# Patient Record
Sex: FEMALE | Race: WHITE
[De-identification: names, ages, dates, MRNs, and addresses within clinical notes are randomized per-mention and may not be internally consistent; named-entity substitution may affect disease eponyms.]

---

## 2020-12-24 ENCOUNTER — HOSPITAL ENCOUNTER (INPATIENT)
Dept: HOSPITAL 79 - ER1 | Age: 55
LOS: 7 days | Discharge: HOME | DRG: 270 | End: 2020-12-31
Attending: HOSPITALIST | Admitting: INTERNAL MEDICINE
Payer: OTHER GOVERNMENT

## 2020-12-24 VITALS — HEIGHT: 67 IN | WEIGHT: 165 LBS | BODY MASS INDEX: 25.9 KG/M2

## 2020-12-24 DIAGNOSIS — A48.0: ICD-10-CM

## 2020-12-24 DIAGNOSIS — Z83.3: ICD-10-CM

## 2020-12-24 DIAGNOSIS — Z90.49: ICD-10-CM

## 2020-12-24 DIAGNOSIS — I16.0: ICD-10-CM

## 2020-12-24 DIAGNOSIS — E11.52: Primary | ICD-10-CM

## 2020-12-24 DIAGNOSIS — L03.031: ICD-10-CM

## 2020-12-24 DIAGNOSIS — Z82.49: ICD-10-CM

## 2020-12-24 DIAGNOSIS — M86.8X7: ICD-10-CM

## 2020-12-24 DIAGNOSIS — F17.210: ICD-10-CM

## 2020-12-24 DIAGNOSIS — E11.69: ICD-10-CM

## 2020-12-24 DIAGNOSIS — Z20.828: ICD-10-CM

## 2020-12-24 DIAGNOSIS — Z91.19: ICD-10-CM

## 2020-12-24 DIAGNOSIS — E11.65: ICD-10-CM

## 2020-12-24 LAB
BUN/CREATININE RATIO: 10 (ref 0–10)
HGB BLD-MCNC: 14.4 GM/DL (ref 12.3–15.3)
RED BLOOD COUNT: 4.5 M/UL (ref 4–5.1)
WHITE BLOOD COUNT: 10 K/UL (ref 4.5–11)

## 2020-12-24 PROCEDURE — C1769 GUIDE WIRE: HCPCS

## 2020-12-24 PROCEDURE — C1887 CATHETER, GUIDING: HCPCS

## 2020-12-24 PROCEDURE — C1714 CATH, TRANS ATHERECTOMY, DIR: HCPCS

## 2020-12-24 PROCEDURE — C2623 CATH, TRANSLUMIN, DRUG-COAT: HCPCS

## 2020-12-24 PROCEDURE — C1725 CATH, TRANSLUMIN NON-LASER: HCPCS

## 2020-12-24 PROCEDURE — U0002 COVID-19 LAB TEST NON-CDC: HCPCS

## 2020-12-25 LAB
BUN/CREATININE RATIO: 11 (ref 0–10)
HGB BLD-MCNC: 12.1 GM/DL (ref 12.3–15.3)
RED BLOOD COUNT: 3.85 M/UL (ref 4–5.1)
WHITE BLOOD COUNT: 9 K/UL (ref 4.5–11)

## 2020-12-26 LAB
BUN/CREATININE RATIO: 10 (ref 0–10)
HGB BLD-MCNC: 12.7 GM/DL (ref 12.3–15.3)
RED BLOOD COUNT: 4.14 M/UL (ref 4–5.1)
WHITE BLOOD COUNT: 10.7 K/UL (ref 4.5–11)

## 2020-12-27 LAB — BUN/CREATININE RATIO: 10 (ref 0–10)

## 2020-12-28 LAB — BUN/CREATININE RATIO: 9 (ref 0–10)

## 2020-12-29 LAB
BUN/CREATININE RATIO: 7 (ref 0–10)
HGB BLD-MCNC: 12.1 GM/DL (ref 12.3–15.3)
RED BLOOD COUNT: 3.87 M/UL (ref 4–5.1)
WHITE BLOOD COUNT: 9.5 K/UL (ref 4.5–11)

## 2020-12-29 PROCEDURE — 04CK3ZZ EXTIRPATION OF MATTER FROM RIGHT FEMORAL ARTERY, PERCUTANEOUS APPROACH: ICD-10-PCS | Performed by: SURGERY

## 2020-12-29 PROCEDURE — 0Y6T0Z1 DETACHMENT AT RIGHT 3RD TOE, HIGH, OPEN APPROACH: ICD-10-PCS | Performed by: SURGERY

## 2020-12-29 PROCEDURE — 047M3Z1 DILATION OF RIGHT POPLITEAL ARTERY USING DRUG-COATED BALLOON, PERCUTANEOUS APPROACH: ICD-10-PCS | Performed by: SURGERY

## 2020-12-29 PROCEDURE — B41GYZZ FLUOROSCOPY OF LEFT LOWER EXTREMITY ARTERIES USING OTHER CONTRAST: ICD-10-PCS | Performed by: SURGERY

## 2020-12-29 PROCEDURE — 047K3Z1 DILATION OF RIGHT FEMORAL ARTERY USING DRUG-COATED BALLOON, PERCUTANEOUS APPROACH: ICD-10-PCS | Performed by: SURGERY

## 2020-12-29 PROCEDURE — 04CM3ZZ EXTIRPATION OF MATTER FROM RIGHT POPLITEAL ARTERY, PERCUTANEOUS APPROACH: ICD-10-PCS | Performed by: SURGERY

## 2020-12-29 PROCEDURE — B41FYZZ FLUOROSCOPY OF RIGHT LOWER EXTREMITY ARTERIES USING OTHER CONTRAST: ICD-10-PCS | Performed by: SURGERY

## 2020-12-29 PROCEDURE — 047P3ZZ DILATION OF RIGHT ANTERIOR TIBIAL ARTERY, PERCUTANEOUS APPROACH: ICD-10-PCS | Performed by: SURGERY

## 2020-12-29 NOTE — NUR
2100- ASKED PATIENT IF SHE WANTED ME TO GO AHEAD AND UNWRAP AND APPLY MORE
BETADINE TO HER FOOT AND REWRAP IT, SHE STATES SHE WOULD RATHER WAIT UNTIL IN
THE MORNING.

## 2020-12-30 LAB
BUN/CREATININE RATIO: 8 (ref 0–10)
HGB BLD-MCNC: 10.9 GM/DL (ref 12.3–15.3)
RED BLOOD COUNT: 3.53 M/UL (ref 4–5.1)
WHITE BLOOD COUNT: 12 K/UL (ref 4.5–11)

## 2020-12-31 LAB
BUN/CREATININE RATIO: 15 (ref 0–10)
HGB BLD-MCNC: 11.6 GM/DL (ref 12.3–15.3)
RED BLOOD COUNT: 3.7 M/UL (ref 4–5.1)
WHITE BLOOD COUNT: 11 K/UL (ref 4.5–11)

## 2020-12-31 PROCEDURE — 02HV33Z INSERTION OF INFUSION DEVICE INTO SUPERIOR VENA CAVA, PERCUTANEOUS APPROACH: ICD-10-PCS | Performed by: HOSPITALIST

## 2020-12-31 PROCEDURE — B548ZZA ULTRASONOGRAPHY OF SUPERIOR VENA CAVA, GUIDANCE: ICD-10-PCS | Performed by: HOSPITALIST

## 2021-01-01 ENCOUNTER — HOSPITAL ENCOUNTER (OUTPATIENT)
Dept: HOSPITAL 79 - OPSV | Age: 56
End: 2021-01-01
Payer: OTHER GOVERNMENT

## 2021-01-01 VITALS — WEIGHT: 165 LBS | BODY MASS INDEX: 25.9 KG/M2 | HEIGHT: 67 IN

## 2021-01-01 DIAGNOSIS — M86.8X7: Primary | ICD-10-CM

## 2021-01-01 DIAGNOSIS — I96: ICD-10-CM

## 2021-01-02 ENCOUNTER — HOSPITAL ENCOUNTER (OUTPATIENT)
Dept: HOSPITAL 79 - OPSV | Age: 56
End: 2021-01-02
Attending: HOSPITALIST
Payer: OTHER GOVERNMENT

## 2021-01-02 VITALS — BODY MASS INDEX: 25.9 KG/M2 | WEIGHT: 165 LBS | HEIGHT: 67 IN

## 2021-01-02 DIAGNOSIS — I96: ICD-10-CM

## 2021-01-02 DIAGNOSIS — M86.8X7: Primary | ICD-10-CM

## 2021-01-03 ENCOUNTER — HOSPITAL ENCOUNTER (OUTPATIENT)
Dept: HOSPITAL 79 - OPSV | Age: 56
End: 2021-01-03
Attending: HOSPITALIST
Payer: OTHER GOVERNMENT

## 2021-01-03 VITALS — BODY MASS INDEX: 25.9 KG/M2 | WEIGHT: 165 LBS | HEIGHT: 67 IN

## 2021-01-03 DIAGNOSIS — M86.8X7: Primary | ICD-10-CM

## 2021-01-03 DIAGNOSIS — I96: ICD-10-CM

## 2021-01-04 ENCOUNTER — HOSPITAL ENCOUNTER (OUTPATIENT)
Dept: HOSPITAL 79 - OPSV | Age: 56
End: 2021-01-04
Attending: HOSPITALIST
Payer: OTHER GOVERNMENT

## 2021-01-04 ENCOUNTER — HOSPITAL ENCOUNTER (OUTPATIENT)
Dept: HOSPITAL 79 - WCC | Age: 56
End: 2021-01-04
Attending: SURGERY
Payer: OTHER GOVERNMENT

## 2021-01-04 VITALS — BODY MASS INDEX: 25.9 KG/M2 | WEIGHT: 165 LBS | HEIGHT: 67 IN

## 2021-01-04 DIAGNOSIS — E08.21: ICD-10-CM

## 2021-01-04 DIAGNOSIS — L97.516: ICD-10-CM

## 2021-01-04 DIAGNOSIS — I73.9: Primary | ICD-10-CM

## 2021-01-04 DIAGNOSIS — M86.8X7: Primary | ICD-10-CM

## 2021-01-04 DIAGNOSIS — I96: ICD-10-CM

## 2021-01-04 DIAGNOSIS — F17.291: ICD-10-CM

## 2021-01-04 PROCEDURE — G0463 HOSPITAL OUTPT CLINIC VISIT: HCPCS

## 2021-01-05 ENCOUNTER — HOSPITAL ENCOUNTER (OUTPATIENT)
Dept: HOSPITAL 79 - OPSV | Age: 56
End: 2021-01-05
Attending: HOSPITALIST
Payer: OTHER GOVERNMENT

## 2021-01-05 DIAGNOSIS — M86.8X7: Primary | ICD-10-CM

## 2021-01-05 DIAGNOSIS — I96: ICD-10-CM

## 2021-01-06 ENCOUNTER — HOSPITAL ENCOUNTER (OUTPATIENT)
Dept: HOSPITAL 79 - OPSV | Age: 56
End: 2021-01-06
Attending: HOSPITALIST
Payer: OTHER GOVERNMENT

## 2021-01-06 ENCOUNTER — HOSPITAL ENCOUNTER (OUTPATIENT)
Dept: HOSPITAL 79 - WCC | Age: 56
End: 2021-01-06
Attending: NURSE PRACTITIONER
Payer: OTHER GOVERNMENT

## 2021-01-06 VITALS — BODY MASS INDEX: 25.9 KG/M2 | WEIGHT: 165 LBS | HEIGHT: 67 IN

## 2021-01-06 DIAGNOSIS — M86.8X7: Primary | ICD-10-CM
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
## 2021-01-06 DIAGNOSIS — I96: ICD-10-CM

## 2021-01-06 DIAGNOSIS — E11.40: ICD-10-CM

## 2021-01-06 DIAGNOSIS — T81.30XA: Primary | ICD-10-CM

## 2021-01-06 PROCEDURE — G0463 HOSPITAL OUTPT CLINIC VISIT: HCPCS

## 2021-01-07 ENCOUNTER — HOSPITAL ENCOUNTER (OUTPATIENT)
Dept: HOSPITAL 79 - OPSV | Age: 56
End: 2021-01-07
Attending: HOSPITALIST
Payer: OTHER GOVERNMENT

## 2021-01-07 DIAGNOSIS — M86.8X7: Primary | ICD-10-CM

## 2021-01-07 DIAGNOSIS — I96: ICD-10-CM

## 2021-01-08 ENCOUNTER — HOSPITAL ENCOUNTER (OUTPATIENT)
Dept: HOSPITAL 79 - OPSV | Age: 56
End: 2021-01-08
Attending: HOSPITALIST
Payer: OTHER GOVERNMENT

## 2021-01-08 ENCOUNTER — HOSPITAL ENCOUNTER (OUTPATIENT)
Dept: HOSPITAL 79 - WCC | Age: 56
End: 2021-01-08
Attending: NURSE PRACTITIONER
Payer: OTHER GOVERNMENT

## 2021-01-08 VITALS — BODY MASS INDEX: 25.9 KG/M2 | HEIGHT: 67 IN | WEIGHT: 165 LBS

## 2021-01-08 DIAGNOSIS — I73.9: ICD-10-CM

## 2021-01-08 DIAGNOSIS — F17.291: ICD-10-CM

## 2021-01-08 DIAGNOSIS — I96: ICD-10-CM

## 2021-01-08 DIAGNOSIS — L97.516: ICD-10-CM

## 2021-01-08 DIAGNOSIS — E08.21: Primary | ICD-10-CM

## 2021-01-08 DIAGNOSIS — M86.8X7: Primary | ICD-10-CM

## 2021-01-08 DIAGNOSIS — E08.621: ICD-10-CM

## 2021-01-08 PROCEDURE — G0463 HOSPITAL OUTPT CLINIC VISIT: HCPCS

## 2021-01-09 ENCOUNTER — HOSPITAL ENCOUNTER (OUTPATIENT)
Dept: HOSPITAL 79 - OPSV | Age: 56
End: 2021-01-09
Attending: HOSPITALIST
Payer: OTHER GOVERNMENT

## 2021-01-09 VITALS — BODY MASS INDEX: 25.9 KG/M2 | WEIGHT: 165 LBS | HEIGHT: 67 IN

## 2021-01-09 DIAGNOSIS — I96: ICD-10-CM

## 2021-01-09 DIAGNOSIS — M86.8X7: Primary | ICD-10-CM

## 2021-01-10 ENCOUNTER — HOSPITAL ENCOUNTER (OUTPATIENT)
Dept: HOSPITAL 79 - OPSV | Age: 56
End: 2021-01-10
Attending: HOSPITALIST
Payer: OTHER GOVERNMENT

## 2021-01-10 VITALS — BODY MASS INDEX: 25.9 KG/M2 | WEIGHT: 165 LBS | HEIGHT: 67 IN

## 2021-01-10 DIAGNOSIS — M86.8X7: Primary | ICD-10-CM

## 2021-01-10 DIAGNOSIS — I96: ICD-10-CM

## 2021-01-11 ENCOUNTER — HOSPITAL ENCOUNTER (OUTPATIENT)
Dept: HOSPITAL 79 - OPSV | Age: 56
End: 2021-01-11
Attending: HOSPITALIST
Payer: OTHER GOVERNMENT

## 2021-01-11 ENCOUNTER — HOSPITAL ENCOUNTER (OUTPATIENT)
Dept: HOSPITAL 79 - WCC | Age: 56
End: 2021-01-11
Attending: SURGERY
Payer: OTHER GOVERNMENT

## 2021-01-11 VITALS — BODY MASS INDEX: 25.9 KG/M2 | WEIGHT: 165 LBS | HEIGHT: 67 IN

## 2021-01-11 DIAGNOSIS — T87.53: ICD-10-CM

## 2021-01-11 DIAGNOSIS — M86.8X7: Primary | ICD-10-CM

## 2021-01-11 DIAGNOSIS — E11.21: ICD-10-CM

## 2021-01-11 DIAGNOSIS — I96: ICD-10-CM

## 2021-01-11 DIAGNOSIS — I10: ICD-10-CM

## 2021-01-11 DIAGNOSIS — Z89.421: ICD-10-CM

## 2021-01-11 DIAGNOSIS — T87.81: Primary | ICD-10-CM

## 2021-01-11 DIAGNOSIS — L97.514: ICD-10-CM
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
## 2021-01-11 DIAGNOSIS — E11.52: ICD-10-CM

## 2021-01-11 DIAGNOSIS — E11.40: ICD-10-CM

## 2021-01-11 DIAGNOSIS — Z79.51: ICD-10-CM

## 2021-01-11 DIAGNOSIS — E11.621: ICD-10-CM

## 2021-01-11 DIAGNOSIS — F17.291: ICD-10-CM

## 2021-01-11 PROCEDURE — 0KBV0ZZ EXCISION OF RIGHT FOOT MUSCLE, OPEN APPROACH: ICD-10-PCS | Performed by: SURGERY

## 2021-01-12 ENCOUNTER — HOSPITAL ENCOUNTER (OUTPATIENT)
Dept: HOSPITAL 79 - OPSV | Age: 56
End: 2021-01-12
Attending: HOSPITALIST
Payer: OTHER GOVERNMENT

## 2021-01-12 VITALS — HEIGHT: 67 IN | WEIGHT: 165 LBS | BODY MASS INDEX: 25.9 KG/M2

## 2021-01-12 DIAGNOSIS — I96: ICD-10-CM

## 2021-01-12 DIAGNOSIS — M86.8X7: Primary | ICD-10-CM

## 2021-01-13 ENCOUNTER — HOSPITAL ENCOUNTER (OUTPATIENT)
Dept: HOSPITAL 79 - OPSV | Age: 56
End: 2021-01-13
Attending: HOSPITALIST
Payer: OTHER GOVERNMENT

## 2021-01-13 VITALS — WEIGHT: 165 LBS | BODY MASS INDEX: 25.9 KG/M2 | HEIGHT: 67 IN

## 2021-01-13 DIAGNOSIS — I96: ICD-10-CM

## 2021-01-13 DIAGNOSIS — M86.8X7: Primary | ICD-10-CM

## 2021-01-14 ENCOUNTER — HOSPITAL ENCOUNTER (OUTPATIENT)
Dept: HOSPITAL 79 - OPSV | Age: 56
End: 2021-01-14
Attending: HOSPITALIST
Payer: OTHER GOVERNMENT

## 2021-01-14 VITALS — HEIGHT: 67 IN | WEIGHT: 165 LBS | BODY MASS INDEX: 25.9 KG/M2

## 2021-01-14 DIAGNOSIS — M86.8X7: Primary | ICD-10-CM

## 2021-01-14 DIAGNOSIS — I96: ICD-10-CM

## 2021-01-15 ENCOUNTER — HOSPITAL ENCOUNTER (OUTPATIENT)
Dept: HOSPITAL 79 - OPSV | Age: 56
End: 2021-01-15
Attending: HOSPITALIST
Payer: OTHER GOVERNMENT

## 2021-01-15 VITALS — HEIGHT: 67 IN | WEIGHT: 165 LBS | BODY MASS INDEX: 25.9 KG/M2

## 2021-01-15 DIAGNOSIS — M86.8X7: Primary | ICD-10-CM

## 2021-01-15 DIAGNOSIS — I96: ICD-10-CM

## 2021-01-16 ENCOUNTER — HOSPITAL ENCOUNTER (OUTPATIENT)
Dept: HOSPITAL 79 - OPSV | Age: 56
End: 2021-01-16
Attending: HOSPITALIST
Payer: OTHER GOVERNMENT

## 2021-01-16 DIAGNOSIS — M86.8X7: Primary | ICD-10-CM

## 2021-01-16 DIAGNOSIS — I96: ICD-10-CM

## 2021-01-17 ENCOUNTER — HOSPITAL ENCOUNTER (OUTPATIENT)
Dept: HOSPITAL 79 - OPSV | Age: 56
End: 2021-01-17
Attending: HOSPITALIST
Payer: OTHER GOVERNMENT

## 2021-01-17 VITALS — WEIGHT: 165 LBS | BODY MASS INDEX: 25.9 KG/M2 | HEIGHT: 67 IN

## 2021-01-17 DIAGNOSIS — I96: ICD-10-CM

## 2021-01-17 DIAGNOSIS — M86.8X7: Primary | ICD-10-CM

## 2021-01-18 ENCOUNTER — HOSPITAL ENCOUNTER (OUTPATIENT)
Dept: HOSPITAL 79 - WCC | Age: 56
End: 2021-01-18
Attending: SURGERY
Payer: OTHER GOVERNMENT

## 2021-01-18 ENCOUNTER — HOSPITAL ENCOUNTER (OUTPATIENT)
Dept: HOSPITAL 79 - OPSV | Age: 56
End: 2021-01-18
Attending: HOSPITALIST
Payer: OTHER GOVERNMENT

## 2021-01-18 DIAGNOSIS — I96: ICD-10-CM

## 2021-01-18 DIAGNOSIS — E11.621: ICD-10-CM

## 2021-01-18 DIAGNOSIS — Z79.899: ICD-10-CM

## 2021-01-18 DIAGNOSIS — M86.8X7: Primary | ICD-10-CM
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
## 2021-01-18 DIAGNOSIS — F17.291: ICD-10-CM

## 2021-01-18 DIAGNOSIS — T81.31XA: Primary | ICD-10-CM

## 2021-01-18 DIAGNOSIS — E11.52: ICD-10-CM

## 2021-01-18 DIAGNOSIS — I10: ICD-10-CM

## 2021-01-18 DIAGNOSIS — L97.514: ICD-10-CM

## 2021-01-18 DIAGNOSIS — E11.40: ICD-10-CM

## 2021-01-18 PROCEDURE — 0KBV0ZZ EXCISION OF RIGHT FOOT MUSCLE, OPEN APPROACH: ICD-10-PCS | Performed by: SURGERY

## 2021-01-19 ENCOUNTER — HOSPITAL ENCOUNTER (OUTPATIENT)
Dept: HOSPITAL 79 - OPSV | Age: 56
End: 2021-01-19
Attending: HOSPITALIST
Payer: OTHER GOVERNMENT

## 2021-01-19 VITALS — WEIGHT: 165 LBS | HEIGHT: 67 IN | BODY MASS INDEX: 25.9 KG/M2

## 2021-01-19 DIAGNOSIS — I96: Primary | ICD-10-CM

## 2021-01-19 DIAGNOSIS — M86.8X7: ICD-10-CM

## 2021-01-20 ENCOUNTER — HOSPITAL ENCOUNTER (OUTPATIENT)
Dept: HOSPITAL 79 - OPSV | Age: 56
End: 2021-01-20
Attending: HOSPITALIST
Payer: OTHER GOVERNMENT

## 2021-01-20 VITALS — WEIGHT: 165 LBS | BODY MASS INDEX: 25.9 KG/M2 | HEIGHT: 67 IN

## 2021-01-20 DIAGNOSIS — I96: Primary | ICD-10-CM

## 2021-01-20 DIAGNOSIS — M86.8X7: ICD-10-CM

## 2021-01-21 ENCOUNTER — HOSPITAL ENCOUNTER (OUTPATIENT)
Dept: HOSPITAL 79 - OPSV | Age: 56
End: 2021-01-21
Attending: HOSPITALIST
Payer: OTHER GOVERNMENT

## 2021-01-21 DIAGNOSIS — I96: ICD-10-CM

## 2021-01-21 DIAGNOSIS — M86.8X7: Primary | ICD-10-CM

## 2021-01-22 ENCOUNTER — HOSPITAL ENCOUNTER (OUTPATIENT)
Dept: HOSPITAL 79 - OPSV | Age: 56
End: 2021-01-22
Attending: HOSPITALIST
Payer: OTHER GOVERNMENT

## 2021-01-22 VITALS — WEIGHT: 165 LBS | HEIGHT: 67 IN | BODY MASS INDEX: 25.9 KG/M2

## 2021-01-22 DIAGNOSIS — I96: ICD-10-CM

## 2021-01-22 DIAGNOSIS — M86.8X7: Primary | ICD-10-CM

## 2021-01-23 ENCOUNTER — HOSPITAL ENCOUNTER (OUTPATIENT)
Dept: HOSPITAL 79 - OPSV | Age: 56
End: 2021-01-23
Attending: HOSPITALIST
Payer: OTHER GOVERNMENT

## 2021-01-23 DIAGNOSIS — M86.8X7: Primary | ICD-10-CM

## 2021-01-23 DIAGNOSIS — I96: ICD-10-CM

## 2021-01-24 ENCOUNTER — HOSPITAL ENCOUNTER (OUTPATIENT)
Dept: HOSPITAL 79 - OPSV | Age: 56
End: 2021-01-24
Attending: HOSPITALIST
Payer: OTHER GOVERNMENT

## 2021-01-24 DIAGNOSIS — M86.8X7: ICD-10-CM

## 2021-01-24 DIAGNOSIS — I96: Primary | ICD-10-CM

## 2021-01-25 ENCOUNTER — HOSPITAL ENCOUNTER (OUTPATIENT)
Dept: HOSPITAL 79 - WCC | Age: 56
End: 2021-01-25
Attending: SURGERY
Payer: OTHER GOVERNMENT

## 2021-01-25 ENCOUNTER — HOSPITAL ENCOUNTER (OUTPATIENT)
Dept: HOSPITAL 79 - OPSV | Age: 56
End: 2021-01-25
Attending: HOSPITALIST
Payer: OTHER GOVERNMENT

## 2021-01-25 DIAGNOSIS — E11.621: ICD-10-CM

## 2021-01-25 DIAGNOSIS — E11.51: ICD-10-CM

## 2021-01-25 DIAGNOSIS — L97.516: ICD-10-CM

## 2021-01-25 DIAGNOSIS — I96: ICD-10-CM

## 2021-01-25 DIAGNOSIS — F17.291: ICD-10-CM

## 2021-01-25 DIAGNOSIS — T81.31XA: Primary | ICD-10-CM

## 2021-01-25 DIAGNOSIS — M86.8X7: Primary | ICD-10-CM

## 2021-01-26 ENCOUNTER — HOSPITAL ENCOUNTER (OUTPATIENT)
Dept: HOSPITAL 79 - OPSV | Age: 56
End: 2021-01-26
Attending: HOSPITALIST
Payer: OTHER GOVERNMENT

## 2021-01-26 VITALS — WEIGHT: 165 LBS | HEIGHT: 67 IN | BODY MASS INDEX: 25.9 KG/M2

## 2021-01-26 DIAGNOSIS — M86.8X7: Primary | ICD-10-CM

## 2021-01-26 DIAGNOSIS — I96: ICD-10-CM

## 2021-01-27 ENCOUNTER — HOSPITAL ENCOUNTER (OUTPATIENT)
Dept: HOSPITAL 79 - OPSV | Age: 56
End: 2021-01-27
Attending: HOSPITALIST
Payer: OTHER GOVERNMENT

## 2021-01-27 VITALS — BODY MASS INDEX: 25.9 KG/M2 | HEIGHT: 67 IN | WEIGHT: 165 LBS

## 2021-01-27 DIAGNOSIS — I96: ICD-10-CM

## 2021-01-27 DIAGNOSIS — M86.8X7: Primary | ICD-10-CM

## 2021-01-28 ENCOUNTER — HOSPITAL ENCOUNTER (OUTPATIENT)
Dept: HOSPITAL 79 - OPSV | Age: 56
End: 2021-01-28
Attending: HOSPITALIST
Payer: OTHER GOVERNMENT

## 2021-01-28 DIAGNOSIS — M86.8X7: Primary | ICD-10-CM

## 2021-01-28 DIAGNOSIS — I96: ICD-10-CM

## 2021-01-29 ENCOUNTER — HOSPITAL ENCOUNTER (OUTPATIENT)
Dept: HOSPITAL 79 - OPSV | Age: 56
End: 2021-01-29
Attending: HOSPITALIST
Payer: OTHER GOVERNMENT

## 2021-01-29 VITALS — BODY MASS INDEX: 25.9 KG/M2 | WEIGHT: 165 LBS | HEIGHT: 67 IN

## 2021-01-29 DIAGNOSIS — I96: ICD-10-CM

## 2021-01-29 DIAGNOSIS — M86.8X7: Primary | ICD-10-CM

## 2021-01-30 ENCOUNTER — HOSPITAL ENCOUNTER (OUTPATIENT)
Dept: HOSPITAL 79 - OPSV | Age: 56
End: 2021-01-30
Attending: HOSPITALIST
Payer: OTHER GOVERNMENT

## 2021-01-30 VITALS — WEIGHT: 165 LBS | BODY MASS INDEX: 25.9 KG/M2 | HEIGHT: 67 IN

## 2021-01-30 DIAGNOSIS — I96: ICD-10-CM

## 2021-01-30 DIAGNOSIS — M86.8X7: Primary | ICD-10-CM

## 2021-02-01 ENCOUNTER — HOSPITAL ENCOUNTER (OUTPATIENT)
Dept: HOSPITAL 79 - WCC | Age: 56
End: 2021-02-01
Attending: SURGERY
Payer: OTHER GOVERNMENT

## 2021-02-01 DIAGNOSIS — F17.291: ICD-10-CM

## 2021-02-01 DIAGNOSIS — E11.52: ICD-10-CM

## 2021-02-01 DIAGNOSIS — I10: ICD-10-CM

## 2021-02-01 DIAGNOSIS — Z89.421: ICD-10-CM

## 2021-02-01 DIAGNOSIS — Z79.1: ICD-10-CM

## 2021-02-01 DIAGNOSIS — L97.514: ICD-10-CM

## 2021-02-01 DIAGNOSIS — E11.621: ICD-10-CM

## 2021-02-01 DIAGNOSIS — T87.53: ICD-10-CM

## 2021-02-01 DIAGNOSIS — T87.81: Primary | ICD-10-CM
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
## 2021-02-01 DIAGNOSIS — E11.21: ICD-10-CM

## 2021-02-01 DIAGNOSIS — I96: ICD-10-CM

## 2021-02-01 DIAGNOSIS — E11.40: ICD-10-CM

## 2021-02-01 PROCEDURE — 0KBV0ZZ EXCISION OF RIGHT FOOT MUSCLE, OPEN APPROACH: ICD-10-PCS | Performed by: SURGERY

## 2021-02-08 ENCOUNTER — HOSPITAL ENCOUNTER (OUTPATIENT)
Dept: HOSPITAL 79 - WCC | Age: 56
End: 2021-02-08
Attending: SURGERY
Payer: OTHER GOVERNMENT
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
## 2021-02-08 DIAGNOSIS — E11.52: ICD-10-CM

## 2021-02-08 DIAGNOSIS — I96: ICD-10-CM

## 2021-02-08 DIAGNOSIS — T87.81: Primary | ICD-10-CM

## 2021-02-08 DIAGNOSIS — I10: ICD-10-CM

## 2021-02-08 DIAGNOSIS — T87.53: ICD-10-CM

## 2021-02-08 DIAGNOSIS — E11.40: ICD-10-CM

## 2021-02-08 DIAGNOSIS — Z79.52: ICD-10-CM

## 2021-02-08 PROCEDURE — 0KBV0ZZ EXCISION OF RIGHT FOOT MUSCLE, OPEN APPROACH: ICD-10-PCS | Performed by: SURGERY

## 2021-02-09 ENCOUNTER — HOSPITAL ENCOUNTER (EMERGENCY)
Dept: HOSPITAL 79 - ER1 | Age: 56
Discharge: HOME | End: 2021-02-09
Payer: OTHER GOVERNMENT

## 2021-02-09 DIAGNOSIS — I95.9: Primary | ICD-10-CM

## 2021-02-09 DIAGNOSIS — E11.9: ICD-10-CM

## 2021-02-09 DIAGNOSIS — Z79.82: ICD-10-CM

## 2021-02-09 DIAGNOSIS — Z79.899: ICD-10-CM

## 2021-02-09 DIAGNOSIS — I10: ICD-10-CM

## 2021-02-09 DIAGNOSIS — E78.5: ICD-10-CM

## 2021-02-09 LAB
BUN/CREATININE RATIO: 19 (ref 0–10)
HGB BLD-MCNC: 10.9 GM/DL (ref 12.3–15.3)
RED BLOOD COUNT: 3.62 M/UL (ref 4–5.1)
WHITE BLOOD COUNT: 6.7 K/UL (ref 4.5–11)

## 2021-02-22 ENCOUNTER — HOSPITAL ENCOUNTER (OUTPATIENT)
Dept: HOSPITAL 79 - WCC | Age: 56
End: 2021-02-22
Attending: SURGERY
Payer: OTHER GOVERNMENT

## 2021-02-22 DIAGNOSIS — I96: ICD-10-CM

## 2021-02-22 DIAGNOSIS — E11.40: ICD-10-CM

## 2021-02-22 DIAGNOSIS — E11.21: ICD-10-CM

## 2021-02-22 DIAGNOSIS — T87.81: Primary | ICD-10-CM

## 2021-02-22 DIAGNOSIS — I10: ICD-10-CM

## 2021-02-22 DIAGNOSIS — Z79.52: ICD-10-CM

## 2021-02-22 DIAGNOSIS — T87.53: ICD-10-CM

## 2021-02-22 DIAGNOSIS — L97.514: ICD-10-CM

## 2021-02-22 DIAGNOSIS — E11.621: ICD-10-CM

## 2021-02-22 DIAGNOSIS — Z89.421: ICD-10-CM
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
## 2021-02-22 DIAGNOSIS — F17.291: ICD-10-CM

## 2021-02-22 DIAGNOSIS — E11.52: ICD-10-CM

## 2021-02-22 PROCEDURE — 0KBV0ZZ EXCISION OF RIGHT FOOT MUSCLE, OPEN APPROACH: ICD-10-PCS | Performed by: SURGERY

## 2021-03-01 ENCOUNTER — HOSPITAL ENCOUNTER (OUTPATIENT)
Dept: HOSPITAL 79 - WCC | Age: 56
End: 2021-03-01
Attending: SURGERY
Payer: OTHER GOVERNMENT

## 2021-03-01 ENCOUNTER — HOSPITAL ENCOUNTER (OUTPATIENT)
Dept: HOSPITAL 79 - DTC | Age: 56
End: 2021-03-01
Attending: NURSE PRACTITIONER
Payer: OTHER GOVERNMENT

## 2021-03-01 DIAGNOSIS — G47.30: ICD-10-CM

## 2021-03-01 DIAGNOSIS — L97.522: ICD-10-CM

## 2021-03-01 DIAGNOSIS — E11.9: Primary | ICD-10-CM

## 2021-03-01 DIAGNOSIS — I10: ICD-10-CM

## 2021-03-01 DIAGNOSIS — L97.829: ICD-10-CM

## 2021-03-01 DIAGNOSIS — L97.819: ICD-10-CM

## 2021-03-01 DIAGNOSIS — E11.621: Primary | ICD-10-CM

## 2021-03-01 PROCEDURE — G0108 DIAB MANAGE TRN  PER INDIV: HCPCS

## 2021-03-08 ENCOUNTER — HOSPITAL ENCOUNTER (OUTPATIENT)
Dept: HOSPITAL 79 - WCC | Age: 56
End: 2021-03-08
Attending: SURGERY
Payer: OTHER GOVERNMENT

## 2021-03-08 DIAGNOSIS — Z79.52: ICD-10-CM

## 2021-03-08 DIAGNOSIS — I10: ICD-10-CM

## 2021-03-08 DIAGNOSIS — E11.40: ICD-10-CM

## 2021-03-08 DIAGNOSIS — E11.52: ICD-10-CM

## 2021-03-08 DIAGNOSIS — L97.513: ICD-10-CM

## 2021-03-08 DIAGNOSIS — T87.81: Primary | ICD-10-CM

## 2021-03-08 DIAGNOSIS — E11.21: ICD-10-CM

## 2021-03-08 DIAGNOSIS — T87.53: ICD-10-CM

## 2021-03-08 DIAGNOSIS — F17.291: ICD-10-CM

## 2021-03-08 DIAGNOSIS — E11.621: ICD-10-CM

## 2021-03-08 DIAGNOSIS — I96: ICD-10-CM
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
## 2021-03-08 PROCEDURE — 0KBV0ZZ EXCISION OF RIGHT FOOT MUSCLE, OPEN APPROACH: ICD-10-PCS | Performed by: SURGERY

## 2021-03-15 ENCOUNTER — HOSPITAL ENCOUNTER (OUTPATIENT)
Dept: HOSPITAL 79 - WCC | Age: 56
End: 2021-03-15
Attending: NURSE PRACTITIONER
Payer: OTHER GOVERNMENT

## 2021-03-15 DIAGNOSIS — Z79.899: ICD-10-CM
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
## 2021-03-15 DIAGNOSIS — E11.52: ICD-10-CM

## 2021-03-15 DIAGNOSIS — F17.291: ICD-10-CM

## 2021-03-15 DIAGNOSIS — I96: ICD-10-CM

## 2021-03-15 DIAGNOSIS — I10: ICD-10-CM

## 2021-03-15 DIAGNOSIS — E11.21: ICD-10-CM

## 2021-03-15 DIAGNOSIS — L97.513: ICD-10-CM

## 2021-03-15 DIAGNOSIS — T87.81: Primary | ICD-10-CM

## 2021-03-15 DIAGNOSIS — T87.53: ICD-10-CM

## 2021-03-15 DIAGNOSIS — E11.621: ICD-10-CM

## 2021-03-15 PROCEDURE — 0KBV0ZZ EXCISION OF RIGHT FOOT MUSCLE, OPEN APPROACH: ICD-10-PCS | Performed by: NURSE PRACTITIONER

## 2021-03-22 ENCOUNTER — HOSPITAL ENCOUNTER (OUTPATIENT)
Dept: HOSPITAL 79 - WCC | Age: 56
End: 2021-03-22
Attending: NURSE PRACTITIONER
Payer: OTHER GOVERNMENT

## 2021-03-22 DIAGNOSIS — T87.53: ICD-10-CM

## 2021-03-22 DIAGNOSIS — Z79.52: ICD-10-CM

## 2021-03-22 DIAGNOSIS — I96: ICD-10-CM

## 2021-03-22 DIAGNOSIS — T87.81: Primary | ICD-10-CM

## 2021-03-22 DIAGNOSIS — E11.40: ICD-10-CM

## 2021-03-22 DIAGNOSIS — E11.21: ICD-10-CM

## 2021-03-22 DIAGNOSIS — L97.512: ICD-10-CM

## 2021-03-22 DIAGNOSIS — I10: ICD-10-CM

## 2021-03-22 DIAGNOSIS — E11.621: ICD-10-CM

## 2021-03-22 DIAGNOSIS — E11.52: ICD-10-CM
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
## 2021-03-22 DIAGNOSIS — F17.291: ICD-10-CM

## 2021-03-22 PROCEDURE — 0KBV0ZZ EXCISION OF RIGHT FOOT MUSCLE, OPEN APPROACH: ICD-10-PCS | Performed by: NURSE PRACTITIONER

## 2021-03-29 ENCOUNTER — HOSPITAL ENCOUNTER (OUTPATIENT)
Dept: HOSPITAL 79 - WCC | Age: 56
End: 2021-03-29
Attending: NURSE PRACTITIONER
Payer: OTHER GOVERNMENT

## 2021-03-29 DIAGNOSIS — L97.512: ICD-10-CM

## 2021-03-29 DIAGNOSIS — T81.31XA: Primary | ICD-10-CM

## 2021-03-29 DIAGNOSIS — E11.621: ICD-10-CM
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
## 2021-03-29 DIAGNOSIS — E11.52: ICD-10-CM

## 2021-03-29 DIAGNOSIS — I96: ICD-10-CM

## 2021-03-29 DIAGNOSIS — E11.40: ICD-10-CM

## 2021-03-29 DIAGNOSIS — E11.21: ICD-10-CM

## 2021-03-29 DIAGNOSIS — F17.291: ICD-10-CM

## 2021-03-29 DIAGNOSIS — Z79.52: ICD-10-CM

## 2021-03-29 PROCEDURE — 0KBV0ZZ EXCISION OF RIGHT FOOT MUSCLE, OPEN APPROACH: ICD-10-PCS | Performed by: NURSE PRACTITIONER

## 2021-04-05 ENCOUNTER — HOSPITAL ENCOUNTER (OUTPATIENT)
Dept: HOSPITAL 79 - WCC | Age: 56
End: 2021-04-05
Attending: SURGERY
Payer: OTHER GOVERNMENT

## 2021-04-05 DIAGNOSIS — E11.40: ICD-10-CM

## 2021-04-05 DIAGNOSIS — T87.53: ICD-10-CM
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
## 2021-04-05 DIAGNOSIS — E11.52: ICD-10-CM

## 2021-04-05 DIAGNOSIS — T87.81: Primary | ICD-10-CM

## 2021-04-05 DIAGNOSIS — I10: ICD-10-CM

## 2021-04-05 DIAGNOSIS — Z79.52: ICD-10-CM

## 2021-04-05 DIAGNOSIS — S81.802A: ICD-10-CM

## 2021-04-05 DIAGNOSIS — I96: ICD-10-CM
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
## 2021-04-05 PROCEDURE — 0JBQ0ZZ EXCISION OF RIGHT FOOT SUBCUTANEOUS TISSUE AND FASCIA, OPEN APPROACH: ICD-10-PCS | Performed by: SURGERY

## 2021-04-12 ENCOUNTER — HOSPITAL ENCOUNTER (OUTPATIENT)
Dept: HOSPITAL 79 - WCC | Age: 56
End: 2021-04-12
Attending: NURSE PRACTITIONER
Payer: OTHER GOVERNMENT

## 2021-04-12 DIAGNOSIS — Z89.421: ICD-10-CM

## 2021-04-12 DIAGNOSIS — E11.621: Primary | ICD-10-CM

## 2021-04-12 DIAGNOSIS — E11.51: ICD-10-CM

## 2021-04-12 DIAGNOSIS — T87.81: ICD-10-CM

## 2021-04-12 DIAGNOSIS — E11.21: ICD-10-CM

## 2021-04-12 DIAGNOSIS — S81.802A: ICD-10-CM

## 2021-04-20 ENCOUNTER — HOSPITAL ENCOUNTER (OUTPATIENT)
Dept: HOSPITAL 79 - WCC | Age: 56
End: 2021-04-20
Attending: NURSE PRACTITIONER
Payer: OTHER GOVERNMENT

## 2021-04-20 DIAGNOSIS — E11.51: Primary | ICD-10-CM

## 2021-04-20 DIAGNOSIS — I10: ICD-10-CM

## 2021-04-20 DIAGNOSIS — L97.515: ICD-10-CM

## 2021-04-20 DIAGNOSIS — F17.291: ICD-10-CM

## 2021-04-20 DIAGNOSIS — L97.516: ICD-10-CM

## 2021-04-26 ENCOUNTER — HOSPITAL ENCOUNTER (OUTPATIENT)
Dept: HOSPITAL 79 - KOH-I | Age: 56
End: 2021-04-26
Attending: NURSE PRACTITIONER
Payer: OTHER GOVERNMENT

## 2021-04-26 ENCOUNTER — HOSPITAL ENCOUNTER (OUTPATIENT)
Dept: HOSPITAL 79 - WCC | Age: 56
End: 2021-04-26
Attending: SURGERY
Payer: OTHER GOVERNMENT

## 2021-04-26 DIAGNOSIS — L97.516: ICD-10-CM

## 2021-04-26 DIAGNOSIS — L97.515: ICD-10-CM

## 2021-04-26 DIAGNOSIS — I73.9: Primary | ICD-10-CM

## 2021-04-26 DIAGNOSIS — F17.291: ICD-10-CM

## 2021-04-26 DIAGNOSIS — E11.621: Primary | ICD-10-CM

## 2021-04-26 DIAGNOSIS — E08.21: ICD-10-CM

## 2021-04-26 DIAGNOSIS — E11.51: ICD-10-CM

## 2021-05-10 ENCOUNTER — HOSPITAL ENCOUNTER (OUTPATIENT)
Dept: HOSPITAL 79 - WCC | Age: 56
End: 2021-05-10
Attending: NURSE PRACTITIONER
Payer: OTHER GOVERNMENT

## 2021-05-10 DIAGNOSIS — T87.81: ICD-10-CM

## 2021-05-10 DIAGNOSIS — F17.291: ICD-10-CM

## 2021-05-10 DIAGNOSIS — E11.51: ICD-10-CM

## 2021-05-10 DIAGNOSIS — E11.21: ICD-10-CM

## 2021-05-10 DIAGNOSIS — Z89.421: ICD-10-CM

## 2021-05-10 DIAGNOSIS — L97.515: ICD-10-CM

## 2021-05-10 DIAGNOSIS — E11.621: Primary | ICD-10-CM

## 2021-05-10 DIAGNOSIS — Z79.52: ICD-10-CM

## 2021-05-17 ENCOUNTER — HOSPITAL ENCOUNTER (OUTPATIENT)
Dept: HOSPITAL 79 - WCC | Age: 56
End: 2021-05-17
Attending: NURSE PRACTITIONER
Payer: OTHER GOVERNMENT

## 2021-05-17 DIAGNOSIS — E11.621: Primary | ICD-10-CM

## 2021-05-17 DIAGNOSIS — E11.51: ICD-10-CM

## 2021-05-17 DIAGNOSIS — Z89.421: ICD-10-CM

## 2021-05-17 DIAGNOSIS — F17.291: ICD-10-CM

## 2021-05-17 DIAGNOSIS — Z79.899: ICD-10-CM

## 2021-05-17 DIAGNOSIS — T87.81: ICD-10-CM

## 2021-05-17 DIAGNOSIS — L97.515: ICD-10-CM

## 2021-05-17 DIAGNOSIS — L97.516: ICD-10-CM

## 2021-05-24 ENCOUNTER — HOSPITAL ENCOUNTER (OUTPATIENT)
Dept: HOSPITAL 79 - WCC | Age: 56
End: 2021-05-24
Attending: NURSE PRACTITIONER
Payer: OTHER GOVERNMENT

## 2021-05-24 DIAGNOSIS — T87.81: Primary | ICD-10-CM

## 2021-05-24 DIAGNOSIS — E11.51: ICD-10-CM

## 2021-05-24 DIAGNOSIS — L97.515: ICD-10-CM

## 2021-05-24 DIAGNOSIS — E11.621: ICD-10-CM

## 2021-05-24 DIAGNOSIS — Z89.421: ICD-10-CM

## 2021-05-24 DIAGNOSIS — L97.516: ICD-10-CM

## 2021-05-24 DIAGNOSIS — F17.291: ICD-10-CM

## 2021-05-24 DIAGNOSIS — Z79.899: ICD-10-CM

## 2021-06-10 ENCOUNTER — HOSPITAL ENCOUNTER (OUTPATIENT)
Dept: HOSPITAL 79 - WCC | Age: 56
End: 2021-06-10
Attending: NURSE PRACTITIONER
Payer: OTHER GOVERNMENT

## 2021-06-10 DIAGNOSIS — T87.81: ICD-10-CM

## 2021-06-10 DIAGNOSIS — Z79.82: ICD-10-CM

## 2021-06-10 DIAGNOSIS — E11.21: ICD-10-CM

## 2021-06-10 DIAGNOSIS — L97.512: ICD-10-CM

## 2021-06-10 DIAGNOSIS — Z89.421: ICD-10-CM

## 2021-06-10 DIAGNOSIS — Z79.02: ICD-10-CM

## 2021-06-10 DIAGNOSIS — E11.621: Primary | ICD-10-CM

## 2021-06-10 DIAGNOSIS — Z79.899: ICD-10-CM

## 2021-06-10 DIAGNOSIS — E11.51: ICD-10-CM

## 2021-06-10 DIAGNOSIS — Z79.84: ICD-10-CM

## 2021-06-10 DIAGNOSIS — F17.291: ICD-10-CM

## 2021-06-24 ENCOUNTER — HOSPITAL ENCOUNTER (OUTPATIENT)
Dept: HOSPITAL 79 - RAD | Age: 56
End: 2021-06-24
Attending: NURSE PRACTITIONER
Payer: OTHER GOVERNMENT

## 2021-06-24 DIAGNOSIS — E11.21: ICD-10-CM

## 2021-06-24 DIAGNOSIS — E11.51: ICD-10-CM

## 2021-06-24 DIAGNOSIS — F17.291: ICD-10-CM

## 2021-06-24 DIAGNOSIS — E11.621: Primary | ICD-10-CM

## 2021-06-24 DIAGNOSIS — L97.516: ICD-10-CM

## 2021-06-24 DIAGNOSIS — L97.515: ICD-10-CM

## 2021-06-25 ENCOUNTER — HOSPITAL ENCOUNTER (OUTPATIENT)
Dept: HOSPITAL 79 - WCC | Age: 56
End: 2021-06-25
Attending: NURSE PRACTITIONER
Payer: OTHER GOVERNMENT

## 2021-06-25 DIAGNOSIS — Z79.82: ICD-10-CM

## 2021-06-25 DIAGNOSIS — E11.51: ICD-10-CM

## 2021-06-25 DIAGNOSIS — Z79.84: ICD-10-CM

## 2021-06-25 DIAGNOSIS — Z89.421: ICD-10-CM

## 2021-06-25 DIAGNOSIS — Z79.899: ICD-10-CM

## 2021-06-25 DIAGNOSIS — E11.21: ICD-10-CM

## 2021-06-25 DIAGNOSIS — Z79.02: ICD-10-CM

## 2021-06-25 DIAGNOSIS — T87.81: Primary | ICD-10-CM

## 2021-06-25 DIAGNOSIS — F17.291: ICD-10-CM

## 2021-06-25 PROCEDURE — G0463 HOSPITAL OUTPT CLINIC VISIT: HCPCS

## 2022-02-01 ENCOUNTER — PREP FOR SURGERY (OUTPATIENT)
Dept: OTHER | Facility: HOSPITAL | Age: 57
End: 2022-02-01

## 2022-02-01 ENCOUNTER — HOSPITAL ENCOUNTER (INPATIENT)
Facility: HOSPITAL | Age: 57
LOS: 22 days | Discharge: HOME | End: 2022-02-23
Attending: INTERNAL MEDICINE | Admitting: INTERNAL MEDICINE

## 2022-02-01 DIAGNOSIS — M79.2 NEUROPATHIC PAIN: Primary | ICD-10-CM

## 2022-02-01 DIAGNOSIS — Z87.81 S/P RIGHT HIP FRACTURE: Primary | ICD-10-CM

## 2022-02-01 DIAGNOSIS — Z87.81 S/P RIGHT HIP FRACTURE: ICD-10-CM

## 2022-02-01 LAB
GLUCOSE BLDC GLUCOMTR-MCNC: 113 MG/DL (ref 70–130)
GLUCOSE BLDC GLUCOMTR-MCNC: 160 MG/DL (ref 70–130)

## 2022-02-01 PROCEDURE — 25010000002 ENOXAPARIN PER 10 MG: Performed by: INTERNAL MEDICINE

## 2022-02-01 PROCEDURE — 82962 GLUCOSE BLOOD TEST: CPT

## 2022-02-01 PROCEDURE — 63710000001 INSULIN DETEMIR PER 5 UNITS: Performed by: INTERNAL MEDICINE

## 2022-02-01 RX ORDER — MAGNESIUM SULFATE HEPTAHYDRATE 40 MG/ML
2 INJECTION, SOLUTION INTRAVENOUS AS NEEDED
Status: DISCONTINUED | OUTPATIENT
Start: 2022-02-01 | End: 2022-02-23 | Stop reason: HOSPADM

## 2022-02-01 RX ORDER — ROSUVASTATIN CALCIUM 20 MG/1
40 TABLET, COATED ORAL NIGHTLY
Status: CANCELLED | OUTPATIENT
Start: 2022-02-01

## 2022-02-01 RX ORDER — MAGNESIUM SULFATE HEPTAHYDRATE 40 MG/ML
2 INJECTION, SOLUTION INTRAVENOUS AS NEEDED
Status: CANCELLED | OUTPATIENT
Start: 2022-02-01

## 2022-02-01 RX ORDER — CALCIUM CARBONATE 200(500)MG
1 TABLET,CHEWABLE ORAL 2 TIMES DAILY PRN
Status: CANCELLED | OUTPATIENT
Start: 2022-02-01

## 2022-02-01 RX ORDER — NYSTATIN 100000 [USP'U]/G
POWDER TOPICAL EVERY 12 HOURS SCHEDULED
Status: DISCONTINUED | OUTPATIENT
Start: 2022-02-01 | End: 2022-02-23 | Stop reason: HOSPADM

## 2022-02-01 RX ORDER — POLYETHYLENE GLYCOL 3350 17 G/17G
17 POWDER, FOR SOLUTION ORAL DAILY PRN
Status: DISCONTINUED | OUTPATIENT
Start: 2022-02-01 | End: 2022-02-23 | Stop reason: HOSPADM

## 2022-02-01 RX ORDER — ONDANSETRON 4 MG/1
4 TABLET, FILM COATED ORAL EVERY 6 HOURS PRN
Status: CANCELLED | OUTPATIENT
Start: 2022-02-01

## 2022-02-01 RX ORDER — DEXTROSE MONOHYDRATE 25 G/50ML
25 INJECTION, SOLUTION INTRAVENOUS
Status: CANCELLED | OUTPATIENT
Start: 2022-02-01

## 2022-02-01 RX ORDER — ROSUVASTATIN CALCIUM 40 MG/1
40 TABLET, COATED ORAL NIGHTLY
Status: ON HOLD | COMMUNITY
End: 2022-02-23 | Stop reason: SDUPTHER

## 2022-02-01 RX ORDER — MAGNESIUM SULFATE HEPTAHYDRATE 40 MG/ML
4 INJECTION, SOLUTION INTRAVENOUS AS NEEDED
Status: CANCELLED | OUTPATIENT
Start: 2022-02-01

## 2022-02-01 RX ORDER — ACETAMINOPHEN 325 MG/1
650 TABLET ORAL EVERY 4 HOURS PRN
Status: DISCONTINUED | OUTPATIENT
Start: 2022-02-01 | End: 2022-02-23 | Stop reason: HOSPADM

## 2022-02-01 RX ORDER — ROSUVASTATIN CALCIUM 20 MG/1
40 TABLET, COATED ORAL NIGHTLY
Status: DISCONTINUED | OUTPATIENT
Start: 2022-02-01 | End: 2022-02-23 | Stop reason: HOSPADM

## 2022-02-01 RX ORDER — POTASSIUM CHLORIDE 1.5 G/1.77G
40 POWDER, FOR SOLUTION ORAL AS NEEDED
Status: DISCONTINUED | OUTPATIENT
Start: 2022-02-01 | End: 2022-02-23 | Stop reason: HOSPADM

## 2022-02-01 RX ORDER — NICOTINE POLACRILEX 4 MG
15 LOZENGE BUCCAL
Status: CANCELLED | OUTPATIENT
Start: 2022-02-01

## 2022-02-01 RX ORDER — DEXTROSE MONOHYDRATE 25 G/50ML
25 INJECTION, SOLUTION INTRAVENOUS
Status: DISCONTINUED | OUTPATIENT
Start: 2022-02-01 | End: 2022-02-23 | Stop reason: HOSPADM

## 2022-02-01 RX ORDER — POTASSIUM CHLORIDE 20 MEQ/1
40 TABLET, EXTENDED RELEASE ORAL AS NEEDED
Status: DISCONTINUED | OUTPATIENT
Start: 2022-02-01 | End: 2022-02-23 | Stop reason: HOSPADM

## 2022-02-01 RX ORDER — MAGNESIUM SULFATE HEPTAHYDRATE 40 MG/ML
4 INJECTION, SOLUTION INTRAVENOUS AS NEEDED
Status: DISCONTINUED | OUTPATIENT
Start: 2022-02-01 | End: 2022-02-23 | Stop reason: HOSPADM

## 2022-02-01 RX ORDER — POTASSIUM CHLORIDE 1.5 G/1.77G
40 POWDER, FOR SOLUTION ORAL AS NEEDED
Status: CANCELLED | OUTPATIENT
Start: 2022-02-01

## 2022-02-01 RX ORDER — POTASSIUM CHLORIDE 7.45 MG/ML
10 INJECTION INTRAVENOUS
Status: DISCONTINUED | OUTPATIENT
Start: 2022-02-01 | End: 2022-02-23 | Stop reason: HOSPADM

## 2022-02-01 RX ORDER — ASPIRIN 81 MG/1
81 TABLET, CHEWABLE ORAL DAILY
Status: CANCELLED | OUTPATIENT
Start: 2022-02-01

## 2022-02-01 RX ORDER — OXYCODONE HYDROCHLORIDE 5 MG/1
5 TABLET ORAL EVERY 6 HOURS PRN
Status: CANCELLED | OUTPATIENT
Start: 2022-02-01 | End: 2022-02-08

## 2022-02-01 RX ORDER — ASPIRIN 81 MG/1
81 TABLET ORAL DAILY
Status: ON HOLD | COMMUNITY
End: 2022-02-23 | Stop reason: SDUPTHER

## 2022-02-01 RX ORDER — POTASSIUM CHLORIDE 750 MG/1
40 CAPSULE, EXTENDED RELEASE ORAL AS NEEDED
Status: CANCELLED | OUTPATIENT
Start: 2022-02-01

## 2022-02-01 RX ORDER — ACETAMINOPHEN 325 MG/1
650 TABLET ORAL EVERY 4 HOURS PRN
Status: CANCELLED | OUTPATIENT
Start: 2022-02-01

## 2022-02-01 RX ORDER — BISACODYL 10 MG
10 SUPPOSITORY, RECTAL RECTAL DAILY PRN
Status: DISCONTINUED | OUTPATIENT
Start: 2022-02-01 | End: 2022-02-23 | Stop reason: HOSPADM

## 2022-02-01 RX ORDER — ASPIRIN 81 MG/1
81 TABLET, CHEWABLE ORAL DAILY
Status: DISCONTINUED | OUTPATIENT
Start: 2022-02-01 | End: 2022-02-23 | Stop reason: HOSPADM

## 2022-02-01 RX ORDER — NICOTINE POLACRILEX 4 MG
15 LOZENGE BUCCAL
Status: DISCONTINUED | OUTPATIENT
Start: 2022-02-01 | End: 2022-02-23 | Stop reason: HOSPADM

## 2022-02-01 RX ORDER — POLYETHYLENE GLYCOL 3350 17 G/17G
17 POWDER, FOR SOLUTION ORAL DAILY PRN
Status: CANCELLED | OUTPATIENT
Start: 2022-02-01

## 2022-02-01 RX ORDER — MULTIPLE VITAMINS W/ MINERALS TAB 9MG-400MCG
1 TAB ORAL DAILY
Status: CANCELLED | OUTPATIENT
Start: 2022-02-01

## 2022-02-01 RX ORDER — ONDANSETRON 4 MG/1
4 TABLET, FILM COATED ORAL EVERY 6 HOURS PRN
Status: DISCONTINUED | OUTPATIENT
Start: 2022-02-01 | End: 2022-02-23 | Stop reason: HOSPADM

## 2022-02-01 RX ORDER — POTASSIUM CHLORIDE 7.45 MG/ML
10 INJECTION INTRAVENOUS
Status: CANCELLED | OUTPATIENT
Start: 2022-02-01

## 2022-02-01 RX ORDER — ISOSORBIDE MONONITRATE 30 MG/1
30 TABLET, EXTENDED RELEASE ORAL
Status: DISCONTINUED | OUTPATIENT
Start: 2022-02-01 | End: 2022-02-23 | Stop reason: HOSPADM

## 2022-02-01 RX ORDER — MULTIPLE VITAMINS W/ MINERALS TAB 9MG-400MCG
1 TAB ORAL DAILY
Status: DISCONTINUED | OUTPATIENT
Start: 2022-02-01 | End: 2022-02-23 | Stop reason: HOSPADM

## 2022-02-01 RX ORDER — ASCORBIC ACID 500 MG
500 TABLET ORAL DAILY
Status: CANCELLED | OUTPATIENT
Start: 2022-02-01

## 2022-02-01 RX ORDER — CLOPIDOGREL BISULFATE 75 MG/1
75 TABLET ORAL DAILY
Status: CANCELLED | OUTPATIENT
Start: 2022-02-01

## 2022-02-01 RX ORDER — ISOSORBIDE MONONITRATE 30 MG/1
30 TABLET, EXTENDED RELEASE ORAL
Status: CANCELLED | OUTPATIENT
Start: 2022-02-01

## 2022-02-01 RX ORDER — OXYCODONE HYDROCHLORIDE 5 MG/1
5 TABLET ORAL EVERY 6 HOURS PRN
Status: DISCONTINUED | OUTPATIENT
Start: 2022-02-01 | End: 2022-02-23 | Stop reason: HOSPADM

## 2022-02-01 RX ORDER — ERGOCALCIFEROL 1.25 MG/1
50000 CAPSULE ORAL WEEKLY
COMMUNITY

## 2022-02-01 RX ORDER — CLOPIDOGREL BISULFATE 75 MG/1
75 TABLET ORAL DAILY
Status: ON HOLD | COMMUNITY
End: 2022-02-23 | Stop reason: SDUPTHER

## 2022-02-01 RX ORDER — BISACODYL 10 MG
10 SUPPOSITORY, RECTAL RECTAL DAILY PRN
Status: CANCELLED | OUTPATIENT
Start: 2022-02-01

## 2022-02-01 RX ORDER — OMEPRAZOLE 20 MG/1
20 CAPSULE, DELAYED RELEASE ORAL 2 TIMES DAILY
COMMUNITY
End: 2022-02-23 | Stop reason: HOSPADM

## 2022-02-01 RX ORDER — LISINOPRIL 10 MG/1
10 TABLET ORAL DAILY
COMMUNITY
End: 2022-02-23 | Stop reason: HOSPADM

## 2022-02-01 RX ORDER — ALENDRONATE SODIUM 70 MG/1
70 TABLET ORAL
Status: ON HOLD | COMMUNITY
End: 2022-06-09

## 2022-02-01 RX ORDER — LISINOPRIL 2.5 MG/1
2.5 TABLET ORAL
Status: CANCELLED | OUTPATIENT
Start: 2022-02-01

## 2022-02-01 RX ORDER — LISINOPRIL 2.5 MG/1
2.5 TABLET ORAL
Status: DISCONTINUED | OUTPATIENT
Start: 2022-02-01 | End: 2022-02-23 | Stop reason: HOSPADM

## 2022-02-01 RX ORDER — INSULIN GLARGINE 100 [IU]/ML
15 INJECTION, SOLUTION SUBCUTANEOUS 2 TIMES DAILY
Status: ON HOLD | COMMUNITY
End: 2022-02-23 | Stop reason: SDUPTHER

## 2022-02-01 RX ORDER — CALCIUM CARBONATE 200(500)MG
1 TABLET,CHEWABLE ORAL 2 TIMES DAILY PRN
Status: DISCONTINUED | OUTPATIENT
Start: 2022-02-01 | End: 2022-02-23 | Stop reason: HOSPADM

## 2022-02-01 RX ORDER — CLOPIDOGREL BISULFATE 75 MG/1
75 TABLET ORAL DAILY
Status: DISCONTINUED | OUTPATIENT
Start: 2022-02-01 | End: 2022-02-23 | Stop reason: HOSPADM

## 2022-02-01 RX ORDER — ASCORBIC ACID 500 MG
500 TABLET ORAL DAILY
Status: DISCONTINUED | OUTPATIENT
Start: 2022-02-01 | End: 2022-02-23 | Stop reason: HOSPADM

## 2022-02-01 RX ADMIN — ROSUVASTATIN CALCIUM 40 MG: 20 TABLET, FILM COATED ORAL at 20:44

## 2022-02-01 RX ADMIN — ENOXAPARIN SODIUM 40 MG: 40 INJECTION SUBCUTANEOUS at 20:44

## 2022-02-01 RX ADMIN — OXYCODONE 5 MG: 5 TABLET ORAL at 20:44

## 2022-02-01 RX ADMIN — INSULIN DETEMIR 12 UNITS: 100 INJECTION, SOLUTION SUBCUTANEOUS at 20:45

## 2022-02-01 NOTE — PLAN OF CARE
Goal Outcome Evaluation:  Plan of Care Reviewed With: patient        Progress: no change  Outcome Summary: new admit to unit

## 2022-02-01 NOTE — PROGRESS NOTES
Rehabilitation Nursing  Inpatient Rehabilitation Plan of Care Note    Plan of Care  Copy from POCBody Function Structure    Skin Integrity (Active)  Current Status (2/3/2022 12:00:00 AM): free from skin breakdown this shift  Weekly Goal: free from skin breakdown this stay  Discharge Goal: home free of skin breakdown    Safety    Potential for Injury (Active)  Current Status (2/3/2022 12:00:00 AM): free from injury this shift  Weekly Goal: free from injury this stay  Discharge Goal: home free of injury    Signed by: Sandra Brewster, Nurse

## 2022-02-01 NOTE — PROGRESS NOTES
Patient Assessment Instrument  Quality Indicators - Admission    Section B. Hearing, Speech Vision  Expression of Ideas and Wants: Expresses complex messages without difficulty and  with speech that is clear and easy to understand.  Understanding Verbal and Non-Verbal Content: Understands: Clear comprehension  without cues or repetitions.    Section C. Cognitive Patterns      Section AA7159. Prior Functioning      Section CO6766. Prior Device Use      Section BZ4528. Self Care Performance      Section UA5959. Self Care Discharge Goals      Section OS5587. Mobility Performance      Section NY5316. Mobility Discharge Goals      Section H. Bladder and Bowel      Section I. Active Diagnosis      Section J. Health Conditions      Section K. Swallowing/Nutritional Status      Section M. Skin Conditions      Section N. Medication      Section O. Special Treatments, Procedures, and Programs      OPTIONAL BRANCH FOR TRACKING FALLS  Fall(s) During Shift:    Signed by: Nurse Yesenia

## 2022-02-02 LAB
ALBUMIN SERPL-MCNC: 3.39 G/DL (ref 3.5–5.2)
ALBUMIN/GLOB SERPL: 1.3 G/DL
ALP SERPL-CCNC: 120 U/L (ref 39–117)
ALT SERPL W P-5'-P-CCNC: 39 U/L (ref 1–33)
ANION GAP SERPL CALCULATED.3IONS-SCNC: 9.6 MMOL/L (ref 5–15)
AST SERPL-CCNC: 42 U/L (ref 1–32)
BASOPHILS # BLD AUTO: 0.02 10*3/MM3 (ref 0–0.2)
BASOPHILS NFR BLD AUTO: 0.4 % (ref 0–1.5)
BILIRUB SERPL-MCNC: 0.5 MG/DL (ref 0–1.2)
BUN SERPL-MCNC: 9 MG/DL (ref 6–20)
BUN/CREAT SERPL: 14.3 (ref 7–25)
CALCIUM SPEC-SCNC: 9.2 MG/DL (ref 8.6–10.5)
CHLORIDE SERPL-SCNC: 107 MMOL/L (ref 98–107)
CO2 SERPL-SCNC: 23.4 MMOL/L (ref 22–29)
CREAT SERPL-MCNC: 0.63 MG/DL (ref 0.57–1)
DEPRECATED RDW RBC AUTO: 49.3 FL (ref 37–54)
EOSINOPHIL # BLD AUTO: 0.15 10*3/MM3 (ref 0–0.4)
EOSINOPHIL NFR BLD AUTO: 2.7 % (ref 0.3–6.2)
ERYTHROCYTE [DISTWIDTH] IN BLOOD BY AUTOMATED COUNT: 14.8 % (ref 12.3–15.4)
GFR SERPL CREATININE-BSD FRML MDRD: 98 ML/MIN/1.73
GLOBULIN UR ELPH-MCNC: 2.7 GM/DL
GLUCOSE BLDC GLUCOMTR-MCNC: 101 MG/DL (ref 70–130)
GLUCOSE BLDC GLUCOMTR-MCNC: 120 MG/DL (ref 70–130)
GLUCOSE BLDC GLUCOMTR-MCNC: 180 MG/DL (ref 70–130)
GLUCOSE SERPL-MCNC: 108 MG/DL (ref 65–99)
HCT VFR BLD AUTO: 34.1 % (ref 34–46.6)
HGB BLD-MCNC: 10.7 G/DL (ref 12–15.9)
IMM GRANULOCYTES # BLD AUTO: 0.02 10*3/MM3 (ref 0–0.05)
IMM GRANULOCYTES NFR BLD AUTO: 0.4 % (ref 0–0.5)
LYMPHOCYTES # BLD AUTO: 2.47 10*3/MM3 (ref 0.7–3.1)
LYMPHOCYTES NFR BLD AUTO: 44.7 % (ref 19.6–45.3)
MAGNESIUM SERPL-MCNC: 2 MG/DL (ref 1.6–2.6)
MCH RBC QN AUTO: 28.7 PG (ref 26.6–33)
MCHC RBC AUTO-ENTMCNC: 31.4 G/DL (ref 31.5–35.7)
MCV RBC AUTO: 91.4 FL (ref 79–97)
MONOCYTES # BLD AUTO: 0.39 10*3/MM3 (ref 0.1–0.9)
MONOCYTES NFR BLD AUTO: 7.1 % (ref 5–12)
NEUTROPHILS NFR BLD AUTO: 2.47 10*3/MM3 (ref 1.7–7)
NEUTROPHILS NFR BLD AUTO: 44.7 % (ref 42.7–76)
NRBC BLD AUTO-RTO: 0 /100 WBC (ref 0–0.2)
PLATELET # BLD AUTO: 194 10*3/MM3 (ref 140–450)
PMV BLD AUTO: 9 FL (ref 6–12)
POTASSIUM SERPL-SCNC: 4 MMOL/L (ref 3.5–5.2)
PROT SERPL-MCNC: 6.1 G/DL (ref 6–8.5)
RBC # BLD AUTO: 3.73 10*6/MM3 (ref 3.77–5.28)
SODIUM SERPL-SCNC: 140 MMOL/L (ref 136–145)
WBC NRBC COR # BLD: 5.52 10*3/MM3 (ref 3.4–10.8)

## 2022-02-02 PROCEDURE — 97161 PT EVAL LOW COMPLEX 20 MIN: CPT

## 2022-02-02 PROCEDURE — 97530 THERAPEUTIC ACTIVITIES: CPT | Performed by: OCCUPATIONAL THERAPIST

## 2022-02-02 PROCEDURE — 97116 GAIT TRAINING THERAPY: CPT

## 2022-02-02 PROCEDURE — 25010000002 ENOXAPARIN PER 10 MG: Performed by: INTERNAL MEDICINE

## 2022-02-02 PROCEDURE — 97166 OT EVAL MOD COMPLEX 45 MIN: CPT | Performed by: OCCUPATIONAL THERAPIST

## 2022-02-02 PROCEDURE — 85025 COMPLETE CBC W/AUTO DIFF WBC: CPT | Performed by: INTERNAL MEDICINE

## 2022-02-02 PROCEDURE — 63710000001 INSULIN ASPART PER 5 UNITS: Performed by: INTERNAL MEDICINE

## 2022-02-02 PROCEDURE — 97110 THERAPEUTIC EXERCISES: CPT

## 2022-02-02 PROCEDURE — 83735 ASSAY OF MAGNESIUM: CPT | Performed by: INTERNAL MEDICINE

## 2022-02-02 PROCEDURE — 63710000001 INSULIN DETEMIR PER 5 UNITS: Performed by: INTERNAL MEDICINE

## 2022-02-02 PROCEDURE — 97535 SELF CARE MNGMENT TRAINING: CPT | Performed by: OCCUPATIONAL THERAPIST

## 2022-02-02 PROCEDURE — 82962 GLUCOSE BLOOD TEST: CPT

## 2022-02-02 PROCEDURE — 80053 COMPREHEN METABOLIC PANEL: CPT | Performed by: INTERNAL MEDICINE

## 2022-02-02 RX ADMIN — ASPIRIN 81 MG: 81 TABLET, CHEWABLE ORAL at 08:28

## 2022-02-02 RX ADMIN — ENOXAPARIN SODIUM 40 MG: 40 INJECTION SUBCUTANEOUS at 20:13

## 2022-02-02 RX ADMIN — LISINOPRIL 2.5 MG: 2.5 TABLET ORAL at 08:28

## 2022-02-02 RX ADMIN — Medication 5000 UNITS: at 08:27

## 2022-02-02 RX ADMIN — ISOSORBIDE MONONITRATE 30 MG: 30 TABLET, EXTENDED RELEASE ORAL at 08:28

## 2022-02-02 RX ADMIN — Medication 1 TABLET: at 08:27

## 2022-02-02 RX ADMIN — CLOPIDOGREL BISULFATE 75 MG: 75 TABLET, FILM COATED ORAL at 08:28

## 2022-02-02 RX ADMIN — INSULIN DETEMIR 12 UNITS: 100 INJECTION, SOLUTION SUBCUTANEOUS at 20:14

## 2022-02-02 RX ADMIN — ROSUVASTATIN CALCIUM 40 MG: 20 TABLET, FILM COATED ORAL at 20:13

## 2022-02-02 RX ADMIN — NYSTATIN: 100000 POWDER TOPICAL at 08:28

## 2022-02-02 RX ADMIN — GUAIFENESIN 200 MG: 200 SOLUTION ORAL at 19:19

## 2022-02-02 RX ADMIN — INSULIN ASPART 2 UNITS: 100 INJECTION, SOLUTION INTRAVENOUS; SUBCUTANEOUS at 11:57

## 2022-02-02 RX ADMIN — OXYCODONE HYDROCHLORIDE AND ACETAMINOPHEN 500 MG: 500 TABLET ORAL at 08:28

## 2022-02-02 RX ADMIN — OXYCODONE 5 MG: 5 TABLET ORAL at 20:13

## 2022-02-02 RX ADMIN — NYSTATIN 1 APPLICATION: 100000 POWDER TOPICAL at 20:13

## 2022-02-02 NOTE — THERAPY EVALUATION
Inpatient Rehabilitation - Physical Therapy Initial Evaluation        [facility]     Patient Name: Lisa Velazquez  : 1965  MRN: [id number]    Today's Date: 2022                    Admit Date: 2022      Visit Dx:     ICD-10-CM ICD-9-CM   1. S/P right hip fracture  Z87.81 V15.51       Patient Active Problem List   Diagnosis   • S/P right hip fracture       Past Medical History:   Diagnosis Date   • Coronary artery disease    • Diabetes mellitus (HCC)    • Elevated cholesterol    • GERD (gastroesophageal reflux disease)    • History of transfusion    • Hypertension        Past Surgical History:   Procedure Laterality Date   • ABDOMINAL SURGERY      gallbladder removal   • CARDIAC CATHETERIZATION     • CARDIAC SURGERY      stent   • COLONOSCOPY     • FRACTURE SURGERY     • TOE AMPUTATION Right     3rd toe right foot       PT ASSESSMENT (last 12 hours)     IRF PT Evaluation and Treatment     Row Name 22 1304          PT Time and Intention    Document Type initial evaluation; daily treatment  -LB     Mode of Treatment individual therapy; physical therapy  -LB     Row Name 22 1304          General Information    Patient Profile Reviewed yes  -LB     Existing Precautions/Restrictions fall  -LB     Row Name 22 1304          Cognition/Psychosocial    Affect/Mental Status (Cognitive) WFL  -LB     Follows Commands (Cognition) verbal cues/prompting required  -LB     Personal Safety Interventions gait belt; nonskid shoes/slippers when out of bed; supervised activity  -LB     Row Name 22 1304          Pain Scale: FACES Pre/Post-Treatment    Pain: FACES Scale, Pretreatment 6-->hurts even more  -LB     Posttreatment Pain Rating 6-->hurts even more  -LB     Pain Location --  R Hip  -LB     Pre/Posttreatment Pain Comment rest, repositioned  -LB     Row Name 22 1304          Range of Motion (ROM)    Range of Motion --  right knee/hip lack 25% due to pain  -LB     Row Name 22 1306           Bed Mobility    Sit-Supine [place] (Bed Mobility) minimum assist (75% patient effort); verbal cues; nonverbal cues (demo/gesture)  -LB     Row Name 02/02/22 1304          Transfers    Chair-Bed [place] (Transfers) contact guard; verbal cues; nonverbal cues (demo/gesture)  -LB     Sit-Stand [place] (Transfers) contact guard; verbal cues; nonverbal cues (demo/gesture)  -LB     Stand-Sit [place] (Transfers) contact guard; verbal cues; nonverbal cues (demo/gesture)  -LB     Row Name 02/02/22 1304          Chair-Bed Transfer    Assistive Device (Chair-Bed Transfers) wheelchair  -LB     Row Name 02/02/22 1304          Sit-Stand Transfer    Assistive Device (Sit-Stand Transfers) walker, front-wheeled  -LB     Row Name 02/02/22 1304          Stand-Sit Transfer    Assistive Device (Stand-Sit Transfers) walker, front-wheeled  -LB     Row Name 02/02/22 1304          Gait/Stairs (Locomotion)    [place] Level (Gait) contact guard; verbal cues; nonverbal cues (demo/gesture)  -LB     Assistive Device (Gait) walker, front-wheeled  -LB     Distance in Feet (Gait) am-40' x2, pm-80' 60'  -LB     Deviations/Abnormal Patterns (Gait) antalgic; bc decreased; gait speed decreased; stride length decreased  -LB     Bilateral Gait Deviations forward flexed posture  -LB     Row Name 02/02/22 1304          Safety Issues, Functional Mobility    Impairments Affecting Function (Mobility) balance; endurance/activity tolerance; pain; range of motion (ROM); strength  -LB     Row Name 02/02/22 1304          Balance    Comment, Balance sitting bean bag toss and  with reacher  -LB     Row Name 02/02/22 1304          Motor Skills    Therapeutic Exercise --  sitting ex bid  -LB     Row Name 02/02/22 1304          IRF PT Goals    Bed Mobility Goal Selection (PT-IRF) bed mobility, PT goal 1  -LB     Transfer Goal Selection (PT-IRF) transfers, PT goal 1  -LB     Gait (Walking Locomotion) Goal Selection (PT-IRF) gait,  PT goal 1  -LB     Row Name 02/02/22 1304          Bed Mobility Goal 1 (PT-IRF)    Activity/Assistive Device (Bed Mobility Goal 1, PT-IRF) sit to supine/supine to sit  -LB     [place] Level (Bed Mobility Goal 1, PT-IRF) independent  -LB     Time Frame (Bed Mobility Goal 1, PT-IRF) by discharge  -LB     Row Name 02/02/22 1304          Transfer Goal 1 (PT-IRF)    Activity/Assistive Device (Transfer Goal 1, PT-IRF) sit-to-stand/stand-to-sit; bed-to-chair/chair-to-bed  -LB     [place] Level (Transfer Goal 1, PT-IRF) modified independence  -LB     Time Frame (Transfer Goal 1, PT-IRF) by discharge  -LB     Row Name 02/02/22 1304          Gait/Walking Locomotion Goal 1 (PT-IRF)    Activity/Assistive Device (Gait/Walking Locomotion Goal 1, PT-IRF) walker, rolling  -LB     Gait/Walking Locomotion Distance Goal 1 (PT-IRF) 300'  -LB     [place] Level (Gait/Walking Locomotion Goal 1, PT-IRF) supervision required  -LB     Time Frame (Gait/Walking Locomotion Goal 1, PT-IRF) by discharge  -LB     Row Name 02/02/22 1304          Positioning and Restraints    Pre-Treatment Position sitting in chair/recliner  -LB     In Bed call light within reach; encouraged to call for assist  pm  -LB     In Wheelchair call light within reach; encouraged to call for assist  am  -LB     Row Name 02/02/22 1304          Therapy Assessment/Plan (PT)    Patient's Goals For Discharge return home; take care of myself at home  -LB     Row Name 02/02/22 1304          Therapy Assessment/Plan (PT)    PT Diagnosis (PT) s/p R hip nailing-WBAT, covid, stenting  -LB     Rehab Potential/Prognosis (PT) adequate, monitor progress closely  -LB     Frequency of Treatment (PT) 5 times per week  -LB     Estimated Duration of Therapy (PT) 2 weeks  -LB     Problem List (PT) balance; mobility; range of motion (ROM); strength; pain  -LB     Activity Limitations Related to Problem List (PT) unable to ambulate safely; unable to transfer safely  -LB     Row Name  02/02/22 1304          Therapy Plan Review/Discharge Plan (PT)    Therapy Plan Review (PT) evaluation/treatment results reviewed; care plan/treatment goals reviewed; risks/benefits reviewed; participants voiced agreement with care plan  -LB     Anticipated Equipment Needs at Discharge (PT Eval) --  tbd  -LB     Expected Discharge Disposition (PT Eval) home with home health care  -LB           User Key  (r) = Recorded By, (t) = Taken By, (c) = Cosigned By    Initials Name Provider Type    LB Wendi Brothers, PT Physical Therapist                 Physical Therapy Education                 Title: PT OT SLP Therapies (Done)     Topic: Physical Therapy (Done)     Point: Mobility training (Done)     Learning Progress Summary           Patient Acceptance, E, VU,NR by LB at 2/2/2022 1331                   Point: Home exercise program (Done)     Learning Progress Summary           Patient Acceptance, E, VU,NR by LB at 2/2/2022 1331                   Point: Body mechanics (Done)     Learning Progress Summary           Patient Acceptance, E, VU,NR by LB at 2/2/2022 1331                   Point: Precautions (Done)     Learning Progress Summary           Patient Acceptance, E, VU,NR by LB at 2/2/2022 1331                               User Key     Initials Effective Dates Name Provider Type Discipline     06/16/21 -  Wendi Brothers, PT Physical Therapist PT                PT Recommendation and Plan    Planned Therapy Interventions (PT): balance training, bed mobility training, gait training, patient/family education, ROM (range of motion), strengthening, transfer training  Frequency of Treatment (PT): 5 times per week  Anticipated Equipment Needs at Discharge (PT Eval):  (tbd)                  Time Calculation:      PT Charges     Row Name 02/02/22 1332 02/02/22 1331          Time Calculation    Start Time 1245  -LB 1045  -LB     Stop Time 1330  -LB 1130  -LB     Time Calculation (min) 45 min  -LB 45 min  -LB      PT Received On -- 02/02/22  -[facility]     PT Goal Re-Cert Due Date -- 02/09/22  -[facility]           User Key  (r) = Recorded By, (t) = Taken By, (c) = Cosigned By    Initials Name Provider Type    Wendi Henriquez, PT Physical Therapist                Therapy Charges for Today     Code Description Service Date Service Provider Modifiers Qty    51284722109 HC PT EVAL LOW COMPLEXITY 1 2/2/2022 Wendi Brothers, PT GP 1    94579564900 HC GAIT TRAINING EA 15 MIN 2/2/2022 Wendi Brothers, PT GP 2    54337111466 HC PT THER PROC EA 15 MIN 2/2/2022 Wendi Brothers, PT GP 3                   Wendi Brothers, PT  2/2/2022

## 2022-02-02 NOTE — NURSING NOTE
Attempted to call patient's son, Luis Fernando Farrar, to notify pt had moved rooms. No answer at this time.

## 2022-02-02 NOTE — PROGRESS NOTES
Case Management  Inpatient Rehabilitation Plan of Care and Discharge Plan Note    Rehabilitation Diagnosis:  right hip IM nailing  Date of Onset:  1-12-22    Medical Summary:  right hip IM nailing, right intertrochanteric hip fx    Plan of Care      Expected Intensity:  Average of 3 hours of therapy 5 days/week.  Interdisciplinary Team:  Interdisciplinary Team: Medical Supervision and 24 Hour Rehabilitation Nursing.,  Physical Therapy:, Occupational Therapy:, Social Work, Therapeutic Recreation.  Physical Therapy Intensity/Duration: PT 1.5 hours per day/5 days per week  Occupational Therapy Intensity/Duration: OT 1.5 hours per day/5 days per week  Estimated Length of Stay/Anticipated Discharge Date: 14 days  Anticipated Discharge Destination:  Anticipated discharge destination from inpatient rehabilitation is community  discharge with assistance. Pt to return to her home with son checking on her  during the day.  Son is self-employed and can stay with pt in the evening and  night if needed.      Based on the patient's medical and functional status, their prognosis and  expected level of functional improvement is:  good    Signed by: RASTA Landry

## 2022-02-02 NOTE — SIGNIFICANT NOTE
02/02/22 1755   Living Environment   Current Living Arrangements home/apartment/condo   Primary Care Provided by self   Provides Primary Care For no one   Family Caregiver if Needed child(andrew), adult   Family Caregiver Names Son Luis Fernando Farrar   Quality of Family Relationships involved; supportive   Able to Return to Prior Arrangements yes   Living Arrangement Comments SS attempted to contact pt via room phone without success.  Spoke to son Luis Fernando Farrar [phone].  Pt is a  and lives alone.  Son is pt's only child.  Pt's best friend and ex-sister-in-law Sharon Sutherland talks to pt daily.  Pt receives VA pension from spouse, VA insurance and [facility] Medicaid.  Son says pt applied for Social Security disability several months ago and case is pending.  PCP is RAVIN Gonzalez.  [facility]  Pt does not have an advance directive or POA.  Son says pt was independent with ADL's and mobility prior to hospital admission.  Pt was also driving.   Resource/Environmental Concerns   Resource/Environmental Concerns none   Transportation Concerns car, none   Transition Planning   Patient/Family Anticipates Transition to home with help/services   Patient/Family Anticipated Services at Transition home health care; durable medical equipment   Transportation Anticipated family or friend will provide   Discharge Needs Assessment (IRF)   Current Outpatient/Agency/Support Group   (Pt did not receive home health services prior to hospital admission but removed home health services in the past.)   Concerns to be Addressed adjustment to diagnosis/illness   Concerns Comments Pt lives alone   Equipment Currently Used at Home shower chair; glucometer; bp cuff; other (see comments)  (Pt has a knee scooter that she used after having a toe amputation in 2020.  Pt may have a walker too but son was not sure.)   Current Discharge Risk lives alone   Discharge Coordination/Progress Son says discharge plans are for pt to return home  and he can check on pt daily and stay with her in the evenings and night if needed.  Son is self-employed.  Pt's best friend Sharon is in the process of moving to [place], [place] and may not be able to provide much assistance.  Team conference will be held in am.  SS will follow and assist with discharge planning.

## 2022-02-02 NOTE — PROGRESS NOTES
Inpatient Rehabilitation Functional Measures Assessment and Plan of Care    Plan of Care      Functional Measures  KIRAN Eating:  KIRAN Grooming:  KIRAN Bathing:  KIRAN Upper Body Dressing:  KIRAN Lower Body Dressing:  KIRAN Toileting:    KIRAN Bladder Management  Level of Assistance:  Frequency/Number of Accidents this Shift:    KIRAN Bowel Management  Level of Assistance:  Frequency/Number of Accidents this Shift:    KIRAN Bed/Chair/Wheelchair Transfer:  Bed/chair/wheelchair Transfer Score = 4.  Patient performs 75% or more of effort and minimal assistance (little/incidental  help/lifting of one limb/steadying) for transferring to and from the  bed/chair/wheelchair, requiring: Patient requires the following assistive  device(s): Walker. Seating system of wheelchair.  KIRAN Toilet Transfer:  KIRAN Tub/Shower Transfer:    Previously Documented Mode of Locomotion at Discharge:  KIRAN Expected Mode of Locomotion at Discharge: The expected mode of most  frequently used locomotion, at discharge, is expected to be walking.  KIRAN Walk/Wheelchair:  WHEELCHAIR OBSERVATION   Wheelchair did not occur.    WALK OBSERVATION   Walk Distance Scale = 2.  Distance walked is 50 -149 feet. Walk Score = 2.  Patient performs 75% or more of effort and requires minimal assistance.  Incidental assistance, contact guard or steadying was provided. Patient walked a  distance of 80 feet. Patient requires the following assistive device(s): Rolling  walker.  KIRAN Stairs:  Stairs did not occur.    KIRAN Comprehension:  KIRAN Expression:  KIRAN Social Interaction:  KIRAN Problem Solving:  KIRAN Memory:    Therapy Mode Minutes  Occupational Therapy:  Physical Therapy: Individual: 90 minutes.  Speech Language Pathology:    Discharge Functional Goals:  Bed, Chair, Wheelchair Transfers: 6  Walk: 5    Signed by: Wendi Brothers, Physical Therapist

## 2022-02-02 NOTE — PROGRESS NOTES
Rehabilitation Nursing  Inpatient Rehabilitation Plan of Care Note    Plan of Care  Body Function Structure    Skin Integrity (Active)  Current Status (2/3/2022 12:00:00 AM): free from skin breakdown this shift  Weekly Goal: free from skin breakdown this stay  Discharge Goal: home free of skin breakdown    Safety    Potential for Injury (Active)  Current Status (2/3/2022 12:00:00 AM): free from injury this shift  Weekly Goal: free from injury this stay  Discharge Goal: home free of injury    Signed by: Kathie Rose RN

## 2022-02-02 NOTE — PLAN OF CARE
Goal Outcome Evaluation:  Plan of Care Reviewed With: patient        Progress: improving  Outcome Summary: Patient done scheduled AM therapies. No complaints voiced. Continue POC

## 2022-02-02 NOTE — H&P
HISTORY AND PHYSICAL        Patient Identification:  Name:  Lisa Velazquez  Age:  56 y.o.  Sex:  female  :  1965  MRN:  [id number]   Visit Number:  [id number]  Primary Care Physician:  Patricia Skelton APRN       Subjective     Subjective     Chief complaint:     Right hip fracture  Anemia      History of presenting illness:     This patient is seen today by me for post admission physician evaluation to the inpatient rehabilitation facility.    57 y/o female admitted initially for L arterial stenting, but after stent she fell and fractured her R hip due to orthostatic hypotension.  She was taken to OR for TFN hip nailing for fixation of basicervical intertrochanteric fracture.  She is WBAT on her RLE.  She did have acute blood loss anemia with HGB on 7.1 and was transfused with 1 unit of PRBC’s.  Notes show that patient needs CABG, but cardiology want her to heal from her hip fracture before proceeding with the surgery.  She had post surgery hemorrhagic anemia and was transfused with 1 unit of PRBC’s  She continued to improve medically and PT/OT evals ordered which recommended ongoing therapy intervention and education.  Inpatient rehab ordered for ongoing medical monitoring r/t her recent arterial stenting, incision monitoring and wound care of R hip, and monitoring of stent incision, pain management regulation, bowel and bladder management which documentation shows she has had complications with urinary incontinence, skin monitoring and breakdown prevention, DM monitoring and glucose regulation, continued monitoring for complications from anemia as she did require transfusion after surgery, along with management of ongoing comorbidities.     The preadmission mini-FIM score as assessed by the referring facility is as follows: Eating 5; grooming 5; bathing 2; dressing-upper body 4; dressing-lower body 1; toileting 3; transfers: Bed, chair, wheelchair 4; transfers: Toilet 4; locomotion: Walk,  wheelchair 4; bladder management for; bowel management 6; comprehension 7; expression 7; social interaction 7; problem solving 7; memory 7.    Estimated length of stay 14 days.    The patient previously lived at home with family and plans to return home with assistance at discharge    Patient is going to require intensive inpatient physical therapy for therapeutic exercises, range of motion, endurance, gait training, safety, stairs training, strengthening for at least 1 to 2 hours a day for 5 to 6 days a week as well as occupational therapy for dressing, ADLs, feeding, home skills, safety and toileting techniques for 1 to 2 hours a day for 5 to 6 days a week.    ---------------------------------------------------------------------------------------------------------------------     Past Medical History    Past Medical History:   Diagnosis Date   • Coronary artery disease    • Diabetes mellitus (HCC)    • Elevated cholesterol    • GERD (gastroesophageal reflux disease)    • History of transfusion    • Hypertension        Past Surgical History    Past Surgical History:   Procedure Laterality Date   • ABDOMINAL SURGERY      gallbladder removal   • CARDIAC CATHETERIZATION     • CARDIAC SURGERY      stent   • COLONOSCOPY     • FRACTURE SURGERY     • TOE AMPUTATION Right     3rd toe right foot       Family History    Family History   Problem Relation Age of Onset   • Cancer Mother    • Diabetes Mother    • Heart disease Mother    • Heart disease Father    • Diabetes Brother    • Heart disease Brother    • Heart disease Daughter        Social History    Social History     Tobacco Use   • Smoking status: Former Smoker     Packs/day: 2.00     Years: 15.00     Pack years: 30.00     Types: Cigarettes     Quit date: 2020     Years since quittin.0   • Smokeless tobacco: Never Used   Vaping Use   • Vaping Use: Never used   Substance Use Topics   • Alcohol use: Never   • Drug use: Never       Allergies    Patient has no  known allergies.  ---------------------------------------------------------------------------------------------------------------------     Home Medications:    Prior to Admission Medications     Prescriptions Last Dose Informant Patient Reported? Taking?    alendronate (FOSAMAX) 70 MG tablet Unknown Pharmacy Yes No    Take 70 mg by mouth Every 7 (Seven) Days.    aspirin 81 MG EC tablet Unknown Pharmacy Yes No    Take 81 mg by mouth Daily.    bismuth-metronidazole-tetracycline (PYLERA) 140-125-125 MG per capsule Unknown Pharmacy Yes No    Take 3 capsules by mouth 4 (Four) Times a Day Before Meals & at Bedtime.    clopidogrel (PLAVIX) 75 MG tablet Unknown Pharmacy Yes No    Take 75 mg by mouth Daily.    insulin glargine (LANTUS, SEMGLEE) 100 UNIT/ML injection Unknown Pharmacy Yes No    Inject 15 Units under the skin into the appropriate area as directed 2 (Two) Times a Day.    lisinopril (PRINIVIL,ZESTRIL) 10 MG tablet Unknown Pharmacy Yes No    Take 10 mg by mouth Daily.    omeprazole (priLOSEC) 20 MG capsule Unknown Pharmacy Yes No    Take 20 mg by mouth 2 (Two) Times a Day.    rosuvastatin (CRESTOR) 40 MG tablet Unknown Pharmacy Yes No    Take 40 mg by mouth Every Night.    vitamin D (ERGOCALCIFEROL) 1.25 MG (45110 UT) capsule capsule Unknown Pharmacy Yes No    Take 50,000 Units by mouth 1 (One) Time Per Week.        ---------------------------------------------------------------------------------------------------------------------    Objective     Objective     Hospital Scheduled Meds:  ascorbic acid, 500 mg, Oral, Daily  aspirin, 81 mg, Oral, Daily  clopidogrel, 75 mg, Oral, Daily  enoxaparin, 40 mg, Subcutaneous, Nightly  insulin aspart, 0-7 Units, Subcutaneous, TID AC  insulin detemir, 12 Units, Subcutaneous, Nightly  isosorbide mononitrate, 30 mg, Oral, Q24H  lisinopril, 2.5 mg, Oral, Q24H  multivitamin with minerals, 1 tablet, Oral, Daily  nystatin, , Topical, Q12H  rosuvastatin, 40 mg, Oral,  Nightly  vitamin D3, 5,000 Units, Oral, Daily         ---------------------------------------------------------------------------------------------------------------------   Vital Signs:  Temp:  [97.3 °F (36.3 °C)-97.8 °F (36.6 °C)] 97.3 °F (36.3 °C)  Heart Rate:  [78-81] 78  Resp:  [16-20] 16  BP: (128-144)/(84-85) 128/84  Mean Arterial Pressure (Non-Invasive) for the past 24 hrs (Last 3 readings):   Noninvasive MAP (mmHg)   02/01/22 1643 2     SpO2 Percentage    02/01/22 1643 02/01/22 1900 02/02/22 0826   SpO2: 100% 98% 96%     SpO2:  [96 %-100 %] 96 %  on   ;   Device (Oxygen Therapy): room air    Body mass index is 25.22 kg/m².  Wt Readings from Last 3 Encounters:   02/01/22 73 kg (161 lb)     ---------------------------------------------------------------------------------------------------------------------     Physical Exam:    General Appearance: alert, pleasant, interactive and cooperative.  Generalized weakness.    Head: normocephalic, without obvious abnormality and atraumatic    Lungs: clear to auscultation, respirations regular, respirations even and  respirations unlabored. No accessory muscle use.     Heart:: regular rhythm & normal rate, normal S1, S2, no murmur, no gallop, no  rub and no click.  Chest wall with no abnormalities observed. PMI nondisplaced    Abdomen: normal bowel sounds in all quadrants, soft non-tender, no guarding and no rebound tenderness    Extremities: no edema, no cyanosis, no redness, no tenderness, no clubbing    Musculoskeletal: joints with no effusion nor erythema nor warmth.  Pedal pulses palpable and equal bilaterally    Skin: Right hip surgical wound    Neurologic:    Mental Status: Patient is awake alert and oriented x3.  Appropriate.   Sensory: Sensory overall seems to be intact in BLE and BUE.   Motor strength: Generalized weakness      ---------------------------------------------------------------------------------------------------------------------              Results from last 7 days   Lab Units 02/02/22  0159   WBC 10*3/mm3 5.52   HEMOGLOBIN g/dL 10.7*   HEMATOCRIT % 34.1   MCV fL 91.4   MCHC g/dL 31.4*   PLATELETS 10*3/mm3 194     Results from last 7 days   Lab Units 02/02/22  0159   SODIUM mmol/L 140   POTASSIUM mmol/L 4.0   MAGNESIUM mg/dL 2.0   CHLORIDE mmol/L 107   CO2 mmol/L 23.4   BUN mg/dL 9   CREATININE mg/dL 0.63   EGFR IF NONAFRICN AM mL/min/1.73 98   CALCIUM mg/dL 9.2   GLUCOSE mg/dL 108*   ALBUMIN g/dL 3.39*   BILIRUBIN mg/dL 0.5   ALK PHOS U/L 120*   AST (SGOT) U/L 42*   ALT (SGPT) U/L 39*   Estimated Creatinine Clearance: 114.9 mL/min (by C-G formula based on SCr of 0.63 mg/dL).  No results found for: AMMONIA    Glucose   Date/Time Value Ref Range Status   02/02/2022 1110 180 (H) 70 - 130 mg/dL Final     Comment:     Meter: [id number] : [id number] Hanh John   02/02/2022 0631 101 70 - 130 mg/dL Final     Comment:     Meter: [id number] : [id number] Cecily Crystal   02/01/2022 2005 113 70 - 130 mg/dL Final     Comment:     Meter: [id number] : [id number] Sivakumar Martinez C   02/01/2022 1732 160 (H) 70 - 130 mg/dL Final     Comment:     Meter: [id number] : [id number] Hanh John     No results found for: HGBA1C  No results found for: TSH, FREET4    No results found for: BLOODCX  No results found for: URINECX  No results found for: WOUNDCX  No results found for: STOOLCX  No results found for: RESPCX  Pain Management Panel    There is no flowsheet data to display.       I have personally reviewed the above laboratory results.   ---------------------------------------------------------------------------------------------------------------------  Imaging Results (Last 7 Days)     ** No results found for the last 168 hours. **        I have personally reviewed the above radiology results.   ---------------------------------------------------------------------------------------------------------------------      Assessment & Plan         Assessment/Plan       ASSESSMENT:    Right interotrochanteric hip fracture  Postsurgical hemorrhagic anemia  Diabetes mellitus  PVD  Hypertension  Hypercholesterolemia    PLAN:    Right hip fracture-the patient will be evaluated today for participation with occupational and physical therapy    Vitals are stable we will continue to monitor hemoglobin as she had recent postoperative significant anemia.  Has occasional nosebleed due to dry air.    Continue with Levemir 12 units subcu nightly with sliding scale and adjust appropriately.    Pain control for now seems to be adequate.    Estimated length of stay 14 days.    The patient previously lived at home with family and plans to return home with assistance at discharge    Patient is going to require intensive inpatient physical therapy for therapeutic exercises, range of motion, endurance, gait training, safety, stairs training, strengthening for at least 1 to 2 hours a day for 5 to 6 days a week as well as occupational therapy for dressing, ADLs, feeding, home skills, safety and toileting techniques for 1 to 2 hours a day for 5 to 6 days a week.      Patient's findings and recommendations were discussed with patient himself, referring facility, intake nurse and nursing staff.      We decided that this patient was appropriate for inpatient rehabilitation based on the new waiver that insurances have allowed due to their medical necessity to help decrease the impact of a COVID surge in referring facilities.      Code Status:     Code Status and Medical Interventions:   Ordered at: 02/02/22 1159     Level Of Support Discussed With:    Patient     Code Status (Patient has no pulse and is not breathing):    CPR (Attempt to Resuscitate)     Medical Interventions (Patient has pulse or is breathing):    Full Support       Alejandrina Ann MD  02/02/22  12:00 EST

## 2022-02-02 NOTE — PROGRESS NOTES
Inpatient Rehabilitation Functional Measures Assessment and Plan of Care    Plan of Care  Self Care    [OT] Toileting(Active)  Current Status(02/02/2022): max  Weekly Goal(02/09/2022): mod  Discharge Goal: set up    Functional Measures  KIRAN Eating:  KIRAN Grooming:  KIRAN Bathing:  KIRAN Upper Body Dressing:  KIRAN Lower Body Dressing:  KIRAN Toileting:    KIRAN Bladder Management  Level of Assistance:  Frequency/Number of Accidents this Shift:    KIRAN Bowel Management  Level of Assistance:  Frequency/Number of Accidents this Shift:    KIRAN Bed/Chair/Wheelchair Transfer:  KIRAN Toilet Transfer:  KIRAN Tub/Shower Transfer:    Previously Documented Mode of Locomotion at Discharge:  [facility] Expected Mode of Locomotion at Discharge:  KIRAN Walk/Wheelchair:  KIRAN Stairs:    KIRAN Comprehension:  KIRAN Expression:  [facility] Social Interaction:  [facility] Problem Solving:  KIRAN Memory:    Therapy Mode Minutes  Occupational Therapy: Individual: 90 minutes.  Physical Therapy:  Speech Language Pathology:    Discharge Functional Goals:    Signed by: Candie Dixon, Occupational Therapist

## 2022-02-02 NOTE — PROGRESS NOTES
Patient Assessment Instrument  Quality Indicators - Admission    Section B. Hearing, Speech Vision      Section C. Cognitive Patterns      Section [id number]. Prior Functioning      Section LT2653. Prior Device Use  Patient does not use manual or motorized wheelchair or scooter, mechanical lift,  walker, or an orthotic/prosthesis.    Section [id number]. Self Care Performance      Section LC5814. Self Care Discharge Goals      Section SM4094. Mobility Performance      Section AQ4775. Mobility Discharge Goals      Section H. Bladder and Bowel      Section I. Active Diagnosis      Section J. Health Conditions      Section K. Swallowing/Nutritional Status      Section M. Skin Conditions      Section N. Medication      Section O. Special Treatments, Procedures, and Programs      OPTIONAL BRANCH FOR TRACKING FALLS  Fall(s) During Shift:    Signed by: RASTA Landry

## 2022-02-02 NOTE — PROGRESS NOTES
Patient Assessment Instrument  Quality Indicators - Admission    Section B. Hearing, Speech Vision      Section C. Cognitive Patterns      Section [id number]. Prior Functioning      Section NS8859. Prior Device Use      Section MP6148. Self Care Performance      Section PA0298. Self Care Discharge Goals      Section BT3412. Mobility Performance     Roll Left and Right: [place] does less than half the effort. [place] lifts, holds  or supports trunk or limbs but provides less than half the effort.   Sit to Lying: [place] does less than half the effort. [place] lifts, holds or  supports trunk or limbs but provides less than half the effort.   Lying to Sitting on Side of Bed: [place] does less than half the effort. [place]  lifts, holds or supports trunk or limbs but provides less than half the effort.   Sit to Stand: [place] provides verbal cues and/or touching/steadying and/or  contact guard assistance as patient completes activity. Assistance may be  provided throughout the activity or intermittently.   Chair/Bed to Chair Transfer: [place] provides verbal cues and/or  touching/steadying and/or contact guard assistance as patient completes  activity. Assistance may be provided throughout the activity or intermittently.   Toilet Transfer [place] provides verbal cues and/or touching/steadying and/or  contact guard assistance as patient completes activity. Assistance may be  provided throughout the activity or intermittently.   Car Transfer: [place] provides verbal cues and/or touching/steadying and/or  contact guard assistance as patient completes activity. Assistance may be  provided throughout the activity or intermittently.   Walk 10 Feet:   [place] provides verbal cues and/or touching/steadying and/or  contact guard assistance as patient completes activity. Assistance may be  provided throughout the activity or intermittently.  Walk 50 Feet with 2 Turns:   [place] provides verbal cues and/or  touching/steadying and/or contact guard  assistance as patient completes  activity. Assistance may be provided throughout the activity or intermittently.  Walk 150 Feet:   Not attempted due to medical or safety concerns.  Walking 10 Feet on Uneven Surfaces:   Not attempted due to medical or safety  concerns.  1 Step Over Curb or Up/Down Stair:   Not attempted due to medical or safety  concerns.  Picking up an Object:   Not attempted due to medical or safety concerns.  Uses Wheelchair/Scooter: No    Section FD1835. Mobility Discharge Goals     Sit to Stand: Patient completed the activities by him/herself with no  assistance from a helper.   Chair/Bed to Chair Transfer: Patient completed the activities by him/herself  with no assistance from a helper.   Walk Discharge Goals:   Walk 150 Feet: [place] provides verbal cues or touching/steadying assistance as  patient completes activity.    Section H. Bladder and Bowel      Section I. Active Diagnosis      Section J. Health Conditions      Section K. Swallowing/Nutritional Status      Section M. Skin Conditions      Section N. Medication      Section O. Special Treatments, Procedures, and Programs      OPTIONAL BRANCH FOR TRACKING FALLS  Fall(s) During Shift:    Signed by: Wendi Brothers, Physical Therapist

## 2022-02-02 NOTE — PROGRESS NOTES
Patient Assessment Instrument  Quality Indicators - Admission    Section B. Hearing, Speech Vision      Section C. Cognitive Patterns      Section [id number]. Prior Functioning      Section QD9549. Prior Device Use      Section GQ2910. Self Care Performance     Eating: [place] sets up or cleans up; patient completes activity. [place] assists  only prior to or following the activity.   Oral Hygiene: [place] sets up or cleans up; patient completes activity. [place]  assists only prior to or following the activity.   Toileting Hygiene: [place] does more than half the effort. [place] lifts or holds  trunk or limbs and provides more than half the effort.   Shower/Bathe Self: [place] does more than half the effort. [place] lifts or holds  trunk or limbs and provides more than half the effort.   Upper Body Dressing: [place] provides verbal cues and/or touching/steadying  and/or contact guard assistance as patient completes activity.   Lower Body Dressing: [place] does more than half the effort. [place] lifts or  holds trunk or limbs and provides more than half the effort.   Putting On/Taking Off Footwear: [place] does all of the effort. Patient does  none of the effort to complete the activity. Or, the assistance of 2 or more  helpers is required for the patient to complete the activity.    Section [id number]. Self Care Discharge Goals     Eating: Patient completed the activities by him/herself with no assistance from  a helper.   Oral Hygiene: Patient completed the activities by him/herself with no  assistance from a helper.   Toileting Hygiene: [place] sets up or cleans up; patient completes activity.  [place] assists only prior to or following the activity.   Shower/Bathe Self: [place] sets up or cleans up; patient completes activity.  [place] assists only prior to or following the activity.   Upper Body Dressing: [place] sets up or cleans up; patient completes activity.  [place] assists only prior to or following the activity.   Lower Body  Dressing: [place] sets up or cleans up; patient completes activity.  [place] assists only prior to or following the activity.   Putting On/Taking Off Footwear: [place] sets up or cleans up; patient completes  activity. [place] assists only prior to or following the activity.    Section ON9665. Mobility Performance      Section KI7316. Mobility Discharge Goals      Section H. Bladder and Bowel      Section I. Active Diagnosis      Section J. Health Conditions      Section K. Swallowing/Nutritional Status      Section M. Skin Conditions      Section N. Medication      Section O. Special Treatments, Procedures, and Programs      OPTIONAL BRANCH FOR TRACKING FALLS  Fall(s) During Shift:    Signed by: Candie Dixon, Occupational Therapist

## 2022-02-02 NOTE — THERAPY EVALUATION
Inpatient Rehabilitation - Occupational Therapy Initial Evaluation     [place]     Patient Name: Lisa Velazquez  : 1965  MRN: [id number]    Today's Date: 2022                 Admit Date: 2022         ICD-10-CM ICD-9-CM   1. S/P right hip fracture  Z87.81 V15.51       Patient Active Problem List   Diagnosis   • S/P right hip fracture       Past Medical History:   Diagnosis Date   • Coronary artery disease    • Diabetes mellitus (HCC)    • Elevated cholesterol    • GERD (gastroesophageal reflux disease)    • History of transfusion    • Hypertension        Past Surgical History:   Procedure Laterality Date   • ABDOMINAL SURGERY      gallbladder removal   • CARDIAC CATHETERIZATION     • CARDIAC SURGERY      stent   • COLONOSCOPY     • FRACTURE SURGERY     • TOE AMPUTATION Right     3rd toe right foot             IRF OT ASSESSMENT FLOWSHEET (last 12 hours)     IRF OT Evaluation and Treatment     Row Name 22 1351          OT Time and Intention    Document Type initial evaluation  -     Mode of Treatment individual therapy; occupational therapy  -     Patient Effort good  -     Row Name 22 1351          Relationship/Environment    Primary Source of Support/Comfort child(andrew)  -     Lives With alone  -     Row Name 22 1351          General Information    Patient/Family/Caregiver Comments/Observations patient agreeable to therapy this am. patient tolerated well with no complaints  -     Existing Precautions/Restrictions fall; hip; weight bearing  -     Row Name 22 1351          Previous Level of Function/Home Environm    BADLs, Premorbid Functional Level independent  -     Row Name 22 1351          Living Environment    Current Living Arrangements home/apartment/condo  -     Row Name 22 1351          Cognition/Psychosocial    Affect/Mental Status (Cognitive) WFL  -     Orientation Status (Cognition) oriented to; person; place; situation  -     Follows  Commands (Cognition) WFL  -AH     Row Name 02/02/22 1351          Range of Motion Comprehensive    General Range of Motion no range of motion deficits identified  -AH     Row Name 02/02/22 [facility]          Strength Comprehensive (MMT)    General Manual Muscle Testing (MMT) Assessment upper extremity strength deficits identified  4-/5  -AH     Row Name 02/02/22 [facility]          Transfers    Bed-Chair [place] (Transfers) minimum assist (75% patient effort)  -AH     Row Name 02/02/22 [facility]          Motor Skills    Motor Skills coordination; functional endurance; therapeutic exercise  -     Therapeutic Exercise --  BUE ROM, gmc,fmc,functional endurance  -AH     Row Name 02/02/22 [facility]          Bathing    [place] Level (Bathing) moderate assist (50% patient effort)  -AH     Row Name 02/02/22 [facility]          Upper Body Dressing    [place] Level (Upper Body Dressing) supervision  -AH     Row Name 02/02/22 [facility]          Lower Body Dressing    [place] Level (Lower Body Dressing) maximum assist (25% patient effort)  -AH     Row Name 02/02/22 [facility]          Grooming    [place] Level (Grooming) supervision  -AH     Row Name 02/02/22 [facility]          Toileting    [place] Level (Toileting) dependent (less than 25% patient effort)  -AH     Row Name 02/02/22 [facility]          Self-Feeding    [place] Level (Self-Feeding) supervision  -AH     Row Name 02/02/22 [facility]          Bathing Goal 1 (OT-IRF)    Activity/Device (Bathing Goal 1, OT-IRF) bathing skills, all  -     [place] Level (Bathing Goal 1, OT-IRF) minimum assist (75% or more patient effort)  -AH     Row Name 02/02/22 [facility]          Bathing Goal 2 (OT-IRF)    Activity/Device (Bathing Goal 2, OT-IRF) bathing skills, all  -     [place] Level (Bathing Goal 2, OT-IRF) set-up required  -AH     Row Name 02/02/22 1351          LB Dressing Goal 1 (OT-IRF)    Activity/Device (LB Dressing Goal 1, OT-IRF) lower body dressing  -     [place] (LB  Dressing Goal 1, OT-IRF) moderate assist (50-74% patient effort)  -[facility] Name 02/02/22 [facility]          LB Dressing Goal 2 (OT-IRF)    Activity/Device (LB Dressing Goal 2, OT-IRF) lower body dressing  -     [place] (LB Dressing Goal 2, OT-IRF) set-up required  -[facility] Name 02/02/22 [facility]          Toileting Goal 1 (OT-IRF)    Activity/Device (Toileting Goal 1, OT-IRF) toileting skills, all  -     [place] Level (Toileting Goal 1, OT-IRF) moderate assist (50-74% patient effort)  -[facility] Name 02/02/22 [facility]          Toileting Goal 2 (OT-IRF)    Activity/Device (Toileting Goal 2, OT-IRF) toileting skills, all  -     [place] Level (Toileting Goal 2, OT-IRF) set-up required  -[facility] Name 02/02/22 [facility]          Therapy Assessment/Plan (OT)    Patient's Goals For Discharge take care of myself at home; return home  -[facility] Name 02/02/22 [facility]          Therapy Assessment/Plan (OT)    Rehab Potential/Prognosis (OT) good, to achieve stated therapy goals  -     Frequency of Treatment (OT) 5 times per week  -     Estimated Duration of Therapy (OT) 2 weeks  -     Activity Limitations Related to Problem List (OT) BADLs not performed adequately or safely  -     Planned Therapy Interventions (OT) activity tolerance training; adaptive equipment training; BADL retraining; patient/caregiver education/training; ROM/therapeutic exercise; strengthening exercise; transfer/mobility retraining  -[facility] Name 02/02/22 [facility]          Therapy Plan Review/Discharge Plan (OT)    Therapy Plan Review (OT) patient  -     Expected Discharge Disposition (OT) home  -           User Key  (r) = Recorded By, (t) = Taken By, (c) = Cosigned By    Initials Name Effective Dates     Rukhsana Dixon, OT 06/16/21 -                          OT Recommendation and Plan    Planned Therapy Interventions (OT): activity tolerance training, adaptive equipment training, BADL retraining, patient/caregiver  education/training, ROM/therapeutic exercise, strengthening exercise, transfer/mobility retraining                    Time Calculation:      Time Calculation- OT     Row Name 02/02/22 1402             Time Calculation-     OT Start Time 0915  -      OT Stop Time 1045  -      OT Time Calculation (min) 90 min  -            User Key  (r) = Recorded By, (t) = Taken By, (c) = Cosigned By    Initials Name Provider Type     Rukhsana Dixon, OT Occupational Therapist              Therapy Charges for Today     Code Description Service Date Service Provider Modifiers Qty    86791733407  OT EVAL MOD COMPLEXITY 3 2/2/2022 Rukhsana Dixon, OT GO 1    [id number]  OT THERAPEUTIC ACT EA 15 MIN 2/2/2022 Rukhsana Dixon OT GO 2    [id number]  OT SELF CARE/MGMT/TRAIN EA 15 MIN 2/2/2022 Rukhsana Dixon, OT GO 1                   Rukhsana Dixon OT  2/2/2022

## 2022-02-03 LAB
GLUCOSE BLDC GLUCOMTR-MCNC: 124 MG/DL (ref 70–130)
GLUCOSE BLDC GLUCOMTR-MCNC: 155 MG/DL (ref 70–130)
GLUCOSE BLDC GLUCOMTR-MCNC: 219 MG/DL (ref 70–130)

## 2022-02-03 PROCEDURE — 97110 THERAPEUTIC EXERCISES: CPT

## 2022-02-03 PROCEDURE — 63710000001 INSULIN DETEMIR PER 5 UNITS: Performed by: INTERNAL MEDICINE

## 2022-02-03 PROCEDURE — 82962 GLUCOSE BLOOD TEST: CPT

## 2022-02-03 PROCEDURE — 25010000002 ENOXAPARIN PER 10 MG: Performed by: INTERNAL MEDICINE

## 2022-02-03 PROCEDURE — 97110 THERAPEUTIC EXERCISES: CPT | Performed by: OCCUPATIONAL THERAPIST

## 2022-02-03 PROCEDURE — 97535 SELF CARE MNGMENT TRAINING: CPT | Performed by: OCCUPATIONAL THERAPIST

## 2022-02-03 PROCEDURE — 97116 GAIT TRAINING THERAPY: CPT

## 2022-02-03 PROCEDURE — 63710000001 INSULIN ASPART PER 5 UNITS: Performed by: INTERNAL MEDICINE

## 2022-02-03 PROCEDURE — 97530 THERAPEUTIC ACTIVITIES: CPT | Performed by: OCCUPATIONAL THERAPIST

## 2022-02-03 PROCEDURE — 97530 THERAPEUTIC ACTIVITIES: CPT

## 2022-02-03 RX ADMIN — NYSTATIN 1 APPLICATION: 100000 POWDER TOPICAL at 20:25

## 2022-02-03 RX ADMIN — ENOXAPARIN SODIUM 40 MG: 40 INJECTION SUBCUTANEOUS at 20:23

## 2022-02-03 RX ADMIN — ASPIRIN 81 MG: 81 TABLET, CHEWABLE ORAL at 10:06

## 2022-02-03 RX ADMIN — CLOPIDOGREL BISULFATE 75 MG: 75 TABLET, FILM COATED ORAL at 10:06

## 2022-02-03 RX ADMIN — INSULIN ASPART 2 UNITS: 100 INJECTION, SOLUTION INTRAVENOUS; SUBCUTANEOUS at 17:34

## 2022-02-03 RX ADMIN — INSULIN DETEMIR 12 UNITS: 100 INJECTION, SOLUTION SUBCUTANEOUS at 20:25

## 2022-02-03 RX ADMIN — GUAIFENESIN 200 MG: 200 SOLUTION ORAL at 20:22

## 2022-02-03 RX ADMIN — Medication 5000 UNITS: at 10:07

## 2022-02-03 RX ADMIN — ISOSORBIDE MONONITRATE 30 MG: 30 TABLET, EXTENDED RELEASE ORAL at 10:08

## 2022-02-03 RX ADMIN — LISINOPRIL 2.5 MG: 2.5 TABLET ORAL at 10:07

## 2022-02-03 RX ADMIN — NYSTATIN: 100000 POWDER TOPICAL at 10:09

## 2022-02-03 RX ADMIN — ROSUVASTATIN CALCIUM 40 MG: 20 TABLET, FILM COATED ORAL at 20:23

## 2022-02-03 RX ADMIN — OXYCODONE HYDROCHLORIDE AND ACETAMINOPHEN 500 MG: 500 TABLET ORAL at 10:06

## 2022-02-03 RX ADMIN — OXYCODONE 5 MG: 5 TABLET ORAL at 20:21

## 2022-02-03 RX ADMIN — Medication 1 TABLET: at 10:07

## 2022-02-03 NOTE — THERAPY TREATMENT NOTE
Inpatient Rehabilitation - Physical Therapy Treatment Note        [place]     Patient Name: Lisa Velazquez  : 1965  MRN: [id number]    Today's Date: 2/3/2022                    Admit Date: 2022      Visit Dx:     ICD-10-CM ICD-9-CM   1. S/P right hip fracture  Z87.81 V15.51       Patient Active Problem List   Diagnosis   • S/P right hip fracture       Past Medical History:   Diagnosis Date   • Coronary artery disease    • Diabetes mellitus (HCC)    • Elevated cholesterol    • GERD (gastroesophageal reflux disease)    • History of transfusion    • Hypertension        Past Surgical History:   Procedure Laterality Date   • ABDOMINAL SURGERY      gallbladder removal   • CARDIAC CATHETERIZATION     • CARDIAC SURGERY      stent   • COLONOSCOPY     • FRACTURE SURGERY     • TOE AMPUTATION Right     3rd toe right foot       PT ASSESSMENT (last 12 hours)     IRF PT Evaluation and Treatment     Row Name 22 161          PT Time and Intention    Document Type daily treatment  -RF     Mode of Treatment individual therapy; physical therapy  -RF     Patient/Family/Caregiver Comments/Observations Pt c/o pain with increased activity.  -RF     Row Name 22 1616          General Information    Patient Profile Reviewed yes  -RF     Existing Precautions/Restrictions fall  -RF     Row Name 22 1616          Cognition/Psychosocial    Affect/Mental Status (Cognitive) WFL  -RF     Follows Commands (Cognition) verbal cues/prompting required  -RF     Row Name 22 1616          Pain Scale: FACES Pre/Post-Treatment    Pain: FACES Scale, Pretreatment 6-->hurts even more  -RF     Posttreatment Pain Rating 6-->hurts even more  -RF     Row Name 22 161          Bed Mobility    Supine-Sit-Supine [place] (Bed Mobility) contact guard; nonverbal cues (demo/gesture); verbal cues  -RF     Assistive Device (Bed Mobility) bed rails  -RF     Row Name 22 1616          Transfers    Chair-Bed [place]  (Transfers) contact guard; verbal cues; nonverbal cues (demo/gesture)  -RF     Sit-Stand [place] (Transfers) contact guard; verbal cues; nonverbal cues (demo/gesture)  -RF     Stand-Sit [place] (Transfers) contact guard; verbal cues; nonverbal cues (demo/gesture)  -RF     Row Name 02/03/22 1616          Chair-Bed Transfer    Assistive Device (Chair-Bed Transfers) wheelchair  -RF     Row Name 02/03/22 1616          Sit-Stand Transfer    Assistive Device (Sit-Stand Transfers) walker, front-wheeled  -RF     Row Name 02/03/22 1616          Stand-Sit Transfer    Assistive Device (Stand-Sit Transfers) walker, front-wheeled  -RF     Row Name 02/03/22 1616          Gait/Stairs (Locomotion)    [place] Level (Gait) contact guard; verbal cues; nonverbal cues (demo/gesture)  -RF     Assistive Device (Gait) walker, front-wheeled  -RF     Distance in Feet (Gait) 40X2 in AM, 60 in PM  -RF     Deviations/Abnormal Patterns (Gait) antalgic; bc decreased; gait speed decreased; stride length decreased  -RF     Bilateral Gait Deviations forward flexed posture  -RF     Comment (Gait/Stairs) Pt cued for increased HS and quad activation. PT demonstrates increased reliance on UEs at RW.  -     Row Name 02/03/22 1616          Safety Issues, Functional Mobility    Impairments Affecting Function (Mobility) balance; endurance/activity tolerance; pain; range of motion (ROM); strength  -RF     Row Name 02/03/22 1616          Motor Skills    Therapeutic Exercise hip; knee; ankle  -     Row Name 02/03/22 1616          Hip (Therapeutic Exercise)    Hip (Therapeutic Exercise) strengthening exercise  -RF     Hip Strengthening (Therapeutic Exercise) bilateral; flexion; extension; aBduction; aDduction; heel slides; marching while seated; marching while standing; sitting; standing; resistance band; green; 2 sets; 10 repetitions; other (see comments)  #2 on LLE, standing TE performed on RLE only  -     Row Name 02/03/22 1616           Knee (Therapeutic Exercise)    Knee (Therapeutic Exercise) strengthening exercise  -RF     Knee Strengthening (Therapeutic Exercise) bilateral; flexion; extension; heel slides; marching while seated; marching while standing; LAQ (long arc quad); hamstring curls; sitting; standing; 2 lb free weight; resistance band; 2 sets; 10 repetitions; other (see comments); green  #2 on LLE, standing TE performed on RLE only  -RF     Row Name 02/03/22 1616          Ankle (Therapeutic Exercise)    Ankle (Therapeutic Exercise) strengthening exercise  -RF     Ankle Strengthening (Therapeutic Exercise) bilateral; dorsiflexion; plantarflexion; sitting; standing; resistance band; green; 2 sets; 10 repetitions; other (see comments)  #2 on LLE, standing TE performed on RLE only  -RF     Row Name 02/03/22 1616          IRF PT Goals    Bed Mobility Goal Selection (PT-IRF) bed mobility, PT goal 1  -RF     Transfer Goal Selection (PT-IRF) transfers, PT goal 1  -RF     Gait (Walking Locomotion) Goal Selection (PT-IRF) gait, PT goal 1  -RF     Row Name 02/03/22 1616          Bed Mobility Goal 1 (PT-IRF)    Activity/Assistive Device (Bed Mobility Goal 1, PT-IRF) sit to supine/supine to sit  -RF     [place] Level (Bed Mobility Goal 1, PT-IRF) independent  -RF     Time Frame (Bed Mobility Goal 1, PT-IRF) by discharge  -RF     Row Name 02/03/22 1616          Transfer Goal 1 (PT-IRF)    Activity/Assistive Device (Transfer Goal 1, PT-IRF) sit-to-stand/stand-to-sit; bed-to-chair/chair-to-bed  -RF     [place] Level (Transfer Goal 1, PT-IRF) modified independence  -RF     Time Frame (Transfer Goal 1, PT-IRF) by discharge  -RF     Row Name 02/03/22 1616          Gait/Walking Locomotion Goal 1 (PT-IRF)    Activity/Assistive Device (Gait/Walking Locomotion Goal 1, PT-IRF) walker, rolling  -RF     Gait/Walking Locomotion Distance Goal 1 (PT-IRF) 300'  -RF     [place] Level (Gait/Walking Locomotion Goal 1, PT-IRF) supervision required   -RF     Time Frame (Gait/Walking Locomotion Goal 1, PT-IRF) by discharge  -RF     Row Name 02/03/22 1616          Positioning and Restraints    Pre-Treatment Position sitting in chair/recliner  BID  -RF     In Bed supine; call light within reach; encouraged to call for assist  PM  -RF     In Wheelchair sitting; call light within reach; encouraged to call for assist  AM  -RF     Row Name 02/03/22 1616          Therapy Assessment/Plan (PT)    Patient's Goals For Discharge return home; take care of myself at home  -RF     Row Name 02/03/22 1616          Therapy Assessment/Plan (PT)    Rehab Potential/Prognosis (PT) adequate, monitor progress closely  -RF     Frequency of Treatment (PT) 5 times per week  -RF     Estimated Duration of Therapy (PT) 2 weeks  -RF     Problem List (PT) balance; mobility; range of motion (ROM); strength; pain  -RF     Activity Limitations Related to Problem List (PT) unable to ambulate safely; unable to transfer safely  -RF     Row Name 02/03/22 1616          Daily Progress Summary (PT)    Functional Goal Overall Progress (PT) progressing toward functional goals as expected  -RF     Daily Progress Summary (PT) Fair functional mobility and ambulation quality noted this date. Pt hindered by LE weakness and pain at this time. Conitnued skilled care required to ensure maximum safe function upon D/C.  -RF     Impairments Still Limiting Function (PT) strength decreased; impaired functional activity tolerance; pain  -RF     Recommendations (PT) Continue per current POC  -RF     Row Name 02/03/22 1616          Therapy Plan Review/Discharge Plan (PT)    Anticipated Equipment Needs at Discharge (PT Eval) --  tbd  -RF     Expected Discharge Disposition (PT Eval) home with home health care  -RF           User Key  (r) = Recorded By, (t) = Taken By, (c) = Cosigned By    Initials Name Provider Type    RF Julianna Ann, PTA Physical Therapy Assistant                 Physical Therapy Education                  Title: PT OT SLP Therapies (Done)     Topic: Physical Therapy (Done)     Point: Mobility training (Done)     Learning Progress Summary           Patient Acceptance, E,TB, VU by  at 2/3/2022 1621    Eager, E,D, VU,NR by  at 2/2/2022 1402    Comment: safety with ADL    Acceptance, E, VU,NR by  at 2/2/2022 1331                   Point: Home exercise program (Done)     Learning Progress Summary           Patient Acceptance, E,TB, VU by  at 2/3/2022 1621    Eager, E,D, VU,NR by  at 2/2/2022 1402    Comment: safety with ADL    Acceptance, E, VU,NR by  at 2/2/2022 1331                   Point: Body mechanics (Done)     Learning Progress Summary           Patient Acceptance, E,TB, VU by  at 2/3/2022 1621    Eager, E,D, VU,NR by  at 2/2/2022 1402    Comment: safety with ADL    Acceptance, E, VU,NR by  at 2/2/2022 1331                   Point: Precautions (Done)     Learning Progress Summary           Patient Acceptance, E,TB, VU by  at 2/3/2022 1621    Eager, E,D, VU,NR by  at 2/2/2022 1402    Comment: safety with ADL    Acceptance, E, VU,NR by  at 2/2/2022 1331                               User Key     Initials Effective Dates Name Provider Type Discipline     06/16/21 -  Wendi Brothers, PT Physical Therapist PT     06/16/21 -  Rukhsana Dixon, OT Occupational Therapist OT     06/16/21 -  Julianna Ann PTA Physical Therapy Assistant PT                PT Recommendation and Plan    Frequency of Treatment (PT): 5 times per week  Anticipated Equipment Needs at Discharge (PT Eval):  (tbd)  Daily Progress Summary (PT)  Functional Goal Overall Progress (PT): progressing toward functional goals as expected  Daily Progress Summary (PT): Fair functional mobility and ambulation quality noted this date. Pt hindered by LE weakness and pain at this time. Conitnued skilled care required to ensure maximum safe function upon D/C.  Impairments Still Limiting Function (PT): strength  decreased, impaired functional activity tolerance, pain  Recommendations (PT): Continue per current POC               Time Calculation:      PT Charges     Row Name 02/03/22 1622 02/03/22 1621          Time Calculation    Start Time 1415  -RF 1045  -RF     Stop Time 1510  -RF 1130  -RF     Time Calculation (min) 55 min  -RF 45 min  -RF     PT Received On 02/03/22  -RF 02/03/22  -RF     PT - Next Appointment 02/04/22  -RF 02/03/22  -RF     PT Goal Re-Cert Due Date 02/09/22  -RF 02/09/22  -RF            Time Calculation- PT    Total Timed Code Minutes- PT 55 minute(s)  -RF 45 minute(s)  -RF           User Key  (r) = Recorded By, (t) = Taken By, (c) = Cosigned By    Initials Name Provider Type    RF Julianna Ann PTA Physical Therapy Assistant                Therapy Charges for Today     Code Description Service Date Service Provider Modifiers Qty    44968212883 HC GAIT TRAINING EA 15 MIN 2/3/2022 Julianna Ann, JESSICA GP 2    21925969468 HC PT THER PROC EA 15 MIN 2/3/2022 Julianna Ann, JESSICA GP 4    65713684291 HC PT THERAPEUTIC ACT EA 15 MIN 2/3/2022 Julianna Ann PTA GP 1                   Julianna Ann PTA  2/3/2022

## 2022-02-03 NOTE — PROGRESS NOTES
Occupational Therapy: Individual: 90 minutes.    Physical Therapy:    Speech Language Pathology:    Signed by: Candie Dixon, Occupational Therapist

## 2022-02-03 NOTE — PROGRESS NOTES
Rehabilitation Nursing  Inpatient Rehabilitation Plan of Care Note    Plan of Care  Copy from POCBody Function Structure    Skin Integrity (Active)  Current Status (2/3/2022 12:00:00 AM): free from skin breakdown this shift  Weekly Goal: free from skin breakdown this stay  Discharge Goal: home free of skin breakdown    Safety    Potential for Injury (Active)  Current Status (2/3/2022 12:00:00 AM): free from injury this shift  Weekly Goal: free from injury this stay  Discharge Goal: home free of injury    Signed by: Mesah Shea, Nurse

## 2022-02-03 NOTE — PROGRESS NOTES
PPS CMG Coordinator  Inpatient Rehabilitation Admission    Ethnic Group:  Marital Status:    IRF Admission Date:  02/01/2022  Admission Class:  Admit From:    Pre-Hospital Living:    Payment Sources:  Impairment Group:  Date of Onset of Impairment:    Etiologic Diagnosis Code(s):  Rank Code      Description  1    S72.141A  Displaced intertrochanteric fracture of right                 femur, initial encounter for closed fracture    Comorbidities:  Rank Code      Description    1    I25.10    Atherosclerotic heart disease of native                 coronary artery without angina pectoris  2    K21.9     Gastro-esophageal reflux disease without                 esophagitis  3    I10       Essential (primary) hypertension  4    E11.51    Type 2 diabetes mellitus with diabetic                 peripheral angiopathy without gangrene  5    E78.00    Pure hypercholesterolemia, unspecified  6    Z79.4     Long term (current) use of insulin  7    Z80.9     Family history of malignant neoplasm,                 unspecified  8    Z82.49    Family history of ischemic heart disease and                 other diseases of the circulatory system  9    Z83.3     Family history of diabetes mellitus  10   Z87.891   Personal history of nicotine dependence  11   W19.XXXA  Unspecified fall, initial encounter    Height on Admission:  Weight on Admission:    Are there any arthritis conditions recorded for Impairment Group, Etiologic  Diagnosis, or Comorbid Conditions that meet all of the regulatory requirements  for IRF classification (in 42 .29(b)(2)(x), (xi), and xii))?  No    KIRAN Bladder Accidents:  0 - Accidents.  Bladder Score = 7. Patient has not had an accident.  KIRAN Bowel Accident:  -Accidents.    Signed by: Nurse Giovanni

## 2022-02-03 NOTE — SIGNIFICANT NOTE
02/03/22 1522   Plan   Plan Team conference held today.  Spoke to pt via cell phone [phone] and son [phone].  Discussed how pt is doing in therapy and plans for discharge on 2-11-22.  Pt asked if she could stay longer if needed.  Explained this is possible based on how she progresses in therapy, medical need for continued inpatient care and insurance approval.  Pt wants to go home at discharge.  Son can check on her during the day and stay with her in the evenings/nights if needed.  Pt says she has six sisters who live in [facility] that can assist her too.  Will follow.   Patient/Family in Agreement with Plan yes

## 2022-02-03 NOTE — PROGRESS NOTES
Rehabilitation Nursing  Inpatient Rehabilitation Plan of Care Note    Plan of Care  Copy from POCBody Function Structure    Skin Integrity (Active)  Current Status (2/3/2022 12:00:00 AM): free from skin breakdown this shift  Weekly Goal: free from skin breakdown this stay  Discharge Goal: home free of skin breakdown    Safety    Potential for Injury (Active)  Current Status (2/3/2022 12:00:00 AM): free from injury this shift  Weekly Goal: free from injury this stay  Discharge Goal: home free of injury    Signed by: Mesha Shea, Nurse

## 2022-02-03 NOTE — PROGRESS NOTES
[facility] CMG Coordinator  Inpatient Rehabilitation Admission    Ethnic Group: White.  Marital Status:  Marital Status: .    IRF Admission Date:  02/01/2022  Admission Class: Initial Rehab.  Admit From:  [facility] [place]    Pre-Hospital Living: Home. Pre-Hospital Living  With: (1) Alone.    Payment Sources: Primary: Not Listed.  Secondary: Not Listed.  Impairment Group: 08.11 Status Post Unilateral Hip Fracture  Date of Onset of Impairment: 01/12/2022    Etiologic Diagnosis Code(s):  Rank Code      Description  1    S72.141A  Displaced intertrochanteric fracture of right                 femur, initial encounter for closed fracture    Comorbidities:      Height on Admission: 67 inches.  Weight on Admission: 161 pounds.    Are there any arthritis conditions recorded for Impairment Group, Etiologic  Diagnosis, or Comorbid Conditions that meet all of the regulatory requirements  for IRF classification (in 42 .29(b)(2)(x), (xi), and xii))?    KIRAN Bladder Accidents:  0 - Accidents.  Bladder Score = 7. Patient has not had an accident.  KIRAN Bowel Accident: 0 -Accidents.  Bowel Score = 7. Patient has no accidents.    Signed by: Felicia Lundberg, Supervisor

## 2022-02-03 NOTE — PROGRESS NOTES
Occupational Therapy:    Physical Therapy: Individual: 100 minutes.    Speech Language Pathology:    Signed by: Julianna Ann PTA

## 2022-02-03 NOTE — PROGRESS NOTES
PROGRESS NOTE         Patient Identification:  Name:  Lisa Velazquez  Age:  56 y.o.  Sex:  female  :  1965  MRN:  [id number]  Visit Number:  [id number]  Primary Care Provider:  Patricia Skelton APRN         LOS: 2 days       ----------------------------------------------------------------------------------------------------------------------  Subjective       Chief Complaints:    Right interotrochanteric hip fracture      Interval History:    55 y/o female admitted initially for L arterial stenting, but after stent she fell and fractured her R hip due to orthostatic hypotension.  She was taken to OR for TFN hip nailing for fixation of basicervical intertrochanteric fracture.    Participated with physical and Occupational Therapy evaluations on 2022: Participated with bed mobility with minimal assist with verbal/nonverbal cues; participated with transfer activities with contact-guard assist and verbal/nonverbal cues; utilized wheelchair/rolled walker for chair to bed, sit to stand/stand to sit transfer; ambulated 40 feet x 2, 80 feet, 60 feet with front wheeled walker contact-guard assist with verbal/nonverbal cues; required moderate assist with bathing activity; supervision for upper body dressing; max assist with lower body dressing; supervision for grooming; dependent with toileting; and supervision for self-feeding      Objective       Current Hospital Meds:  ascorbic acid, 500 mg, Oral, Daily  aspirin, 81 mg, Oral, Daily  clopidogrel, 75 mg, Oral, Daily  enoxaparin, 40 mg, Subcutaneous, Nightly  insulin aspart, 0-7 Units, Subcutaneous, TID AC  insulin detemir, 12 Units, Subcutaneous, Nightly  isosorbide mononitrate, 30 mg, Oral, Q24H  lisinopril, 2.5 mg, Oral, Q24H  multivitamin with minerals, 1 tablet, Oral, Daily  nystatin, , Topical, Q12H  rosuvastatin, 40 mg, Oral, Nightly  vitamin D3, 5,000 Units, Oral, Daily          ----------------------------------------------------------------------------------------------------------------------    Vital Signs:  Temp:  [98.1 °F (36.7 °C)-98.4 °F (36.9 °C)] 98.4 °F (36.9 °C)  Heart Rate:  [81-84] 84  Resp:  [18] 18  BP: (131-160)/(81-86) 131/81  No data found.  SpO2 Percentage    02/02/22 0826 02/02/22 1900 02/03/22 0700   SpO2: 96% 97% 96%     SpO2:  [96 %-97 %] 96 %  on   ;   Device (Oxygen Therapy): room air    Body mass index is 25.22 kg/m².  Wt Readings from Last 3 Encounters:   02/01/22 73 kg (161 lb)        Intake/Output Summary (Last 24 hours) at 2/3/2022 0915  Last data filed at 2/3/2022 0500  Gross per 24 hour   Intake 660 ml   Output --   Net 660 ml     Diet Regular; Consistent Carbohydrate  ----------------------------------------------------------------------------------------------------------------------      Physical Exam:    General Appearance: alert, pleasant, looks better this morning     Head: normocephalic, without obvious abnormality and atraumatic     Lungs: clear to auscultation, respirations regular, respirations even and  respirations unlabored. No accessory muscle use.      Heart:: regular rhythm & normal rate, normal S1, S2, no murmur, no gallop, no  rub and no click.  Chest wall with no abnormalities observed. PMI nondisplaced     Abdomen: normal bowel sounds in all quadrants, soft non-tender, no guarding and no rebound tenderness     Extremities: no edema, no cyanosis, no redness, no tenderness, no clubbing     Musculoskeletal: joints with no effusion nor erythema nor warmth.  Pedal pulses palpable and equal bilaterally     Skin: Right hip surgical wound     Neurologic:               Mental Status: Patient is awake alert and oriented x3.  Appropriate.              Sensory: Sensory overall seems to be intact in BLE and BUE.              Motor strength: Generalized  weakness        ----------------------------------------------------------------------------------------------------------------------            Results from last 7 days   Lab Units 02/02/22  0159   WBC 10*3/mm3 5.52   HEMOGLOBIN g/dL 10.7*   HEMATOCRIT % 34.1   MCV fL 91.4   MCHC g/dL 31.4*   PLATELETS 10*3/mm3 194     Results from last 7 days   Lab Units 02/02/22  0159   SODIUM mmol/L 140   POTASSIUM mmol/L 4.0   MAGNESIUM mg/dL 2.0   CHLORIDE mmol/L 107   CO2 mmol/L 23.4   BUN mg/dL 9   CREATININE mg/dL 0.63   EGFR IF NONAFRICN AM mL/min/1.73 98   CALCIUM mg/dL 9.2   GLUCOSE mg/dL 108*   ALBUMIN g/dL 3.39*   BILIRUBIN mg/dL 0.5   ALK PHOS U/L 120*   AST (SGOT) U/L 42*   ALT (SGPT) U/L 39*   Estimated Creatinine Clearance: 114.9 mL/min (by C-G formula based on SCr of 0.63 mg/dL).  No results found for: AMMONIA    Glucose   Date/Time Value Ref Range Status   02/03/2022 0624 124 70 - 130 mg/dL Final     Comment:     Meter: [id number] : [id number] Sivakumar BERGER   02/02/2022 1601 120 70 - 130 mg/dL Final     Comment:     Meter: [id number] : [id number] Hanh John   02/02/2022 1110 180 (H) 70 - 130 mg/dL Final     Comment:     Meter: [id number] : [id number] Schafer Dora   02/02/2022 0631 101 70 - 130 mg/dL Final     Comment:     Meter: [id number] : [id number] Cecily Allie   02/01/2022 2005 113 70 - 130 mg/dL Final     Comment:     Meter: [id number] : [id number] Sivakumar Martinez CELINE   02/01/2022 1732 160 (H) 70 - 130 mg/dL Final     Comment:     Meter: [id number] : [id number] Hanh John     No results found for: HGBA1C  No results found for: TSH, FREET4    No results found for: BLOODCX  No results found for: URINECX  No results found for: WOUNDCX  No results found for: STOOLCX  No results found for: RESPCX  Pain Management Panel    There is no flowsheet data to display.            ----------------------------------------------------------------------------------------------------------------------  Imaging Results (Last 24 Hours)     ** No results found for the last 24 hours. **          ----------------------------------------------------------------------------------------------------------------------    Assessment/Plan       Assessment/Plan     ASSESSMENT:    Right interotrochanteric hip fracture  Postsurgical hemorrhagic anemia  Diabetes mellitus  PVD  Hypertension  Hypercholesterolemia      PLAN:    Right hip fracture-Participated with physical and Occupational Therapy evaluations on 2/2/2022: Participated with bed mobility with minimal assist with verbal/nonverbal cues; participated with transfer activities with contact-guard assist and verbal/nonverbal cues; utilized wheelchair/rolled walker for chair to bed, sit to stand/stand to sit transfer; ambulated 40 feet x 2, 80 feet, 60 feet with front wheeled walker contact-guard assist with verbal/nonverbal cues; required moderate assist with bathing activity; supervision for upper body dressing; max assist with lower body dressing; supervision for grooming; dependent with toileting; and supervision for self-feeding     Vitals are stable we will continue to monitor hemoglobin as she had recent postoperative significant anemia.  Has occasional nosebleed due to dry air.     Continue with Levemir 12 units subcu nightly with sliding scale and adjust appropriately.     Pain control for now seems to be adequate.        Code Status:   Code Status and Medical Interventions:   Ordered at: 02/02/22 1159     Level Of Support Discussed With:    Patient     Code Status (Patient has no pulse and is not breathing):    CPR (Attempt to Resuscitate)     Medical Interventions (Patient has pulse or is breathing):    Full Support       Alejandrina Ann MD  02/03/22  11:57 EST

## 2022-02-03 NOTE — THERAPY TREATMENT NOTE
Inpatient Rehabilitation - Occupational Therapy Treatment Note     [facility]     Patient Name: Lisa Velazquez  : 1965  MRN: [id number]    Today's Date: 2/3/2022                 Admit Date: 2022         ICD-10-CM ICD-9-CM   1. S/P right hip fracture  Z87.81 V15.51       Patient Active Problem List   Diagnosis   • S/P right hip fracture       Past Medical History:   Diagnosis Date   • Coronary artery disease    • Diabetes mellitus (HCC)    • Elevated cholesterol    • GERD (gastroesophageal reflux disease)    • History of transfusion    • Hypertension        Past Surgical History:   Procedure Laterality Date   • ABDOMINAL SURGERY      gallbladder removal   • CARDIAC CATHETERIZATION     • CARDIAC SURGERY      stent   • COLONOSCOPY     • FRACTURE SURGERY     • TOE AMPUTATION Right     3rd toe right foot             IRF OT ASSESSMENT FLOWSHEET (last 12 hours)     IRF OT Evaluation and Treatment     Row Name 22 1300          OT Time and Intention    Document Type daily treatment  -     Mode of Treatment individual therapy; occupational therapy  -     Patient Effort good  -     Row Name 22 1300          General Information    Patient/Family/Caregiver Comments/Observations patient agreeable to therapy today. patient tolerated well with no complaints.  -     Existing Precautions/Restrictions fall  -[facility] Name 22 1300          Cognition/Psychosocial    Affect/Mental Status (Cognitive) WFL  -[facility] Name 22 1300          Transfers    Bed-Chair [place] (Transfers) minimum assist (75% patient effort)  -     Assistive Device (Bed-Chair Transfers) wheelchair  -[facility] Name 22 1300          Motor Skills    Motor Skills coordination; functional endurance; therapeutic exercise  -     Therapeutic Exercise --  UE bike, dowel ex, gmc,fmc,reaching  -[facility] Name 22 1300          Lower Body Dressing    [place] Level (Lower Body Dressing) moderate assist (50%  patient effort)  -     Row Name 02/03/22 1300          Grooming    [place] Level (Grooming) supervision  -     Row Name 02/03/22 1300          Self-Feeding    [place] Level (Self-Feeding) set up  -           User Key  (r) = Recorded By, (t) = Taken By, (c) = Cosigned By    Initials Name Effective Dates     Rukhsana Dixon, OT 06/16/21 -                          OT Recommendation and Plan    Planned Therapy Interventions (OT): activity tolerance training, adaptive equipment training, BADL retraining, patient/caregiver education/training, ROM/therapeutic exercise, strengthening exercise, transfer/mobility retraining                    Time Calculation:      Time Calculation-      Row Name 02/03/22 1357             Time Calculation-     OT Start Time 0745  -      OT Stop Time 0915  -      OT Time Calculation (min) 90 min  -            User Key  (r) = Recorded By, (t) = Taken By, (c) = Cosigned By    Initials Name Provider Type     Rukhsana Dixon, OT Occupational Therapist              Therapy Charges for Today     Code Description Service Date Service Provider Modifiers Qty    72687687387 HC OT EVAL MOD COMPLEXITY 3 2/2/2022 Rukhsana Dixon, OT GO 1    75023095229 HC OT THERAPEUTIC ACT EA 15 MIN 2/2/2022 Rukhsana Dixon, OT GO 2    24294018566 HC OT SELF CARE/MGMT/TRAIN EA 15 MIN 2/2/2022 Rukhsana Dixon, OT GO 1    55587149297 HC OT THERAPEUTIC ACT EA 15 MIN 2/3/2022 Rukhsana Dixon OT GO 2    32675452527 HC OT SELF CARE/MGMT/TRAIN EA 15 MIN 2/3/2022 Rukhsana Dixon, OT GO 2    82405631496 HC OT THER PROC EA 15 MIN 2/3/2022 Rukhsana Dixon, OT GO 2                   Rukhsana Dixon OT  2/3/2022

## 2022-02-03 NOTE — PROGRESS NOTES
Physical Medicine and Rehabilitation  Inpatient Rehabilitation Interdisciplinary Plan of Care    Demographics            Age: 56Y            Gender: Female    Admission Date: 2/1/2022 4:43:00 PM  Rehabilitation Diagnosis:  right hip IM nailing    Plan of Care  Anticipated Discharge Date/Estimated Length of Stay: 2-11-22  Anticipated Discharge Destination: Community discharge with assistance  Discharge Plan : Pt to return to her home with son checking on her during the  day.  Son is self-employed and can stay with pt in the evening and night if  needed.  Medical Necessity Expected Level Rationale: good  Intensity and Duration: an average of 3 hours/5 days per week  Medical Supervision and 24 Hour Rehab Nursing: x  Physical Therapy: x  PT Intensity/Duration: PT 1.5 hours per day/5 days per week  Occupational Therapy: x  OT Intensity/Duration: OT 1.5 hours per day/5 days per week  Social Work: x  Therapeutic Recreation: x  Updated (if changes indicated)  No changes to plan.    Based on the patient's medical and functional status, their prognosis and  expected level of functional improvement is: good    Interdisciplinary Problem/Goals/Status    Mobility    [PT] Bed/Chair/Wheelchair (Active)  Current Status (2/2/2022 12:00:00 AM): min A  Weekly Goal: MI  Discharge Goal: MI    [PT] Walk (Active)  Current Status (2/2/2022 12:00:00 AM): amb 80' RW CGA  Weekly Goal: amb 300' RW Sup  Discharge Goal: amb 300' RW Sup    Self Care    [OT] Toileting (Active)  Current Status (2/2/2022 12:00:00 AM): max  Weekly Goal: mod  Discharge Goal: set up    Body Function Structure    [RN] Skin Integrity (Active)  Current Status (2/3/2022 12:00:00 AM): free from skin breakdown this shift  Weekly Goal: free from skin breakdown this stay  Discharge Goal: home free of skin breakdown    Safety    [RN] Potential for Injury (Active)  Current Status (2/3/2022 12:00:00 AM): free from injury this shift  Weekly Goal: free from injury this  stay  Discharge Goal: home free of injury    Medical Problems      Right interotrochanteric hip fracture  Postsurgical hemorrhagic anemia  Diabetes mellitus  PVD  Hypertension  Hypercholesterolemia    Comments: Pt plans to return home at discharge with son checking on her and  staying with her in the evenings/nights if needed.    Signed by: Alejandrina Ann, Physician

## 2022-02-03 NOTE — PROGRESS NOTES
Case Management  Inpatient Rehabilitation Team Conference    Conference Date/Time: 2/3/2022 7:03:32 AM    Team Conference Attendees:  MD Carrie Almanza SW Jessica Bill, RN,   Wendi Brothers, PT  Candie Dixon, OT   Riley Logan RN    Demographics            Age: 56Y            Gender: Female    Admission Date: 2/1/2022 4:43:00 PM  Rehabilitation Diagnosis:  right hip IM nailing  Comorbidities:      Plan of Care  Anticipated Discharge Date/Estimated Length of Stay: 14 days  Anticipated Discharge Destination: Community discharge with assistance  Discharge Plan : Pt to return to her home with son checking on her during the  day.  Son is self-employed and can stay with pt in the evening and night if  needed.  Medical Necessity Expected Level Rationale: good  Intensity and Duration: an average of 3 hours/5 days per week  Medical Supervision and 24 Hour Rehab Nursing: x  Physical Therapy: x  PT Intensity/Duration: PT 1.5 hours per day/5 days per week  Occupational Therapy: x  OT Intensity/Duration: OT 1.5 hours per day/5 days per week  Social Work: x  Therapeutic Recreation: x  Updated (if changes indicated)    Anticipated Discharge Date/Estimated Length of Stay:   2-11-22      Discharge Plan of Care:    Based on the patient's medical and functional status, their prognosis and  expected level of functional improvement is: good      Interdisciplinary Problem/Goals/Status  Body Function Structure    [RN] Skin Integrity(Active)  Current Status(02/03/2022): free from skin breakdown this shift  Weekly Goal(02/17/2022): free from skin breakdown this stay  Discharge Goal: home free of skin breakdown        Mobility    [PT] Bed/Chair/Wheelchair(Active)  Current Status(02/02/2022): min A  Weekly Goal(02/09/2022): MI  Discharge Goal: MI    [PT] Walk(Active)  Current Status(02/02/2022): amb 80' RW CGA  Weekly Goal(02/09/2022): amb 300' RW Sup  Discharge Goal: amb 300' RW  Sup        Safety    [RN] Potential for Injury(Active)  Current Status(02/03/2022): free from injury this shift  Weekly Goal(02/17/2022): free from injury this stay  Discharge Goal: home free of injury        Self Care    [OT] Toileting(Active)  Current Status(02/02/2022): max  Weekly Goal(02/09/2022): mod  Discharge Goal: set up    Comments: Pt plans to return home at discharge with son checking on her and  staying with her in the evenings/nights if needed.    Signed by: RASTA Landry    Physician CoSigned By: Alejandrina Ann 02/03/2022 13:33:00

## 2022-02-03 NOTE — PROGRESS NOTES
"Patient Assessment Instrument  Quality Indicators - Admission    Section B. Hearing, Speech Vision      Section C. Cognitive Patterns  Brief Interview for Mental Status (BIMS) was conducted.  Repetition of Three Words: Three words  Able to report correct year: Correct  Able to report correct month: Accurate within 5 days  Able to report correct day of the week: Correct  Able to recall \"sock\": Yes, no cue required  Able to recall \"blue\": Yes, no cue required  Able to recall \"bed\": Yes, no cue required    BIMS SUMMARY SCORE: 15 Cognitively intact Patient was able to complete the Brief  Interview for Mental Status    Section [id number]. Prior Functioning    Self Care: Patient completed the activities by him/herself, with or without an  assistive device, with no assistance from a helper.  Indoor Mobility: Patient completed the activities by him/herself, with or  without an assistive device, with no assistance from a helper.  Stairs: Patient completed the activities by him/herself, with or without an  assistive device, with no assistance from a helper.  Functional Cognition: Patient completed the activities by him/herself, with or  without an assistive device, with no assistance from a helper.    Section [id number]. Prior Device Use      Section [id number]. Self Care Performance      Section [id number]. Self Care Discharge Goals      Section [id number]. Mobility Performance      Section GE5824. Mobility Discharge Goals      Section H. Bladder and Bowel  Bladder Continence: Always continent (no documented incontinence).  Bowel Continence: Not rated (patient had an ostomy or did not have a bowel  movement for the entire 3 days).    Section I. Active Diagnosis  Comorbidities and Co-existing Conditions:   Peripheral Vascular Disease (PVD) or  Peripheral Arterial Disease (PAD)., Diabetes Mellitus (DM) - e.g., diabetic  retinopathy, nephropathy, and neuropathy).    Section J. Health Conditions  Patient has had two or more falls, or a fall with " injury, in the past year.  Patient has had major surgery during the 100 days prior to admission.    Section K. Swallowing/Nutritional Status  Regular food (solids and liquids swallowed safely without supervision or  modified food or liquid consistency).    Section M. Skin Conditions  Unhealed Pressure Ulcer/Injuries at Stage 1 or Higher on Admission:  No.    Section N. Medication    Potential Clinically Significant Medication Issues: No issues found during  review    Section O. Special Treatments, Procedures, and Programs  Patient did not receive total parenteral nutrition treatment at the time of  admission.    OPTIONAL BRANCH FOR TRACKING FALLS  Fall(s) During Shift: No falls.    Signed by: Felicia Lundberg, Supervisor

## 2022-02-04 LAB
GLUCOSE BLDC GLUCOMTR-MCNC: 123 MG/DL (ref 70–130)
GLUCOSE BLDC GLUCOMTR-MCNC: 154 MG/DL (ref 70–130)
GLUCOSE BLDC GLUCOMTR-MCNC: 180 MG/DL (ref 70–130)

## 2022-02-04 PROCEDURE — 97535 SELF CARE MNGMENT TRAINING: CPT

## 2022-02-04 PROCEDURE — 63710000001 INSULIN ASPART PER 5 UNITS: Performed by: INTERNAL MEDICINE

## 2022-02-04 PROCEDURE — 63710000001 INSULIN DETEMIR PER 5 UNITS: Performed by: INTERNAL MEDICINE

## 2022-02-04 PROCEDURE — 82962 GLUCOSE BLOOD TEST: CPT

## 2022-02-04 PROCEDURE — 25010000002 ENOXAPARIN PER 10 MG: Performed by: INTERNAL MEDICINE

## 2022-02-04 PROCEDURE — 97116 GAIT TRAINING THERAPY: CPT

## 2022-02-04 PROCEDURE — 97110 THERAPEUTIC EXERCISES: CPT

## 2022-02-04 PROCEDURE — 97530 THERAPEUTIC ACTIVITIES: CPT

## 2022-02-04 PROCEDURE — 97112 NEUROMUSCULAR REEDUCATION: CPT

## 2022-02-04 RX ADMIN — GUAIFENESIN 200 MG: 200 SOLUTION ORAL at 20:07

## 2022-02-04 RX ADMIN — NYSTATIN: 100000 POWDER TOPICAL at 08:19

## 2022-02-04 RX ADMIN — ASPIRIN 81 MG: 81 TABLET, CHEWABLE ORAL at 08:19

## 2022-02-04 RX ADMIN — OXYCODONE 5 MG: 5 TABLET ORAL at 20:07

## 2022-02-04 RX ADMIN — OXYCODONE HYDROCHLORIDE AND ACETAMINOPHEN 500 MG: 500 TABLET ORAL at 08:19

## 2022-02-04 RX ADMIN — INSULIN ASPART 2 UNITS: 100 INJECTION, SOLUTION INTRAVENOUS; SUBCUTANEOUS at 11:41

## 2022-02-04 RX ADMIN — Medication 1 TABLET: at 08:19

## 2022-02-04 RX ADMIN — ISOSORBIDE MONONITRATE 30 MG: 30 TABLET, EXTENDED RELEASE ORAL at 08:19

## 2022-02-04 RX ADMIN — ROSUVASTATIN CALCIUM 40 MG: 20 TABLET, FILM COATED ORAL at 20:08

## 2022-02-04 RX ADMIN — LISINOPRIL 2.5 MG: 2.5 TABLET ORAL at 08:19

## 2022-02-04 RX ADMIN — ENOXAPARIN SODIUM 40 MG: 40 INJECTION SUBCUTANEOUS at 20:08

## 2022-02-04 RX ADMIN — INSULIN DETEMIR 12 UNITS: 100 INJECTION, SOLUTION SUBCUTANEOUS at 20:08

## 2022-02-04 RX ADMIN — CLOPIDOGREL BISULFATE 75 MG: 75 TABLET, FILM COATED ORAL at 08:19

## 2022-02-04 RX ADMIN — NYSTATIN 1 APPLICATION: 100000 POWDER TOPICAL at 20:08

## 2022-02-04 RX ADMIN — Medication 5000 UNITS: at 08:19

## 2022-02-04 RX ADMIN — INSULIN ASPART 2 UNITS: 100 INJECTION, SOLUTION INTRAVENOUS; SUBCUTANEOUS at 16:42

## 2022-02-04 NOTE — PROGRESS NOTES
Occupational Therapy: Individual: 90 minutes.    Physical Therapy:    Speech Language Pathology:    Signed by: Bharat Lake, Therapy Coordinator

## 2022-02-04 NOTE — PROGRESS NOTES
PROGRESS NOTE         Patient Identification:  Name:  Lisa Velazquez  Age:  56 y.o.  Sex:  female  :  1965  MRN:  [id number]  Visit Number:  [id number]  Primary Care Provider:  Patricia Skelton APRN         LOS: 3 days       ----------------------------------------------------------------------------------------------------------------------  Subjective       Chief Complaints:    Right interotrochanteric hip fracture      Interval History:    57 y/o female admitted initially for L arterial stenting, but after stent she fell and fractured her R hip due to orthostatic hypotension.  She was taken to OR for TFN hip nailing for fixation of basicervical intertrochanteric fracture.      Participated with physical and occupational therapy: Performs bed mobility with contact-guard assist and verbal/nonverbal cues; performs transfer activities with contact-guard assist and verbal/nonverbal cues; utilizes wheelchair/rolled walker for chair to bed, sit to stand/stand to sit transfer; ambulated 40 feet x 2 and 60 feet with front wheeled walker and contact-guard assist with verbal/nonverbal cues; required moderate assist for lower body dressing; supervision for grooming activity; set up assist for self-feeding      Objective       Current Hospital Meds:  ascorbic acid, 500 mg, Oral, Daily  aspirin, 81 mg, Oral, Daily  clopidogrel, 75 mg, Oral, Daily  enoxaparin, 40 mg, Subcutaneous, Nightly  insulin aspart, 0-7 Units, Subcutaneous, TID AC  insulin detemir, 12 Units, Subcutaneous, Nightly  isosorbide mononitrate, 30 mg, Oral, Q24H  lisinopril, 2.5 mg, Oral, Q24H  multivitamin with minerals, 1 tablet, Oral, Daily  nystatin, , Topical, Q12H  rosuvastatin, 40 mg, Oral, Nightly  vitamin D3, 5,000 Units, Oral, Daily         ----------------------------------------------------------------------------------------------------------------------    Vital Signs:  Temp:  [97.6 °F (36.4 °C)-98 °F (36.7 °C)] 97.6 °F (36.4  °C)  Heart Rate:  [85-94] 94  Resp:  [16-18] 18  BP: (131-137)/(74-84) 131/84  No data found.  SpO2 Percentage    02/03/22 0700 02/03/22 1900 02/04/22 0737   SpO2: 96% 95% 98%     SpO2:  [95 %-98 %] 98 %  on   ;   Device (Oxygen Therapy): room air    Body mass index is 25.22 kg/m².  Wt Readings from Last 3 Encounters:   02/01/22 73 kg (161 lb)        Intake/Output Summary (Last 24 hours) at 2/4/2022 0926  Last data filed at 2/4/2022 0800  Gross per 24 hour   Intake 600 ml   Output --   Net 600 ml     Diet Regular; Consistent Carbohydrate  ----------------------------------------------------------------------------------------------------------------------      Physical Exam:    General Appearance: alert, pleasant, looks better this morning, feels much better and would like to extend her stay beyond Friday     Head: normocephalic, without obvious abnormality and atraumatic     Lungs: clear to auscultation, respirations regular, respirations even and  respirations unlabored. No accessory muscle use.      Heart:: regular rhythm & normal rate, normal S1, S2, no murmur, no gallop, no  rub and no click.  Chest wall with no abnormalities observed. PMI nondisplaced     Abdomen: normal bowel sounds in all quadrants, soft non-tender, no guarding and no rebound tenderness     Extremities: no edema, no cyanosis, no redness, no tenderness, no clubbing     Musculoskeletal: joints with no effusion nor erythema nor warmth.  Pedal pulses palpable and equal bilaterally     Skin: Right hip surgical wound     Neurologic:               Mental Status: Patient is awake alert and oriented x3.  Appropriate.              Sensory: Sensory overall seems to be intact in BLE and BUE.              Motor strength: Generalized weakness        ----------------------------------------------------------------------------------------------------------------------            Results from last 7 days   Lab Units 02/02/22  0159   WBC 10*3/mm3 5.52    HEMOGLOBIN g/dL 10.7*   HEMATOCRIT % 34.1   MCV fL 91.4   MCHC g/dL 31.4*   PLATELETS 10*3/mm3 194     Results from last 7 days   Lab Units 02/02/22  0159   SODIUM mmol/L 140   POTASSIUM mmol/L 4.0   MAGNESIUM mg/dL 2.0   CHLORIDE mmol/L 107   CO2 mmol/L 23.4   BUN mg/dL 9   CREATININE mg/dL 0.63   EGFR IF NONAFRICN AM mL/min/1.73 98   CALCIUM mg/dL 9.2   GLUCOSE mg/dL 108*   ALBUMIN g/dL 3.39*   BILIRUBIN mg/dL 0.5   ALK PHOS U/L 120*   AST (SGOT) U/L 42*   ALT (SGPT) U/L 39*   Estimated Creatinine Clearance: 114.9 mL/min (by C-G formula based on SCr of 0.63 mg/dL).  No results found for: AMMONIA    Glucose   Date/Time Value Ref Range Status   02/04/2022 0642 123 70 - 130 mg/dL Final     Comment:     Meter: [id number] : [id number] Sivakumar BERGER   02/03/2022 1637 155 (H) 70 - 130 mg/dL Final     Comment:     Meter: [id number] : [id number] david mejias   02/03/2022 1056 219 (H) 70 - 130 mg/dL Final     Comment:     Meter: [id number] : [id number] liam pires   02/03/2022 0624 124 70 - 130 mg/dL Final     Comment:     Meter: [id number] : [id number] Sivakumar BERGER   02/02/2022 1601 120 70 - 130 mg/dL Final     Comment:     Meter: [id number] : [id number] Hanh John   02/02/2022 1110 180 (H) 70 - 130 mg/dL Final     Comment:     Meter: [id number] : [id number] Hanh John   02/02/2022 0631 101 70 - 130 mg/dL Final     Comment:     Meter: [id number] : [id number] Cecily Crystal   02/01/2022 2005 113 70 - 130 mg/dL Final     Comment:     Meter: [id number] : [id number] Sivakumar BERGER     No results found for: HGBA1C  No results found for: TSH, FREET4    No results found for: BLOODCX  No results found for: URINECX  No results found for: WOUNDCX  No results found for: STOOLCX  No results found for: RESPCX  Pain Management Panel    There is no flowsheet data to display.            ----------------------------------------------------------------------------------------------------------------------  Imaging Results (Last 24 Hours)     ** No results found for the last 24 hours. **          ----------------------------------------------------------------------------------------------------------------------    Assessment/Plan       Assessment/Plan     ASSESSMENT:    Right interotrochanteric hip fracture  Postsurgical hemorrhagic anemia  Diabetes mellitus  PVD  Hypertension  Hypercholesterolemia      PLAN:    Right hip fracture-Participated with physical and occupational therapy: Performs bed mobility with contact-guard assist and verbal/nonverbal cues; performs transfer activities with contact-guard assist and verbal/nonverbal cues; utilizes wheelchair/rolled walker for chair to bed, sit to stand/stand to sit transfer; ambulated 40 feet x 2 and 60 feet with front wheeled walker and contact-guard assist with verbal/nonverbal cues; required moderate assist for lower body dressing; supervision for grooming activity; set up assist for self-feeding     Vitals are stable we will continue to monitor hemoglobin as she had recent postoperative significant anemia.  Has occasional nosebleed due to dry air.     Continue with Levemir 12 units subcu nightly with sliding scale and adjust appropriately.     Pain control for now seems to be adequate.        Code Status:   Code Status and Medical Interventions:   Ordered at: 02/02/22 1325     Level Of Support Discussed With:    Patient     Code Status (Patient has no pulse and is not breathing):    CPR (Attempt to Resuscitate)     Medical Interventions (Patient has pulse or is breathing):    Full Support     Alejandrina Ann MD  02/05/22  11:13 EST

## 2022-02-04 NOTE — PROGRESS NOTES
Occupational Therapy:    Physical Therapy: Individual: 90 minutes.    Speech Language Pathology:    Signed by: Julianna Ann PTA

## 2022-02-04 NOTE — THERAPY TREATMENT NOTE
Inpatient Rehabilitation - Occupational Therapy Treatment Note     [place]     Patient Name: Lisa Velazquez  : 1965  MRN: [id number]    Today's Date: 2022                 Admit Date: 2022         ICD-10-CM ICD-9-CM   1. S/P right hip fracture  Z87.81 V15.51       Patient Active Problem List   Diagnosis   • S/P right hip fracture       Past Medical History:   Diagnosis Date   • Coronary artery disease    • Diabetes mellitus (HCC)    • Elevated cholesterol    • GERD (gastroesophageal reflux disease)    • History of transfusion    • Hypertension        Past Surgical History:   Procedure Laterality Date   • ABDOMINAL SURGERY      gallbladder removal   • CARDIAC CATHETERIZATION     • CARDIAC SURGERY      stent   • COLONOSCOPY     • FRACTURE SURGERY     • TOE AMPUTATION Right     3rd toe right foot             IRF OT ASSESSMENT FLOWSHEET (last 12 hours)     IRF OT Evaluation and Treatment     Row Name 22 0907          OT Time and Intention    Document Type daily treatment  -JW     Mode of Treatment individual therapy; occupational therapy  -JW     Patient Effort good  -JW     Row Name 22 0907          General Information    General Observations of Patient Pt seen for 90 min this am.  Tx included groom/hyg tr while in w/c, w//c mob tr, bed mob and transf tr with focus on safety, pt need assist with opening containers during self feeding d/t decrease fine motor skills.  SBA for UB dres and min mod A for LB dress and min for donning socks/shoes on R and set up for Left foot. She completed 3 sets of 4 min. UE PRE and multiple fine and gross motor coord. challenges.  -JW     Existing Precautions/Restrictions fall  -JW           User Key  (r) = Recorded By, (t) = Taken By, (c) = Cosigned By    Initials Name Effective Dates    Bharat Glynn, OTR 21 -                          OT Recommendation and Plan                         Time Calculation:      Time Calculation- OT     Row Name 22 0915              Time Calculation- OT    OT Start Time 0715  -      OT Stop Time 0845  -      OT Time Calculation (min) 90 min  -      Total Timed Code Minutes- OT 90 minute(s)  -            User Key  (r) = Recorded By, (t) = Taken By, (c) = Cosigned By    Initials Name Provider Type    Bharat Glynn OTR Occupational Therapist              Therapy Charges for Today     Code Description Service Date Service Provider Modifiers Qty    34933546119 HC OT SELF CARE/MGMT/TRAIN EA 15 MIN 2/4/2022 Bharat Lake OTR GO 3    49646821219 HC OT THERAPEUTIC ACT EA 15 MIN 2/4/2022 Bharat Lake OTR GO 1    73805033190 HC OT THER SUPP EA 15 MIN 2/4/2022 Bharat Lake OTR GO 1    96226030694 HC OT NEUROMUSC RE EDUCATION EA 15 MIN 2/4/2022 Bharat Lake OTR GO 2                   EDOUARD Love  2/4/2022

## 2022-02-04 NOTE — THERAPY TREATMENT NOTE
Inpatient Rehabilitation - Physical Therapy Treatment Note       [facility]     Patient Name: Lisa Velazquez  : 1965  MRN: [id number]    Today's Date: 2022                    Admit Date: 2022      Visit Dx:     ICD-10-CM ICD-9-CM   1. S/P right hip fracture  Z87.81 V15.51       Patient Active Problem List   Diagnosis   • S/P right hip fracture       Past Medical History:   Diagnosis Date   • Coronary artery disease    • Diabetes mellitus (HCC)    • Elevated cholesterol    • GERD (gastroesophageal reflux disease)    • History of transfusion    • Hypertension        Past Surgical History:   Procedure Laterality Date   • ABDOMINAL SURGERY      gallbladder removal   • CARDIAC CATHETERIZATION     • CARDIAC SURGERY      stent   • COLONOSCOPY     • FRACTURE SURGERY     • TOE AMPUTATION Right     3rd toe right foot       PT ASSESSMENT (last 12 hours)     IRF PT Evaluation and Treatment     Row Name 22 1531          PT Time and Intention    Document Type daily treatment  -RF     Mode of Treatment individual therapy; physical therapy  -RF     Patient/Family/Caregiver Comments/Observations Pt c/o R hip pain with increased activity.  -RF     Row Name 22 1531          General Information    Patient Profile Reviewed yes  -RF     Existing Precautions/Restrictions fall  -RF     Row Name 22 1531          Cognition/Psychosocial    Affect/Mental Status (Cognitive) WFL  -RF     Follows Commands (Cognition) verbal cues/prompting required  -RF     Row Name 22 1531          Pain Scale: FACES Pre/Post-Treatment    Pain: FACES Scale, Pretreatment 6-->hurts even more  -RF     Posttreatment Pain Rating 6-->hurts even more  -RF     Row Name 22 1531          Bed Mobility    Supine-Sit [place] (Bed Mobility) minimum assist (75% patient effort)  -RF     Sit-Supine [place] (Bed Mobility) minimum assist (75% patient effort)  -RF     Assistive Device (Bed Mobility) bed rails  -RF     Comment (Bed  Mobility) Requires assistance with RLE  -RF     Row Name 02/04/22 1531          Transfers    Bed-Chair [place] (Transfers) contact guard  -RF     Chair-Bed [place] (Transfers) contact guard; verbal cues; nonverbal cues (demo/gesture)  -RF     Assistive Device (Bed-Chair Transfers) wheelchair; walker, front-wheeled  -RF     Sit-Stand [place] (Transfers) contact guard; verbal cues; nonverbal cues (demo/gesture)  -RF     Stand-Sit [place] (Transfers) contact guard; verbal cues; nonverbal cues (demo/gesture)  -RF     Row Name 02/04/22 1531          Chair-Bed Transfer    Assistive Device (Chair-Bed Transfers) wheelchair; walker, front-wheeled  -RF     Row Name 02/04/22 1531          Sit-Stand Transfer    Assistive Device (Sit-Stand Transfers) walker, front-wheeled  -RF     Row Name 02/04/22 1531          Stand-Sit Transfer    Assistive Device (Stand-Sit Transfers) walker, front-wheeled  -RF     Row Name 02/04/22 1531          Gait/Stairs (Locomotion)    [place] Level (Gait) contact guard; verbal cues; nonverbal cues (demo/gesture)  -RF     Assistive Device (Gait) walker, front-wheeled  -RF     Distance in Feet (Gait) 60X2 in AM, 80  -RF     Deviations/Abnormal Patterns (Gait) antalgic; bc decreased; gait speed decreased; stride length decreased  -RF     Bilateral Gait Deviations forward flexed posture  -RF     Comment (Gait/Stairs) Decreased WS and stance time noted on RLE; pt cued for proper technique.  -RF     Row Name 02/04/22 1531          Safety Issues, Functional Mobility    Impairments Affecting Function (Mobility) balance; endurance/activity tolerance; pain; range of motion (ROM); strength  -RF     Row Name 02/04/22 1531          Hip (Therapeutic Exercise)    Hip Strengthening (Therapeutic Exercise) bilateral; flexion; extension; aBduction; aDduction; marching while seated; external rotation; heel slides; sitting; supine; 2 lb free weight; resistance band; red; 2 sets; 10 repetitions   -RF     Row Name 02/04/22 1531          Knee (Therapeutic Exercise)    Knee Strengthening (Therapeutic Exercise) bilateral; flexion; extension; heel slides; marching while seated; SLR (straight leg raise); SAQ (short arc quad); LAQ (long arc quad); hamstring curls; sitting; supine; 2 lb free weight; resistance band; red; 2 sets; 10 repetitions  -RF     Row Name 02/04/22 1531          Ankle (Therapeutic Exercise)    Ankle Strengthening (Therapeutic Exercise) bilateral; dorsiflexion; plantarflexion; sitting; supine; 2 lb free weight; 2 sets; 10 repetitions  -RF     Row Name 02/04/22 1531          IRF PT Goals    Bed Mobility Goal Selection (PT-IRF) bed mobility, PT goal 1  -RF     Transfer Goal Selection (PT-IRF) transfers, PT goal 1  -RF     Gait (Walking Locomotion) Goal Selection (PT-IRF) gait, PT goal 1  -RF     Row Name 02/04/22 1531          Bed Mobility Goal 1 (PT-IRF)    Activity/Assistive Device (Bed Mobility Goal 1, PT-IRF) sit to supine/supine to sit  -RF     [place] Level (Bed Mobility Goal 1, PT-IRF) independent  -RF     Time Frame (Bed Mobility Goal 1, PT-IRF) by discharge  -RF     Row Name 02/04/22 1531          Transfer Goal 1 (PT-IRF)    Activity/Assistive Device (Transfer Goal 1, PT-IRF) sit-to-stand/stand-to-sit; bed-to-chair/chair-to-bed  -RF     [place] Level (Transfer Goal 1, PT-IRF) modified independence  -RF     Time Frame (Transfer Goal 1, PT-IRF) by discharge  -RF     Row Name 02/04/22 1531          Gait/Walking Locomotion Goal 1 (PT-IRF)    Activity/Assistive Device (Gait/Walking Locomotion Goal 1, PT-IRF) walker, rolling  -RF     Gait/Walking Locomotion Distance Goal 1 (PT-IRF) 300'  -RF     [place] Level (Gait/Walking Locomotion Goal 1, PT-IRF) supervision required  -RF     Time Frame (Gait/Walking Locomotion Goal 1, PT-IRF) by discharge  -RF     Row Name 02/04/22 1531          Positioning and Restraints    Pre-Treatment Position sitting in chair/recliner  -RF     Post  Treatment Position wheelchair  -RF     In Bed supine; call light within reach; encouraged to call for assist  PM  -RF     In Wheelchair sitting; call light within reach; encouraged to call for assist  AM  -RF     Row Name 02/04/22 1531          Therapy Assessment/Plan (PT)    Patient's Goals For Discharge return home; take care of myself at home  -RF     Row Name 02/04/22 1531          Therapy Assessment/Plan (PT)    Rehab Potential/Prognosis (PT) adequate, monitor progress closely  -RF     Frequency of Treatment (PT) 5 times per week  -RF     Estimated Duration of Therapy (PT) 2 weeks  -RF     Problem List (PT) balance; mobility; range of motion (ROM); strength; pain  -RF     Activity Limitations Related to Problem List (PT) unable to ambulate safely; unable to transfer safely  -RF     Row Name 02/04/22 1531          Daily Progress Summary (PT)    Functional Goal Overall Progress (PT) progressing toward functional goals as expected  -RF     Daily Progress Summary (PT) LE weakness and pain continually noted this date. Continued skilled care required for further improvements to ensure maximum safe function upon D/C>  -RF     Impairments Still Limiting Function (PT) strength decreased; impaired functional activity tolerance; pain  -RF     Recommendations (PT) Continue per current POC  -RF     Row Name 02/04/22 1531          Therapy Plan Review/Discharge Plan (PT)    Anticipated Equipment Needs at Discharge (PT Eval) --  tbd  -RF     Expected Discharge Disposition (PT Eval) home with home health care  -RF           User Key  (r) = Recorded By, (t) = Taken By, (c) = Cosigned By    Initials Name Provider Type    RF Julianna Ann, PTA Physical Therapy Assistant                 Physical Therapy Education                 Title: PT OT SLP Therapies (Done)     Topic: Physical Therapy (Done)     Point: Mobility training (Done)     Learning Progress Summary           Patient Acceptance, E,TB, VU by RF at 2/4/2022 3301     Eager, E, VU,DU,NR by  at 2/4/2022 0915    Acceptance, E,TB, VU by  at 2/3/2022 1621    Eager, E,D, VU,NR by  at 2/2/2022 1402    Comment: safety with ADL    Acceptance, E, VU,NR by  at 2/2/2022 1331                   Point: Home exercise program (Done)     Learning Progress Summary           Patient Acceptance, E,TB, VU by  at 2/4/2022 1542    Eager, E, VU,DU,NR by  at 2/4/2022 0915    Acceptance, E,TB, VU by  at 2/3/2022 1621    Eager, E,D, VU,NR by  at 2/2/2022 1402    Comment: safety with ADL    Acceptance, E, VU,NR by  at 2/2/2022 1331                   Point: Body mechanics (Done)     Learning Progress Summary           Patient Acceptance, E,TB, VU by  at 2/4/2022 1542    Eager, E, VU,DU,NR by  at 2/4/2022 0915    Acceptance, E,TB, VU by  at 2/3/2022 1621    Eager, E,D, VU,NR by  at 2/2/2022 1402    Comment: safety with ADL    Acceptance, E, VU,NR by  at 2/2/2022 1331                   Point: Precautions (Done)     Learning Progress Summary           Patient Acceptance, E,TB, VU by  at 2/4/2022 1542    Eager, E, VU,DU,NR by  at 2/4/2022 0915    Acceptance, E,TB, VU by  at 2/3/2022 1621    Eager, E,D, VU,NR by  at 2/2/2022 1402    Comment: safety with ADL    Acceptance, E, VU,NR by  at 2/2/2022 1331                               User Key     Initials Effective Dates Name Provider Type Discipline     06/16/21 -  Wendi Brothers, PT Physical Therapist PT     06/16/21 -  Rukhsana Dixon, OT Occupational Therapist OT     06/16/21 -  Bharat Lake OTR Occupational Therapist OT     06/16/21 -  Julianna Ann PTA Physical Therapy Assistant PT                PT Recommendation and Plan    Frequency of Treatment (PT): 5 times per week  Anticipated Equipment Needs at Discharge (PT Eval):  (tbd)  Daily Progress Summary (PT)  Functional Goal Overall Progress (PT): progressing toward functional goals as expected  Daily Progress Summary (PT): LE weakness and pain  continually noted this date. Continued skilled care required for further improvements to ensure maximum safe function upon D/C>  Impairments Still Limiting Function (PT): strength decreased, impaired functional activity tolerance, pain  Recommendations (PT): Continue per current POC               Time Calculation:      PT Charges     Row Name 02/04/22 1545 02/04/22 1544          Time Calculation    Start Time 1245  -RF 1045  -RF     Stop Time 1330  -RF 1130  -RF     Time Calculation (min) 45 min  -RF 45 min  -RF     PT Received On 02/04/22  -RF 02/04/22  -RF     PT - Next Appointment 02/05/22  -RF 02/04/22  -RF     PT Goal Re-Cert Due Date 02/09/22  -RF 02/09/22  -RF            Time Calculation- PT    Total Timed Code Minutes- PT 45 minute(s)  -RF 45 minute(s)  -RF           User Key  (r) = Recorded By, (t) = Taken By, (c) = Cosigned By    Initials Name Provider Type    RF Julianna Ann, JESSICA Physical Therapy Assistant                Therapy Charges for Today     Code Description Service Date Service Provider Modifiers Qty    04085427242 HC GAIT TRAINING EA 15 MIN 2/3/2022 Julianna Ann, PTA GP 2    63018956781 HC PT THER PROC EA 15 MIN 2/3/2022 Julianna Ann, PTA GP 4    98792554358 HC PT THERAPEUTIC ACT EA 15 MIN 2/3/2022 Julianna Ann, PTA GP 1    15441795065 HC GAIT TRAINING EA 15 MIN 2/4/2022 Julianna Ann, PTA GP 2    06476335331 HC PT THER PROC EA 15 MIN 2/4/2022 Julianna Ann, PTA GP 4                   Julianna Ann, JESSICA  2/4/2022

## 2022-02-04 NOTE — PROGRESS NOTES
Rehabilitation Nursing  Inpatient Rehabilitation Plan of Care Note    Plan of Care  Body Function Structure    Skin Integrity (Active)  Current Status (2/3/2022 12:00:00 AM): free from skin breakdown this shift  Weekly Goal: free from skin breakdown this stay  Discharge Goal: home free of skin breakdown    Safety    Potential for Injury (Active)  Current Status (2/3/2022 12:00:00 AM): free from injury this shift  Weekly Goal: free from injury this stay  Discharge Goal: home free of injury    Signed by: Jeromy Slater RN

## 2022-02-05 LAB
GLUCOSE BLDC GLUCOMTR-MCNC: 136 MG/DL (ref 70–130)
GLUCOSE BLDC GLUCOMTR-MCNC: 159 MG/DL (ref 70–130)
GLUCOSE BLDC GLUCOMTR-MCNC: 186 MG/DL (ref 70–130)
GLUCOSE BLDC GLUCOMTR-MCNC: 206 MG/DL (ref 70–130)

## 2022-02-05 PROCEDURE — 97530 THERAPEUTIC ACTIVITIES: CPT

## 2022-02-05 PROCEDURE — 25010000002 ENOXAPARIN PER 10 MG: Performed by: INTERNAL MEDICINE

## 2022-02-05 PROCEDURE — 63710000001 INSULIN DETEMIR PER 5 UNITS: Performed by: INTERNAL MEDICINE

## 2022-02-05 PROCEDURE — 63710000001 INSULIN ASPART PER 5 UNITS: Performed by: INTERNAL MEDICINE

## 2022-02-05 PROCEDURE — 97116 GAIT TRAINING THERAPY: CPT

## 2022-02-05 PROCEDURE — 97110 THERAPEUTIC EXERCISES: CPT

## 2022-02-05 PROCEDURE — 82962 GLUCOSE BLOOD TEST: CPT

## 2022-02-05 PROCEDURE — 97535 SELF CARE MNGMENT TRAINING: CPT

## 2022-02-05 RX ADMIN — ASPIRIN 81 MG: 81 TABLET, CHEWABLE ORAL at 08:24

## 2022-02-05 RX ADMIN — ISOSORBIDE MONONITRATE 30 MG: 30 TABLET, EXTENDED RELEASE ORAL at 08:25

## 2022-02-05 RX ADMIN — OXYCODONE 5 MG: 5 TABLET ORAL at 21:24

## 2022-02-05 RX ADMIN — INSULIN ASPART 3 UNITS: 100 INJECTION, SOLUTION INTRAVENOUS; SUBCUTANEOUS at 11:31

## 2022-02-05 RX ADMIN — NYSTATIN: 100000 POWDER TOPICAL at 08:24

## 2022-02-05 RX ADMIN — NYSTATIN: 100000 POWDER TOPICAL at 21:24

## 2022-02-05 RX ADMIN — ROSUVASTATIN CALCIUM 40 MG: 20 TABLET, FILM COATED ORAL at 21:23

## 2022-02-05 RX ADMIN — Medication 5000 UNITS: at 08:25

## 2022-02-05 RX ADMIN — OXYCODONE HYDROCHLORIDE AND ACETAMINOPHEN 500 MG: 500 TABLET ORAL at 08:24

## 2022-02-05 RX ADMIN — INSULIN DETEMIR 12 UNITS: 100 INJECTION, SOLUTION SUBCUTANEOUS at 21:24

## 2022-02-05 RX ADMIN — Medication 1 TABLET: at 08:25

## 2022-02-05 RX ADMIN — GUAIFENESIN 200 MG: 200 SOLUTION ORAL at 21:23

## 2022-02-05 RX ADMIN — INSULIN ASPART 2 UNITS: 100 INJECTION, SOLUTION INTRAVENOUS; SUBCUTANEOUS at 17:09

## 2022-02-05 RX ADMIN — LISINOPRIL 2.5 MG: 2.5 TABLET ORAL at 08:25

## 2022-02-05 RX ADMIN — ENOXAPARIN SODIUM 40 MG: 40 INJECTION SUBCUTANEOUS at 21:23

## 2022-02-05 RX ADMIN — CLOPIDOGREL BISULFATE 75 MG: 75 TABLET, FILM COATED ORAL at 08:24

## 2022-02-05 NOTE — PROGRESS NOTES
Occupational Therapy: Individual: 90 minutes.    Physical Therapy:    Speech Language Pathology:    Signed by: Vandana Rader OT

## 2022-02-05 NOTE — PROGRESS NOTES
Occupational Therapy:    Physical Therapy: Individual: 90 minutes.    Speech Language Pathology:    Signed by: Griffin Baird PT

## 2022-02-05 NOTE — THERAPY TREATMENT NOTE
Inpatient Rehabilitation - Occupational Therapy Treatment Note     Junito     Patient Name: Lisa Velazquez  : 1965  MRN: [id number]    Today's Date: 2022                 Admit Date: 2022         ICD-10-CM ICD-9-CM   1. S/P right hip fracture  Z87.81 V15.51       Patient Active Problem List   Diagnosis   • S/P right hip fracture       Past Medical History:   Diagnosis Date   • Coronary artery disease    • Diabetes mellitus (HCC)    • Elevated cholesterol    • GERD (gastroesophageal reflux disease)    • History of transfusion    • Hypertension        Past Surgical History:   Procedure Laterality Date   • ABDOMINAL SURGERY      gallbladder removal   • CARDIAC CATHETERIZATION     • CARDIAC SURGERY      stent   • COLONOSCOPY     • FRACTURE SURGERY     • TOE AMPUTATION Right     3rd toe right foot             IRF OT ASSESSMENT FLOWSHEET (last 12 hours)     IRF OT Evaluation and Treatment     Row Name 22          OT Time and Intention    Document Type daily treatment  -HB     Mode of Treatment individual therapy; occupational therapy  -HB     Patient Effort good  -HB     Symptoms Noted During/After Treatment none  -HB     Row Name 22 110          General Information    Patient Profile Reviewed yes  -HB     Patient/Family/Caregiver Comments/Observations Patient and RN okd OT this date.  -HB     General Observations of Patient Patient tolerated OT well with no adverse reactions. Pt demo good tolerance.  -HB     Existing Precautions/Restrictions fall  -     Row Name 22 110          Cognition/Psychosocial    Affect/Mental Status (Cognitive) WFL  -     Orientation Status (Cognition) oriented to; person; place; situation  -     Row Name 22 110          Pain Assessment    Additional Documentation Pain Scale: Numbers Pre/Post-Treatment (Group)  -     Row Name 22 110          Pain Scale: Numbers Pre/Post-Treatment    Pretreatment Pain Rating 0/10 - no pain  -      Posttreatment Pain Rating 0/10 - no pain  -     Row Name 02/05/22 1102          Bed Mobility    Supine-Sit [place] (Bed Mobility) supervision; nonverbal cues (demo/gesture); verbal cues  -     Row Name 02/05/22 1102          Transfers    Bed-Chair [place] (Transfers) minimum assist (75% patient effort); nonverbal cues (demo/gesture); verbal cues  -HB     Assistive Device (Bed-Chair Transfers) wheelchair; walker, front-wheeled  -     Sit-Stand [place] (Transfers) contact guard; nonverbal cues (demo/gesture); verbal cues  -HB     Stand-Sit [place] (Transfers) minimum assist (75% patient effort); nonverbal cues (demo/gesture); verbal cues  -     Row Name 02/05/22 1102          Motor Skills    Motor Skills coordination; functional endurance; motor control/coordination interventions; therapeutic exercise  -     Functional Endurance PRE 2 min 3x, wrist rolls, BUE TA to increase ADL status and functional endurance.  -     Motor Control/Coordination Interventions fine motor manipulation/dexterity activities; graded motor imagery/mirror therapy; therapeutic exercise/ROM; gross motor coordination activities  -     Therapeutic Exercise shoulder; elbow/forearm; wrist; hand  -     Row Name 02/05/22 1102          Lower Body Dressing    [place] Level (Lower Body Dressing) doff; don; shoes/slippers; socks; maximum assist (25% patient effort); verbal cues; nonverbal cues (demo/gesture)  -     Position (Lower Body Dressing) supported sitting  -     Row Name 02/05/22 1102          Grooming    [place] Level (Grooming) supervision; grooming skills  -     Position (Grooming) supported sitting  -[facility] Name 02/05/22 1102          Positioning and Restraints    Pre-Treatment Position in bed  -     Post Treatment Position wheelchair  -HB     In Bed encouraged to call for assist  in PT gym  -           User Key  (r) = Recorded By, (t) = Taken By, (c) = Cosigned By    Initials Name  Effective Dates     Vandana Rader OT 05/25/21 -                  Occupational Therapy Education                 Title: PT OT SLP Therapies (Done)     Topic: Occupational Therapy (Done)     Point: ADL training (Done)     Description:   Instruct learner(s) on proper safety adaptation and remediation techniques during self care or transfers.   Instruct in proper use of assistive devices.              Learning Progress Summary           Patient Acceptance, E, VU,NR by  at 2/5/2022 1117                   Point: Home exercise program (Done)     Description:   Instruct learner(s) on appropriate technique for monitoring, assisting and/or progressing therapeutic exercises/activities.              Learning Progress Summary           Patient Acceptance, E, VU,NR by  at 2/5/2022 1117                   Point: Body mechanics (Done)     Description:   Instruct learner(s) on proper positioning and spine alignment during self-care, functional mobility activities and/or exercises.              Learning Progress Summary           Patient Acceptance, E, VU,NR by  at 2/5/2022 1117                               User Key     Initials Effective Dates Name Provider Type Discipline     05/25/21 -  Vandana Rader OT Occupational Therapist OT                    OT Recommendation and Plan                         Time Calculation:      Time Calculation- OT     Row Name 02/05/22 1118             Time Calculation- OT    OT Start Time 0700  -HB      OT Stop Time 0830  -HB      OT Time Calculation (min) 90 min  -      Total Timed Code Minutes- OT 90 minute(s)  -      OT Non-Billable Time (min) 10 min  -            User Key  (r) = Recorded By, (t) = Taken By, (c) = Cosigned By    Initials Name Provider Type     Vandana Rader OT Occupational Therapist              Therapy Charges for Today     Code Description Service Date Service Provider Modifiers Qty    46419393372  OT SELF CARE/MGMT/TRAIN EA 15 MIN 2/5/2022 Vandana Rader OT  GO 2    66221446848 HC OT THER PROC EA 15 MIN 2/5/2022 Vandana Rader OT GO 1    74523668275 HC OT THERAPEUTIC ACT EA 15 MIN 2/5/2022 Vandana Rader OT GO 3                   Vandana Rader OT  2/5/2022

## 2022-02-05 NOTE — THERAPY TREATMENT NOTE
Inpatient Rehabilitation - Physical Therapy Treatment Note       TONI Wild     Patient Name: Lisa Velazquez  : 1965  MRN: [id number]    Today's Date: 2022                    Admit Date: 2022      Visit Dx:     ICD-10-CM ICD-9-CM   1. S/P right hip fracture  Z87.81 V15.51       Patient Active Problem List   Diagnosis   • S/P right hip fracture       Past Medical History:   Diagnosis Date   • Coronary artery disease    • Diabetes mellitus (HCC)    • Elevated cholesterol    • GERD (gastroesophageal reflux disease)    • History of transfusion    • Hypertension        Past Surgical History:   Procedure Laterality Date   • ABDOMINAL SURGERY      gallbladder removal   • CARDIAC CATHETERIZATION     • CARDIAC SURGERY      stent   • COLONOSCOPY     • FRACTURE SURGERY     • TOE AMPUTATION Right     3rd toe right foot       PT ASSESSMENT (last 12 hours)     IRF PT Evaluation and Treatment     Row Name 22 1329          PT Time and Intention    Document Type daily treatment  -CW     Mode of Treatment individual therapy; physical therapy  -CW     Patient/Family/Caregiver Comments/Observations Patient agreeable to physical therapy treatment this date. She reports that she is eager to ambulate without a walker.  -CW     Total Minutes, Physical Therapy 90  -CW     Row Name 22 1327          General Information    Patient Profile Reviewed yes  -CW     Existing Precautions/Restrictions fall  -CW     Row Name 22 132          Coping Strategies    Trust Relationship/Rapport care explained; choices provided; questions answered; thoughts/feelings acknowledged  -CW     Row Name 22 1321          Cognition/Psychosocial    Affect/Mental Status (Cognitive) WFL  -CW     Follows Commands (Cognition) verbal cues/prompting required  -CW     Row Name 22 1327          Pain Scale: FACES Pre/Post-Treatment    Pain: FACES Scale, Pretreatment 2-->hurts little bit  -CW     Posttreatment Pain Rating 4-->hurts  little more  -     Row Name 02/05/22 1325          Bed Mobility    Comment (Bed Mobility) Patient received and left seated in wheelchair this date.  -     Row Name 02/05/22 1325          Transfers    Bed-Chair [place] (Transfers) contact guard; minimum assist (75% patient effort)  -     Chair-Bed [place] (Transfers) contact guard; verbal cues; nonverbal cues (demo/gesture); minimum assist (75% patient effort)  -     Assistive Device (Bed-Chair Transfers) wheelchair; walker, front-wheeled  -CW     Sit-Stand [place] (Transfers) contact guard; verbal cues; nonverbal cues (demo/gesture)  -     Stand-Sit [place] (Transfers) contact guard; verbal cues; nonverbal cues (demo/gesture)  -     Row Name 02/05/22 1325          Chair-Bed Transfer    Assistive Device (Chair-Bed Transfers) wheelchair; walker, front-wheeled  -     Row Name 02/05/22 1325          Sit-Stand Transfer    Assistive Device (Sit-Stand Transfers) walker, front-wheeled  -     Row Name 02/05/22 1325          Stand-Sit Transfer    Assistive Device (Stand-Sit Transfers) walker, front-wheeled  -CW     Row Name 02/05/22 1325          Gait/Stairs (Locomotion)    [place] Level (Gait) contact guard; verbal cues; nonverbal cues (demo/gesture)  -     Assistive Device (Gait) walker, front-wheeled  -     Distance in Feet (Gait) 80', 70', 100', 60'  -CW     Pattern (Gait) step-through  -CW     Deviations/Abnormal Patterns (Gait) antalgic; bc decreased; gait speed decreased; stride length decreased  -     Bilateral Gait Deviations forward flexed posture  -     Row Name 02/05/22 1325          Safety Issues, Functional Mobility    Impairments Affecting Function (Mobility) balance; endurance/activity tolerance; pain; range of motion (ROM); strength  -     Row Name 02/05/22 1325          Motor Skills    Therapeutic Exercise other (see comments)  Supine and standing LE strengthening interventions this date with focus on  hip strengthening. Also educated for scapular retraction/depression.  -CW     Row Name 02/05/22 1325          IRF PT Goals    Bed Mobility Goal Selection (PT-IRF) bed mobility, PT goal 1  -CW     Transfer Goal Selection (PT-IRF) transfers, PT goal 1  -CW     Gait (Walking Locomotion) Goal Selection (PT-IRF) gait, PT goal 1  -CW     Row Name 02/05/22 1325          Bed Mobility Goal 1 (PT-IRF)    Activity/Assistive Device (Bed Mobility Goal 1, PT-IRF) sit to supine/supine to sit  -CW     [place] Level (Bed Mobility Goal 1, PT-IRF) independent  -CW     Time Frame (Bed Mobility Goal 1, PT-IRF) by discharge  -CW     Row Name 02/05/22 1325          Transfer Goal 1 (PT-IRF)    Activity/Assistive Device (Transfer Goal 1, PT-IRF) sit-to-stand/stand-to-sit; bed-to-chair/chair-to-bed  -CW     [place] Level (Transfer Goal 1, PT-IRF) modified independence  -CW     Time Frame (Transfer Goal 1, PT-IRF) by discharge  -CW     Row Name 02/05/22 1325          Gait/Walking Locomotion Goal 1 (PT-IRF)    Activity/Assistive Device (Gait/Walking Locomotion Goal 1, PT-IRF) walker, rolling  -CW     Gait/Walking Locomotion Distance Goal 1 (PT-IRF) 300'  -CW     [place] Level (Gait/Walking Locomotion Goal 1, PT-IRF) supervision required  -CW     Time Frame (Gait/Walking Locomotion Goal 1, PT-IRF) by discharge  -CW     Row Name 02/05/22 1325          Positioning and Restraints    Pre-Treatment Position in bed  -CW     In Wheelchair sitting; call light within reach  -CW     Row Name 02/05/22 1325          Therapy Assessment/Plan (PT)    Patient's Goals For Discharge return home; take care of myself at home  -CW     Row Name 02/05/22 1325          Therapy Assessment/Plan (PT)    Rehab Potential/Prognosis (PT) adequate, monitor progress closely  -CW     Frequency of Treatment (PT) 5 times per week  -CW     Estimated Duration of Therapy (PT) 2 weeks  -CW     Problem List (PT) balance; mobility; range of motion (ROM); strength; pain   -CW     Activity Limitations Related to Problem List (PT) unable to ambulate safely; unable to transfer safely  -CW     Row Name 02/05/22 1325          Daily Progress Summary (PT)    Impairments Still Limiting Function (PT) strength decreased; impaired functional activity tolerance; pain  -CW     Row Name 02/05/22 1325          Therapy Plan Review/Discharge Plan (PT)    Anticipated Equipment Needs at Discharge (PT Eval) --  tbd  -CW     Expected Discharge Disposition (PT Eval) home with home health care  -           User Key  (r) = Recorded By, (t) = Taken By, (c) = Cosigned By    Initials Name Provider Type    CW Griffin Baird PT Physical Therapist                 Physical Therapy Education                 Title: PT OT SLP Therapies (Done)     Topic: Physical Therapy (Done)     Point: Mobility training (Done)     Learning Progress Summary           Patient Acceptance, E,TB, VU by  at 2/4/2022 1542      Show all documentation for this point (4)                 Point: Home exercise program (Done)     Learning Progress Summary           Patient Acceptance, E,TB, VU by  at 2/4/2022 1542      Show all documentation for this point (4)                 Point: Body mechanics (Done)     Learning Progress Summary           Patient Acceptance, E,TB, VU by  at 2/4/2022 1542      Show all documentation for this point (4)                 Point: Precautions (Done)     Learning Progress Summary           Patient Acceptance, E,TB, VU by  at 2/4/2022 1542      Show all documentation for this point (4)                             User Key     Initials Effective Dates Name Provider Type Discipline     06/16/21 -  Julianna Ann, PTA Physical Therapy Assistant PT                PT Recommendation and Plan    Frequency of Treatment (PT): 5 times per week  Anticipated Equipment Needs at Discharge (PT Eval):  (tbd)  Daily Progress Summary (PT)  Impairments Still Limiting Function (PT): strength decreased, impaired  functional activity tolerance, pain               Time Calculation:      PT Charges     Row Name 02/05/22 1329 02/05/22 1328          Time Calculation    Start Time 1000  -CW 0830  -CW     Stop Time 1045  -CW 0915  -CW     Time Calculation (min) 45 min  -CW 45 min  -CW     PT Received On 02/05/22  -CW 02/05/22  -CW     PT - Next Appointment -- 02/07/22  -CW     PT Goal Re-Cert Due Date 02/09/22  -CW --            Time Calculation- PT    Total Timed Code Minutes- PT 45 minute(s)  -CW 45 minute(s)  -CW           User Key  (r) = Recorded By, (t) = Taken By, (c) = Cosigned By    Initials Name Provider Type    CW Griffin Baird, PT Physical Therapist                Therapy Charges for Today     Code Description Service Date Service Provider Modifiers Qty    25830550079 HC GAIT TRAINING EA 15 MIN 2/5/2022 Griffin Baird, PT GP 2    62667148762 HC PT THER PROC EA 15 MIN 2/5/2022 Griffin Baird, PT GP 3    01811524993 HC PT THERAPEUTIC ACT EA 15 MIN 2/5/2022 Griffin Baird, PT GP 1                   Griffin Baird PT  2/5/2022

## 2022-02-06 LAB
GLUCOSE BLDC GLUCOMTR-MCNC: 138 MG/DL (ref 70–130)
GLUCOSE BLDC GLUCOMTR-MCNC: 140 MG/DL (ref 70–130)
GLUCOSE BLDC GLUCOMTR-MCNC: 159 MG/DL (ref 70–130)
GLUCOSE BLDC GLUCOMTR-MCNC: 230 MG/DL (ref 70–130)

## 2022-02-06 PROCEDURE — 82962 GLUCOSE BLOOD TEST: CPT

## 2022-02-06 PROCEDURE — 63710000001 INSULIN ASPART PER 5 UNITS: Performed by: INTERNAL MEDICINE

## 2022-02-06 PROCEDURE — 25010000002 ENOXAPARIN PER 10 MG: Performed by: INTERNAL MEDICINE

## 2022-02-06 PROCEDURE — 63710000001 INSULIN DETEMIR PER 5 UNITS: Performed by: INTERNAL MEDICINE

## 2022-02-06 RX ADMIN — Medication 1 TABLET: at 08:54

## 2022-02-06 RX ADMIN — ISOSORBIDE MONONITRATE 30 MG: 30 TABLET, EXTENDED RELEASE ORAL at 08:54

## 2022-02-06 RX ADMIN — ENOXAPARIN SODIUM 40 MG: 40 INJECTION SUBCUTANEOUS at 21:22

## 2022-02-06 RX ADMIN — ASPIRIN 81 MG: 81 TABLET, CHEWABLE ORAL at 08:54

## 2022-02-06 RX ADMIN — GUAIFENESIN 200 MG: 200 SOLUTION ORAL at 21:26

## 2022-02-06 RX ADMIN — LISINOPRIL 2.5 MG: 2.5 TABLET ORAL at 08:54

## 2022-02-06 RX ADMIN — OXYCODONE 5 MG: 5 TABLET ORAL at 08:53

## 2022-02-06 RX ADMIN — INSULIN DETEMIR 12 UNITS: 100 INJECTION, SOLUTION SUBCUTANEOUS at 21:23

## 2022-02-06 RX ADMIN — CLOPIDOGREL BISULFATE 75 MG: 75 TABLET, FILM COATED ORAL at 08:54

## 2022-02-06 RX ADMIN — ROSUVASTATIN CALCIUM 40 MG: 20 TABLET, FILM COATED ORAL at 21:22

## 2022-02-06 RX ADMIN — INSULIN ASPART 3 UNITS: 100 INJECTION, SOLUTION INTRAVENOUS; SUBCUTANEOUS at 12:26

## 2022-02-06 RX ADMIN — OXYCODONE 5 MG: 5 TABLET ORAL at 21:22

## 2022-02-06 RX ADMIN — NYSTATIN: 100000 POWDER TOPICAL at 08:59

## 2022-02-06 RX ADMIN — OXYCODONE HYDROCHLORIDE AND ACETAMINOPHEN 500 MG: 500 TABLET ORAL at 08:54

## 2022-02-06 RX ADMIN — NYSTATIN: 100000 POWDER TOPICAL at 21:23

## 2022-02-06 RX ADMIN — Medication 5000 UNITS: at 08:54

## 2022-02-06 NOTE — PROGRESS NOTES
Rehabilitation Nursing  Inpatient Rehabilitation Plan of Care Note    Plan of Care  Copy from POC    Body Function Structure    Skin Integrity (Active)  Current Status (2/3/2022 12:00:00 AM): free from skin breakdown this shift  Weekly Goal: free from skin breakdown this stay  Discharge Goal: home free of skin breakdown    Safety    Potential for Injury (Active)  Current Status (2/3/2022 12:00:00 AM): free from injury this shift  Weekly Goal: free from injury this stay  Discharge Goal: home free of injury    RN Interventions    Body Function Structure -  RN: monitor skin with each shift change, barrier skin, turn q 2, pressure relief  [RN]    Safety -  RN: items within reach, nonskid socks, gait belt, answer call light  quickly  [RN]    Signed by: Geraldine Gonzalez Nurse

## 2022-02-07 LAB
GLUCOSE BLDC GLUCOMTR-MCNC: 126 MG/DL (ref 70–130)
GLUCOSE BLDC GLUCOMTR-MCNC: 146 MG/DL (ref 70–130)
GLUCOSE BLDC GLUCOMTR-MCNC: 179 MG/DL (ref 70–130)
GLUCOSE BLDC GLUCOMTR-MCNC: 185 MG/DL (ref 70–130)

## 2022-02-07 PROCEDURE — 97530 THERAPEUTIC ACTIVITIES: CPT

## 2022-02-07 PROCEDURE — 97110 THERAPEUTIC EXERCISES: CPT | Performed by: OCCUPATIONAL THERAPIST

## 2022-02-07 PROCEDURE — 97112 NEUROMUSCULAR REEDUCATION: CPT

## 2022-02-07 PROCEDURE — 97535 SELF CARE MNGMENT TRAINING: CPT | Performed by: OCCUPATIONAL THERAPIST

## 2022-02-07 PROCEDURE — 25010000002 ENOXAPARIN PER 10 MG: Performed by: INTERNAL MEDICINE

## 2022-02-07 PROCEDURE — 82962 GLUCOSE BLOOD TEST: CPT

## 2022-02-07 PROCEDURE — 97110 THERAPEUTIC EXERCISES: CPT

## 2022-02-07 PROCEDURE — 99231 SBSQ HOSP IP/OBS SF/LOW 25: CPT | Performed by: FAMILY MEDICINE

## 2022-02-07 PROCEDURE — 97116 GAIT TRAINING THERAPY: CPT

## 2022-02-07 PROCEDURE — 63710000001 INSULIN ASPART PER 5 UNITS: Performed by: INTERNAL MEDICINE

## 2022-02-07 PROCEDURE — 63710000001 INSULIN DETEMIR PER 5 UNITS: Performed by: INTERNAL MEDICINE

## 2022-02-07 PROCEDURE — 97530 THERAPEUTIC ACTIVITIES: CPT | Performed by: OCCUPATIONAL THERAPIST

## 2022-02-07 PROCEDURE — 63710000001 DIPHENHYDRAMINE PER 50 MG: Performed by: INTERNAL MEDICINE

## 2022-02-07 RX ORDER — CETIRIZINE HYDROCHLORIDE 10 MG/1
10 TABLET ORAL DAILY
Status: DISCONTINUED | OUTPATIENT
Start: 2022-02-07 | End: 2022-02-23 | Stop reason: HOSPADM

## 2022-02-07 RX ORDER — DIPHENHYDRAMINE HCL 25 MG
25 CAPSULE ORAL EVERY 6 HOURS PRN
Status: DISCONTINUED | OUTPATIENT
Start: 2022-02-07 | End: 2022-02-23 | Stop reason: HOSPADM

## 2022-02-07 RX ADMIN — Medication 1 TABLET: at 08:43

## 2022-02-07 RX ADMIN — LISINOPRIL 2.5 MG: 2.5 TABLET ORAL at 08:43

## 2022-02-07 RX ADMIN — CETIRIZINE HYDROCHLORIDE 10 MG: 10 TABLET, FILM COATED ORAL at 08:43

## 2022-02-07 RX ADMIN — INSULIN ASPART 2 UNITS: 100 INJECTION, SOLUTION INTRAVENOUS; SUBCUTANEOUS at 12:21

## 2022-02-07 RX ADMIN — ROSUVASTATIN CALCIUM 40 MG: 20 TABLET, FILM COATED ORAL at 21:09

## 2022-02-07 RX ADMIN — OXYCODONE 5 MG: 5 TABLET ORAL at 08:43

## 2022-02-07 RX ADMIN — NYSTATIN: 100000 POWDER TOPICAL at 21:14

## 2022-02-07 RX ADMIN — CLOPIDOGREL BISULFATE 75 MG: 75 TABLET, FILM COATED ORAL at 08:43

## 2022-02-07 RX ADMIN — NYSTATIN: 100000 POWDER TOPICAL at 08:44

## 2022-02-07 RX ADMIN — ISOSORBIDE MONONITRATE 30 MG: 30 TABLET, EXTENDED RELEASE ORAL at 08:44

## 2022-02-07 RX ADMIN — Medication 5000 UNITS: at 08:43

## 2022-02-07 RX ADMIN — DIPHENHYDRAMINE HYDROCHLORIDE 25 MG: 25 CAPSULE ORAL at 21:10

## 2022-02-07 RX ADMIN — INSULIN DETEMIR 12 UNITS: 100 INJECTION, SOLUTION SUBCUTANEOUS at 21:54

## 2022-02-07 RX ADMIN — OXYCODONE HYDROCHLORIDE AND ACETAMINOPHEN 500 MG: 500 TABLET ORAL at 08:43

## 2022-02-07 RX ADMIN — OXYCODONE 5 MG: 5 TABLET ORAL at 21:09

## 2022-02-07 RX ADMIN — ENOXAPARIN SODIUM 40 MG: 40 INJECTION SUBCUTANEOUS at 21:09

## 2022-02-07 RX ADMIN — ASPIRIN 81 MG: 81 TABLET, CHEWABLE ORAL at 08:43

## 2022-02-07 NOTE — PROGRESS NOTES
Inpatient Rehabilitation Functional Measures Assessment and Plan of Care    Plan of Care  Self Care    [OT] Toileting(Active)  Current Status(02/14/2022): min  Weekly Goal(02/16/2022): set up  Discharge Goal: set up    Functional Measures  KIRAN Eating:  KIRAN Grooming:  KIRAN Bathing:  KIRAN Upper Body Dressing:  KIRAN Lower Body Dressing:  KIRAN Toileting:    KIRAN Bladder Management  Level of Assistance:  Frequency/Number of Accidents this Shift:    KIRAN Bowel Management  Level of Assistance:  Frequency/Number of Accidents this Shift:    KIRAN Bed/Chair/Wheelchair Transfer:  KIRAN Toilet Transfer:  KIRAN Tub/Shower Transfer:    Previously Documented Mode of Locomotion at Discharge:  [facility] Expected Mode of Locomotion at Discharge:  KIRAN Walk/Wheelchair:  KIRAN Stairs:    KIRAN Comprehension:  [facility] Expression:  [facility] Social Interaction:  [facility] Problem Solving:  KIRAN Memory:    Therapy Mode Minutes  Occupational Therapy: Individual: 90 minutes.  Physical Therapy:  Speech Language Pathology:    Discharge Functional Goals:    Signed by: Candie Dixon, Occupational Therapist

## 2022-02-07 NOTE — PROGRESS NOTES
[facility]  PROGRESS NOTE     Patient Identification:  Name:  Lisa Velazquez  Age:  56 y.o.  Sex:  female  :  1965  MRN:  [id number]  Visit Number:  [id number]  ROOM: [facility]     Primary Care Provider:  Patricia Skelton APRN    Length of stay in inpatient status:  6    Subjective     Chief Compliant:  No chief complaint on file.      History of Presenting Illness: Patient is a 56-year-old female who is status post right hip fracture with intramedullary nail placement, acute blood loss anemia, diabetes mellitus, peripheral vascular disease, hypertension hyperlipidemia.  Patient states that she is doing reasonably well but still not is mobile that she would like to be at this time.  Patient does live at home by herself and is concerned about early discharge.    Objective     Current Hospital Meds:ascorbic acid, 500 mg, Oral, Daily  aspirin, 81 mg, Oral, Daily  cetirizine, 10 mg, Oral, Daily  clopidogrel, 75 mg, Oral, Daily  enoxaparin, 40 mg, Subcutaneous, Nightly  insulin aspart, 0-7 Units, Subcutaneous, TID AC  insulin detemir, 12 Units, Subcutaneous, Nightly  isosorbide mononitrate, 30 mg, Oral, Q24H  lisinopril, 2.5 mg, Oral, Q24H  multivitamin with minerals, 1 tablet, Oral, Daily  nystatin, , Topical, Q12H  rosuvastatin, 40 mg, Oral, Nightly  vitamin D3, 5,000 Units, Oral, Daily       ----------------------------------------------------------------------------------------------------------------------  Vital Signs:  Temp:  [97.6 °F (36.4 °C)-97.8 °F (36.6 °C)] 97.6 °F (36.4 °C)  Heart Rate:  [83-98] 98  Resp:  [18] 18  BP: (115-118)/(68-76) 115/76  SpO2:  [94 %-96 %] 96 %  on   ;   Device (Oxygen Therapy): room air  Body mass index is 25.22 kg/m².    Wt Readings from Last 3 Encounters:   22 73 kg (161 lb)     Intake & Output (last 3 days)        0701   0700  0701   0700  0701   0700  0701   0700    P.O. 500 1040 1320 240    Total Intake(mL/kg)  500 (6.8) 1040 (14.2) 1320 (18.1) 240 (3.3)    Net +500 +1040 +1320 +240            Urine Unmeasured Occurrence 2 x 1 x 4 x 1 x    Stool Unmeasured Occurrence 1 x  1 x         Diet Regular; Consistent Carbohydrate  ----------------------------------------------------------------------------------------------------------------------  Physical exam:  Constitutional:   No acute distress  HEENT: Normocephalic atraumatic  Neck: Supple   Cardiovascular: Regular rate and rhythm  Pulmonary/Chest: Clear to auscultation  Abdominal: Positive bowel sounds soft.   Musculoskeletal: Status post hip repair  Neurological: No focal deficits  Skin: No rash  Peripheral vascular:  Genitourinary:  ----------------------------------------------------------------------------------------------------------------------    Last echocardiogram:    ----------------------------------------------------------------------------------------------------------------------  Results from last 7 days   Lab Units 02/02/22  0159   WBC 10*3/mm3 5.52   HEMOGLOBIN g/dL 10.7*   HEMATOCRIT % 34.1   MCV fL 91.4   MCHC g/dL 31.4*   PLATELETS 10*3/mm3 194         Results from last 7 days   Lab Units 02/02/22  0159   SODIUM mmol/L 140   POTASSIUM mmol/L 4.0   MAGNESIUM mg/dL 2.0   CHLORIDE mmol/L 107   CO2 mmol/L 23.4   BUN mg/dL 9   CREATININE mg/dL 0.63   EGFR IF NONAFRICN AM mL/min/1.73 98   CALCIUM mg/dL 9.2   GLUCOSE mg/dL 108*   ALBUMIN g/dL 3.39*   BILIRUBIN mg/dL 0.5   ALK PHOS U/L 120*   AST (SGOT) U/L 42*   ALT (SGPT) U/L 39*   Estimated Creatinine Clearance: 114.9 mL/min (by C-G formula based on SCr of 0.63 mg/dL).  No results found for: AMMONIA              Glucose   Date/Time Value Ref Range Status   02/07/2022 0628 126 70 - 130 mg/dL Final     Comment:     Meter: [id number] : [id number] Cecily [employer]   02/06/2022 2037 159 (H) 70 - 130 mg/dL Final     Comment:     Meter: [id number] : [id number] Cecily Crystal   02/06/2022 1630 140 (H) 70 - 130  mg/dL Final     Comment:     Meter: [id number] : [id number] Hanh John   02/06/2022 1112 230 (H) 70 - 130 mg/dL Final     Comment:     RN Notified Meter: [id number] : [id number] ROBERT RAMIREZER   02/06/2022 0606 138 (H) 70 - 130 mg/dL Final     Comment:     Meter: [id number] : [id number] VAL HATFIELD   02/05/2022 2103 186 (H) 70 - 130 mg/dL Final     Comment:     Meter: [id number] : [id number] Sivakumar BERGER   02/05/2022 1613 159 (H) 70 - 130 mg/dL Final     Comment:     RN Notified Meter: [id number] : [id number] ROBERT MELVIN   02/05/2022 1053 206 (H) 70 - 130 mg/dL Final     Comment:     Meter: [id number] : [id number] ernestina rosas     No results found for: TSH, FREET4  No results found for: PREGTESTUR, PREGSERUM, HCG, HCGQUANT  Pain Management Panel    There is no flowsheet data to display.       Brief Urine Lab Results     None        No results found for: BLOODCX      No results found for: URINECX  No results found for: WOUNDCX  No results found for: STOOLCX        I have personally looked at the labs and they are summarized above.  ----------------------------------------------------------------------------------------------------------------------  Detailed radiology reports for the last 24 hours:    Imaging Results (Last 24 Hours)     ** No results found for the last 24 hours. **        Final impressions for the last 30 days of radiology reports:    No radiology results for the last 30 days.  I have personally looked at the radiology images and read the final radiology report.    Assessment & Plan    Status post right intertrochanteric hip fracture with open reduction internal fixation with intramedullary nail placement.  Patient currently requiring contact-guard to minimum assistance for up with transfers; last week ambulated 80 feet, 70 feet, 100 feet and 60 feet with contact-guard in front wheel walker.  Requiring maximum assistance for upper lower body dressing; supported sitting  for grooming.    Diabetes mellitus reasonably well-controlled    Peripheral vascular disease--continue medical management.  Stable    Hypertension--well-controlled    Hyperlipidemia continue statin therapy    VTE Prophylaxis:   Mechanical Order History:     None      Pharmalogical Order History:      Ordered     Dose Route Frequency Stop    02/01/22 2306  enoxaparin (LOVENOX) syringe 40 mg         40 mg SC Nightly --                    Rob Camarillo MD  [facility]  02/07/22  11:55 EST

## 2022-02-07 NOTE — PROGRESS NOTES
Rehabilitation Nursing  Inpatient Rehabilitation Plan of Care Note    Plan of Care  Copy from POCBody Function Structure    Skin Integrity (Active)  Current Status (2/3/2022 12:00:00 AM): free from skin breakdown this shift  Weekly Goal: free from skin breakdown this stay  Discharge Goal: home free of skin breakdown    Safety    Potential for Injury (Active)  Current Status (2/3/2022 12:00:00 AM): free from injury this shift  Weekly Goal: free from injury this stay  Discharge Goal: home free of injury    Signed by: Jane Dixon, Nurse

## 2022-02-07 NOTE — THERAPY TREATMENT NOTE
Inpatient Rehabilitation - Occupational Therapy Treatment Note     Junito     Patient Name: Lisa Velazquez  : 1965  MRN: [id number]    Today's Date: 2022                 Admit Date: 2022         ICD-10-CM ICD-9-CM   1. S/P right hip fracture  Z87.81 V15.51       Patient Active Problem List   Diagnosis   • S/P right hip fracture       Past Medical History:   Diagnosis Date   • Coronary artery disease    • Diabetes mellitus (HCC)    • Elevated cholesterol    • GERD (gastroesophageal reflux disease)    • History of transfusion    • Hypertension        Past Surgical History:   Procedure Laterality Date   • ABDOMINAL SURGERY      gallbladder removal   • CARDIAC CATHETERIZATION     • CARDIAC SURGERY      stent   • COLONOSCOPY     • FRACTURE SURGERY     • TOE AMPUTATION Right     3rd toe right foot             IRF OT ASSESSMENT FLOWSHEET (last 12 hours)     IRF OT Evaluation and Treatment     Row Name 22 1500          OT Time and Intention    Document Type daily treatment  -     Mode of Treatment individual therapy; occupational therapy  -     Patient Effort good  -     Row Name 22 1500          General Information    Patient/Family/Caregiver Comments/Observations patient agreeable to therapy. patient participated in self care and BUE activities. patient tolerated well with increasing endurance.  -     Existing Precautions/Restrictions fall; hip  -     Row Name 22 1500          Cognition/Psychosocial    Affect/Mental Status (Cognitive) WFL  -     Row Name 22 1500          Transfers    Bed-Chair [place] (Transfers) minimum assist (75% patient effort); verbal cues  -     [place] Level (Toilet Transfer) minimum assist (75% patient effort); verbal cues  -     Assistive Device (Toilet Transfer) commode, bedside without drop arms; raised toilet seat  -     Row Name 22 1500          Toilet Transfer    Type (Toilet Transfer) stand pivot/stand step  -      [facility] Name 02/07/22 1500          Motor Skills    Motor Skills coordination; functional endurance; therapeutic exercise  -     Therapeutic Exercise --  BUE ROM, gmc,fmc,functional endurance  -[facility] Name 02/07/22 1500          Bathing    [place] Level (Bathing) minimum assist (75% patient effort)  -[facility] Name 02/07/22 1500          Upper Body Dressing    [place] Level (Upper Body Dressing) set up assistance  -[facility] Name 02/07/22 1500          Lower Body Dressing    [place] Level (Lower Body Dressing) moderate assist (50% patient effort)  -[facility] Name 02/07/22 1500          Grooming    [place] Level (Grooming) supervision; set up  -[facility] Name 02/07/22 1500          Toileting    [place] Level (Toileting) contact guard assist; toileting skills  -           User Key  (r) = Recorded By, (t) = Taken By, (c) = Cosigned By    Initials Name Effective Dates     Rukhsana Dixon, OT 06/16/21 -                  Occupational Therapy Education                 Title: PT OT SLP Therapies (Done)     Topic: Occupational Therapy (Done)     Point: ADL training (Done)     Description:   Instruct learner(s) on proper safety adaptation and remediation techniques during self care or transfers.   Instruct in proper use of assistive devices.              Learning Progress Summary           Patient Acceptance, E, VU,NR by  at 2/5/2022 1117                   Point: Home exercise program (Done)     Description:   Instruct learner(s) on appropriate technique for monitoring, assisting and/or progressing therapeutic exercises/activities.              Learning Progress Summary           Patient Acceptance, E, VU,NR by  at 2/5/2022 1117                   Point: Body mechanics (Done)     Description:   Instruct learner(s) on proper positioning and spine alignment during self-care, functional mobility activities and/or exercises.              Learning Progress Summary           Patient  Acceptance, E, VU,NR by  at 2/5/2022 1117                               User Key     Initials Effective Dates Name Provider Type Discipline     05/25/21 -  Vandana Rader OT Occupational Therapist OT                    OT Recommendation and Plan    Planned Therapy Interventions (OT): activity tolerance training, adaptive equipment training, BADL retraining, patient/caregiver education/training, ROM/therapeutic exercise, strengthening exercise, transfer/mobility retraining                    Time Calculation:      Time Calculation- OT     Row Name 02/07/22 1506             Time Calculation-     OT Start Time 0745  -      OT Stop Time 0915  -      OT Time Calculation (min) 90 min  -            User Key  (r) = Recorded By, (t) = Taken By, (c) = Cosigned By    Initials Name Provider Type     Rukhsana Dixon, ABELARDO Occupational Therapist              Therapy Charges for Today     Code Description Service Date Service Provider Modifiers Qty    08632143474 HC OT SELF CARE/MGMT/TRAIN EA 15 MIN 2/7/2022 Rukhsana Dixon OT GO 3    13264651645 HC OT THERAPEUTIC ACT EA 15 MIN 2/7/2022 Rukhsana Dixon OT GO 2    97974432633 HC OT THER PROC EA 15 MIN 2/7/2022 Rukhsana Dixon OT GO 1                   Rukhsana Dixon OT  2/7/2022

## 2022-02-07 NOTE — PLAN OF CARE
Goal Outcome Evaluation:            Pt is progressing medically and physically with all therapies, continue with the current plan of care.

## 2022-02-07 NOTE — THERAPY TREATMENT NOTE
Inpatient Rehabilitation - Physical Therapy Treatment Note       TONI Wild     Patient Name: Lisa Velazquez  : 1965  MRN: [id number]    Today's Date: 2022                    Admit Date: 2022      Visit Dx:     ICD-10-CM ICD-9-CM   1. S/P right hip fracture  Z87.81 V15.51       Patient Active Problem List   Diagnosis   • S/P right hip fracture       Past Medical History:   Diagnosis Date   • Coronary artery disease    • Diabetes mellitus (HCC)    • Elevated cholesterol    • GERD (gastroesophageal reflux disease)    • History of transfusion    • Hypertension        Past Surgical History:   Procedure Laterality Date   • ABDOMINAL SURGERY      gallbladder removal   • CARDIAC CATHETERIZATION     • CARDIAC SURGERY      stent   • COLONOSCOPY     • FRACTURE SURGERY     • TOE AMPUTATION Right     3rd toe right foot       PT ASSESSMENT (last 12 hours)     IRF PT Evaluation and Treatment     Row Name 22          PT Time and Intention    Document Type daily treatment  -RF     Mode of Treatment individual therapy; physical therapy  -RF     Patient/Family/Caregiver Comments/Observations Pt c/o R hip pain with increased weightbearing  -RF     Row Name 22          General Information    Patient Profile Reviewed yes  -RF     Existing Precautions/Restrictions fall  -RF     Row Name 22          Cognition/Psychosocial    Affect/Mental Status (Cognitive) WFL  -RF     Follows Commands (Cognition) verbal cues/prompting required  -RF     Row Name 22          Pain Scale: FACES Pre/Post-Treatment    Pain: FACES Scale, Pretreatment 2-->hurts little bit  -RF     Posttreatment Pain Rating 4-->hurts little more  -RF     Row Name 22          Bed Mobility    Supine-Sit [place] (Bed Mobility) standby assist  -RF     Sit-Supine [place] (Bed Mobility) contact guard; minimum assist (75% patient effort)  -RF     Row Name 22          Transfer Assessment/Treatment     Transfers car transfer  -RF     Row Name 02/07/22 1652          Transfers    Bed-Chair [place] (Transfers) standby assist  -RF     Chair-Bed [place] (Transfers) standby assist  -RF     Assistive Device (Bed-Chair Transfers) wheelchair; walker, front-wheeled  -RF     Sit-Stand [place] (Transfers) standby assist  -RF     Stand-Sit [place] (Transfers) standby assist  -RF     Row Name 02/07/22 1652          Chair-Bed Transfer    Assistive Device (Chair-Bed Transfers) wheelchair; walker, front-wheeled  -RF     Row Name 02/07/22 1652          Sit-Stand Transfer    Assistive Device (Sit-Stand Transfers) walker, front-wheeled  -RF     Row Name 02/07/22 1652          Stand-Sit Transfer    Assistive Device (Stand-Sit Transfers) walker, front-wheeled  -RF     Row Name 02/07/22 1652          Car Transfer    Type (Car Transfer) stand pivot/stand step  -RF     [place] Level (Car Transfer) standby assist  -RF     Assistive Device (Car Transfer) walker, front-wheeled  -RF     Row Name 02/07/22 1652          Gait/Stairs (Locomotion)    [place] Level (Gait) contact guard; verbal cues; nonverbal cues (demo/gesture)  -RF     Assistive Device (Gait) walker, front-wheeled  -RF     Distance in Feet (Gait) 80X2 in AM, 100, 80 in PM  -RF     Pattern (Gait) step-through  -RF     Deviations/Abnormal Patterns (Gait) antalgic; bc decreased; gait speed decreased; stride length decreased  -RF     Bilateral Gait Deviations forward flexed posture  -RF     Row Name 02/07/22 1652          Safety Issues, Functional Mobility    Impairments Affecting Function (Mobility) balance; endurance/activity tolerance; pain; range of motion (ROM); strength  -RF     Row Name 02/07/22 1652          Balance    Static Standing Balance mild impairment; unsupported; standing; other (see comments)  foam balance WBOS, tandem  -RF     Row Name 02/07/22 1652          Hip (Therapeutic Exercise)    Hip Strengthening (Therapeutic Exercise)  bilateral; flexion; extension; aBduction; aDduction; heel slides; marching while seated; marching while standing; sitting; standing; resistance band; red; 2 sets; 10 repetitions; other (see comments)  Pt demonstrates difficulty with unilateral stance on RLE; pt performed lunges on step  -RF     Row Name 02/07/22 1652          Knee (Therapeutic Exercise)    Knee Strengthening (Therapeutic Exercise) bilateral; flexion; extension; heel slides; marching while seated; marching while standing; LAQ (long arc quad); hamstring curls; sitting; standing; resistance band; red; 2 sets; 10 repetitions; other (see comments)  Pt demonstrates difficulty with unilateral stance on RLE; pt performed lunges on step  -RF     Row Name 02/07/22 1652          Ankle (Therapeutic Exercise)    Ankle Strengthening (Therapeutic Exercise) bilateral; dorsiflexion; plantarflexion; sitting; standing; 2 sets; 10 repetitions  -RF     Row Name 02/07/22 1652          IRF PT Goals    Bed Mobility Goal Selection (PT-IRF) bed mobility, PT goal 1  -RF     Transfer Goal Selection (PT-IRF) transfers, PT goal 1  -RF     Gait (Walking Locomotion) Goal Selection (PT-IRF) gait, PT goal 1  -RF     Row Name 02/07/22 1652          Bed Mobility Goal 1 (PT-IRF)    Activity/Assistive Device (Bed Mobility Goal 1, PT-IRF) sit to supine/supine to sit  -RF     [place] Level (Bed Mobility Goal 1, PT-IRF) independent  -RF     Time Frame (Bed Mobility Goal 1, PT-IRF) by discharge  -RF     Row Name 02/07/22 1652          Transfer Goal 1 (PT-IRF)    Activity/Assistive Device (Transfer Goal 1, PT-IRF) sit-to-stand/stand-to-sit; bed-to-chair/chair-to-bed  -RF     [place] Level (Transfer Goal 1, PT-IRF) modified independence  -RF     Time Frame (Transfer Goal 1, PT-IRF) by discharge  -RF     Row Name 02/07/22 1652          Gait/Walking Locomotion Goal 1 (PT-IRF)    Activity/Assistive Device (Gait/Walking Locomotion Goal 1, PT-IRF) walker, rolling  -RF     Gait/Walking  Locomotion Distance Goal 1 (PT-IRF) 300'  -RF     [place] Level (Gait/Walking Locomotion Goal 1, PT-IRF) supervision required  -RF     Time Frame (Gait/Walking Locomotion Goal 1, PT-IRF) by discharge  -RF     Row Name 02/07/22 1652          Therapy Assessment/Plan (PT)    Patient's Goals For Discharge return home; take care of myself at home  -RF     Row Name 02/07/22 1652          Therapy Assessment/Plan (PT)    Rehab Potential/Prognosis (PT) adequate, monitor progress closely  -RF     Frequency of Treatment (PT) 5 times per week  -RF     Estimated Duration of Therapy (PT) 2 weeks  -RF     Problem List (PT) balance; mobility; range of motion (ROM); strength; pain  -RF     Activity Limitations Related to Problem List (PT) unable to ambulate safely; unable to transfer safely  -RF     Row Name 02/07/22 1652          Daily Progress Summary (PT)    Functional Goal Overall Progress (PT) progressing toward functional goals as expected  -RF     Daily Progress Summary (PT) Pt continues to demonstrates difficulty with weightbearing on RLE. Pain, weakness, and fear avoidance hinder pt at this time. Continued skilled care required for further improvements to ensure maximum safe function upon D/C>  -RF     Impairments Still Limiting Function (PT) strength decreased; impaired functional activity tolerance; pain  -RF     Recommendations (PT) Continue per current POC  -RF     Row Name 02/07/22 1652          Therapy Plan Review/Discharge Plan (PT)    Anticipated Equipment Needs at Discharge (PT Eval) --  tbd  -RF     Expected Discharge Disposition (PT Eval) home with home health care  -RF           User Key  (r) = Recorded By, (t) = Taken By, (c) = Cosigned By    Initials Name Provider Type    RF Julianna Ann, PTA Physical Therapy Assistant                 Physical Therapy Education                 Title: PT OT SLP Therapies (Done)     Topic: Physical Therapy (Done)     Point: Mobility training (Done)     Learning  Progress Summary           Patient Acceptance, E,TB, VU by RF at 2/7/2022 1656      Show all documentation for this point (5)                 Point: Home exercise program (Done)     Learning Progress Summary           Patient Acceptance, E,TB, VU by RF at 2/7/2022 1656      Show all documentation for this point (5)                 Point: Body mechanics (Done)     Learning Progress Summary           Patient Acceptance, E,TB, VU by RF at 2/7/2022 1656      Show all documentation for this point (5)                 Point: Precautions (Done)     Learning Progress Summary           Patient Acceptance, E,TB, VU by RF at 2/7/2022 1656      Show all documentation for this point (5)                             User Key     Initials Effective Dates Name Provider Type Discipline     06/16/21 -  Julianna Ann, PTA Physical Therapy Assistant PT                PT Recommendation and Plan    Frequency of Treatment (PT): 5 times per week  Anticipated Equipment Needs at Discharge (PT Eval):  (tbd)  Daily Progress Summary (PT)  Functional Goal Overall Progress (PT): progressing toward functional goals as expected  Daily Progress Summary (PT): Pt continues to demonstrates difficulty with weightbearing on RLE. Pain, weakness, and fear avoidance hinder pt at this time. Continued skilled care required for further improvements to ensure maximum safe function upon D/C>  Impairments Still Limiting Function (PT): strength decreased, impaired functional activity tolerance, pain  Recommendations (PT): Continue per current POC               Time Calculation:      PT Charges     Row Name 02/07/22 1658 02/07/22 1657          Time Calculation    Start Time 1430  -RF 1045  -RF     Stop Time 1525  -RF 1130  -RF     Time Calculation (min) 55 min  -RF 45 min  -RF     PT Received On 02/07/22  -RF 02/07/22  -RF     PT - Next Appointment 02/08/22  -RF 02/07/22  -RF     PT Goal Re-Cert Due Date 02/09/22  -RF 02/09/22  -RF            Time Calculation-  PT    Total Timed Code Minutes- PT 55 minute(s)  -RF 45 minute(s)  -RF           User Key  (r) = Recorded By, (t) = Taken By, (c) = Cosigned By    Initials Name Provider Type    RF Julianna Ann, JESSICA Physical Therapy Assistant                Therapy Charges for Today     Code Description Service Date Service Provider Modifiers Qty    47911739145 HC GAIT TRAINING EA 15 MIN 2/7/2022 Julianna Ann, JESSICA GP, CQ 2    [id number]  PT NEUROMUSC RE EDUCATION EA 15 MIN 2/7/2022 Julianna Ann, PTA GP, CQ 1    76123883736 HC PT THER PROC EA 15 MIN 2/7/2022 Julianna Ann, PTA GP, CQ 2    [id number]  PT THERAPEUTIC ACT EA 15 MIN 2/7/2022 Julianna Ann, JESSICA GP, CQ 1    [id number]  PT THERAPEUTIC ACT EA 15 MIN 2/7/2022 Julianna Ann, JESSICA GP, CQ 1                   Julianna Ann PTA  2/7/2022

## 2022-02-08 LAB
BASOPHILS # BLD AUTO: 0.02 10*3/MM3 (ref 0–0.2)
BASOPHILS NFR BLD AUTO: 0.3 % (ref 0–1.5)
DEPRECATED RDW RBC AUTO: 49.5 FL (ref 37–54)
EOSINOPHIL # BLD AUTO: 0.18 10*3/MM3 (ref 0–0.4)
EOSINOPHIL NFR BLD AUTO: 2.6 % (ref 0.3–6.2)
ERYTHROCYTE [DISTWIDTH] IN BLOOD BY AUTOMATED COUNT: 15.2 % (ref 12.3–15.4)
GLUCOSE BLDC GLUCOMTR-MCNC: 152 MG/DL (ref 70–130)
GLUCOSE BLDC GLUCOMTR-MCNC: 158 MG/DL (ref 70–130)
GLUCOSE BLDC GLUCOMTR-MCNC: 190 MG/DL (ref 70–130)
HCT VFR BLD AUTO: 34.7 % (ref 34–46.6)
HGB BLD-MCNC: 11.1 G/DL (ref 12–15.9)
IMM GRANULOCYTES # BLD AUTO: 0.04 10*3/MM3 (ref 0–0.05)
IMM GRANULOCYTES NFR BLD AUTO: 0.6 % (ref 0–0.5)
LYMPHOCYTES # BLD AUTO: 1.78 10*3/MM3 (ref 0.7–3.1)
LYMPHOCYTES NFR BLD AUTO: 25.7 % (ref 19.6–45.3)
MCH RBC QN AUTO: 29.4 PG (ref 26.6–33)
MCHC RBC AUTO-ENTMCNC: 32 G/DL (ref 31.5–35.7)
MCV RBC AUTO: 91.8 FL (ref 79–97)
MONOCYTES # BLD AUTO: 0.51 10*3/MM3 (ref 0.1–0.9)
MONOCYTES NFR BLD AUTO: 7.4 % (ref 5–12)
NEUTROPHILS NFR BLD AUTO: 4.39 10*3/MM3 (ref 1.7–7)
NEUTROPHILS NFR BLD AUTO: 63.4 % (ref 42.7–76)
NRBC BLD AUTO-RTO: 0 /100 WBC (ref 0–0.2)
PLATELET # BLD AUTO: 203 10*3/MM3 (ref 140–450)
PMV BLD AUTO: 9.9 FL (ref 6–12)
RBC # BLD AUTO: 3.78 10*6/MM3 (ref 3.77–5.28)
WBC NRBC COR # BLD: 6.92 10*3/MM3 (ref 3.4–10.8)

## 2022-02-08 PROCEDURE — 97535 SELF CARE MNGMENT TRAINING: CPT | Performed by: OCCUPATIONAL THERAPIST

## 2022-02-08 PROCEDURE — 82962 GLUCOSE BLOOD TEST: CPT

## 2022-02-08 PROCEDURE — 97110 THERAPEUTIC EXERCISES: CPT | Performed by: OCCUPATIONAL THERAPIST

## 2022-02-08 PROCEDURE — 63710000001 DIPHENHYDRAMINE PER 50 MG: Performed by: INTERNAL MEDICINE

## 2022-02-08 PROCEDURE — 25010000002 ENOXAPARIN PER 10 MG: Performed by: INTERNAL MEDICINE

## 2022-02-08 PROCEDURE — 97110 THERAPEUTIC EXERCISES: CPT

## 2022-02-08 PROCEDURE — 63710000001 INSULIN DETEMIR PER 5 UNITS: Performed by: INTERNAL MEDICINE

## 2022-02-08 PROCEDURE — 97116 GAIT TRAINING THERAPY: CPT

## 2022-02-08 PROCEDURE — 85025 COMPLETE CBC W/AUTO DIFF WBC: CPT | Performed by: FAMILY MEDICINE

## 2022-02-08 PROCEDURE — 63710000001 INSULIN ASPART PER 5 UNITS: Performed by: INTERNAL MEDICINE

## 2022-02-08 PROCEDURE — 99231 SBSQ HOSP IP/OBS SF/LOW 25: CPT | Performed by: FAMILY MEDICINE

## 2022-02-08 PROCEDURE — 97530 THERAPEUTIC ACTIVITIES: CPT | Performed by: OCCUPATIONAL THERAPIST

## 2022-02-08 RX ORDER — GUAIFENESIN 600 MG/1
1200 TABLET, EXTENDED RELEASE ORAL EVERY 12 HOURS SCHEDULED
Status: DISCONTINUED | OUTPATIENT
Start: 2022-02-08 | End: 2022-02-23 | Stop reason: HOSPADM

## 2022-02-08 RX ADMIN — NYSTATIN: 100000 POWDER TOPICAL at 20:59

## 2022-02-08 RX ADMIN — DIPHENHYDRAMINE HYDROCHLORIDE 25 MG: 25 CAPSULE ORAL at 20:59

## 2022-02-08 RX ADMIN — NYSTATIN: 100000 POWDER TOPICAL at 08:39

## 2022-02-08 RX ADMIN — OXYCODONE HYDROCHLORIDE AND ACETAMINOPHEN 500 MG: 500 TABLET ORAL at 08:35

## 2022-02-08 RX ADMIN — INSULIN ASPART 2 UNITS: 100 INJECTION, SOLUTION INTRAVENOUS; SUBCUTANEOUS at 17:19

## 2022-02-08 RX ADMIN — ASPIRIN 81 MG: 81 TABLET, CHEWABLE ORAL at 08:35

## 2022-02-08 RX ADMIN — GUAIFENESIN 200 MG: 200 SOLUTION ORAL at 20:57

## 2022-02-08 RX ADMIN — ENOXAPARIN SODIUM 40 MG: 40 INJECTION SUBCUTANEOUS at 20:57

## 2022-02-08 RX ADMIN — ROSUVASTATIN CALCIUM 40 MG: 20 TABLET, FILM COATED ORAL at 20:57

## 2022-02-08 RX ADMIN — GUAIFENESIN 1200 MG: 600 TABLET, EXTENDED RELEASE ORAL at 22:15

## 2022-02-08 RX ADMIN — Medication 5000 UNITS: at 08:35

## 2022-02-08 RX ADMIN — CLOPIDOGREL BISULFATE 75 MG: 75 TABLET, FILM COATED ORAL at 08:35

## 2022-02-08 RX ADMIN — INSULIN DETEMIR 12 UNITS: 100 INJECTION, SOLUTION SUBCUTANEOUS at 20:59

## 2022-02-08 RX ADMIN — ISOSORBIDE MONONITRATE 30 MG: 30 TABLET, EXTENDED RELEASE ORAL at 08:39

## 2022-02-08 RX ADMIN — Medication 1 TABLET: at 08:35

## 2022-02-08 RX ADMIN — INSULIN ASPART 2 UNITS: 100 INJECTION, SOLUTION INTRAVENOUS; SUBCUTANEOUS at 08:35

## 2022-02-08 RX ADMIN — INSULIN ASPART 2 UNITS: 100 INJECTION, SOLUTION INTRAVENOUS; SUBCUTANEOUS at 12:02

## 2022-02-08 RX ADMIN — OXYCODONE 5 MG: 5 TABLET ORAL at 20:59

## 2022-02-08 RX ADMIN — LISINOPRIL 2.5 MG: 2.5 TABLET ORAL at 08:35

## 2022-02-08 RX ADMIN — CETIRIZINE HYDROCHLORIDE 10 MG: 10 TABLET, FILM COATED ORAL at 08:35

## 2022-02-08 RX ADMIN — GUAIFENESIN 1200 MG: 600 TABLET, EXTENDED RELEASE ORAL at 12:00

## 2022-02-08 NOTE — PROGRESS NOTES
[facility]  PROGRESS NOTE     Patient Identification:  Name:  Lisa Velazquez  Age:  56 y.o.  Sex:  female  :  1965  MRN:  [id number]  Visit Number:  [id number]  ROOM: [facility]     Primary Care Provider:  Patricia Skelton APRN    Length of stay in inpatient status:  7    Subjective     Chief Compliant:  No chief complaint on file.      History of Presenting Illness: 56-year-old female who is status post right hip fracture with intramedullary nail placement, history of acute blood loss anemia, diabetes mellitus, peripheral vascular disease, hypertension hyperlipidemia.  Patient states that her stools have been dark but that noticed that for some time now.  Patient has had no diarrhea no other difficulties.  Patient did have recent EGD and colonoscopy done at [facility] in [place].  Patient states she still has decreased range of motion in the hip and still having some difficulty with ambulation    Objective     Current Hospital Meds:ascorbic acid, 500 mg, Oral, Daily  aspirin, 81 mg, Oral, Daily  cetirizine, 10 mg, Oral, Daily  clopidogrel, 75 mg, Oral, Daily  enoxaparin, 40 mg, Subcutaneous, Nightly  guaiFENesin, 1,200 mg, Oral, Q12H  insulin aspart, 0-7 Units, Subcutaneous, TID AC  insulin detemir, 12 Units, Subcutaneous, Nightly  isosorbide mononitrate, 30 mg, Oral, Q24H  lisinopril, 2.5 mg, Oral, Q24H  multivitamin with minerals, 1 tablet, Oral, Daily  nystatin, , Topical, Q12H  rosuvastatin, 40 mg, Oral, Nightly  vitamin D3, 5,000 Units, Oral, Daily       ----------------------------------------------------------------------------------------------------------------------  Vital Signs:  Temp:  [97.8 °F (36.6 °C)] 97.8 °F (36.6 °C)  Heart Rate:  [82-92] 82  Resp:  [18-20] 18  BP: (112-122)/(50-76) 112/76  SpO2:  [96 %-97 %] 97 %  on   ;   Device (Oxygen Therapy): room air  Body mass index is 25.22 kg/m².    Wt Readings from Last 3 Encounters:   22 73 kg (161 lb)     Intake & Output  (last 3 days)       02/05 0701 02/06 0700 02/06 0701 02/07 0700 02/07 0701 02/08 0700 02/08 0701 02/09 0700    P.O. 1040 1320 1200 360    Total Intake(mL/kg) 1040 (14.2) 1320 (18.1) 1200 (16.4) 360 (4.9)    Net +1040 +1320 +1200 +360            Urine Unmeasured Occurrence 1 x 4 x 4 x     Stool Unmeasured Occurrence  1 x 1 x         Diet Regular; Consistent Carbohydrate  ----------------------------------------------------------------------------------------------------------------------  Physical exam:  Constitutional:   No acute distress  HEENT: Normocephalic atraumatic  Neck: Supple   Cardiovascular: Regular rate and rhythm  Pulmonary/Chest: Clear to auscultation  Abdominal: Positive bowel sounds soft.   Musculoskeletal: Status post hip repair  Neurological: No focal deficits  Skin: No rash  Peripheral vascular:  Genitourinary:  ----------------------------------------------------------------------------------------------------------------------    Last echocardiogram:    ----------------------------------------------------------------------------------------------------------------------  Results from last 7 days   Lab Units 02/02/22  0159   WBC 10*3/mm3 5.52   HEMOGLOBIN g/dL 10.7*   HEMATOCRIT % 34.1   MCV fL 91.4   MCHC g/dL 31.4*   PLATELETS 10*3/mm3 194         Results from last 7 days   Lab Units 02/02/22  0159   SODIUM mmol/L 140   POTASSIUM mmol/L 4.0   MAGNESIUM mg/dL 2.0   CHLORIDE mmol/L 107   CO2 mmol/L 23.4   BUN mg/dL 9   CREATININE mg/dL 0.63   EGFR IF NONAFRICN AM mL/min/1.73 98   CALCIUM mg/dL 9.2   GLUCOSE mg/dL 108*   ALBUMIN g/dL 3.39*   BILIRUBIN mg/dL 0.5   ALK PHOS U/L 120*   AST (SGOT) U/L 42*   ALT (SGPT) U/L 39*   Estimated Creatinine Clearance: 114.9 mL/min (by C-G formula based on SCr of 0.63 mg/dL).  No results found for: AMMONIA              Glucose   Date/Time Value Ref Range Status   02/08/2022 0618 152 (H) 70 - 130 mg/dL Final     Comment:     Meter: [id number] :  [id number] Cecily Crystal   02/07/2022 1947 185 (H) 70 - 130 mg/dL Final     Comment:     Meter: [id number] : [id number] Cecily Crystal   02/07/2022 1622 146 (H) 70 - 130 mg/dL Final     Comment:     Meter: [id number] : [id number] crystal sarkar   02/07/2022 1147 179 (H) 70 - 130 mg/dL Final     Comment:     Meter: [id number] : [id number] Slater Miranda   02/07/2022 0628 126 70 - 130 mg/dL Final     Comment:     Meter: [id number] : [id number] Cecily Crystal   02/06/2022 2037 159 (H) 70 - 130 mg/dL Final     Comment:     Meter: [id number] : [id number] Cecily Crystal   02/06/2022 1630 140 (H) 70 - 130 mg/dL Final     Comment:     Meter: [id number] : [id number] Hanh Dora   02/06/2022 1112 230 (H) 70 - 130 mg/dL Final     Comment:     RN Notified Meter: [id number] : [id number] ROBERT MELVIN     No results found for: TSH, FREET4  No results found for: PREGTESTUR, PREGSERUM, HCG, HCGQUANT  Pain Management Panel    There is no flowsheet data to display.       Brief Urine Lab Results     None        No results found for: BLOODCX      No results found for: URINECX  No results found for: WOUNDCX  No results found for: STOOLCX        I have personally looked at the labs and they are summarized above.  ----------------------------------------------------------------------------------------------------------------------  Detailed radiology reports for the last 24 hours:    Imaging Results (Last 24 Hours)     ** No results found for the last 24 hours. **        Final impressions for the last 30 days of radiology reports:    No radiology results for the last 30 days.  I have personally looked at the radiology images and read the final radiology report.    Assessment & Plan    Status post right intertrochanteric hip fracture with intramedullary nail placement--requiring standby assist to contact-guard for bed mobility; standby assistance for transfers; ambulated 80 feet times 2 in the morning 100 feet and  80 feet in the afternoon with front wheel walker and contact-guard.  Requiring minimum assistance for help with bathing; set up assistance for upper body dressing; moderate assistance for lower body dressing; set up for grooming.    History of acute blood loss anemia after surgery--we will recheck CBC today.  Up to attempt to obtain records from [facility] in [place] for recent EGD and colonoscopy.    Peripheral vascular disease medical management.  Stable  Hypertension controlled    Hyperlipidemia continue current treatment      VTE Prophylaxis:   Mechanical Order History:     None      Pharmalogical Order History:      Ordered     Dose Route Frequency Stop    02/01/22 9906  enoxaparin (LOVENOX) syringe 40 mg         40 mg SC Nightly --                    Rob Camarillo MD  [facility]  02/08/22  11:12 EST

## 2022-02-08 NOTE — NURSING NOTE
Spoke with medical records at [facility]. Faxed request for medical records over to them.

## 2022-02-08 NOTE — PLAN OF CARE
Problem: Rehabilitation (IRF) Plan of Care  Goal: Plan of Care Review  Outcome: Ongoing, Progressing  Flowsheets  Taken 2/8/2022 0410 by Kelly Morfin RN  Progress: improving  Plan of Care Reviewed With: patient  Taken 2/7/2022 0947 by Geraldine Gonzalez RN  Outcome Summary: patient progressing medically and with therapies. continue plan of care.  Goal: Patient-Specific Goal (Individualized)  Outcome: Ongoing, Progressing  Goal: Absence of New-Onset Illness or Injury  Outcome: Ongoing, Progressing  Intervention: Prevent Fall and Fall Injury  Recent Flowsheet Documentation  Taken 2/8/2022 0400 by Kelly Morfin RN  Safety Promotion/Fall Prevention: safety round/check completed  Taken 2/8/2022 0200 by Kelly Morfin RN  Safety Promotion/Fall Prevention: safety round/check completed  Taken 2/8/2022 0000 by Kelly Morfin RN  Safety Promotion/Fall Prevention: safety round/check completed  Taken 2/7/2022 2200 by Kelly Morfin RN  Safety Promotion/Fall Prevention: safety round/check completed  Taken 2/7/2022 2000 by Kelly Morfin RN  Safety Promotion/Fall Prevention: safety round/check completed  Goal: Optimal Comfort and Wellbeing  Outcome: Ongoing, Progressing  Goal: Home and Community Transition Plan Established  Outcome: Ongoing, Progressing   Goal Outcome Evaluation:  Plan of Care Reviewed With: patient        Progress: improving

## 2022-02-08 NOTE — THERAPY PROGRESS REPORT/RE-CERT
Inpatient Rehabilitation - Occupational Therapy Progress Note     [facility]     Patient Name: Lisa Velazquez  : 1965  MRN: [id number]    Today's Date: 2022                 Admit Date: 2022         ICD-10-CM ICD-9-CM   1. S/P right hip fracture  Z87.81 V15.51       Patient Active Problem List   Diagnosis   • S/P right hip fracture       Past Medical History:   Diagnosis Date   • Coronary artery disease    • Diabetes mellitus (HCC)    • Elevated cholesterol    • GERD (gastroesophageal reflux disease)    • History of transfusion    • Hypertension        Past Surgical History:   Procedure Laterality Date   • ABDOMINAL SURGERY      gallbladder removal   • CARDIAC CATHETERIZATION     • CARDIAC SURGERY      stent   • COLONOSCOPY     • FRACTURE SURGERY     • TOE AMPUTATION Right     3rd toe right foot             IRF OT ASSESSMENT FLOWSHEET (last 12 hours)     IRF OT Evaluation and Treatment     Row Name 22 1500          OT Time and Intention    Document Type daily treatment; progress note  -     Mode of Treatment individual therapy; occupational therapy  -     Patient Effort good  -     Row Name 22 1500          General Information    Patient/Family/Caregiver Comments/Observations patient continues to improve with UE ther ex, functional activities. patient tolerated therapy well  -     Existing Precautions/Restrictions fall; hip  -     Row Name 22 1500          Cognition/Psychosocial    Affect/Mental Status (Cognitive) WFL  -     Row Name 22 1500          Transfers    [place] Level (Toilet Transfer) minimum assist (75% patient effort); contact guard  -     Row Name 22 1500          Toilet Transfer    Type (Toilet Transfer) stand pivot/stand step  -     Row Name 22 1500          Motor Skills    Motor Skills coordination; functional endurance; therapeutic exercise  -     Therapeutic Exercise --  UE bike, gmc,fmc,reaching strength, , functional  endurance  -[facility] Name 02/08/22 1500          Bathing    [employer] Level (Bathing) minimum assist (75% patient effort)  -[facility] Name 02/08/22 1500          Upper Body Dressing    [employer] Level (Upper Body Dressing) set up assistance  -[facility] Name 02/08/22 1500          Lower Body Dressing    [employer] Level (Lower Body Dressing) moderate assist (50% patient effort)  -[facility] Name 02/08/22 1500          Grooming    [employer] Level (Grooming) supervision; set up  -[facility] Name 02/08/22 1500          Toileting    [employer] Level (Toileting) minimum assist (75% patient effort); contact guard assist  -           User Key  (r) = Recorded By, (t) = Taken By, (c) = Cosigned By    Initials Name Effective Dates     Rukhsana Dixon, OT 06/16/21 -                  Occupational Therapy Education                 Title: PT OT SLP Therapies (Done)     Topic: Occupational Therapy (Done)     Point: ADL training (Done)     Description:   Instruct learner(s) on proper safety adaptation and remediation techniques during self care or transfers.   Instruct in proper use of assistive devices.              Learning Progress Summary           Patient Acceptance, E, VU,NR by  at 2/5/2022 1117                   Point: Home exercise program (Done)     Description:   Instruct learner(s) on appropriate technique for monitoring, assisting and/or progressing therapeutic exercises/activities.              Learning Progress Summary           Patient Acceptance, E, VU,NR by  at 2/5/2022 1117                   Point: Body mechanics (Done)     Description:   Instruct learner(s) on proper positioning and spine alignment during self-care, functional mobility activities and/or exercises.              Learning Progress Summary           Patient Acceptance, E, VU,NR by  at 2/5/2022 1117                               User Key     Initials Effective Dates Name Provider Type Discipline     05/25/21 -  Prasanth  ABELARDO Ross Occupational Therapist OT                    OT Recommendation and Plan    Planned Therapy Interventions (OT): activity tolerance training, adaptive equipment training, BADL retraining, patient/caregiver education/training, ROM/therapeutic exercise, strengthening exercise, transfer/mobility retraining                    Time Calculation:      Time Calculation- OT     Row Name 02/08/22 1514             Time Calculation- OT    OT Start Time 0745  -      OT Stop Time 0915  -      OT Time Calculation (min) 90 min  -            User Key  (r) = Recorded By, (t) = Taken By, (c) = Cosigned By    Initials Name Provider Type     Rukhsana Dixon OT Occupational Therapist              Therapy Charges for Today     Code Description Service Date Service Provider Modifiers Qty    22464627842 HC OT SELF CARE/MGMT/TRAIN EA 15 MIN 2/7/2022 Rukhsana Dixon, ABELARDO GO 3    34798724123 HC OT THERAPEUTIC ACT EA 15 MIN 2/7/2022 Rukhsana Dixon OT GO 2    81932675340 HC OT THER PROC EA 15 MIN 2/7/2022 Rukhsana Dixon, OT GO 1    48257899191 HC OT THERAPEUTIC ACT EA 15 MIN 2/8/2022 Rukhsana Dixon, OT GO 2    20073103281 HC OT SELF CARE/MGMT/TRAIN EA 15 MIN 2/8/2022 Rukhsana Dixon OT GO 1    89638509168 HC OT THER PROC EA 15 MIN 2/8/2022 Rukhsana Dixon OT GO 3                   Rukhsana Dixon OT  2/8/2022

## 2022-02-08 NOTE — THERAPY TREATMENT NOTE
Inpatient Rehabilitation - Physical Therapy Treatment Note        [facility]     Patient Name: Lisa Velazquez  : 1965  MRN: [id number]    Today's Date: 2022                    Admit Date: 2022      Visit Dx:     ICD-10-CM ICD-9-CM   1. S/P right hip fracture  Z87.81 V15.51       Patient Active Problem List   Diagnosis   • S/P right hip fracture       Past Medical History:   Diagnosis Date   • Coronary artery disease    • Diabetes mellitus (HCC)    • Elevated cholesterol    • GERD (gastroesophageal reflux disease)    • History of transfusion    • Hypertension        Past Surgical History:   Procedure Laterality Date   • ABDOMINAL SURGERY      gallbladder removal   • CARDIAC CATHETERIZATION     • CARDIAC SURGERY      stent   • COLONOSCOPY     • FRACTURE SURGERY     • TOE AMPUTATION Right     3rd toe right foot       PT ASSESSMENT (last 12 hours)     IRF PT Evaluation and Treatment     Row Name 22          PT Time and Intention    Document Type daily treatment  -RF     Mode of Treatment individual therapy; physical therapy  -RF     Patient/Family/Caregiver Comments/Observations Pt c/o RLE pain with increased activity.  -RF     Row Name 22          General Information    Patient Profile Reviewed yes  -RF     Existing Precautions/Restrictions fall  -RF     Row Name 22 162          Cognition/Psychosocial    Affect/Mental Status (Cognitive) WFL  -RF     Follows Commands (Cognition) verbal cues/prompting required  -RF     Row Name 22          Pain Scale: FACES Pre/Post-Treatment    Pain: FACES Scale, Pretreatment 2-->hurts little bit  -RF     Posttreatment Pain Rating 4-->hurts little more  -RF     Row Name 22 162          Bed Mobility    Supine-Sit [place] (Bed Mobility) standby assist  -RF     Sit-Supine [place] (Bed Mobility) contact guard; minimum assist (75% patient effort)  -RF     Row Name 22 162          Transfer Assessment/Treatment     Transfers car transfer  -RF     Row Name 02/08/22 1626          Transfers    Bed-Chair [place] (Transfers) standby assist  -RF     Chair-Bed [place] (Transfers) standby assist  -RF     Assistive Device (Bed-Chair Transfers) wheelchair; walker, front-wheeled  -RF     Sit-Stand [place] (Transfers) standby assist  -RF     Stand-Sit [place] (Transfers) standby assist  -RF     Row Name 02/08/22 1626          Chair-Bed Transfer    Assistive Device (Chair-Bed Transfers) wheelchair; walker, front-wheeled  -RF     Row Name 02/08/22 1626          Sit-Stand Transfer    Assistive Device (Sit-Stand Transfers) walker, front-wheeled  -RF     Row Name 02/08/22 1626          Stand-Sit Transfer    Assistive Device (Stand-Sit Transfers) walker, front-wheeled  -RF     Row Name 02/08/22 1626          Gait/Stairs (Locomotion)    [place] Level (Gait) verbal cues; nonverbal cues (demo/gesture); standby assist  -RF     Assistive Device (Gait) walker, front-wheeled  -RF     Distance in Feet (Gait) 160, 60 in AM, 160 in PM  -RF     Pattern (Gait) step-through  -RF     Deviations/Abnormal Patterns (Gait) antalgic; bc decreased; gait speed decreased; stride length decreased  -RF     Bilateral Gait Deviations forward flexed posture  -RF     Comment (Gait/Stairs) Pt continues to demonstrates antalgic pattern with decreased WB and increased reliance on UEs noted. Decreased activity tolerance observed as fatigue is noted with increased distances.  -RF     Row Name 02/08/22 1626          Safety Issues, Functional Mobility    Impairments Affecting Function (Mobility) balance; endurance/activity tolerance; pain; range of motion (ROM); strength  -RF     Row Name 02/08/22 1626          Hip (Therapeutic Exercise)    Hip Strengthening (Therapeutic Exercise) bilateral; flexion; extension; aBduction; aDduction; external rotation; internal rotation; heel slides; marching while seated; sitting; supine; 1 lb free weight;  resistance band; red; 2 sets; 10 repetitions  -RF     Row Name 02/08/22 1626          Knee (Therapeutic Exercise)    Knee Strengthening (Therapeutic Exercise) bilateral; flexion; extension; heel slides; marching while seated; SLR (straight leg raise); SAQ (short arc quad); LAQ (long arc quad); hamstring curls; sitting; supine; 1 lb free weight; resistance band; red; 2 sets; 10 repetitions  -RF     Row Name 02/08/22 1626          Ankle (Therapeutic Exercise)    Ankle Strengthening (Therapeutic Exercise) bilateral; dorsiflexion; plantarflexion; sitting; supine; 1 lb free weight; 2 sets; 10 repetitions  -RF     Row Name 02/08/22 1626          IRF PT Goals    Bed Mobility Goal Selection (PT-IRF) bed mobility, PT goal 1  -RF     Transfer Goal Selection (PT-IRF) transfers, PT goal 1  -RF     Gait (Walking Locomotion) Goal Selection (PT-IRF) gait, PT goal 1  -RF     Row Name 02/08/22 1626          Bed Mobility Goal 1 (PT-IRF)    Activity/Assistive Device (Bed Mobility Goal 1, PT-IRF) sit to supine/supine to sit  -RF     [place] Level (Bed Mobility Goal 1, PT-IRF) independent  -RF     Time Frame (Bed Mobility Goal 1, PT-IRF) by discharge  -RF     Row Name 02/08/22 1626          Transfer Goal 1 (PT-IRF)    Activity/Assistive Device (Transfer Goal 1, PT-IRF) sit-to-stand/stand-to-sit; bed-to-chair/chair-to-bed  -RF     [place] Level (Transfer Goal 1, PT-IRF) modified independence  -RF     Time Frame (Transfer Goal 1, PT-IRF) by discharge  -RF     Row Name 02/08/22 1626          Gait/Walking Locomotion Goal 1 (PT-IRF)    Activity/Assistive Device (Gait/Walking Locomotion Goal 1, PT-IRF) walker, rolling  -RF     Gait/Walking Locomotion Distance Goal 1 (PT-IRF) 300'  -RF     [place] Level (Gait/Walking Locomotion Goal 1, PT-IRF) supervision required  -RF     Time Frame (Gait/Walking Locomotion Goal 1, PT-IRF) by discharge  -RF     Row Name 02/08/22 1626          Positioning and Restraints    Pre-Treatment Position  sitting in chair/recliner  BID  -RF     In Bed supine; call light within reach; encouraged to call for assist  PM  -RF     In Wheelchair sitting; call light within reach; encouraged to call for assist  AM  -RF     Row Name 02/08/22 1626          Therapy Assessment/Plan (PT)    Patient's Goals For Discharge return home; take care of myself at home  -RF     Row Name 02/08/22 1626          Therapy Assessment/Plan (PT)    Rehab Potential/Prognosis (PT) adequate, monitor progress closely  -RF     Frequency of Treatment (PT) 5 times per week  -RF     Estimated Duration of Therapy (PT) 2 weeks  -RF     Problem List (PT) balance; mobility; range of motion (ROM); strength; pain  -RF     Activity Limitations Related to Problem List (PT) unable to ambulate safely; unable to transfer safely  -RF     Row Name 02/08/22 1626          Daily Progress Summary (PT)    Functional Goal Overall Progress (PT) progressing toward functional goals as expected  -RF     Daily Progress Summary (PT) Improving functional mobility and ambulation quality noted this date. Pain continues to hinder progress at this time, although improvements in LE strength are noted. Continued skilled care required to ensure maximum safe function upon D/C.  -RF     Impairments Still Limiting Function (PT) strength decreased; impaired functional activity tolerance; pain  -RF     Recommendations (PT) Continue per current POC  -RF     Row Name 02/08/22 1626          Therapy Plan Review/Discharge Plan (PT)    Anticipated Equipment Needs at Discharge (PT Eval) --  tbd  -RF     Expected Discharge Disposition (PT Eval) home with home health care  -RF           User Key  (r) = Recorded By, (t) = Taken By, (c) = Cosigned By    Initials Name Provider Type    RF Julianna Ann, PTA Physical Therapy Assistant                 Physical Therapy Education                 Title: PT OT SLP Therapies (Done)     Topic: Physical Therapy (Done)     Point: Mobility training (Done)      Learning Progress Summary           Patient Acceptance, E,TB, VU by RF at 2/8/2022 1631      Show all documentation for this point (6)                 Point: Home exercise program (Done)     Learning Progress Summary           Patient Acceptance, E,TB, VU by RF at 2/8/2022 1631      Show all documentation for this point (6)                 Point: Body mechanics (Done)     Learning Progress Summary           Patient Acceptance, E,TB, VU by RF at 2/8/2022 1631      Show all documentation for this point (6)                 Point: Precautions (Done)     Learning Progress Summary           Patient Acceptance, E,TB, VU by RF at 2/8/2022 1631      Show all documentation for this point (6)                             User Key     Initials Effective Dates Name Provider Type Discipline     06/16/21 -  Julianna Ann, PTA Physical Therapy Assistant PT                PT Recommendation and Plan    Frequency of Treatment (PT): 5 times per week  Anticipated Equipment Needs at Discharge (PT Eval):  (tbd)  Daily Progress Summary (PT)  Functional Goal Overall Progress (PT): progressing toward functional goals as expected  Daily Progress Summary (PT): Improving functional mobility and ambulation quality noted this date. Pain continues to hinder progress at this time, although improvements in LE strength are noted. Continued skilled care required to ensure maximum safe function upon D/C.  Impairments Still Limiting Function (PT): strength decreased, impaired functional activity tolerance, pain  Recommendations (PT): Continue per current POC               Time Calculation:      PT Charges     Row Name 02/08/22 1632 02/08/22 1631          Time Calculation    Start Time 1345  -RF 1100  -RF     Stop Time 1430  -RF 1145  -RF     Time Calculation (min) 45 min  -RF 45 min  -RF     PT Received On 02/08/22  -RF 02/08/22  -RF     PT - Next Appointment 02/09/22  -RF 02/08/22  -RF     PT Goal Re-Cert Due Date 02/09/22  -RF 02/09/22  -RF             Time Calculation- PT    Total Timed Code Minutes- PT 45 minute(s)  -RF 45 minute(s)  -RF           User Key  (r) = Recorded By, (t) = Taken By, (c) = Cosigned By    Initials Name Provider Type    Julianna Gambino, JESSICA Physical Therapy Assistant                Therapy Charges for Today     Code Description Service Date Service Provider Modifiers Qty    96085843234 HC GAIT TRAINING EA 15 MIN 2/7/2022 Julianna Ann, PTA GP, CQ 2    22174427564 HC PT NEUROMUSC RE EDUCATION EA 15 MIN 2/7/2022 Julianna Ann, PTA GP, CQ 1    10992525739 HC PT THER PROC EA 15 MIN 2/7/2022 Julianna Ann, PTA GP, CQ 2    97423690768 HC PT THERAPEUTIC ACT EA 15 MIN 2/7/2022 Julianna Ann, PTA GP, CQ 1    42057804483 HC PT THERAPEUTIC ACT EA 15 MIN 2/7/2022 Julianna Ann, PTA GP, CQ 1    53611130669 HC GAIT TRAINING EA 15 MIN 2/8/2022 Julianna Ann, PTA GP 2    17218770519 HC PT THER PROC EA 15 MIN 2/8/2022 Julianna Ann, PTA GP 4                   Julianna Ann PTA  2/8/2022

## 2022-02-08 NOTE — PROGRESS NOTES
Rehabilitation Nursing  Inpatient Rehabilitation Plan of Care Note    Plan of Care  Copy from POCBody Function Structure    Skin Integrity (Active)  Current Status (2/3/2022 12:00:00 AM): free from skin breakdown this shift  Weekly Goal: free from skin breakdown this stay  Discharge Goal: home free of skin breakdown    Safety    Potential for Injury (Active)  Current Status (2/3/2022 12:00:00 AM): free from injury this shift  Weekly Goal: free from injury this stay  Discharge Goal: home free of injury    Signed by: Hilda Foster, Nurse

## 2022-02-08 NOTE — SIGNIFICANT NOTE
02/08/22 1400   Plan   Plan Team conference held today.  Spoke to pt about plans to extend discharge date to 2-16-22 and how she is improving in therapy.  Pt plans to return to her home at discharge.  Pt says one of her sisters may stay with her some at night.  Son can check on her during the day and stay with her in the evenings/nights if needed.  Contacted son [phone] about pt's discharge date extending to 2-16-22 then phone call was disconnected.  Attempted to call son back and left message about visitation beginning today 4pm-6pm.  Pt is aware of visitation starting today.  Will follow.   Patient/Family in Agreement with Plan yes

## 2022-02-09 LAB
GLUCOSE BLDC GLUCOMTR-MCNC: 132 MG/DL (ref 70–130)
GLUCOSE BLDC GLUCOMTR-MCNC: 217 MG/DL (ref 70–130)
GLUCOSE BLDC GLUCOMTR-MCNC: 260 MG/DL (ref 70–130)

## 2022-02-09 PROCEDURE — 97116 GAIT TRAINING THERAPY: CPT

## 2022-02-09 PROCEDURE — 97112 NEUROMUSCULAR REEDUCATION: CPT

## 2022-02-09 PROCEDURE — 63710000001 INSULIN ASPART PER 5 UNITS: Performed by: INTERNAL MEDICINE

## 2022-02-09 PROCEDURE — 97110 THERAPEUTIC EXERCISES: CPT | Performed by: OCCUPATIONAL THERAPIST

## 2022-02-09 PROCEDURE — 97530 THERAPEUTIC ACTIVITIES: CPT | Performed by: OCCUPATIONAL THERAPIST

## 2022-02-09 PROCEDURE — 63710000001 DIPHENHYDRAMINE PER 50 MG: Performed by: INTERNAL MEDICINE

## 2022-02-09 PROCEDURE — 63710000001 INSULIN DETEMIR PER 5 UNITS: Performed by: INTERNAL MEDICINE

## 2022-02-09 PROCEDURE — 97110 THERAPEUTIC EXERCISES: CPT

## 2022-02-09 PROCEDURE — 99231 SBSQ HOSP IP/OBS SF/LOW 25: CPT | Performed by: FAMILY MEDICINE

## 2022-02-09 PROCEDURE — 82962 GLUCOSE BLOOD TEST: CPT

## 2022-02-09 PROCEDURE — 25010000002 ENOXAPARIN PER 10 MG: Performed by: INTERNAL MEDICINE

## 2022-02-09 PROCEDURE — 97535 SELF CARE MNGMENT TRAINING: CPT | Performed by: OCCUPATIONAL THERAPIST

## 2022-02-09 RX ADMIN — CLOPIDOGREL BISULFATE 75 MG: 75 TABLET, FILM COATED ORAL at 08:25

## 2022-02-09 RX ADMIN — OXYCODONE 5 MG: 5 TABLET ORAL at 20:28

## 2022-02-09 RX ADMIN — NYSTATIN: 100000 POWDER TOPICAL at 08:25

## 2022-02-09 RX ADMIN — Medication 1 TABLET: at 08:25

## 2022-02-09 RX ADMIN — CETIRIZINE HYDROCHLORIDE 10 MG: 10 TABLET, FILM COATED ORAL at 08:25

## 2022-02-09 RX ADMIN — LISINOPRIL 2.5 MG: 2.5 TABLET ORAL at 08:25

## 2022-02-09 RX ADMIN — INSULIN ASPART 4 UNITS: 100 INJECTION, SOLUTION INTRAVENOUS; SUBCUTANEOUS at 11:29

## 2022-02-09 RX ADMIN — GUAIFENESIN 1200 MG: 600 TABLET, EXTENDED RELEASE ORAL at 09:47

## 2022-02-09 RX ADMIN — ROSUVASTATIN CALCIUM 40 MG: 20 TABLET, FILM COATED ORAL at 20:28

## 2022-02-09 RX ADMIN — DIPHENHYDRAMINE HYDROCHLORIDE 25 MG: 25 CAPSULE ORAL at 20:28

## 2022-02-09 RX ADMIN — GUAIFENESIN 1200 MG: 600 TABLET, EXTENDED RELEASE ORAL at 20:28

## 2022-02-09 RX ADMIN — INSULIN DETEMIR 12 UNITS: 100 INJECTION, SOLUTION SUBCUTANEOUS at 20:30

## 2022-02-09 RX ADMIN — ENOXAPARIN SODIUM 40 MG: 40 INJECTION SUBCUTANEOUS at 20:29

## 2022-02-09 RX ADMIN — ISOSORBIDE MONONITRATE 30 MG: 30 TABLET, EXTENDED RELEASE ORAL at 09:47

## 2022-02-09 RX ADMIN — NYSTATIN: 100000 POWDER TOPICAL at 20:29

## 2022-02-09 RX ADMIN — Medication 5000 UNITS: at 08:25

## 2022-02-09 RX ADMIN — OXYCODONE HYDROCHLORIDE AND ACETAMINOPHEN 500 MG: 500 TABLET ORAL at 08:25

## 2022-02-09 RX ADMIN — ASPIRIN 81 MG: 81 TABLET, CHEWABLE ORAL at 08:25

## 2022-02-09 RX ADMIN — INSULIN ASPART 3 UNITS: 100 INJECTION, SOLUTION INTRAVENOUS; SUBCUTANEOUS at 17:32

## 2022-02-09 RX ADMIN — GUAIFENESIN 200 MG: 200 SOLUTION ORAL at 20:29

## 2022-02-09 NOTE — PROGRESS NOTES
Rehabilitation Nursing  Inpatient Rehabilitation Plan of Care Note    Plan of Care  Copy from POCBody Function Structure    Skin Integrity (Active)  Current Status (2/3/2022 12:00:00 AM): free from skin breakdown this shift  Weekly Goal: free from skin breakdown this stay  Discharge Goal: home free of skin breakdown    Safety    Potential for Injury (Active)  Current Status (2/3/2022 12:00:00 AM): free from injury this shift  Weekly Goal: free from injury this stay  Discharge Goal: home free of injury    Signed by: Karen Miranda, Nurse

## 2022-02-09 NOTE — THERAPY PROGRESS REPORT/RE-CERT
Inpatient Rehabilitation - Physical Therapy Progress Note        [facility]     Patient Name: Lisa Velazquez  : 1965  MRN: [id number]    Today's Date: 2022                    Admit Date: 2022      Visit Dx:     ICD-10-CM ICD-9-CM   1. S/P right hip fracture  Z87.81 V15.51       Patient Active Problem List   Diagnosis   • S/P right hip fracture       Past Medical History:   Diagnosis Date   • Coronary artery disease    • Diabetes mellitus (HCC)    • Elevated cholesterol    • GERD (gastroesophageal reflux disease)    • History of transfusion    • Hypertension        Past Surgical History:   Procedure Laterality Date   • ABDOMINAL SURGERY      gallbladder removal   • CARDIAC CATHETERIZATION     • CARDIAC SURGERY      stent   • COLONOSCOPY     • FRACTURE SURGERY     • TOE AMPUTATION Right     3rd toe right foot       PT ASSESSMENT (last 12 hours)     IRF PT Evaluation and Treatment     Row Name 22 153          PT Time and Intention    Document Type progress note  -RF     Mode of Treatment individual therapy; physical therapy  -RF     Patient/Family/Caregiver Comments/Observations Pt c/o R hip pain and soreness this date.  -RF     Row Name 22 153          General Information    Patient Profile Reviewed yes  -RF     Existing Precautions/Restrictions fall  -RF     Row Name 22 153          Cognition/Psychosocial    Affect/Mental Status (Cognitive) WFL  -RF     Follows Commands (Cognition) verbal cues/prompting required  -RF     Row Name 22 153          Pain Scale: FACES Pre/Post-Treatment    Pain: FACES Scale, Pretreatment 2-->hurts little bit  -RF     Posttreatment Pain Rating 4-->hurts little more  -RF     Row Name 22 153          Bed Mobility    Supine-Sit [place] (Bed Mobility) standby assist  -RF     Sit-Supine [place] (Bed Mobility) standby assist  -RF     Row Name 22 153          Transfer Assessment/Treatment    Transfers car transfer  -RF     Row Name  02/09/22 1537          Transfers    Bed-Chair [place] (Transfers) standby assist  -RF     Chair-Bed [place] (Transfers) standby assist  -RF     Assistive Device (Bed-Chair Transfers) wheelchair; walker, front-wheeled  -RF     Sit-Stand [place] (Transfers) supervision  -RF     Stand-Sit [place] (Transfers) supervision  -RF     Row Name 02/09/22 1537          Chair-Bed Transfer    Assistive Device (Chair-Bed Transfers) wheelchair; walker, front-wheeled  -RF     Row Name 02/09/22 1537          Sit-Stand Transfer    Assistive Device (Sit-Stand Transfers) walker, front-wheeled  -RF     Row Name 02/09/22 1537          Stand-Sit Transfer    Assistive Device (Stand-Sit Transfers) walker, front-wheeled  -RF     Row Name 02/09/22 1537          Gait/Stairs (Locomotion)    [place] Level (Gait) verbal cues; nonverbal cues (demo/gesture); standby assist  -RF     Assistive Device (Gait) walker, front-wheeled  -RF     Distance in Feet (Gait) 80X2  -RF     Pattern (Gait) step-through  -RF     Deviations/Abnormal Patterns (Gait) antalgic; bc decreased; gait speed decreased; stride length decreased  -RF     Bilateral Gait Deviations forward flexed posture  -     Row Name 02/09/22 1537          Safety Issues, Functional Mobility    Impairments Affecting Function (Mobility) balance; endurance/activity tolerance; pain; range of motion (ROM); strength  -     Row Name 02/09/22 1537          Balance    Dynamic Sitting Balance mild impairment; supported; asymmetrical weight shifting; other (see comments)  bag toss in tandem standing  -RF     Balance Interventions other (see comments)  H-T walk, retro walk, side stepping  -     Row Name 02/09/22 1537          Hip (Therapeutic Exercise)    Hip Strengthening (Therapeutic Exercise) bilateral; flexion; extension; aBduction; aDduction; heel slides; marching while seated; sitting; resistance band; green; 2 sets; 10 repetitions  -     Row Name 02/09/22 1537           Knee (Therapeutic Exercise)    Knee Strengthening (Therapeutic Exercise) bilateral; flexion; extension; marching while seated; LAQ (long arc quad); hamstring curls; sitting; resistance band; green; 2 sets; 10 repetitions  -RF     Row Name 02/09/22 1537          Ankle (Therapeutic Exercise)    Ankle Strengthening (Therapeutic Exercise) bilateral; dorsiflexion; plantarflexion; sitting; 2 sets; 10 repetitions  -RF     Row Name 02/09/22 1537          IRF PT Goals    Bed Mobility Goal Selection (PT-IRF) bed mobility, PT goal 1  -RF     Transfer Goal Selection (PT-IRF) transfers, PT goal 1  -RF     Gait (Walking Locomotion) Goal Selection (PT-IRF) gait, PT goal 1  -RF     Row Name 02/09/22 1537          Bed Mobility Goal 1 (PT-IRF)    Activity/Assistive Device (Bed Mobility Goal 1, PT-IRF) sit to supine/supine to sit  -RF     [place] Level (Bed Mobility Goal 1, PT-IRF) independent  -RF     Time Frame (Bed Mobility Goal 1, PT-IRF) by discharge  -RF     Progress/Outcomes (Bed Mobility Goal 1, PT-IRF) goal ongoing  -RF     Row Name 02/09/22 1537          Transfer Goal 1 (PT-IRF)    Activity/Assistive Device (Transfer Goal 1, PT-IRF) sit-to-stand/stand-to-sit; bed-to-chair/chair-to-bed  -RF     [place] Level (Transfer Goal 1, PT-IRF) modified independence  -RF     Time Frame (Transfer Goal 1, PT-IRF) by discharge  -RF     Progress/Outcomes (Transfer Goal 1, PT-IRF) goal ongoing  -RF     Row Name 02/09/22 1537          Gait/Walking Locomotion Goal 1 (PT-IRF)    Activity/Assistive Device (Gait/Walking Locomotion Goal 1, PT-IRF) walker, rolling  -RF     Gait/Walking Locomotion Distance Goal 1 (PT-IRF) 300'  -RF     [place] Level (Gait/Walking Locomotion Goal 1, PT-IRF) supervision required  -RF     Time Frame (Gait/Walking Locomotion Goal 1, PT-IRF) by discharge  -RF     Progress/Outcomes (Gait/Walking Locomotion Goal 1, PT-IRF) goal ongoing  -RF     Row Name 02/09/22 1537          Positioning and Restraints     Pre-Treatment Position sitting in chair/recliner  -RF     Post Treatment Position bed  -RF     In Bed supine; call light within reach; encouraged to call for assist  PM  -RF     In Wheelchair sitting; call light within reach; encouraged to call for assist  AM  -RF     Row Name 02/09/22 1537          Therapy Assessment/Plan (PT)    Patient's Goals For Discharge return home; take care of myself at home  -RF     Row Name 02/09/22 1537          Therapy Assessment/Plan (PT)    Rehab Potential/Prognosis (PT) adequate, monitor progress closely  -RF     Frequency of Treatment (PT) 5 times per week  -RF     Estimated Duration of Therapy (PT) 2 weeks  -RF     Problem List (PT) balance; mobility; range of motion (ROM); strength; pain  -RF     Activity Limitations Related to Problem List (PT) unable to ambulate safely; unable to transfer safely  -RF     Row Name 02/09/22 1537          Daily Progress Summary (PT)    Functional Goal Overall Progress (PT) progressing toward functional goals as expected  -RF     Daily Progress Summary (PT) Pt continues to be hindered by LE weakness and pain. Continued skilled acre required for further improvements to ensure maximum safe function upon D/C.  -RF     Impairments Still Limiting Function (PT) strength decreased; impaired functional activity tolerance; pain  -RF     Recommendations (PT) Conitnue per current POC  -RF     Row Name 02/09/22 1537          Therapy Plan Review/Discharge Plan (PT)    Anticipated Equipment Needs at Discharge (PT Eval) --  tbd  -RF     Expected Discharge Disposition (PT Eval) home with home health care  -RF           User Key  (r) = Recorded By, (t) = Taken By, (c) = Cosigned By    Initials Name Provider Type    RF Julianna Ann PTA Physical Therapy Assistant                 Physical Therapy Education                 Title: PT OT SLP Therapies (Done)     Topic: Physical Therapy (Done)     Point: Mobility training (Done)     Learning Progress Summary            Patient Acceptance, E,TB, VU by RF at 2/9/2022 1542      Show all documentation for this point (8)                 Point: Home exercise program (Done)     Learning Progress Summary           Patient Acceptance, E,TB, VU by RF at 2/9/2022 1542      Show all documentation for this point (8)                 Point: Body mechanics (Done)     Learning Progress Summary           Patient Acceptance, E,TB, VU by RF at 2/9/2022 1542      Show all documentation for this point (8)                 Point: Precautions (Done)     Learning Progress Summary           Patient Acceptance, E,TB, VU by RF at 2/9/2022 1542      Show all documentation for this point (8)                             User Key     Initials Effective Dates Name Provider Type Discipline     06/16/21 -  Julianna Ann, PTA Physical Therapy Assistant PT                PT Recommendation and Plan    Frequency of Treatment (PT): 5 times per week  Anticipated Equipment Needs at Discharge (PT Eval):  (tbd)  Daily Progress Summary (PT)  Functional Goal Overall Progress (PT): progressing toward functional goals as expected  Daily Progress Summary (PT): Pt continues to be hindered by LE weakness and pain. Continued skilled acre required for further improvements to ensure maximum safe function upon D/C.  Impairments Still Limiting Function (PT): strength decreased, impaired functional activity tolerance, pain  Recommendations (PT): Conitnue per current POC               Time Calculation:      PT Charges     Row Name 02/09/22 1543 02/09/22 1542          Time Calculation    Start Time 1330  -RF 1045  -RF     Stop Time 1415  -RF 1130  -RF     Time Calculation (min) 45 min  -RF 45 min  -RF     PT Received On 02/09/22  -RF 02/09/22  -RF     PT - Next Appointment 02/10/22  -RF 02/09/22  -RF     PT Goal Re-Cert Due Date 02/16/22  -RF 02/09/22  -RF            Time Calculation- PT    Total Timed Code Minutes- PT 45 minute(s)  -RF 45 minute(s)  -RF           User Key  (r) =  Recorded By, (t) = Taken By, (c) = Cosigned By    Initials Name Provider Type    RF Julianna Ann, JESSICA Physical Therapy Assistant                Therapy Charges for Today     Code Description Service Date Service Provider Modifiers Qty    42041417096 HC GAIT TRAINING EA 15 MIN 2/8/2022 Julianna Ann, PTA GP 2    42069729349 HC PT THER PROC EA 15 MIN 2/8/2022 Julianna Ann, PTA GP 4    07492485532 HC GAIT TRAINING EA 15 MIN 2/9/2022 Julianna Ann, PTA GP 1    03768530963 HC PT THER PROC EA 15 MIN 2/9/2022 Julianna Ann, PTA GP 3    46542883161 HC PT NEUROMUSC RE EDUCATION EA 15 MIN 2/9/2022 Julianna Ann, PTA GP 2                   Julianna Ann PTA  2/9/2022

## 2022-02-09 NOTE — PROGRESS NOTES
Case Management  Inpatient Rehabilitation Team Conference    Conference Date/Time: 2/8/2022 9:11:21 AM    Team Conference Attendees:  MD Carrie Price SW Jessica Bill, SUBHA,   SUBHA Crespo, PT  Candie Dixon OT    Demographics            Age: 56Y            Gender: Female    Admission Date: 2/1/2022 4:43:00 PM  Rehabilitation Diagnosis:  right hip IM nailing  Comorbidities: I25.10 Atherosclerotic heart disease of native coronary artery  without angina pectoris  K21.9 Gastro-esophageal reflux disease without esophagitis  I10 Essential (primary) hypertension  E11.51 Type 2 diabetes mellitus with diabetic peripheral angiopathy without  gangrene  E78.00 Pure hypercholesterolemia, unspecified  Z79.4 Long term (current) use of insulin  Z80.9 Family history of malignant neoplasm, unspecified  Z82.49 Family history of ischemic heart disease and other diseases of the  circulatory system  Z83.3 Family history of diabetes mellitus  Z87.891 Personal history of nicotine dependence  W19.XXXA Unspecified fall, initial encounter      Plan of Care  Anticipated Discharge Date/Estimated Length of Stay: 2-11-22  Anticipated Discharge Destination: Community discharge with assistance  Discharge Plan : Pt to return to her home with son checking on her during the  day.  Son is self-employed and can stay with pt in the evening and night if  needed.  Medical Necessity Expected Level Rationale: good  Intensity and Duration: an average of 3 hours/5 days per week  Medical Supervision and 24 Hour Rehab Nursing: x  Physical Therapy: x  PT Intensity/Duration: PT 1.5 hours per day/5 days per week  Occupational Therapy: x  OT Intensity/Duration: OT 1.5 hours per day/5 days per week  Social Work: x  Therapeutic Recreation: x  Updated (if changes indicated)    Anticipated Discharge Date/Estimated Length of Stay:   2-16-22      Discharge Plan of Care:    Based on the patient's medical and  functional status, their prognosis and  expected level of functional improvement is: good      Interdisciplinary Problem/Goals/Status  Body Function Structure    [RN] Skin Integrity(Active)  Current Status(02/03/2022): free from skin breakdown this shift  Weekly Goal(02/17/2022): free from skin breakdown this stay  Discharge Goal: home free of skin breakdown        Mobility    [PT] Bed/Chair/Wheelchair(Active)  Current Status(02/02/2022): min A  Weekly Goal(02/09/2022): MI  Discharge Goal: MI    [PT] Walk(Active)  Current Status(02/02/2022): amb 80' RW CGA  Weekly Goal(02/09/2022): amb 300' RW Sup  Discharge Goal: amb 300' RW Sup        Safety    [RN] Potential for Injury(Active)  Current Status(02/03/2022): free from injury this shift  Weekly Goal(02/17/2022): free from injury this stay  Discharge Goal: home free of injury        Self Care    [OT] Toileting(Active)  Current Status(02/14/2022): min  Weekly Goal(02/16/2022): set up  Discharge Goal: set up    Comments: Recommend extending discharge date to 2-16-22 to allow more progress  in therapy.  Pt plans to return home at discharge with son checking on her and  staying with her in the evenings/nights if needed.    Signed by: RASTA Landry    Physician CoSigned By: Rob Camarillo 02/09/2022 09:43:50

## 2022-02-09 NOTE — PLAN OF CARE
Goal Outcome Evaluation:   Progressing medically and physically with therapies. Continue current POC.

## 2022-02-09 NOTE — PROGRESS NOTES
[facility]  PROGRESS NOTE     Patient Identification:  Name:  Lisa Velazquez  Age:  56 y.o.  Sex:  female  :  1965  MRN:  [id number]  Visit Number:  [id number]  ROOM: [facility]     Primary Care Provider:  Patricia Skelton APRN    Length of stay in inpatient status:  8    Subjective     Chief Compliant:  No chief complaint on file.      History of Presenting Illness: 56-year-old female who is status post right hip fracture with intramedullary nail placement, history of acute blood loss anemia, diabetes mellitus, peripheral vascular disease, hypertension, and hyperlipidemia.  Patient states her hip is a little sore this morning but otherwise is doing  fine.    Objective     Current Hospital Meds:ascorbic acid, 500 mg, Oral, Daily  aspirin, 81 mg, Oral, Daily  cetirizine, 10 mg, Oral, Daily  clopidogrel, 75 mg, Oral, Daily  enoxaparin, 40 mg, Subcutaneous, Nightly  guaiFENesin, 1,200 mg, Oral, Q12H  insulin aspart, 0-7 Units, Subcutaneous, TID AC  insulin detemir, 12 Units, Subcutaneous, Nightly  isosorbide mononitrate, 30 mg, Oral, Q24H  lisinopril, 2.5 mg, Oral, Q24H  multivitamin with minerals, 1 tablet, Oral, Daily  nystatin, , Topical, Q12H  rosuvastatin, 40 mg, Oral, Nightly  vitamin D3, 5,000 Units, Oral, Daily       ----------------------------------------------------------------------------------------------------------------------  Vital Signs:  Temp:  [97.5 °F (36.4 °C)-97.6 °F (36.4 °C)] 97.5 °F (36.4 °C)  Heart Rate:  [85-89] 89  Resp:  [18] 18  BP: (101-153)/(54-68) 101/68  SpO2:  [94 %-99 %] 99 %  on   ;   Device (Oxygen Therapy): room air  Body mass index is 25.22 kg/m².    Wt Readings from Last 3 Encounters:   22 73 kg (161 lb)     Intake & Output (last 3 days)        0701   0700  0701   0700  0701   0700  0701  02/10 0700    P.O. 1320 1200 1200 480    Total Intake(mL/kg) 1320 (18.1) 1200 (16.4) 1200 (16.4) 480 (6.6)    Net +1320 +1200  +1200 +480            Urine Unmeasured Occurrence 4 x 4 x 3 x 1 x    Stool Unmeasured Occurrence 1 x 1 x 2 x         Diet Regular; Consistent Carbohydrate  ----------------------------------------------------------------------------------------------------------------------  Physical exam:  Constitutional:   No acute distress  HEENT: Normocephalic atraumatic  Neck:    Supple  Cardiovascular: Regular rate and rhythm  Pulmonary/Chest: Clear to auscultation  Abdominal: Positive bowel sounds soft.   Musculoskeletal: Status post right hip repair  Neurological: No focal deficits  Skin: No rash  Peripheral vascular:  Genitourinary:  ----------------------------------------------------------------------------------------------------------------------    Last echocardiogram:    ----------------------------------------------------------------------------------------------------------------------  Results from last 7 days   Lab Units 02/08/22  1459   WBC 10*3/mm3 6.92   HEMOGLOBIN g/dL 11.1*   HEMATOCRIT % 34.7   MCV fL 91.8   MCHC g/dL 32.0   PLATELETS 10*3/mm3 203               Invalid input(s): PROTEstimated Creatinine Clearance: 114.9 mL/min (by C-G formula based on SCr of 0.63 mg/dL).  No results found for: AMMONIA              Glucose   Date/Time Value Ref Range Status   02/09/2022 0600 132 (H) 70 - 130 mg/dL Final     Comment:     Meter: [id number] : [id number] Cecily Crystal   02/08/2022 1616 158 (H) 70 - 130 mg/dL Final     Comment:     Meter: [id number] : [id number] GUNTER ROBERT   02/08/2022 1153 190 (H) 70 - 130 mg/dL Final     Comment:     Meter: [id number] : [id number] Schafer Dora   02/08/2022 0618 152 (H) 70 - 130 mg/dL Final     Comment:     Meter: [id number] : [id number] Cecily Crystal   02/07/2022 1947 185 (H) 70 - 130 mg/dL Final     Comment:     Meter: [id number] : [id number] Cecily Crystal   02/07/2022 1622 146 (H) 70 - 130 mg/dL Final     Comment:     Meter: [id number] :  [id number] domenic sarkar   02/07/2022 1147 179 (H) 70 - 130 mg/dL Final     Comment:     Meter: [id number] : [id number] Bryon Miranda   02/07/2022 0628 126 70 - 130 mg/dL Final     Comment:     Meter: [id number] : [id number] Cecily Kelley     No results found for: TSH, FREET4  No results found for: PREGTESTUR, PREGSERUM, HCG, HCGQUANT  Pain Management Panel    There is no flowsheet data to display.       Brief Urine Lab Results     None        No results found for: BLOODCX      No results found for: URINECX  No results found for: WOUNDCX  No results found for: STOOLCX        I have personally looked at the labs and they are summarized above.  ----------------------------------------------------------------------------------------------------------------------  Detailed radiology reports for the last 24 hours:    Imaging Results (Last 24 Hours)     ** No results found for the last 24 hours. **        Final impressions for the last 30 days of radiology reports:    No radiology results for the last 30 days.  I have personally looked at the radiology images and read the final radiology report.    Assessment & Plan    Status post right intertrochanteric hip fracture with intramedullary nail placement--standby assist for up with bed mobility regarding supine to sit.  Contact-guard to minimum assist for sit to supine independence.  Standby assist for transfers; ambulated 160 feet, 60 feet in the morning and 160 feet in the afternoon yesterday with front wheel walker and standby assistance.  Patient requiring moderate assistance for upper lower body dressing, minimum assistance for bathing; set up for upper body dressing; set up for grooming; minimum assistance for toileting.    Recent acute blood loss anemia--improved.      Peripheral vascular disease continue medical management    Hypertension controlled    Hyperlipidemia continue statin therapy      VTE Prophylaxis:   Mechanical Order History:     None       Pharmalogical Order History:      Ordered     Dose Route Frequency Stop    02/01/22 7090  enoxaparin (LOVENOX) syringe 40 mg         40 mg SC Nightly --                    Rob Camarillo MD  [facility]  02/09/22  09:58 EST

## 2022-02-09 NOTE — THERAPY TREATMENT NOTE
Inpatient Rehabilitation - Occupational Therapy Treatment Note     Junito     Patient Name: Lisa Velazquez  : 1965  MRN: [id number]    Today's Date: 2022                 Admit Date: 2022         ICD-10-CM ICD-9-CM   1. S/P right hip fracture  Z87.81 V15.51       Patient Active Problem List   Diagnosis   • S/P right hip fracture       Past Medical History:   Diagnosis Date   • Coronary artery disease    • Diabetes mellitus (HCC)    • Elevated cholesterol    • GERD (gastroesophageal reflux disease)    • History of transfusion    • Hypertension        Past Surgical History:   Procedure Laterality Date   • ABDOMINAL SURGERY      gallbladder removal   • CARDIAC CATHETERIZATION     • CARDIAC SURGERY      stent   • COLONOSCOPY     • FRACTURE SURGERY     • TOE AMPUTATION Right     3rd toe right foot             IRF OT ASSESSMENT FLOWSHEET (last 12 hours)     IRF OT Evaluation and Treatment     Row Name 22 1400          OT Time and Intention    Document Type daily treatment  -     Mode of Treatment individual therapy; occupational therapy  -     Patient Effort good  -     Row Name 22 1400          General Information    Patient/Family/Caregiver Comments/Observations patient continues to tolerate therapy with no complaints. patient continues to improve with functional activities and UE strength, endurance.  -     Existing Precautions/Restrictions fall; hip  -     Row Name 22 1400          Cognition/Psychosocial    Affect/Mental Status (Cognitive) WFL  -     Row Name 22 1400          Transfers    Bed-Chair [place] (Transfers) contact guard; minimum assist (75% patient effort)  -     Row Name 22 1400          Motor Skills    Motor Skills coordination; functional endurance; therapeutic exercise  -     Therapeutic Exercise --  UE bike, ROM, gmc,fmc,functional endurance  -     Row Name 22 1400          Bathing    [place] Level (Bathing) minimum assist  (75% patient effort)  -     Row Name 02/09/22 1400          Lower Body Dressing    [place] Level (Lower Body Dressing) moderate assist (50% patient effort); minimum assist (75% patient effort)  -           User Key  (r) = Recorded By, (t) = Taken By, (c) = Cosigned By    Initials Name Effective Dates     Rukhsana Dixonney, OT 06/16/21 -                  Occupational Therapy Education                 Title: PT OT SLP Therapies (Done)     Topic: Occupational Therapy (Done)     Point: ADL training (Done)     Description:   Instruct learner(s) on proper safety adaptation and remediation techniques during self care or transfers.   Instruct in proper use of assistive devices.              Learning Progress Summary           Patient Acceptance, E,TB, VU by  at 2/8/2022 2200      Show all documentation for this point (1)                 Point: Home exercise program (Done)     Description:   Instruct learner(s) on appropriate technique for monitoring, assisting and/or progressing therapeutic exercises/activities.              Learning Progress Summary           Patient Acceptance, E,TB, VU by  at 2/8/2022 2200      Show all documentation for this point (1)                 Point: Body mechanics (Done)     Description:   Instruct learner(s) on proper positioning and spine alignment during self-care, functional mobility activities and/or exercises.              Learning Progress Summary           Patient Acceptance, E,TB, VU by  at 2/8/2022 2200      Show all documentation for this point (1)                             User Key     Initials Effective Dates Name Provider Type Discipline     01/19/22 -  Hilda Salinas, Nursing Student Nursing Student Nurse                    OT Recommendation and Plan    Planned Therapy Interventions (OT): activity tolerance training, adaptive equipment training, BADL retraining, patient/caregiver education/training, ROM/therapeutic exercise, strengthening exercise,  transfer/mobility retraining                    Time Calculation:      Time Calculation- OT     Row Name 02/09/22 1506             Time Calculation- OT    OT Start Time 0745  -      OT Stop Time 0915  -      OT Time Calculation (min) 90 min  -            User Key  (r) = Recorded By, (t) = Taken By, (c) = Cosigned By    Initials Name Provider Type     Rukhsana Dixon, OT Occupational Therapist              Therapy Charges for Today     Code Description Service Date Service Provider Modifiers Qty    78495036724 HC OT THERAPEUTIC ACT EA 15 MIN 2/8/2022 Rukhsana Dixon, OT GO 2    11088027725 HC OT SELF CARE/MGMT/TRAIN EA 15 MIN 2/8/2022 Rukhsana Dixon, OT GO 1    17249696968 HC OT THER PROC EA 15 MIN 2/8/2022 Rukhsana Dixon, OT GO 3    09282954324 HC OT THERAPEUTIC ACT EA 15 MIN 2/9/2022 Rukhsana Dixon, OT GO 2    09628396124 HC OT SELF CARE/MGMT/TRAIN EA 15 MIN 2/9/2022 Rukhsana Dixon, OT GO 1    15119395395 HC OT THER PROC EA 15 MIN 2/9/2022 Rukhsana Dixon, OT GO 3                   Rukhsana Dixon OT  2/9/2022

## 2022-02-09 NOTE — PROGRESS NOTES
Occupational Therapy:    Physical Therapy: Individual: 135 minutes.    Speech Language Pathology:    Signed by: Julianna Ann PTA

## 2022-02-10 LAB
GLUCOSE BLDC GLUCOMTR-MCNC: 144 MG/DL (ref 70–130)
GLUCOSE BLDC GLUCOMTR-MCNC: 184 MG/DL (ref 70–130)
GLUCOSE BLDC GLUCOMTR-MCNC: 282 MG/DL (ref 70–130)

## 2022-02-10 PROCEDURE — 25010000002 ENOXAPARIN PER 10 MG: Performed by: INTERNAL MEDICINE

## 2022-02-10 PROCEDURE — 63710000001 INSULIN ASPART PER 5 UNITS: Performed by: INTERNAL MEDICINE

## 2022-02-10 PROCEDURE — 63710000001 INSULIN DETEMIR PER 5 UNITS: Performed by: INTERNAL MEDICINE

## 2022-02-10 PROCEDURE — 97535 SELF CARE MNGMENT TRAINING: CPT | Performed by: OCCUPATIONAL THERAPIST

## 2022-02-10 PROCEDURE — 82962 GLUCOSE BLOOD TEST: CPT

## 2022-02-10 PROCEDURE — 97116 GAIT TRAINING THERAPY: CPT

## 2022-02-10 PROCEDURE — 97112 NEUROMUSCULAR REEDUCATION: CPT

## 2022-02-10 PROCEDURE — 63710000001 DIPHENHYDRAMINE PER 50 MG: Performed by: INTERNAL MEDICINE

## 2022-02-10 PROCEDURE — 97530 THERAPEUTIC ACTIVITIES: CPT | Performed by: OCCUPATIONAL THERAPIST

## 2022-02-10 PROCEDURE — 99231 SBSQ HOSP IP/OBS SF/LOW 25: CPT | Performed by: FAMILY MEDICINE

## 2022-02-10 PROCEDURE — 97110 THERAPEUTIC EXERCISES: CPT | Performed by: OCCUPATIONAL THERAPIST

## 2022-02-10 PROCEDURE — 97110 THERAPEUTIC EXERCISES: CPT

## 2022-02-10 PROCEDURE — 97530 THERAPEUTIC ACTIVITIES: CPT

## 2022-02-10 RX ADMIN — OXYCODONE HYDROCHLORIDE AND ACETAMINOPHEN 500 MG: 500 TABLET ORAL at 08:49

## 2022-02-10 RX ADMIN — ASPIRIN 81 MG: 81 TABLET, CHEWABLE ORAL at 08:48

## 2022-02-10 RX ADMIN — INSULIN ASPART 2 UNITS: 100 INJECTION, SOLUTION INTRAVENOUS; SUBCUTANEOUS at 17:27

## 2022-02-10 RX ADMIN — CLOPIDOGREL BISULFATE 75 MG: 75 TABLET, FILM COATED ORAL at 08:48

## 2022-02-10 RX ADMIN — OXYCODONE 5 MG: 5 TABLET ORAL at 21:00

## 2022-02-10 RX ADMIN — GUAIFENESIN 1200 MG: 600 TABLET, EXTENDED RELEASE ORAL at 08:48

## 2022-02-10 RX ADMIN — Medication 1 TABLET: at 08:48

## 2022-02-10 RX ADMIN — ROSUVASTATIN CALCIUM 40 MG: 20 TABLET, FILM COATED ORAL at 21:00

## 2022-02-10 RX ADMIN — NYSTATIN: 100000 POWDER TOPICAL at 21:01

## 2022-02-10 RX ADMIN — INSULIN DETEMIR 12 UNITS: 100 INJECTION, SOLUTION SUBCUTANEOUS at 21:01

## 2022-02-10 RX ADMIN — OXYCODONE 5 MG: 5 TABLET ORAL at 11:06

## 2022-02-10 RX ADMIN — CETIRIZINE HYDROCHLORIDE 10 MG: 10 TABLET, FILM COATED ORAL at 08:48

## 2022-02-10 RX ADMIN — INSULIN ASPART 4 UNITS: 100 INJECTION, SOLUTION INTRAVENOUS; SUBCUTANEOUS at 11:50

## 2022-02-10 RX ADMIN — DIPHENHYDRAMINE HYDROCHLORIDE 25 MG: 25 CAPSULE ORAL at 21:00

## 2022-02-10 RX ADMIN — ISOSORBIDE MONONITRATE 30 MG: 30 TABLET, EXTENDED RELEASE ORAL at 08:48

## 2022-02-10 RX ADMIN — LISINOPRIL 2.5 MG: 2.5 TABLET ORAL at 08:49

## 2022-02-10 RX ADMIN — GUAIFENESIN 1200 MG: 600 TABLET, EXTENDED RELEASE ORAL at 21:00

## 2022-02-10 RX ADMIN — ENOXAPARIN SODIUM 40 MG: 40 INJECTION SUBCUTANEOUS at 21:00

## 2022-02-10 RX ADMIN — Medication 5000 UNITS: at 08:48

## 2022-02-10 RX ADMIN — NYSTATIN: 100000 POWDER TOPICAL at 08:49

## 2022-02-10 NOTE — THERAPY TREATMENT NOTE
Inpatient Rehabilitation - Occupational Therapy Treatment Note     [facility]     Patient Name: Lisa Velazquez  : 1965  MRN: [id number]    Today's Date: 2/10/2022                 Admit Date: 2022         ICD-10-CM ICD-9-CM   1. S/P right hip fracture  Z87.81 V15.51       Patient Active Problem List   Diagnosis   • S/P right hip fracture       Past Medical History:   Diagnosis Date   • Coronary artery disease    • Diabetes mellitus (HCC)    • Elevated cholesterol    • GERD (gastroesophageal reflux disease)    • History of transfusion    • Hypertension        Past Surgical History:   Procedure Laterality Date   • ABDOMINAL SURGERY      gallbladder removal   • CARDIAC CATHETERIZATION     • CARDIAC SURGERY      stent   • COLONOSCOPY     • FRACTURE SURGERY     • TOE AMPUTATION Right     3rd toe right foot             IRF OT ASSESSMENT FLOWSHEET (last 12 hours)     IRF OT Evaluation and Treatment     Row Name 02/10/22 1500          OT Time and Intention    Document Type daily treatment  -     Mode of Treatment individual therapy; occupational therapy  -     Patient Effort good  -     Row Name 02/10/22 1500          General Information    Patient/Family/Caregiver Comments/Observations patient continues to improve with functional activity, UE strength and endurance.  -     Existing Precautions/Restrictions fall; hip  -     Row Name 02/10/22 1500          Cognition/Psychosocial    Affect/Mental Status (Cognitive) WFL  -     Row Name 02/10/22 1500          Transfers    Bed-Chair [place] (Transfers) contact guard; verbal cues  -     [place] Level (Toilet Transfer) contact guard; verbal cues  -     Row Name 02/10/22 1500          Toilet Transfer    Type (Toilet Transfer) stand pivot/stand step  -     Row Name 02/10/22 1500          Motor Skills    Motor Skills coordination; functional endurance; neuro-muscular function  -     Therapeutic Exercise --  UE bike, gmc,fmc,functional endurance,   strength, reaching  -     Row Name 02/10/22 1500          Bathing    [place] Level (Bathing) contact guard assist; verbal cues  -     Row Name 02/10/22 1500          Upper Body Dressing    [place] Level (Upper Body Dressing) set up assistance  -     Row Name 02/10/22 1500          Lower Body Dressing    [place] Level (Lower Body Dressing) contact guard assist; minimum assist (75% patient effort)  -     Assistive Device Use (Lower Body Dressing) reacher; sock-aid  -     Row Name 02/10/22 1500          Grooming    [place] Level (Grooming) set up  -     Row Name 02/10/22 1500          Toileting    [place] Level (Toileting) contact guard assist; supervision; verbal cues  -           User Key  (r) = Recorded By, (t) = Taken By, (c) = Cosigned By    Initials Name Effective Dates    Rukhsana Olivo, OT 06/16/21 -                  Occupational Therapy Education                 Title: PT OT SLP Therapies (Done)     Topic: Occupational Therapy (Done)     Point: ADL training (Done)     Description:   Instruct learner(s) on proper safety adaptation and remediation techniques during self care or transfers.   Instruct in proper use of assistive devices.              Learning Progress Summary           Patient Acceptance, E,TB, VU by  at 2/9/2022 2136      Show all documentation for this point (2)                 Point: Home exercise program (Done)     Description:   Instruct learner(s) on appropriate technique for monitoring, assisting and/or progressing therapeutic exercises/activities.              Learning Progress Summary           Patient Acceptance, E,TB, VU by  at 2/9/2022 2136      Show all documentation for this point (2)                 Point: Body mechanics (Done)     Description:   Instruct learner(s) on proper positioning and spine alignment during self-care, functional mobility activities and/or exercises.              Learning Progress Summary            Patient Acceptance, E,TB, VU by  at 2/9/2022 2136      Show all documentation for this point (2)                             User Key     Initials Effective Dates Name Provider Type Discipline     06/16/21 -  Karen Miranda, RN Registered Nurse Nurse                    OT Recommendation and Plan    Planned Therapy Interventions (OT): activity tolerance training, adaptive equipment training, BADL retraining, patient/caregiver education/training, ROM/therapeutic exercise, strengthening exercise, transfer/mobility retraining                    Time Calculation:      Time Calculation- OT     Row Name 02/10/22 1502             Time Calculation- OT    OT Start Time 0745  -      OT Stop Time 0915  -      OT Time Calculation (min) 90 min  -            User Key  (r) = Recorded By, (t) = Taken By, (c) = Cosigned By    Initials Name Provider Type     Rukhsana Dixon, OT Occupational Therapist              Therapy Charges for Today     Code Description Service Date Service Provider Modifiers Qty    93700029993 HC OT THERAPEUTIC ACT EA 15 MIN 2/9/2022 Rukhsana Dixon, OT GO 2    70358925150 HC OT SELF CARE/MGMT/TRAIN EA 15 MIN 2/9/2022 Rukhsana Dixon, OT GO 1    90986759455 HC OT THER PROC EA 15 MIN 2/9/2022 Rukhsana Dixon, OT GO 3    19052457315 HC OT SELF CARE/MGMT/TRAIN EA 15 MIN 2/10/2022 Rukhsana Dixon, OT GO 3    83886240438 HC OT THER PROC EA 15 MIN 2/10/2022 Rukhsana Dixon OT GO 1    82334602268 HC OT THERAPEUTIC ACT EA 15 MIN 2/10/2022 Rukhsana Dixon OT GO 2                   Rukhsana Dixon OT  2/10/2022

## 2022-02-10 NOTE — THERAPY TREATMENT NOTE
Inpatient Rehabilitation - Physical Therapy Treatment Note       TONI Wild     Patient Name: Lisa Velazquez  : 1965  MRN: [id number]    Today's Date: 2/10/2022                    Admit Date: 2022      Visit Dx:     ICD-10-CM ICD-9-CM   1. S/P right hip fracture  Z87.81 V15.51       Patient Active Problem List   Diagnosis   • S/P right hip fracture       Past Medical History:   Diagnosis Date   • Coronary artery disease    • Diabetes mellitus (HCC)    • Elevated cholesterol    • GERD (gastroesophageal reflux disease)    • History of transfusion    • Hypertension        Past Surgical History:   Procedure Laterality Date   • ABDOMINAL SURGERY      gallbladder removal   • CARDIAC CATHETERIZATION     • CARDIAC SURGERY      stent   • COLONOSCOPY     • FRACTURE SURGERY     • TOE AMPUTATION Right     3rd toe right foot       PT ASSESSMENT (last 12 hours)     IRF PT Evaluation and Treatment     Row Name 02/10/22 143          PT Time and Intention    Document Type daily treatment  -RF     Mode of Treatment individual therapy; physical therapy  -RF     Patient/Family/Caregiver Comments/Observations Pt c/o BLE pain with activity this date.  -RF     Row Name 02/10/22 1432          General Information    Patient Profile Reviewed yes  -RF     Existing Precautions/Restrictions fall  -RF     Row Name 02/10/22 1432          Cognition/Psychosocial    Affect/Mental Status (Cognitive) WFL  -RF     Follows Commands (Cognition) verbal cues/prompting required  -RF     Row Name 02/10/22 1432          Pain Scale: FACES Pre/Post-Treatment    Pain: FACES Scale, Pretreatment 2-->hurts little bit  -RF     Posttreatment Pain Rating 4-->hurts little more  -RF     Row Name 02/10/22 1432          Bed Mobility    Supine-Sit [place] (Bed Mobility) standby assist  -RF     Sit-Supine [place] (Bed Mobility) standby assist  -RF     Row Name 02/10/22 1432          Transfer Assessment/Treatment    Transfers car transfer  -RF     Row  Name 02/10/22 1432          Transfers    Bed-Chair [place] (Transfers) standby assist  -RF     Chair-Bed [place] (Transfers) standby assist  -RF     Assistive Device (Bed-Chair Transfers) wheelchair; walker, front-wheeled  -RF     Sit-Stand [place] (Transfers) supervision  -RF     Stand-Sit [place] (Transfers) supervision  -RF     [place] Level (Toilet Transfer) contact guard; standby assist  -RF     Assistive Device (Toilet Transfer) grab bars/safety frame  -RF     Row Name 02/10/22 1432          Chair-Bed Transfer    Assistive Device (Chair-Bed Transfers) wheelchair; walker, front-wheeled  -RF     Row Name 02/10/22 1432          Sit-Stand Transfer    Assistive Device (Sit-Stand Transfers) walker, front-wheeled  -RF     Row Name 02/10/22 1432          Stand-Sit Transfer    Assistive Device (Stand-Sit Transfers) walker, front-wheeled  -RF     Row Name 02/10/22 1432          Toilet Transfer    Type (Toilet Transfer) stand pivot/stand step  -RF     Row Name 02/10/22 1432          Gait/Stairs (Locomotion)    [place] Level (Gait) verbal cues; nonverbal cues (demo/gesture); standby assist  -RF     Assistive Device (Gait) walker, front-wheeled  -RF     Distance in Feet (Gait) 160X2  -RF     Pattern (Gait) step-through  -RF     Deviations/Abnormal Patterns (Gait) antalgic; bc decreased; gait speed decreased; stride length decreased  -RF     Bilateral Gait Deviations forward flexed posture  -RF     Row Name 02/10/22 1432          Safety Issues, Functional Mobility    Impairments Affecting Function (Mobility) balance; endurance/activity tolerance; pain; range of motion (ROM); strength  -RF     Row Name 02/10/22 1432          Balance    Dynamic Standing Balance mild impairment; unsupported; asymmetrical weight shifting; standing; other (see comments)  Pt performed functional tasks such as reaching for objects overhead and balancing while managing objects for cooking with 0 UE support.  Balance activities performed while reaching outside JOHAN and across midline.  -RF     Comment, Balance Fatigue noted; pt required rest breaks periodically.  -RF     Row Name 02/10/22 1432          Hip (Therapeutic Exercise)    Hip Strengthening (Therapeutic Exercise) bilateral; flexion; extension; aBduction; aDduction; heel slides; marching while seated; marching while standing; sitting; standing; resistance band; red; 2 sets; 10 repetitions  -RF     Row Name 02/10/22 1432          Knee (Therapeutic Exercise)    Knee Strengthening (Therapeutic Exercise) bilateral; flexion; extension; heel slides; marching while standing; marching while seated; LAQ (long arc quad); hamstring curls; sitting; resistance band; red; 2 sets; 10 repetitions  -RF     Row Name 02/10/22 1432          Ankle (Therapeutic Exercise)    Ankle Strengthening (Therapeutic Exercise) bilateral; dorsiflexion; plantarflexion; sitting; 2 sets; 10 repetitions  -RF     Row Name 02/10/22 1432          IRF PT Goals    Bed Mobility Goal Selection (PT-IRF) bed mobility, PT goal 1  -RF     Transfer Goal Selection (PT-IRF) transfers, PT goal 1  -RF     Gait (Walking Locomotion) Goal Selection (PT-IRF) gait, PT goal 1  -RF     Row Name 02/10/22 1432          Bed Mobility Goal 1 (PT-IRF)    Activity/Assistive Device (Bed Mobility Goal 1, PT-IRF) sit to supine/supine to sit  -RF     [place] Level (Bed Mobility Goal 1, PT-IRF) independent  -RF     Time Frame (Bed Mobility Goal 1, PT-IRF) by discharge  -RF     Progress/Outcomes (Bed Mobility Goal 1, PT-IRF) goal ongoing  -RF     Row Name 02/10/22 1432          Transfer Goal 1 (PT-IRF)    Activity/Assistive Device (Transfer Goal 1, PT-IRF) sit-to-stand/stand-to-sit; bed-to-chair/chair-to-bed  -RF     [place] Level (Transfer Goal 1, PT-IRF) modified independence  -RF     Time Frame (Transfer Goal 1, PT-IRF) by discharge  -RF     Progress/Outcomes (Transfer Goal 1, PT-IRF) goal ongoing  -RF     Row Name 02/10/22  1432          Gait/Walking Locomotion Goal 1 (PT-IRF)    Activity/Assistive Device (Gait/Walking Locomotion Goal 1, PT-IRF) walker, rolling  -RF     Gait/Walking Locomotion Distance Goal 1 (PT-IRF) 300'  -RF     [place] Level (Gait/Walking Locomotion Goal 1, PT-IRF) supervision required  -RF     Time Frame (Gait/Walking Locomotion Goal 1, PT-IRF) by discharge  -RF     Progress/Outcomes (Gait/Walking Locomotion Goal 1, PT-IRF) goal ongoing  -RF     Row Name 02/10/22 1432          Positioning and Restraints    Pre-Treatment Position sitting in chair/recliner  BID  -RF     In Bed supine; call light within reach; encouraged to call for assist  PM  -RF     In Wheelchair sitting; call light within reach; encouraged to call for assist  Am  -RF     Row Name 02/10/22 1432          Therapy Assessment/Plan (PT)    Patient's Goals For Discharge return home; take care of myself at home  -RF     Row Name 02/10/22 1432          Therapy Assessment/Plan (PT)    Rehab Potential/Prognosis (PT) adequate, monitor progress closely  -RF     Frequency of Treatment (PT) 5 times per week  -RF     Estimated Duration of Therapy (PT) 2 weeks  -RF     Problem List (PT) balance; mobility; range of motion (ROM); strength; pain  -RF     Activity Limitations Related to Problem List (PT) unable to ambulate safely; unable to transfer safely  -RF     Row Name 02/10/22 1432          Daily Progress Summary (PT)    Functional Goal Overall Progress (PT) progressing toward functional goals as expected  -RF     Daily Progress Summary (PT) Pt continues to be hindered by pain and decreased activity tolerance at this time. Continued skilled care required to ensure maximum safe function upon D/C  -RF     Impairments Still Limiting Function (PT) strength decreased; impaired functional activity tolerance; pain  -RF     Recommendations (PT) Continue per current POC  -RF     Row Name 02/10/22 1432          Therapy Plan Review/Discharge Plan (PT)    Anticipated  Equipment Needs at Discharge (PT Eval) --  tbd  -RF     Expected Discharge Disposition (PT Eval) home with home health care  -           User Key  (r) = Recorded By, (t) = Taken By, (c) = Cosigned By    Initials Name Provider Type     Julianna Ann PTA Physical Therapy Assistant                 Physical Therapy Education                 Title: PT OT SLP Therapies (Done)     Topic: Physical Therapy (Done)     Point: Mobility training (Done)     Learning Progress Summary           Patient Acceptance, E,TB, VU by RF at 2/10/2022 1451      Show all documentation for this point (10)                 Point: Home exercise program (Done)     Learning Progress Summary           Patient Acceptance, E,TB, VU by RF at 2/10/2022 1451      Show all documentation for this point (10)                 Point: Body mechanics (Done)     Learning Progress Summary           Patient Acceptance, E,TB, VU by RF at 2/10/2022 1451      Show all documentation for this point (10)                 Point: Precautions (Done)     Learning Progress Summary           Patient Acceptance, E,TB, VU by  at 2/10/2022 1451      Show all documentation for this point (10)                             User Key     Initials Effective Dates Name Provider Type Discipline     06/16/21 -  Julianna Ann PTA Physical Therapy Assistant PT                PT Recommendation and Plan    Frequency of Treatment (PT): 5 times per week  Anticipated Equipment Needs at Discharge (PT Eval):  (tbd)  Daily Progress Summary (PT)  Functional Goal Overall Progress (PT): progressing toward functional goals as expected  Daily Progress Summary (PT): Pt continues to be hindered by pain and decreased activity tolerance at this time. Continued skilled care required to ensure maximum safe function upon D/C  Impairments Still Limiting Function (PT): strength decreased, impaired functional activity tolerance, pain  Recommendations (PT): Continue per current POC                Time Calculation:      PT Charges     Row Name 02/10/22 1452 02/10/22 1451          Time Calculation    Start Time 1330  -RF 1045  -RF     Stop Time 1445  -RF 1145  -RF     Time Calculation (min) 75 min  -RF 60 min  -RF     PT Received On 02/10/22  -RF 02/10/22  -RF     PT - Next Appointment 02/11/22  -RF 02/10/22  -RF     PT Goal Re-Cert Due Date 02/16/22  -RF 02/16/22  -RF            Time Calculation- PT    Total Timed Code Minutes- PT 75 minute(s)  -RF 60 minute(s)  -RF           User Key  (r) = Recorded By, (t) = Taken By, (c) = Cosigned By    Initials Name Provider Type    RF Julianna Ann PTA Physical Therapy Assistant                Therapy Charges for Today     Code Description Service Date Service Provider Modifiers Qty    59292422952 HC GAIT TRAINING EA 15 MIN 2/9/2022 Julianna Ann, PTA GP 1    35433046133 HC PT THER PROC EA 15 MIN 2/9/2022 Julianna Ann, PTA GP 3    02670221515 HC PT NEUROMUSC RE EDUCATION EA 15 MIN 2/9/2022 Julianna Ann, PTA GP 2    82476510875 HC GAIT TRAINING EA 15 MIN 2/10/2022 Julianna Ann, PTA GP, CQ 1    13434546124 HC PT NEUROMUSC RE EDUCATION EA 15 MIN 2/10/2022 Julianna Ann, PTA GP, CQ 3    42367861833 HC PT THER PROC EA 15 MIN 2/10/2022 Julianna Ann, PTA GP, CQ 4    67278930635 HC PT THERAPEUTIC ACT EA 15 MIN 2/10/2022 Julianna Ann, PTA GP, CQ 1                   Julianna Ann, PTA  2/10/2022

## 2022-02-11 LAB
GLUCOSE BLDC GLUCOMTR-MCNC: 153 MG/DL (ref 70–130)
GLUCOSE BLDC GLUCOMTR-MCNC: 182 MG/DL (ref 70–130)
GLUCOSE BLDC GLUCOMTR-MCNC: 240 MG/DL (ref 70–130)

## 2022-02-11 PROCEDURE — 82962 GLUCOSE BLOOD TEST: CPT

## 2022-02-11 PROCEDURE — 97530 THERAPEUTIC ACTIVITIES: CPT | Performed by: OCCUPATIONAL THERAPIST

## 2022-02-11 PROCEDURE — 97110 THERAPEUTIC EXERCISES: CPT | Performed by: OCCUPATIONAL THERAPIST

## 2022-02-11 PROCEDURE — 97530 THERAPEUTIC ACTIVITIES: CPT

## 2022-02-11 PROCEDURE — 97110 THERAPEUTIC EXERCISES: CPT

## 2022-02-11 PROCEDURE — 63710000001 INSULIN ASPART PER 5 UNITS: Performed by: INTERNAL MEDICINE

## 2022-02-11 PROCEDURE — 63710000001 DIPHENHYDRAMINE PER 50 MG: Performed by: INTERNAL MEDICINE

## 2022-02-11 PROCEDURE — 97116 GAIT TRAINING THERAPY: CPT

## 2022-02-11 PROCEDURE — 97112 NEUROMUSCULAR REEDUCATION: CPT

## 2022-02-11 PROCEDURE — 63710000001 INSULIN DETEMIR PER 5 UNITS: Performed by: INTERNAL MEDICINE

## 2022-02-11 PROCEDURE — 25010000002 ENOXAPARIN PER 10 MG: Performed by: INTERNAL MEDICINE

## 2022-02-11 PROCEDURE — 97535 SELF CARE MNGMENT TRAINING: CPT | Performed by: OCCUPATIONAL THERAPIST

## 2022-02-11 RX ADMIN — LISINOPRIL 2.5 MG: 2.5 TABLET ORAL at 08:51

## 2022-02-11 RX ADMIN — ASPIRIN 81 MG: 81 TABLET, CHEWABLE ORAL at 08:52

## 2022-02-11 RX ADMIN — OXYCODONE 5 MG: 5 TABLET ORAL at 23:22

## 2022-02-11 RX ADMIN — Medication 5000 UNITS: at 08:52

## 2022-02-11 RX ADMIN — NYSTATIN: 100000 POWDER TOPICAL at 20:58

## 2022-02-11 RX ADMIN — INSULIN ASPART 2 UNITS: 100 INJECTION, SOLUTION INTRAVENOUS; SUBCUTANEOUS at 08:51

## 2022-02-11 RX ADMIN — ROSUVASTATIN CALCIUM 40 MG: 20 TABLET, FILM COATED ORAL at 20:58

## 2022-02-11 RX ADMIN — INSULIN ASPART 2 UNITS: 100 INJECTION, SOLUTION INTRAVENOUS; SUBCUTANEOUS at 12:07

## 2022-02-11 RX ADMIN — GUAIFENESIN 1200 MG: 600 TABLET, EXTENDED RELEASE ORAL at 20:58

## 2022-02-11 RX ADMIN — OXYCODONE 5 MG: 5 TABLET ORAL at 17:11

## 2022-02-11 RX ADMIN — NYSTATIN: 100000 POWDER TOPICAL at 08:52

## 2022-02-11 RX ADMIN — CLOPIDOGREL BISULFATE 75 MG: 75 TABLET, FILM COATED ORAL at 08:51

## 2022-02-11 RX ADMIN — INSULIN DETEMIR 12 UNITS: 100 INJECTION, SOLUTION SUBCUTANEOUS at 20:59

## 2022-02-11 RX ADMIN — Medication 1 TABLET: at 08:52

## 2022-02-11 RX ADMIN — ISOSORBIDE MONONITRATE 30 MG: 30 TABLET, EXTENDED RELEASE ORAL at 08:52

## 2022-02-11 RX ADMIN — GUAIFENESIN 1200 MG: 600 TABLET, EXTENDED RELEASE ORAL at 08:51

## 2022-02-11 RX ADMIN — ENOXAPARIN SODIUM 40 MG: 40 INJECTION SUBCUTANEOUS at 20:57

## 2022-02-11 RX ADMIN — INSULIN ASPART 3 UNITS: 100 INJECTION, SOLUTION INTRAVENOUS; SUBCUTANEOUS at 17:08

## 2022-02-11 RX ADMIN — DIPHENHYDRAMINE HYDROCHLORIDE 25 MG: 25 CAPSULE ORAL at 20:58

## 2022-02-11 RX ADMIN — CETIRIZINE HYDROCHLORIDE 10 MG: 10 TABLET, FILM COATED ORAL at 08:51

## 2022-02-11 RX ADMIN — OXYCODONE HYDROCHLORIDE AND ACETAMINOPHEN 500 MG: 500 TABLET ORAL at 08:52

## 2022-02-11 NOTE — THERAPY TREATMENT NOTE
Inpatient Rehabilitation - Occupational Therapy Treatment Note     Marty     Patient Name: Lisa Velazquez  : 1965  MRN: [id number]    Today's Date: 2022                 Admit Date: 2022         ICD-10-CM ICD-9-CM   1. S/P right hip fracture  Z87.81 V15.51       Patient Active Problem List   Diagnosis   • S/P right hip fracture       Past Medical History:   Diagnosis Date   • Coronary artery disease    • Diabetes mellitus (HCC)    • Elevated cholesterol    • GERD (gastroesophageal reflux disease)    • History of transfusion    • Hypertension        Past Surgical History:   Procedure Laterality Date   • ABDOMINAL SURGERY      gallbladder removal   • CARDIAC CATHETERIZATION     • CARDIAC SURGERY      stent   • COLONOSCOPY     • FRACTURE SURGERY     • TOE AMPUTATION Right     3rd toe right foot             IRF OT ASSESSMENT FLOWSHEET (last 12 hours)     IRF OT Evaluation and Treatment     Row Name 22 1400          OT Time and Intention    Document Type daily treatment  -     Mode of Treatment individual therapy; occupational therapy  -     Patient Effort good  -     Row Name 22 1400          General Information    Patient/Family/Caregiver Comments/Observations patient agreeable to therapy  -     Existing Precautions/Restrictions hip; fall  -     Row Name 22 1400          Cognition/Psychosocial    Affect/Mental Status (Cognitive) WFL  -     Row Name 22 1400          Motor Skills    Motor Skills functional endurance; coordination; therapeutic exercise  -     Therapeutic Exercise --  UE bike, gmc,fmc,reaching,  strengthening  -     Row Name 22 1400          Lower Body Dressing    [place] Level (Lower Body Dressing) moderate assist (50% patient effort); minimum assist (75% patient effort); don; shoes/slippers  -           User Key  (r) = Recorded By, (t) = Taken By, (c) = Cosigned By    Initials Name Effective Dates     Rukhsana Dixon, OT  06/16/21 -                  Occupational Therapy Education                 Title: PT OT SLP Therapies (Done)     Topic: Occupational Therapy (Done)     Point: ADL training (Done)     Description:   Instruct learner(s) on proper safety adaptation and remediation techniques during self care or transfers.   Instruct in proper use of assistive devices.              Learning Progress Summary           Patient Acceptance, E,TB, VU by  at 2/10/2022 2238      Show all documentation for this point (3)                 Point: Home exercise program (Done)     Description:   Instruct learner(s) on appropriate technique for monitoring, assisting and/or progressing therapeutic exercises/activities.              Learning Progress Summary           Patient Acceptance, E,TB, VU by  at 2/10/2022 2238      Show all documentation for this point (3)                 Point: Body mechanics (Done)     Description:   Instruct learner(s) on proper positioning and spine alignment during self-care, functional mobility activities and/or exercises.              Learning Progress Summary           Patient Acceptance, E,TB, VU by  at 2/10/2022 2238      Show all documentation for this point (3)                             User Key     Initials Effective Dates Name Provider Type Discipline     06/16/21 -  Karen Miranda, RN Registered Nurse Nurse                    OT Recommendation and Plan    Planned Therapy Interventions (OT): activity tolerance training, adaptive equipment training, BADL retraining, patient/caregiver education/training, ROM/therapeutic exercise, strengthening exercise, transfer/mobility retraining                    Time Calculation:      Time Calculation- OT     Row Name 02/11/22 1452             Time Calculation- OT    OT Start Time 0745  -      OT Stop Time 0915  -      OT Time Calculation (min) 90 min  -            User Key  (r) = Recorded By, (t) = Taken By, (c) = Cosigned By    Initials Name Provider Type      Rukhsana Dixon, OT Occupational Therapist              Therapy Charges for Today     Code Description Service Date Service Provider Modifiers Qty    51250858378 HC OT SELF CARE/MGMT/TRAIN EA 15 MIN 2/10/2022 Rukhsana Dixon, OT GO 3    33040069218 HC OT THER PROC EA 15 MIN 2/10/2022 Rukhsana Dixon, OT GO 1    52770104324 HC OT THERAPEUTIC ACT EA 15 MIN 2/10/2022 Rukhsana Dixon, OT GO 2    59564664960 HC OT THERAPEUTIC ACT EA 15 MIN 2/11/2022 Rukhsana Dixon, OT GO 2    90931038123 HC OT SELF CARE/MGMT/TRAIN EA 15 MIN 2/11/2022 Rukhsana Dixon, OT GO 1    22457053500 HC OT THER PROC EA 15 MIN 2/11/2022 Rukhsana Dixon, OT GO 3                   Rukhsana Dixon OT  2/11/2022

## 2022-02-11 NOTE — THERAPY TREATMENT NOTE
Inpatient Rehabilitation - Physical Therapy Treatment Note        Junito     Patient Name: Lisa Velazquez  : 1965  MRN: [id number]    Today's Date: 2022                    Admit Date: 2022      Visit Dx:     ICD-10-CM ICD-9-CM   1. S/P right hip fracture  Z87.81 V15.51       Patient Active Problem List   Diagnosis   • S/P right hip fracture       Past Medical History:   Diagnosis Date   • Coronary artery disease    • Diabetes mellitus (HCC)    • Elevated cholesterol    • GERD (gastroesophageal reflux disease)    • History of transfusion    • Hypertension        Past Surgical History:   Procedure Laterality Date   • ABDOMINAL SURGERY      gallbladder removal   • CARDIAC CATHETERIZATION     • CARDIAC SURGERY      stent   • COLONOSCOPY     • FRACTURE SURGERY     • TOE AMPUTATION Right     3rd toe right foot       PT ASSESSMENT (last 12 hours)     IRF PT Evaluation and Treatment     Row Name 22 1640          PT Time and Intention    Document Type daily treatment  -RF     Mode of Treatment individual therapy; physical therapy  -RF     Patient/Family/Caregiver Comments/Observations Pt c/o BLE pain with activity. Pt reports R hip pain and B ant tib/gastroc pain. MD aware.  -RF     Row Name 22 1640          General Information    Patient Profile Reviewed yes  -RF     Existing Precautions/Restrictions fall  -RF     Row Name 22 1640          Cognition/Psychosocial    Affect/Mental Status (Cognitive) WFL  -RF     Follows Commands (Cognition) verbal cues/prompting required  -RF     Row Name 22 1640          Pain Scale: FACES Pre/Post-Treatment    Pain: FACES Scale, Pretreatment 2-->hurts little bit  -RF     Posttreatment Pain Rating 4-->hurts little more  -RF     Row Name 22 1640          Bed Mobility    Supine-Sit [place] (Bed Mobility) supervision; modified independence  -RF     Sit-Supine [place] (Bed Mobility) supervision; modified independence  -RF     Assistive  Device (Bed Mobility) bed rails  -RF     Row Name 02/11/22 1640          Transfer Assessment/Treatment    Transfers car transfer  -RF     Row Name 02/11/22 1640          Transfers    Bed-Chair [place] (Transfers) supervision; modified independence  -RF     Chair-Bed [place] (Transfers) supervision; modified independence  -RF     Assistive Device (Bed-Chair Transfers) wheelchair; walker, front-wheeled  -RF     Sit-Stand [place] (Transfers) supervision; modified independence  -RF     Stand-Sit [place] (Transfers) supervision; modified independence  -RF     [place] Level (Toilet Transfer) supervision  -RF     Assistive Device (Toilet Transfer) grab bars/safety frame  -RF     Row Name 02/11/22 1640          Chair-Bed Transfer    Assistive Device (Chair-Bed Transfers) wheelchair; walker, front-wheeled  -RF     Row Name 02/11/22 1640          Sit-Stand Transfer    Assistive Device (Sit-Stand Transfers) walker, front-wheeled  -RF     Row Name 02/11/22 1640          Stand-Sit Transfer    Assistive Device (Stand-Sit Transfers) walker, front-wheeled  -RF     Row Name 02/11/22 1640          Toilet Transfer    Type (Toilet Transfer) stand pivot/stand step  -RF     Row Name 02/11/22 1640          Gait/Stairs (Locomotion)    [place] Level (Gait) verbal cues; nonverbal cues (demo/gesture); supervision  -RF     Assistive Device (Gait) walker, front-wheeled  -RF     Distance in Feet (Gait) 160, 80X2 in AM, 160, 100, 60 in PM  -RF     Pattern (Gait) step-through  -RF     Deviations/Abnormal Patterns (Gait) antalgic; bc decreased; gait speed decreased; stride length decreased  -RF     Bilateral Gait Deviations forward flexed posture  -RF     [place] Level (Stairs) minimum assist (75% patient effort); contact guard  -RF     Handrail Location (Stairs) both sides  -RF     Number of Steps (Stairs) 5  -RF     Ascending Technique (Stairs) step-to-step  -RF     Descending Technique (Stairs)  step-to-step  -RF     Stairs, Safety Issues weight-shifting ability decreased  -RF     Stairs, Impairments strength decreased; pain  -RF     Comment (Gait/Stairs) Antalgic pattern continually noted with decreased WB noted on R; pattern worsens with fatigue.  -RF     Row Name 02/11/22 1640          Safety Issues, Functional Mobility    Impairments Affecting Function (Mobility) balance; endurance/activity tolerance; pain; range of motion (ROM); strength  -RF     Row Name 02/11/22 1640          Balance    Balance Interventions other (see comments)  weave cones with RW X 2 trials  -RF     Comment, Balance No LOB noted.  -RF     Row Name 02/11/22 1640          Hip (Therapeutic Exercise)    Hip Strengthening (Therapeutic Exercise) bilateral; flexion; extension; aBduction; aDduction; heel slides; marching while seated; sitting; resistance band; red; 2 sets; 10 repetitions; 1 lb free weight  -RF     Row Name 02/11/22 1640          Knee (Therapeutic Exercise)    Knee Strengthening (Therapeutic Exercise) bilateral; flexion; extension; marching while seated; LAQ (long arc quad); hamstring curls; sitting; resistance band; red; 2 sets; 10 repetitions; 1 lb free weight  -RF     Row Name 02/11/22 1640          Ankle (Therapeutic Exercise)    Ankle Strengthening (Therapeutic Exercise) bilateral; dorsiflexion; plantarflexion; sitting; 2 sets; 10 repetitions; 1 lb free weight  -RF     Row Name 02/11/22 1640          IRF PT Goals    Bed Mobility Goal Selection (PT-IRF) bed mobility, PT goal 1  -RF     Transfer Goal Selection (PT-IRF) transfers, PT goal 1  -RF     Gait (Walking Locomotion) Goal Selection (PT-IRF) gait, PT goal 1  -RF     Row Name 02/11/22 1640          Bed Mobility Goal 1 (PT-IRF)    Activity/Assistive Device (Bed Mobility Goal 1, PT-IRF) sit to supine/supine to sit  -RF     [place] Level (Bed Mobility Goal 1, PT-IRF) independent  -RF     Time Frame (Bed Mobility Goal 1, PT-IRF) by discharge  -RF      Progress/Outcomes (Bed Mobility Goal 1, PT-IRF) goal ongoing  -RF     Row Name 02/11/22 1640          Transfer Goal 1 (PT-IRF)    Activity/Assistive Device (Transfer Goal 1, PT-IRF) sit-to-stand/stand-to-sit; bed-to-chair/chair-to-bed  -RF     [place] Level (Transfer Goal 1, PT-IRF) modified independence  -RF     Time Frame (Transfer Goal 1, PT-IRF) by discharge  -RF     Progress/Outcomes (Transfer Goal 1, PT-IRF) goal ongoing  -RF     Row Name 02/11/22 1640          Gait/Walking Locomotion Goal 1 (PT-IRF)    Activity/Assistive Device (Gait/Walking Locomotion Goal 1, PT-IRF) walker, rolling  -RF     Gait/Walking Locomotion Distance Goal 1 (PT-IRF) 300'  -RF     [place] Level (Gait/Walking Locomotion Goal 1, PT-IRF) supervision required  -RF     Time Frame (Gait/Walking Locomotion Goal 1, PT-IRF) by discharge  -RF     Progress/Outcomes (Gait/Walking Locomotion Goal 1, PT-IRF) goal ongoing  -RF     Row Name 02/11/22 1640          Positioning and Restraints    Pre-Treatment Position sitting in chair/recliner  BID  -RF     In Bed supine; call light within reach; encouraged to call for assist  PM  -RF     In Wheelchair sitting; call light within reach; encouraged to call for assist  AM  -RF     Row Name 02/11/22 1640          Therapy Assessment/Plan (PT)    Patient's Goals For Discharge return home; take care of myself at home  -RF     Row Name 02/11/22 1640          Therapy Assessment/Plan (PT)    Rehab Potential/Prognosis (PT) adequate, monitor progress closely  -RF     Frequency of Treatment (PT) 5 times per week  -RF     Estimated Duration of Therapy (PT) 2 weeks  -RF     Problem List (PT) balance; mobility; range of motion (ROM); strength; pain  -RF     Activity Limitations Related to Problem List (PT) unable to ambulate safely; unable to transfer safely  -RF     Row Name 02/11/22 1640          Daily Progress Summary (PT)    Functional Goal Overall Progress (PT) progressing toward functional goals as  expected  -RF     Daily Progress Summary (PT) Improvements noted in functional mobility and ambulation quality at this time, however strength deficits and pain continue to hinder progress. Continued skilled care required to ensure maximum safe function upon D/C.  -RF     Impairments Still Limiting Function (PT) strength decreased; impaired functional activity tolerance; pain  -RF     Recommendations (PT) Continue per current POC  -RF     Row Name 02/11/22 1640          Therapy Plan Review/Discharge Plan (PT)    Anticipated Equipment Needs at Discharge (PT Eval) --  tbd  -RF     Expected Discharge Disposition (PT Eval) home with home health care  -RF           User Key  (r) = Recorded By, (t) = Taken By, (c) = Cosigned By    Initials Name Provider Type    RF Julianna Ann PTA Physical Therapy Assistant                 Physical Therapy Education                 Title: PT OT SLP Therapies (Done)     Topic: Physical Therapy (Done)     Point: Mobility training (Done)     Learning Progress Summary           Patient Acceptance, E,TB, VU by RF at 2/11/2022 1650      Show all documentation for this point (12)                 Point: Home exercise program (Done)     Learning Progress Summary           Patient Acceptance, E,TB, VU by RF at 2/11/2022 1650      Show all documentation for this point (12)                 Point: Body mechanics (Done)     Learning Progress Summary           Patient Acceptance, E,TB, VU by RF at 2/11/2022 1650      Show all documentation for this point (12)                 Point: Precautions (Done)     Learning Progress Summary           Patient Acceptance, E,TB, VU by RF at 2/11/2022 1650      Show all documentation for this point (12)                             User Key     Initials Effective Dates Name Provider Type Discipline     06/16/21 -  Julianna Ann PTA Physical Therapy Assistant PT                PT Recommendation and Plan    Frequency of Treatment (PT): 5 times per  week  Anticipated Equipment Needs at Discharge (PT Eval):  (tbd)  Daily Progress Summary (PT)  Functional Goal Overall Progress (PT): progressing toward functional goals as expected  Daily Progress Summary (PT): Improvements noted in functional mobility and ambulation quality at this time, however strength deficits and pain continue to hinder progress. Continued skilled care required to ensure maximum safe function upon D/C.  Impairments Still Limiting Function (PT): strength decreased, impaired functional activity tolerance, pain  Recommendations (PT): Continue per current POC               Time Calculation:      PT Charges     Row Name 02/11/22 1651 02/11/22 1650          Time Calculation    Start Time 1330  -RF 0915  -RF     Stop Time 1415  -RF 1000  -RF     Time Calculation (min) 45 min  -RF 45 min  -RF     PT Received On 02/11/22  -RF 02/11/22  -RF     PT - Next Appointment 02/14/22  -RF 02/11/22  -RF     PT Goal Re-Cert Due Date 02/16/22  -RF 02/16/22  -RF            Time Calculation- PT    Total Timed Code Minutes- PT 45 minute(s)  -RF 45 minute(s)  -RF           User Key  (r) = Recorded By, (t) = Taken By, (c) = Cosigned By    Initials Name Provider Type    RF Julianna Ann, JESSICA Physical Therapy Assistant                Therapy Charges for Today     Code Description Service Date Service Provider Modifiers Qty    69915504990 HC GAIT TRAINING EA 15 MIN 2/10/2022 Julianna Ann PTA GP, CQ 1    69661821696 HC PT NEUROMUSC RE EDUCATION EA 15 MIN 2/10/2022 Julianna Ann PTA GP, CQ 3    70963276931 HC PT THER PROC EA 15 MIN 2/10/2022 Julianna Ann, PTA GP, CQ 4    41548971754 HC PT THERAPEUTIC ACT EA 15 MIN 2/10/2022 Julianna Ann, PTA GP, CQ 1    29900327551 HC GAIT TRAINING EA 15 MIN 2/11/2022 Julianna Ann, PTA GP 2    99525256257 HC PT NEUROMUSC RE EDUCATION EA 15 MIN 2/11/2022 Julianna Ann, PTA GP 1    92265342172 HC PT THER PROC EA 15 MIN 2/11/2022 Julianna Ann, PTA GP 1     [id number]  PT THERAPEUTIC ACT EA 15 MIN 2/11/2022 Julianna Ann, PTA GP 2                   Julianna Ann, JESSICA  2/11/2022

## 2022-02-12 LAB
GLUCOSE BLDC GLUCOMTR-MCNC: 145 MG/DL (ref 70–130)
GLUCOSE BLDC GLUCOMTR-MCNC: 147 MG/DL (ref 70–130)
GLUCOSE BLDC GLUCOMTR-MCNC: 173 MG/DL (ref 70–130)
GLUCOSE BLDC GLUCOMTR-MCNC: 197 MG/DL (ref 70–130)

## 2022-02-12 PROCEDURE — 25010000002 ENOXAPARIN PER 10 MG: Performed by: INTERNAL MEDICINE

## 2022-02-12 PROCEDURE — 63710000001 INSULIN ASPART PER 5 UNITS: Performed by: INTERNAL MEDICINE

## 2022-02-12 PROCEDURE — 63710000001 DIPHENHYDRAMINE PER 50 MG: Performed by: INTERNAL MEDICINE

## 2022-02-12 PROCEDURE — 63710000001 INSULIN DETEMIR PER 5 UNITS: Performed by: INTERNAL MEDICINE

## 2022-02-12 PROCEDURE — 82962 GLUCOSE BLOOD TEST: CPT

## 2022-02-12 RX ADMIN — NYSTATIN: 100000 POWDER TOPICAL at 08:42

## 2022-02-12 RX ADMIN — INSULIN ASPART 2 UNITS: 100 INJECTION, SOLUTION INTRAVENOUS; SUBCUTANEOUS at 17:01

## 2022-02-12 RX ADMIN — GUAIFENESIN 1200 MG: 600 TABLET, EXTENDED RELEASE ORAL at 21:36

## 2022-02-12 RX ADMIN — Medication 5000 UNITS: at 08:41

## 2022-02-12 RX ADMIN — OXYCODONE HYDROCHLORIDE AND ACETAMINOPHEN 500 MG: 500 TABLET ORAL at 08:42

## 2022-02-12 RX ADMIN — Medication 1 TABLET: at 08:41

## 2022-02-12 RX ADMIN — CLOPIDOGREL BISULFATE 75 MG: 75 TABLET, FILM COATED ORAL at 08:42

## 2022-02-12 RX ADMIN — OXYCODONE 5 MG: 5 TABLET ORAL at 21:37

## 2022-02-12 RX ADMIN — ROSUVASTATIN CALCIUM 40 MG: 20 TABLET, FILM COATED ORAL at 21:36

## 2022-02-12 RX ADMIN — ENOXAPARIN SODIUM 40 MG: 40 INJECTION SUBCUTANEOUS at 21:36

## 2022-02-12 RX ADMIN — ASPIRIN 81 MG: 81 TABLET, CHEWABLE ORAL at 08:42

## 2022-02-12 RX ADMIN — GUAIFENESIN 1200 MG: 600 TABLET, EXTENDED RELEASE ORAL at 08:41

## 2022-02-12 RX ADMIN — ISOSORBIDE MONONITRATE 30 MG: 30 TABLET, EXTENDED RELEASE ORAL at 08:41

## 2022-02-12 RX ADMIN — CETIRIZINE HYDROCHLORIDE 10 MG: 10 TABLET, FILM COATED ORAL at 08:42

## 2022-02-12 RX ADMIN — LISINOPRIL 2.5 MG: 2.5 TABLET ORAL at 08:41

## 2022-02-12 RX ADMIN — DIPHENHYDRAMINE HYDROCHLORIDE 25 MG: 25 CAPSULE ORAL at 21:36

## 2022-02-12 RX ADMIN — INSULIN DETEMIR 12 UNITS: 100 INJECTION, SOLUTION SUBCUTANEOUS at 21:37

## 2022-02-12 RX ADMIN — NYSTATIN: 100000 POWDER TOPICAL at 21:37

## 2022-02-12 NOTE — PLAN OF CARE
Goal Outcome Evaluation:   Pt progressing physically and medically with therapies; continue current POC.

## 2022-02-13 LAB
GLUCOSE BLDC GLUCOMTR-MCNC: 144 MG/DL (ref 70–130)
GLUCOSE BLDC GLUCOMTR-MCNC: 211 MG/DL (ref 70–130)
GLUCOSE BLDC GLUCOMTR-MCNC: 212 MG/DL (ref 70–130)
GLUCOSE BLDC GLUCOMTR-MCNC: 213 MG/DL (ref 70–130)

## 2022-02-13 PROCEDURE — 63710000001 INSULIN ASPART PER 5 UNITS: Performed by: INTERNAL MEDICINE

## 2022-02-13 PROCEDURE — 63710000001 INSULIN DETEMIR PER 5 UNITS: Performed by: INTERNAL MEDICINE

## 2022-02-13 PROCEDURE — 82962 GLUCOSE BLOOD TEST: CPT

## 2022-02-13 PROCEDURE — 25010000002 ENOXAPARIN PER 10 MG: Performed by: INTERNAL MEDICINE

## 2022-02-13 PROCEDURE — 63710000001 DIPHENHYDRAMINE PER 50 MG: Performed by: INTERNAL MEDICINE

## 2022-02-13 RX ADMIN — ACETAMINOPHEN 650 MG: 325 TABLET ORAL at 21:09

## 2022-02-13 RX ADMIN — OXYCODONE 5 MG: 5 TABLET ORAL at 09:14

## 2022-02-13 RX ADMIN — INSULIN DETEMIR 12 UNITS: 100 INJECTION, SOLUTION SUBCUTANEOUS at 21:10

## 2022-02-13 RX ADMIN — ENOXAPARIN SODIUM 40 MG: 40 INJECTION SUBCUTANEOUS at 21:10

## 2022-02-13 RX ADMIN — NYSTATIN: 100000 POWDER TOPICAL at 21:10

## 2022-02-13 RX ADMIN — OXYCODONE HYDROCHLORIDE AND ACETAMINOPHEN 500 MG: 500 TABLET ORAL at 09:04

## 2022-02-13 RX ADMIN — DIPHENHYDRAMINE HYDROCHLORIDE 25 MG: 25 CAPSULE ORAL at 21:09

## 2022-02-13 RX ADMIN — Medication 5000 UNITS: at 09:05

## 2022-02-13 RX ADMIN — Medication 1 TABLET: at 09:05

## 2022-02-13 RX ADMIN — INSULIN ASPART 3 UNITS: 100 INJECTION, SOLUTION INTRAVENOUS; SUBCUTANEOUS at 12:29

## 2022-02-13 RX ADMIN — CETIRIZINE HYDROCHLORIDE 10 MG: 10 TABLET, FILM COATED ORAL at 09:05

## 2022-02-13 RX ADMIN — LISINOPRIL 2.5 MG: 2.5 TABLET ORAL at 09:05

## 2022-02-13 RX ADMIN — INSULIN ASPART 3 UNITS: 100 INJECTION, SOLUTION INTRAVENOUS; SUBCUTANEOUS at 17:32

## 2022-02-13 RX ADMIN — GUAIFENESIN 1200 MG: 600 TABLET, EXTENDED RELEASE ORAL at 09:05

## 2022-02-13 RX ADMIN — ASPIRIN 81 MG: 81 TABLET, CHEWABLE ORAL at 09:04

## 2022-02-13 RX ADMIN — CLOPIDOGREL BISULFATE 75 MG: 75 TABLET, FILM COATED ORAL at 09:04

## 2022-02-13 RX ADMIN — GUAIFENESIN 1200 MG: 600 TABLET, EXTENDED RELEASE ORAL at 21:09

## 2022-02-13 RX ADMIN — ROSUVASTATIN CALCIUM 40 MG: 20 TABLET, FILM COATED ORAL at 21:09

## 2022-02-13 RX ADMIN — OXYCODONE 5 MG: 5 TABLET ORAL at 17:42

## 2022-02-13 RX ADMIN — ISOSORBIDE MONONITRATE 30 MG: 30 TABLET, EXTENDED RELEASE ORAL at 09:05

## 2022-02-13 RX ADMIN — NYSTATIN: 100000 POWDER TOPICAL at 09:05

## 2022-02-13 NOTE — PLAN OF CARE
Goal Outcome Evaluation:            Pt is progressing medically and physically with all therapies, continue with current medications.

## 2022-02-14 LAB
GLUCOSE BLDC GLUCOMTR-MCNC: 156 MG/DL (ref 70–130)
GLUCOSE BLDC GLUCOMTR-MCNC: 260 MG/DL (ref 70–130)
GLUCOSE BLDC GLUCOMTR-MCNC: 278 MG/DL (ref 70–130)

## 2022-02-14 PROCEDURE — 82962 GLUCOSE BLOOD TEST: CPT

## 2022-02-14 PROCEDURE — 63710000001 INSULIN DETEMIR PER 5 UNITS: Performed by: INTERNAL MEDICINE

## 2022-02-14 PROCEDURE — 63710000001 INSULIN ASPART PER 5 UNITS: Performed by: INTERNAL MEDICINE

## 2022-02-14 PROCEDURE — 97535 SELF CARE MNGMENT TRAINING: CPT | Performed by: OCCUPATIONAL THERAPIST

## 2022-02-14 PROCEDURE — 97110 THERAPEUTIC EXERCISES: CPT

## 2022-02-14 PROCEDURE — 97116 GAIT TRAINING THERAPY: CPT

## 2022-02-14 PROCEDURE — 97530 THERAPEUTIC ACTIVITIES: CPT | Performed by: OCCUPATIONAL THERAPIST

## 2022-02-14 PROCEDURE — 97112 NEUROMUSCULAR REEDUCATION: CPT

## 2022-02-14 PROCEDURE — 25010000002 ENOXAPARIN PER 10 MG: Performed by: INTERNAL MEDICINE

## 2022-02-14 PROCEDURE — 97110 THERAPEUTIC EXERCISES: CPT | Performed by: OCCUPATIONAL THERAPIST

## 2022-02-14 RX ADMIN — GUAIFENESIN 1200 MG: 600 TABLET, EXTENDED RELEASE ORAL at 08:42

## 2022-02-14 RX ADMIN — INSULIN DETEMIR 12 UNITS: 100 INJECTION, SOLUTION SUBCUTANEOUS at 21:45

## 2022-02-14 RX ADMIN — ENOXAPARIN SODIUM 40 MG: 40 INJECTION SUBCUTANEOUS at 20:22

## 2022-02-14 RX ADMIN — ISOSORBIDE MONONITRATE 30 MG: 30 TABLET, EXTENDED RELEASE ORAL at 08:42

## 2022-02-14 RX ADMIN — INSULIN ASPART 4 UNITS: 100 INJECTION, SOLUTION INTRAVENOUS; SUBCUTANEOUS at 17:26

## 2022-02-14 RX ADMIN — INSULIN ASPART 2 UNITS: 100 INJECTION, SOLUTION INTRAVENOUS; SUBCUTANEOUS at 07:53

## 2022-02-14 RX ADMIN — INSULIN ASPART 4 UNITS: 100 INJECTION, SOLUTION INTRAVENOUS; SUBCUTANEOUS at 12:39

## 2022-02-14 RX ADMIN — ASPIRIN 81 MG: 81 TABLET, CHEWABLE ORAL at 08:42

## 2022-02-14 RX ADMIN — Medication 5000 UNITS: at 08:42

## 2022-02-14 RX ADMIN — CETIRIZINE HYDROCHLORIDE 10 MG: 10 TABLET, FILM COATED ORAL at 08:42

## 2022-02-14 RX ADMIN — Medication 1 TABLET: at 08:42

## 2022-02-14 RX ADMIN — GUAIFENESIN 1200 MG: 600 TABLET, EXTENDED RELEASE ORAL at 20:22

## 2022-02-14 RX ADMIN — ROSUVASTATIN CALCIUM 40 MG: 20 TABLET, FILM COATED ORAL at 20:22

## 2022-02-14 RX ADMIN — CLOPIDOGREL BISULFATE 75 MG: 75 TABLET, FILM COATED ORAL at 08:42

## 2022-02-14 RX ADMIN — NYSTATIN: 100000 POWDER TOPICAL at 20:22

## 2022-02-14 RX ADMIN — OXYCODONE 5 MG: 5 TABLET ORAL at 08:51

## 2022-02-14 RX ADMIN — LISINOPRIL 2.5 MG: 2.5 TABLET ORAL at 08:42

## 2022-02-14 RX ADMIN — OXYCODONE 5 MG: 5 TABLET ORAL at 20:22

## 2022-02-14 RX ADMIN — NYSTATIN: 100000 POWDER TOPICAL at 08:43

## 2022-02-14 RX ADMIN — OXYCODONE HYDROCHLORIDE AND ACETAMINOPHEN 500 MG: 500 TABLET ORAL at 08:42

## 2022-02-14 NOTE — THERAPY TREATMENT NOTE
Inpatient Rehabilitation - Physical Therapy Treatment Note       TONI Wild     Patient Name: Lisa Velazquez  : 1965  MRN: [id number]    Today's Date: 2022                    Admit Date: 2022      Visit Dx:     ICD-10-CM ICD-9-CM   1. S/P right hip fracture  Z87.81 V15.51       Patient Active Problem List   Diagnosis   • S/P right hip fracture       Past Medical History:   Diagnosis Date   • Coronary artery disease    • Diabetes mellitus (HCC)    • Elevated cholesterol    • GERD (gastroesophageal reflux disease)    • History of transfusion    • Hypertension        Past Surgical History:   Procedure Laterality Date   • ABDOMINAL SURGERY      gallbladder removal   • CARDIAC CATHETERIZATION     • CARDIAC SURGERY      stent   • COLONOSCOPY     • FRACTURE SURGERY     • TOE AMPUTATION Right     3rd toe right foot       PT ASSESSMENT (last 12 hours)     IRF PT Evaluation and Treatment     Row Name 22 1631          PT Time and Intention    Document Type daily treatment  -RF     Mode of Treatment individual therapy; physical therapy  -RF     Patient/Family/Caregiver Comments/Observations Pt c/o R hip and calf pain with increased activity. Pt reports feeling weak in hip and ankles this date.  -RF     Row Name 22 1631          General Information    Patient Profile Reviewed yes  -RF     Existing Precautions/Restrictions fall  -RF     Row Name 22 1631          Cognition/Psychosocial    Affect/Mental Status (Cognitive) WFL  -RF     Follows Commands (Cognition) verbal cues/prompting required  -RF     Row Name 22 1631          Pain Scale: FACES Pre/Post-Treatment    Pain: FACES Scale, Pretreatment 2-->hurts little bit  -RF     Posttreatment Pain Rating 4-->hurts little more  -RF     Row Name 22 1631          Bed Mobility    Supine-Sit [place] (Bed Mobility) modified independence  -RF     Sit-Supine [place] (Bed Mobility) modified independence  -RF     Assistive Device (Bed  Mobility) bed rails  -RF     Row Name 02/14/22 1631          Transfer Assessment/Treatment    Transfers car transfer  -RF     Row Name 02/14/22 1631          Transfers    Bed-Chair [place] (Transfers) supervision; modified independence  -RF     Chair-Bed [place] (Transfers) supervision; modified independence  -RF     Assistive Device (Bed-Chair Transfers) wheelchair; walker, front-wheeled  -RF     Sit-Stand [place] (Transfers) supervision; modified independence  -RF     Stand-Sit [place] (Transfers) supervision; modified independence  -RF     [place] Level (Toilet Transfer) supervision  -RF     Assistive Device (Toilet Transfer) grab bars/safety frame  -RF     Row Name 02/14/22 1631          Chair-Bed Transfer    Assistive Device (Chair-Bed Transfers) wheelchair; walker, front-wheeled  -RF     Row Name 02/14/22 1631          Sit-Stand Transfer    Assistive Device (Sit-Stand Transfers) walker, front-wheeled  -RF     Row Name 02/14/22 1631          Stand-Sit Transfer    Assistive Device (Stand-Sit Transfers) walker, front-wheeled  -RF     Row Name 02/14/22 1631          Toilet Transfer    Type (Toilet Transfer) stand pivot/stand step  -RF     Row Name 02/14/22 1631          Gait/Stairs (Locomotion)    [place] Level (Gait) verbal cues; nonverbal cues (demo/gesture); supervision  -RF     Assistive Device (Gait) walker, front-wheeled  -RF     Distance in Feet (Gait) 320 in AM, 180, 140 in PM  -RF     Pattern (Gait) step-through  -RF     Deviations/Abnormal Patterns (Gait) antalgic; bc decreased; gait speed decreased; stride length decreased  -RF     Bilateral Gait Deviations forward flexed posture  -RF     Comment (Gait/Stairs) Antaglic pattern continually noted; pt demonstrated decreased WB and stance time on RLE. Heavy reliance noted on UEs.  -RF     Row Name 02/14/22 1631          Safety Issues, Functional Mobility    Impairments Affecting Function (Mobility) balance;  endurance/activity tolerance; pain; range of motion (ROM); strength  -RF     Row Name 02/14/22 1631          Balance    Static Standing Balance mild impairment; unsupported; standing; other (see comments)  unilateral stance, foam bal with FT/WBOS  -RF     Dynamic Standing Balance mild impairment; unsupported; standing; asymmetrical weight shifting; other (see comments)  W/S, squats,  -RF     Comment, Balance No significant LOB noted  -RF     Row Name 02/14/22 1631          Hip (Therapeutic Exercise)    Hip Strengthening (Therapeutic Exercise) bilateral; flexion; extension; aBduction; aDduction; heel slides; marching while seated; marching while standing; sitting; standing; 1 lb free weight; resistance band; red; 2 sets; 10 repetitions  -RF     Row Name 02/14/22 1631          Knee (Therapeutic Exercise)    Knee Strengthening (Therapeutic Exercise) bilateral; flexion; extension; marching while seated; LAQ (long arc quad); hamstring curls; sitting; standing; 1 lb free weight; resistance band; red; 2 sets; 10 repetitions  -RF     Row Name 02/14/22 1631          Ankle (Therapeutic Exercise)    Ankle Strengthening (Therapeutic Exercise) bilateral; dorsiflexion; plantarflexion; sitting; standing; 2 sets; 10 repetitions  -RF     Row Name 02/14/22 1631          IRF PT Goals    Bed Mobility Goal Selection (PT-IRF) bed mobility, PT goal 1  -RF     Transfer Goal Selection (PT-IRF) transfers, PT goal 1  -RF     Gait (Walking Locomotion) Goal Selection (PT-IRF) gait, PT goal 1  -RF     Row Name 02/14/22 1631          Bed Mobility Goal 1 (PT-IRF)    Activity/Assistive Device (Bed Mobility Goal 1, PT-IRF) sit to supine/supine to sit  -RF     [place] Level (Bed Mobility Goal 1, PT-IRF) independent  -RF     Time Frame (Bed Mobility Goal 1, PT-IRF) by discharge  -RF     Progress/Outcomes (Bed Mobility Goal 1, PT-IRF) goal ongoing  -RF     Row Name 02/14/22 1631          Transfer Goal 1 (PT-IRF)    Activity/Assistive Device  (Transfer Goal 1, PT-IRF) sit-to-stand/stand-to-sit; bed-to-chair/chair-to-bed  -RF     [place] Level (Transfer Goal 1, PT-IRF) modified independence  -RF     Time Frame (Transfer Goal 1, PT-IRF) by discharge  -RF     Progress/Outcomes (Transfer Goal 1, PT-IRF) goal ongoing  -RF     Row Name 02/14/22 1631          Gait/Walking Locomotion Goal 1 (PT-IRF)    Activity/Assistive Device (Gait/Walking Locomotion Goal 1, PT-IRF) walker, rolling  -RF     Gait/Walking Locomotion Distance Goal 1 (PT-IRF) 300'  -RF     [place] Level (Gait/Walking Locomotion Goal 1, PT-IRF) supervision required  -RF     Time Frame (Gait/Walking Locomotion Goal 1, PT-IRF) by discharge  -RF     Progress/Outcomes (Gait/Walking Locomotion Goal 1, PT-IRF) goal ongoing  -RF     Row Name 02/14/22 1631          Positioning and Restraints    Pre-Treatment Position sitting in chair/recliner  BID  -RF     In Bed supine; call light within reach; encouraged to call for assist  PM  -RF     In Wheelchair sitting; call light within reach; encouraged to call for assist  AM  -RF     Row Name 02/14/22 1631          Therapy Assessment/Plan (PT)    Patient's Goals For Discharge return home; take care of myself at home  -RF     Row Name 02/14/22 1631          Therapy Assessment/Plan (PT)    Rehab Potential/Prognosis (PT) adequate, monitor progress closely  -RF     Frequency of Treatment (PT) 5 times per week  -RF     Estimated Duration of Therapy (PT) 2 weeks  -RF     Problem List (PT) balance; mobility; range of motion (ROM); strength; pain  -RF     Activity Limitations Related to Problem List (PT) unable to ambulate safely; unable to transfer safely  -RF     Row Name 02/14/22 1631          Daily Progress Summary (PT)    Functional Goal Overall Progress (PT) progressing toward functional goals as expected  -RF     Daily Progress Summary (PT) Patient demonstrates fair ambulation quality and good functional mobility this date. LE weakness continues to  hinder patient at this time. Pt requires conitnued skilled care per POC to ensure maximum safe function upon D/C.  -RF     Impairments Still Limiting Function (PT) strength decreased; impaired functional activity tolerance; pain  -RF     Recommendations (PT) Continue per current POC  -RF     Row Name 02/14/22 1631          Therapy Plan Review/Discharge Plan (PT)    Anticipated Equipment Needs at Discharge (PT Eval) --  tbd  -RF     Expected Discharge Disposition (PT Eval) home with home health care  -RF           User Key  (r) = Recorded By, (t) = Taken By, (c) = Cosigned By    Initials Name Provider Type    RF Julianna Ann PTA Physical Therapy Assistant                 Physical Therapy Education                 Title: PT OT SLP Therapies (Done)     Topic: Physical Therapy (Done)     Point: Mobility training (Done)     Learning Progress Summary           Patient Acceptance, E,TB, VU by RF at 2/14/2022 1636      Show all documentation for this point (14)                 Point: Home exercise program (Done)     Learning Progress Summary           Patient Acceptance, E,TB, VU by RF at 2/14/2022 1636      Show all documentation for this point (14)                 Point: Body mechanics (Done)     Learning Progress Summary           Patient Acceptance, E,TB, VU by RF at 2/14/2022 1636      Show all documentation for this point (14)                 Point: Precautions (Done)     Learning Progress Summary           Patient Acceptance, E,TB, VU by RF at 2/14/2022 1636      Show all documentation for this point (14)                             User Key     Initials Effective Dates Name Provider Type Discipline    RF 06/16/21 -  Julianna Ann PTA Physical Therapy Assistant PT                PT Recommendation and Plan    Frequency of Treatment (PT): 5 times per week  Anticipated Equipment Needs at Discharge (PT Eval):  (tbd)  Daily Progress Summary (PT)  Functional Goal Overall Progress (PT): progressing toward  functional goals as expected  Daily Progress Summary (PT): Patient demonstrates fair ambulation quality and good functional mobility this date. LE weakness continues to hinder patient at this time. Pt requires conitnued skilled care per POC to ensure maximum safe function upon D/C.  Impairments Still Limiting Function (PT): strength decreased, impaired functional activity tolerance, pain  Recommendations (PT): Continue per current POC               Time Calculation:      PT Charges     Row Name 02/14/22 1637 02/14/22 1636          Time Calculation    Start Time 1245  -RF 0915  -RF     Stop Time 1330  -RF 1000  -RF     Time Calculation (min) 45 min  -RF 45 min  -RF     PT Received On 02/14/22  -RF 02/14/22  -RF     PT - Next Appointment 02/15/22  -RF 02/14/22  -RF     PT Goal Re-Cert Due Date 02/16/22  -RF 02/16/22  -RF            Time Calculation- PT    Total Timed Code Minutes- PT 45 minute(s)  -RF 45 minute(s)  -RF           User Key  (r) = Recorded By, (t) = Taken By, (c) = Cosigned By    Initials Name Provider Type    RF Julianna Ann, JESSICA Physical Therapy Assistant                Therapy Charges for Today     Code Description Service Date Service Provider Modifiers Qty    21611755084 HC PT THER PROC EA 15 MIN 2/14/2022 Julianna Ann PTA GP, CQ 3    89415049572 HC GAIT TRAINING EA 15 MIN 2/14/2022 Julianan Ann PTA GP, CQ 2    34018546683 HC PT NEUROMUSC RE EDUCATION EA 15 MIN 2/14/2022 Julianna Ann PTA GP, CQ 1                   Julianna Ann PTA  2/14/2022

## 2022-02-14 NOTE — THERAPY TREATMENT NOTE
Inpatient Rehabilitation - Occupational Therapy Treatment Note     Junito     Patient Name: Lisa Velazquez  : 1965  MRN: [id number]    Today's Date: 2022                 Admit Date: 2022         ICD-10-CM ICD-9-CM   1. S/P right hip fracture  Z87.81 V15.51       Patient Active Problem List   Diagnosis   • S/P right hip fracture       Past Medical History:   Diagnosis Date   • Coronary artery disease    • Diabetes mellitus (HCC)    • Elevated cholesterol    • GERD (gastroesophageal reflux disease)    • History of transfusion    • Hypertension        Past Surgical History:   Procedure Laterality Date   • ABDOMINAL SURGERY      gallbladder removal   • CARDIAC CATHETERIZATION     • CARDIAC SURGERY      stent   • COLONOSCOPY     • FRACTURE SURGERY     • TOE AMPUTATION Right     3rd toe right foot             IRF OT ASSESSMENT FLOWSHEET (last 12 hours)     IRF OT Evaluation and Treatment     Row Name 22 1500          OT Time and Intention    Document Type daily treatment  -     Mode of Treatment individual therapy; occupational therapy  -     Patient Effort good  -     Row Name 22 1500          General Information    Patient/Family/Caregiver Comments/Observations patient agreeable to therapy.  -     Existing Precautions/Restrictions fall; hip  -     Row Name 22 1500          Transfers    Bed-Chair [place] (Transfers) minimum assist (75% patient effort)  -[facility] Name 22 1500          Motor Skills    Motor Skills coordination; functional endurance; therapeutic exercise  -     Therapeutic Exercise --  BUE ROM, UE bike,gmc,fmc,functional endurance, reaching  -[facility] Name 22 1500          Lower Body Dressing    [place] Level (Lower Body Dressing) moderate assist (50% patient effort); minimum assist (75% patient effort); don; shoes/slippers  -[facility] Name 22 1500          Self-Feeding    [place] Level (Self-Feeding) set up; modified  [place]  -           User Key  (r) = Recorded By, (t) = Taken By, (c) = Cosigned By    Initials Name Effective Dates     Zack Rukhsana Candie, OT 06/16/21 -                  Occupational Therapy Education                 Title: PT OT SLP Therapies (Done)     Topic: Occupational Therapy (Done)     Point: ADL training (Done)     Description:   Instruct learner(s) on proper safety adaptation and remediation techniques during self care or transfers.   Instruct in proper use of assistive devices.              Learning Progress Summary           Patient Acceptance, E,TB, VU by  at 2/11/2022 2209      Show all documentation for this point (4)                 Point: Home exercise program (Done)     Description:   Instruct learner(s) on appropriate technique for monitoring, assisting and/or progressing therapeutic exercises/activities.              Learning Progress Summary           Patient Acceptance, E,TB, VU by  at 2/11/2022 2209      Show all documentation for this point (4)                 Point: Body mechanics (Done)     Description:   Instruct learner(s) on proper positioning and spine alignment during self-care, functional mobility activities and/or exercises.              Learning Progress Summary           Patient Acceptance, E,TB, VU by  at 2/11/2022 2209      Show all documentation for this point (4)                             User Key     Initials Effective Dates Name Provider Type Discipline     01/19/22 -  Hilda Salinas, Nursing Student Nursing Student Nurse                    OT Recommendation and Plan    Planned Therapy Interventions (OT): activity tolerance training, adaptive equipment training, BADL retraining, patient/caregiver education/training, ROM/therapeutic exercise, strengthening exercise, transfer/mobility retraining                    Time Calculation:      Time Calculation- OT     Row Name 02/14/22 1505             Time Calculation- OT    OT Start Time 0745  -      OT  Stop Time 0915  -      OT Time Calculation (min) 90 min  -            User Key  (r) = Recorded By, (t) = Taken By, (c) = Cosigned By    Initials Name Provider Type     Rukhsana Dixon OT Occupational Therapist              Therapy Charges for Today     Code Description Service Date Service Provider Modifiers Qty    11305039880  OT THERAPEUTIC ACT EA 15 MIN 2/14/2022 Rukhsana Dixon OT GO 2    26419971236  OT SELF CARE/MGMT/TRAIN EA 15 MIN 2/14/2022 Rukhsana Dixon, OT GO 1    [id number]  OT THER PROC EA 15 MIN 2/14/2022 Rukhsana Dixon OT GO 3                   Rukhsana Dixon OT  2/14/2022

## 2022-02-14 NOTE — SIGNIFICANT NOTE
02/14/22 1030   Plan   Plan MD says pt is requesting to stay longer in rehab due to living alone.  Spoke to PTA who recommends extending pt another week.  SS reviewed this information with pt who verified having  as primary insurance [facility].  Pt says her son can bring her  card so a copy can be made.  Pt plans to return to her home at discharge and says her sister can stay with her evenings/nights.  Son can check on pt during the day; he works full-time.   aware of recommendation to extend discharge date another which would be 2-23-22 and insurance should approve continued rehab stay.  Contacted [facility] in Admitting [phone] who will correct pt's insurance to  instead of [facility].  Team conference will be held in am.   Patient/Family in Agreement with Plan yes

## 2022-02-14 NOTE — PROGRESS NOTES
[facility]  PROGRESS NOTE     Patient Identification:  Name:  Lisa Velazquez  Age:  56 y.o.  Sex:  female  :  1965  MRN:  [id number]  Visit Number:  [id number]  ROOM: [facility]     Primary Care Provider:  Patricia Skelton APRN    Length of stay in inpatient status:  13    Subjective     Chief Compliant: Status post right hip fracture    History of Presenting Illness: 56-year-old female who is status post right hip fracture with intramedullary nail placement, history of acute blood loss anemia, diabetes mellitus, peripheral vascular disease, hypertension hyperlipidemia.      Participated with physical and occupational therapy on 2022: Performs bed mobility with supervision to modified independence; performs transfer activities with supervision to modified independent; utilizes wheelchair/front wheeled walker for chair to bed, sit to stand/stand to sit transfer; ambulated 160 feet, 80 feet x 2, 160 feet, 100 feet, 60 feet with minimal assist to contact-guard in front wheel walker; climbed 5 stairs with minimal assistance and handrails on both sides; participated in functional endurance; coordination; therapeutic exercise; required minimal to moderate assist with lower body dressing    Objective     Current Hospital Meds:ascorbic acid, 500 mg, Oral, Daily  aspirin, 81 mg, Oral, Daily  cetirizine, 10 mg, Oral, Daily  clopidogrel, 75 mg, Oral, Daily  enoxaparin, 40 mg, Subcutaneous, Nightly  guaiFENesin, 1,200 mg, Oral, Q12H  insulin aspart, 0-7 Units, Subcutaneous, TID AC  insulin detemir, 12 Units, Subcutaneous, Nightly  isosorbide mononitrate, 30 mg, Oral, Q24H  lisinopril, 2.5 mg, Oral, Q24H  multivitamin with minerals, 1 tablet, Oral, Daily  nystatin, , Topical, Q12H  rosuvastatin, 40 mg, Oral, Nightly  vitamin D3, 5,000 Units, Oral, Daily       ----------------------------------------------------------------------------------------------------------------------  Vital Signs:  Temp:  [97.6  °F (36.4 °C)-97.7 °F (36.5 °C)] 97.7 °F (36.5 °C)  Heart Rate:  [80-89] 87  Resp:  [16] 16  BP: ()/(63-70) 119/67  SpO2:  [98 %] 98 %  on   ;   Device (Oxygen Therapy): room air  Body mass index is 25.22 kg/m².    Wt Readings from Last 3 Encounters:   02/01/22 73 kg (161 lb)     Intake & Output (last 3 days)       02/11 0701 02/12 0700 02/12 0701 02/13 0700 02/13 0701 02/14 0700 02/14 0701  02/15 0700    P.O. 8965 232 1277     Total Intake(mL/kg) 1320 (18.1) 880 (12.1) 1500 (20.5)     Net +1320 +880 +1500             Urine Unmeasured Occurrence 4 x 3 x 6 x     Stool Unmeasured Occurrence 2 x           Diet Regular; Consistent Carbohydrate  ----------------------------------------------------------------------------------------------------------------------  Physical exam:  Constitutional:   No acute distress, unable to leave Wednesday and is continuing to need more therapy  HEENT: Normocephalic atraumatic  Neck: Supple   Cardiovascular: Regular rate and rhythm  Pulmonary/Chest: Clear to auscultation  Abdominal: Positive bowel sounds soft.   Musculoskeletal: Status post hip repair, wound ok  Neurological: No focal deficits  Skin: No rash     ----------------------------------------------------------------------------------------------------------------------    Last echocardiogram:    ----------------------------------------------------------------------------------------------------------------------  Results from last 7 days   Lab Units 02/08/22  1459   WBC 10*3/mm3 6.92   HEMOGLOBIN g/dL 11.1*   HEMATOCRIT % 34.7   MCV fL 91.8   MCHC g/dL 32.0   PLATELETS 10*3/mm3 203               Invalid input(s): PROTEstimated Creatinine Clearance: 114.9 mL/min (by C-G formula based on SCr of 0.63 mg/dL).  No results found for: AMMONIA              Glucose   Date/Time Value Ref Range Status   02/14/2022 0642 156 (H) 70 - 130 mg/dL Final     Comment:     Meter: [id number] : [id number] Sivakumar BERGER   02/13/2022  2021 212 (H) 70 - 130 mg/dL Final     Comment:     Meter: [id number] : [id number] Sivakumar Martinez C   02/13/2022 1612 211 (H) 70 - 130 mg/dL Final     Comment:     Meter: [id number] : [id number] Bryon Miranda   02/13/2022 1104 213 (H) 70 - 130 mg/dL Final     Comment:     Meter: [id number] : [id number] Bryon Miranda   02/13/2022 0611 144 (H) 70 - 130 mg/dL Final     Comment:     Meter: [id number] : [id number] Shabbir Mesha   02/12/2022 2007 197 (H) 70 - 130 mg/dL Final     Comment:     Meter: [id number] : [id number] Sivakumar BERGER   02/12/2022 1604 173 (H) 70 - 130 mg/dL Final     Comment:     Meter: [id number] : [id number] MARIAM MUSE   02/12/2022 1127 147 (H) 70 - 130 mg/dL Final     Comment:     Meter: [id number] : [id number] crystal antonina     No results found for: TSH, FREET4  No results found for: PREGTESTUR, PREGSERUM, HCG, HCGQUANT  Pain Management Panel    There is no flowsheet data to display.       Brief Urine Lab Results     None        No results found for: BLOODCX      No results found for: URINECX  No results found for: WOUNDCX  No results found for: STOOLCX        I have personally looked at the labs and they are summarized above.  ----------------------------------------------------------------------------------------------------------------------  Detailed radiology reports for the last 24 hours:    Imaging Results (Last 24 Hours)     ** No results found for the last 24 hours. **        Final impressions for the last 30 days of radiology reports:    No radiology results for the last 30 days.  I have personally looked at the radiology images and read the final radiology report.    Assessment & Plan    Status post right intertrochanteric hip fracture with intramedullary nail placement--Participated with physical and occupational therapy on 2/11/2022: Performs bed mobility with supervision to modified independence; performs transfer activities with supervision to  modified independent; utilizes wheelchair/front wheeled walker for chair to bed, sit to stand/stand to sit transfer; ambulated 160 feet, 80 feet x 2, 160 feet, 100 feet, 60 feet with minimal assist to contact-guard in front wheel walker; climbed 5 stairs with minimal assistance and handrails on both sides; participated in functional endurance; coordination; therapeutic exercise; required minimal to moderate assist with lower body dressing    Recent acute blood loss anemia--stable    Peripheral vascular disease continue medical management    Hypertension controlled    hyperlipidemia continue statin therapy    Gastric erosions--patient was treated with full course of Protonix and treatment for H. pylori prior to coming here.        VTE Prophylaxis:   Mechanical Order History:     None      Pharmalogical Order History:      Ordered     Dose Route Frequency Stop    02/01/22 0286  enoxaparin (LOVENOX) syringe 40 mg         40 mg SC Nightly --              Alejandrina Ann MD  02/14/22  11:53 EST

## 2022-02-14 NOTE — PROGRESS NOTES
Inpatient Rehabilitation Functional Measures Assessment and Plan of Care    Plan of Care  Self Care    [OT] Toileting(Active)  Current Status(02/21/2022): min/cga  Weekly Goal(02/23/2022): set up  Discharge Goal: set up    Functional Measures  KIRAN Eating:  KIRAN Grooming:  KIRAN Bathing:  KIRAN Upper Body Dressing:  KIRAN Lower Body Dressing:  KIRAN Toileting:    KIRAN Bladder Management  Level of Assistance:  Frequency/Number of Accidents this Shift:    KIRAN Bowel Management  Level of Assistance:  Frequency/Number of Accidents this Shift:    KIRAN Bed/Chair/Wheelchair Transfer:  KIRAN Toilet Transfer:  KIRAN Tub/Shower Transfer:    Previously Documented Mode of Locomotion at Discharge:  [facility] Expected Mode of Locomotion at Discharge:  KIRAN Walk/Wheelchair:  KIRAN Stairs:    [facility] Comprehension:  [facility] Expression:  [facility] Social Interaction:  [facility] Problem Solving:  IKRAN Memory:    Therapy Mode Minutes  Occupational Therapy: Individual: 90 minutes.  Physical Therapy:  Speech Language Pathology:    Discharge Functional Goals:    Signed by: Candie Dixon, Occupational Therapist

## 2022-02-14 NOTE — PLAN OF CARE
Goal Outcome Evaluation:  Plan of Care Reviewed With: patient        Progress: improving  Outcome Summary: patient progressing with therapies. continue plan of care.

## 2022-02-15 LAB
GLUCOSE BLDC GLUCOMTR-MCNC: 131 MG/DL (ref 70–130)
GLUCOSE BLDC GLUCOMTR-MCNC: 155 MG/DL (ref 70–130)
GLUCOSE BLDC GLUCOMTR-MCNC: 243 MG/DL (ref 70–130)

## 2022-02-15 PROCEDURE — 97535 SELF CARE MNGMENT TRAINING: CPT | Performed by: OCCUPATIONAL THERAPIST

## 2022-02-15 PROCEDURE — 82962 GLUCOSE BLOOD TEST: CPT

## 2022-02-15 PROCEDURE — 25010000002 ENOXAPARIN PER 10 MG: Performed by: INTERNAL MEDICINE

## 2022-02-15 PROCEDURE — 97530 THERAPEUTIC ACTIVITIES: CPT

## 2022-02-15 PROCEDURE — 63710000001 INSULIN ASPART PER 5 UNITS: Performed by: INTERNAL MEDICINE

## 2022-02-15 PROCEDURE — 97110 THERAPEUTIC EXERCISES: CPT

## 2022-02-15 PROCEDURE — 97110 THERAPEUTIC EXERCISES: CPT | Performed by: OCCUPATIONAL THERAPIST

## 2022-02-15 PROCEDURE — 97116 GAIT TRAINING THERAPY: CPT

## 2022-02-15 PROCEDURE — 63710000001 INSULIN DETEMIR PER 5 UNITS: Performed by: INTERNAL MEDICINE

## 2022-02-15 PROCEDURE — 97530 THERAPEUTIC ACTIVITIES: CPT | Performed by: OCCUPATIONAL THERAPIST

## 2022-02-15 RX ORDER — GABAPENTIN 100 MG/1
100 CAPSULE ORAL NIGHTLY
Status: DISCONTINUED | OUTPATIENT
Start: 2022-02-15 | End: 2022-02-23 | Stop reason: HOSPADM

## 2022-02-15 RX ORDER — BISACODYL 5 MG/1
10 TABLET, DELAYED RELEASE ORAL DAILY PRN
Status: DISCONTINUED | OUTPATIENT
Start: 2022-02-15 | End: 2022-02-23 | Stop reason: HOSPADM

## 2022-02-15 RX ADMIN — OXYCODONE HYDROCHLORIDE AND ACETAMINOPHEN 500 MG: 500 TABLET ORAL at 08:50

## 2022-02-15 RX ADMIN — ROSUVASTATIN CALCIUM 40 MG: 20 TABLET, FILM COATED ORAL at 20:02

## 2022-02-15 RX ADMIN — INSULIN ASPART 2 UNITS: 100 INJECTION, SOLUTION INTRAVENOUS; SUBCUTANEOUS at 08:51

## 2022-02-15 RX ADMIN — LISINOPRIL 2.5 MG: 2.5 TABLET ORAL at 08:50

## 2022-02-15 RX ADMIN — CLOPIDOGREL BISULFATE 75 MG: 75 TABLET, FILM COATED ORAL at 08:50

## 2022-02-15 RX ADMIN — Medication 1 TABLET: at 08:50

## 2022-02-15 RX ADMIN — ISOSORBIDE MONONITRATE 30 MG: 30 TABLET, EXTENDED RELEASE ORAL at 08:50

## 2022-02-15 RX ADMIN — GUAIFENESIN 1200 MG: 600 TABLET, EXTENDED RELEASE ORAL at 20:02

## 2022-02-15 RX ADMIN — Medication 5000 UNITS: at 08:50

## 2022-02-15 RX ADMIN — OXYCODONE 5 MG: 5 TABLET ORAL at 20:02

## 2022-02-15 RX ADMIN — OXYCODONE 5 MG: 5 TABLET ORAL at 08:51

## 2022-02-15 RX ADMIN — ASPIRIN 81 MG: 81 TABLET, CHEWABLE ORAL at 08:50

## 2022-02-15 RX ADMIN — GUAIFENESIN 1200 MG: 600 TABLET, EXTENDED RELEASE ORAL at 08:50

## 2022-02-15 RX ADMIN — INSULIN DETEMIR 12 UNITS: 100 INJECTION, SOLUTION SUBCUTANEOUS at 20:03

## 2022-02-15 RX ADMIN — CETIRIZINE HYDROCHLORIDE 10 MG: 10 TABLET, FILM COATED ORAL at 08:50

## 2022-02-15 RX ADMIN — INSULIN ASPART 3 UNITS: 100 INJECTION, SOLUTION INTRAVENOUS; SUBCUTANEOUS at 12:05

## 2022-02-15 RX ADMIN — GABAPENTIN 100 MG: 100 CAPSULE ORAL at 20:02

## 2022-02-15 RX ADMIN — ENOXAPARIN SODIUM 40 MG: 40 INJECTION SUBCUTANEOUS at 20:02

## 2022-02-15 NOTE — SIGNIFICANT NOTE
02/15/22 0418   Plan   Plan Team conference held today.  Spoke to pt about how she is doing in therapy and extending discharge date to 2-23-22.  Pt will return to her home at discharge with son checking on her during the day and sister will stay with her in the evenings/nights.  Will follow.   Patient/Family in Agreement with Plan yes

## 2022-02-15 NOTE — THERAPY TREATMENT NOTE
Inpatient Rehabilitation - Occupational Therapy Treatment Note     [place]     Patient Name: Lisa Velazquez  : 1965  MRN: [id number]    Today's Date: 2/15/2022                 Admit Date: 2022         ICD-10-CM ICD-9-CM   1. S/P right hip fracture  Z87.81 V15.51       Patient Active Problem List   Diagnosis   • S/P right hip fracture       Past Medical History:   Diagnosis Date   • Coronary artery disease    • Diabetes mellitus (HCC)    • Elevated cholesterol    • GERD (gastroesophageal reflux disease)    • History of transfusion    • Hypertension        Past Surgical History:   Procedure Laterality Date   • ABDOMINAL SURGERY      gallbladder removal   • CARDIAC CATHETERIZATION     • CARDIAC SURGERY      stent   • COLONOSCOPY     • FRACTURE SURGERY     • TOE AMPUTATION Right     3rd toe right foot             IRF OT ASSESSMENT FLOWSHEET (last 12 hours)     IRF OT Evaluation and Treatment     Row Name 02/15/22 1500          OT Time and Intention    Document Type daily treatment  -     Mode of Treatment individual therapy; occupational therapy  -     Patient Effort good  -     Row Name 02/15/22 1500          General Information    Patient/Family/Caregiver Comments/Observations patient agreeable to therapy this am. patient continues to progress with treatment.  -     Existing Precautions/Restrictions fall; hip  -     Row Name 02/15/22 1500          Cognition/Psychosocial    Affect/Mental Status (Cognitive) WFL  -[facility] Name 02/15/22 1500          Transfers    [place] Level (Toilet Transfer) contact guard  -[facility] Name 02/15/22 1500          Toilet Transfer    Type (Toilet Transfer) stand pivot/stand step  -[facility] Name 02/15/22 1500          Motor Skills    Motor Skills coordination; functional endurance; therapeutic exercise  -     Therapeutic Exercise --  UE bike, BUE ROM, gmc,fmc,functional endurance, strength  -[facility] Name 02/15/22 1500          Grooming    [place]  Level (Grooming) set up  -     Row Name 02/15/22 1500          Toileting    [place] Level (Toileting) contact guard assist; minimum assist (75% patient effort)  -           User Key  (r) = Recorded By, (t) = Taken By, (c) = Cosigned By    Initials Name Effective Dates     Rukhsana Dixon, OT 06/16/21 -                  Occupational Therapy Education                 Title: PT OT SLP Therapies (Done)     Topic: Occupational Therapy (Done)     Point: ADL training (Done)     Description:   Instruct learner(s) on proper safety adaptation and remediation techniques during self care or transfers.   Instruct in proper use of assistive devices.              Learning Progress Summary           Patient Acceptance, E,TB, VU by  at 2/11/2022 2209      Show all documentation for this point (4)                 Point: Home exercise program (Done)     Description:   Instruct learner(s) on appropriate technique for monitoring, assisting and/or progressing therapeutic exercises/activities.              Learning Progress Summary           Patient Acceptance, E,TB, VU by  at 2/11/2022 2209      Show all documentation for this point (4)                 Point: Body mechanics (Done)     Description:   Instruct learner(s) on proper positioning and spine alignment during self-care, functional mobility activities and/or exercises.              Learning Progress Summary           Patient Acceptance, E,TB, VU by  at 2/11/2022 2209      Show all documentation for this point (4)                             User Key     Initials Effective Dates Name Provider Type Discipline     01/19/22 -  Hilda Salinas, Nursing Student Nursing Student Nurse                    OT Recommendation and Plan    Planned Therapy Interventions (OT): activity tolerance training, adaptive equipment training, BADL retraining, patient/caregiver education/training, ROM/therapeutic exercise, strengthening exercise, transfer/mobility retraining                     Time Calculation:      Time Calculation- OT     Row Name 02/15/22 1507             Time Calculation- OT    OT Start Time 0745  -      OT Stop Time 0915  -      OT Time Calculation (min) 90 min  -            User Key  (r) = Recorded By, (t) = Taken By, (c) = Cosigned By    Initials Name Provider Type     Rukhsana Dixon OT Occupational Therapist              Therapy Charges for Today     Code Description Service Date Service Provider Modifiers Qty    31258415392 HC OT THERAPEUTIC ACT EA 15 MIN 2/14/2022 Rukhsana Dixon, OT GO 2    25230871782 HC OT SELF CARE/MGMT/TRAIN EA 15 MIN 2/14/2022 Rukhsana Dixon, OT GO 1    13534434662 HC OT THER PROC EA 15 MIN 2/14/2022 Rukhsana Dixon, OT GO 3    01862909114 HC OT THERAPEUTIC ACT EA 15 MIN 2/15/2022 Rukhsana Dixon, OT GO 2    46218752423 HC OT SELF CARE/MGMT/TRAIN EA 15 MIN 2/15/2022 Rukhsana Dixon, OT GO 2    57532637208 HC OT THER PROC EA 15 MIN 2/15/2022 Rukhsana Dixon, OT GO 2                   Rukhsana Dixon OT  2/15/2022

## 2022-02-15 NOTE — PROGRESS NOTES
Occupational Therapy:    Physical Therapy: Individual: 115 minutes.    Speech Language Pathology:    Signed by: Julianna Ann PTA

## 2022-02-15 NOTE — THERAPY TREATMENT NOTE
Inpatient Rehabilitation - Physical Therapy Treatment Note       TONI Wild     Patient Name: Lisa Velazquez  : 1965  MRN: [id number]    Today's Date: 2/15/2022                    Admit Date: 2022      Visit Dx:     ICD-10-CM ICD-9-CM   1. S/P right hip fracture  Z87.81 V15.51       Patient Active Problem List   Diagnosis   • S/P right hip fracture       Past Medical History:   Diagnosis Date   • Coronary artery disease    • Diabetes mellitus (HCC)    • Elevated cholesterol    • GERD (gastroesophageal reflux disease)    • History of transfusion    • Hypertension        Past Surgical History:   Procedure Laterality Date   • ABDOMINAL SURGERY      gallbladder removal   • CARDIAC CATHETERIZATION     • CARDIAC SURGERY      stent   • COLONOSCOPY     • FRACTURE SURGERY     • TOE AMPUTATION Right     3rd toe right foot       PT ASSESSMENT (last 12 hours)     IRF PT Evaluation and Treatment     Row Name 02/15/22 161          PT Time and Intention    Document Type daily treatment  -RF     Mode of Treatment individual therapy; physical therapy  -RF     Patient/Family/Caregiver Comments/Observations PT c/o significant R calf/anterior tib, and R forefoot pain with supine TE; RN and MD aware.  -RF     Row Name 02/15/22 161          General Information    Patient Profile Reviewed yes  -RF     Existing Precautions/Restrictions fall  -RF     Row Name 02/15/22 1612          Cognition/Psychosocial    Affect/Mental Status (Cognitive) WFL  -RF     Follows Commands (Cognition) verbal cues/prompting required  -RF     Row Name 02/15/22 1612          Pain Scale: FACES Pre/Post-Treatment    Pain: FACES Scale, Pretreatment 2-->hurts little bit  -RF     Posttreatment Pain Rating 4-->hurts little more  -RF     Row Name 02/15/22 1612          Bed Mobility    Supine-Sit [place] (Bed Mobility) modified independence  -RF     Sit-Supine [place] (Bed Mobility) modified independence  -RF     Assistive Device (Bed Mobility) bed  rails  -RF     Row Name 02/15/22 1612          Transfer Assessment/Treatment    Transfers car transfer  -RF     Row Name 02/15/22 1612          Transfers    Bed-Chair [place] (Transfers) supervision; modified independence  -RF     Chair-Bed [place] (Transfers) supervision; modified independence  -RF     Assistive Device (Bed-Chair Transfers) wheelchair; walker, front-wheeled  -RF     Sit-Stand [place] (Transfers) supervision; modified independence  -RF     Stand-Sit [place] (Transfers) supervision; modified independence  -RF     Row Name 02/15/22 1612          Chair-Bed Transfer    Assistive Device (Chair-Bed Transfers) wheelchair; walker, front-wheeled  -RF     Row Name 02/15/22 1612          Sit-Stand Transfer    Assistive Device (Sit-Stand Transfers) walker, front-wheeled  -RF     Row Name 02/15/22 1612          Stand-Sit Transfer    Assistive Device (Stand-Sit Transfers) walker, front-wheeled  -RF     Row Name 02/15/22 1612          Gait/Stairs (Locomotion)    [place] Level (Gait) verbal cues; nonverbal cues (demo/gesture); supervision  -RF     Assistive Device (Gait) walker, front-wheeled  -RF     Distance in Feet (Gait) 220, 100 in AM, 160X2 in PM  -RF     Pattern (Gait) step-through  -RF     Deviations/Abnormal Patterns (Gait) antalgic; bc decreased; gait speed decreased; stride length decreased  -RF     Bilateral Gait Deviations forward flexed posture  -RF     Comment (Gait/Stairs) Decreased WB noted on RLE; cues provided for increased HS and stance time. Antalgic pattern worsens with fatigue.  -RF     Row Name 02/15/22 1612          Safety Issues, Functional Mobility    Impairments Affecting Function (Mobility) balance; endurance/activity tolerance; pain; range of motion (ROM); strength  -RF     Row Name 02/15/22 1612          Hip (Therapeutic Exercise)    Hip Strengthening (Therapeutic Exercise) bilateral; flexion; extension; aBduction; aDduction; heel slides; marching while  seated; internal rotation; external rotation; sitting; supine; 1 lb free weight; resistance band; red; 2 sets; 10 repetitions  -RF     Row Name 02/15/22 1612          Knee (Therapeutic Exercise)    Knee Strengthening (Therapeutic Exercise) bilateral; flexion; extension; heel slides; marching while seated; SLR (straight leg raise); SAQ (short arc quad); LAQ (long arc quad); hamstring curls; sitting; supine; 1 lb free weight; resistance band; red; 2 sets; 10 repetitions  -RF     Row Name 02/15/22 1612          Ankle (Therapeutic Exercise)    Ankle Strengthening (Therapeutic Exercise) bilateral; dorsiflexion; plantarflexion; sitting; supine; 2 sets; 10 repetitions  -RF     Row Name 02/15/22 1612          IRF PT Goals    Bed Mobility Goal Selection (PT-IRF) bed mobility, PT goal 1  -RF     Transfer Goal Selection (PT-IRF) transfers, PT goal 1  -RF     Gait (Walking Locomotion) Goal Selection (PT-IRF) gait, PT goal 1  -RF     Row Name 02/15/22 1612          Bed Mobility Goal 1 (PT-IRF)    Activity/Assistive Device (Bed Mobility Goal 1, PT-IRF) sit to supine/supine to sit  -RF     [place] Level (Bed Mobility Goal 1, PT-IRF) independent  -RF     Time Frame (Bed Mobility Goal 1, PT-IRF) by discharge  -RF     Progress/Outcomes (Bed Mobility Goal 1, PT-IRF) goal ongoing  -RF     Row Name 02/15/22 1612          Transfer Goal 1 (PT-IRF)    Activity/Assistive Device (Transfer Goal 1, PT-IRF) sit-to-stand/stand-to-sit; bed-to-chair/chair-to-bed  -RF     [place] Level (Transfer Goal 1, PT-IRF) modified independence  -RF     Time Frame (Transfer Goal 1, PT-IRF) by discharge  -RF     Progress/Outcomes (Transfer Goal 1, PT-IRF) goal ongoing  -RF     Row Name 02/15/22 1612          Gait/Walking Locomotion Goal 1 (PT-IRF)    Activity/Assistive Device (Gait/Walking Locomotion Goal 1, PT-IRF) walker, rolling  -RF     Gait/Walking Locomotion Distance Goal 1 (PT-IRF) 300'  -RF     [place] Level (Gait/Walking Locomotion Goal  "1, PT-IRF) supervision required  -RF     Time Frame (Gait/Walking Locomotion Goal 1, PT-IRF) by discharge  -RF     Progress/Outcomes (Gait/Walking Locomotion Goal 1, PT-IRF) goal ongoing  -RF     Row Name 02/15/22 1612          Positioning and Restraints    Pre-Treatment Position sitting in chair/recliner  BID  -RF     In Bed supine; call light within reach; encouraged to call for assist  PM  -RF     In Wheelchair sitting; call light within reach; encouraged to call for assist  AM  -RF     Row Name 02/15/22 1612          Therapy Assessment/Plan (PT)    Patient's Goals For Discharge return home; take care of myself at home  -RF     Row Name 02/15/22 1612          Therapy Assessment/Plan (PT)    Rehab Potential/Prognosis (PT) adequate, monitor progress closely  -RF     Frequency of Treatment (PT) 5 times per week  -RF     Estimated Duration of Therapy (PT) 2 weeks  -RF     Problem List (PT) balance; mobility; range of motion (ROM); strength; pain  -RF     Activity Limitations Related to Problem List (PT) unable to ambulate safely; unable to transfer safely  -RF     Row Name 02/15/22 1612          Daily Progress Summary (PT)    Functional Goal Overall Progress (PT) progressing toward functional goals as expected  -RF     Daily Progress Summary (PT) Pt hindered this date due to pain and LLE \"burning\". Deficits in functional mobility remain due to LE weakness. Continued skilled care required for further improvements to ensure maximum safe function upon D/C.  -RF     Impairments Still Limiting Function (PT) strength decreased; impaired functional activity tolerance; pain  -RF     Recommendations (PT) Continue per current POC  -RF     Row Name 02/15/22 1612          Therapy Plan Review/Discharge Plan (PT)    Anticipated Equipment Needs at Discharge (PT Eval) --  tbd  -RF     Expected Discharge Disposition (PT Eval) home with home health care  -RF           User Key  (r) = Recorded By, (t) = Taken By, (c) = Cosigned By    " "Initials Name Provider Type     Julianna Ann PTA Physical Therapy Assistant                 Physical Therapy Education                 Title: PT OT SLP Therapies (Done)     Topic: Physical Therapy (Done)     Point: Mobility training (Done)     Learning Progress Summary           Patient Acceptance, E,TB, VU by RF at 2/15/2022 1619      Show all documentation for this point (15)                 Point: Home exercise program (Done)     Learning Progress Summary           Patient Acceptance, E,TB, VU by RF at 2/15/2022 1619      Show all documentation for this point (15)                 Point: Body mechanics (Done)     Learning Progress Summary           Patient Acceptance, E,TB, VU by RF at 2/15/2022 1619      Show all documentation for this point (15)                 Point: Precautions (Done)     Learning Progress Summary           Patient Acceptance, E,TB, VU by RF at 2/15/2022 1619      Show all documentation for this point (15)                             User Key     Initials Effective Dates Name Provider Type [facility] 06/16/21 -  Julianna Ann PTA Physical Therapy Assistant PT                PT Recommendation and Plan    Frequency of Treatment (PT): 5 times per week  Anticipated Equipment Needs at Discharge (PT Eval):  (tbd)  Daily Progress Summary (PT)  Functional Goal Overall Progress (PT): progressing toward functional goals as expected  Daily Progress Summary (PT): Pt hindered this date due to pain and LLE \"burning\". Deficits in functional mobility remain due to LE weakness. Continued skilled care required for further improvements to ensure maximum safe function upon D/C.  Impairments Still Limiting Function (PT): strength decreased, impaired functional activity tolerance, pain  Recommendations (PT): Continue per current POC               Time Calculation:      PT Charges     Row Name 02/15/22 1620 02/15/22 1619          Time Calculation    Start Time 1245  -RF 1045  -RF     Stop Time " 1340  -RF 1145  -RF     Time Calculation (min) 55 min  -RF 60 min  -RF     PT Received On 02/15/22  -RF 02/15/22  -RF     PT - Next Appointment 02/16/22  -RF 02/15/22  -RF     PT Goal Re-Cert Due Date 02/16/22  -RF 02/16/22  -RF            Time Calculation- PT    Total Timed Code Minutes- PT 55 minute(s)  -RF 60 minute(s)  -RF           User Key  (r) = Recorded By, (t) = Taken By, (c) = Cosigned By    Initials Name Provider Type    RF Julianna Ann PTA Physical Therapy Assistant                Therapy Charges for Today     Code Description Service Date Service Provider Modifiers Qty    12427553066 HC PT THER PROC EA 15 MIN 2/14/2022 Julianna Ann, PTA GP, CQ 3    42986737004 HC GAIT TRAINING EA 15 MIN 2/14/2022 Julianna Ann, PTA GP, CQ 2    04611557084 HC PT NEUROMUSC RE EDUCATION EA 15 MIN 2/14/2022 Julianna Ann, PTA GP, CQ 1    60121447370 HC GAIT TRAINING EA 15 MIN 2/15/2022 Julianna Ann, PTA GP, CQ 2    57569857918 HC PT THER PROC EA 15 MIN 2/15/2022 Julianna Ann, PTA GP, CQ 5    61564357343 HC PT THERAPEUTIC ACT EA 15 MIN 2/15/2022 Julianna Ann PTA GP, CQ 1                   Julianna Ann PTA  2/15/2022

## 2022-02-15 NOTE — PLAN OF CARE
Problem: Rehabilitation (IRF) Plan of Care  Goal: Plan of Care Review  Outcome: Ongoing, Progressing  Flowsheets (Taken 2/15/2022 0513)  Progress: improving  Outcome Summary:   calm and cooperative   resting well throughout the night  Plan of Care Reviewed With: patient  Goal: Patient-Specific Goal (Individualized)  Outcome: Ongoing, Progressing  Goal: Absence of New-Onset Illness or Injury  Outcome: Ongoing, Progressing  Intervention: Prevent Fall and Fall Injury  Recent Flowsheet Documentation  Taken 2/15/2022 0400 by Kelly Morfin RN  Safety Promotion/Fall Prevention: safety round/check completed  Taken 2/15/2022 0200 by Kelly Morfin RN  Safety Promotion/Fall Prevention: safety round/check completed  Taken 2/15/2022 0000 by Kelly Morfin, RN  Safety Promotion/Fall Prevention: safety round/check completed  Taken 2/14/2022 2200 by Kelly Morfin, RN  Safety Promotion/Fall Prevention: safety round/check completed  Taken 2/14/2022 2000 by Kelly Morfin, RN  Safety Promotion/Fall Prevention: safety round/check completed  Goal: Optimal Comfort and Wellbeing  Outcome: Ongoing, Progressing  Goal: Home and Community Transition Plan Established  Outcome: Ongoing, Progressing   Goal Outcome Evaluation:  Plan of Care Reviewed With: patient        Progress: improving  Outcome Summary: calm and cooperative; resting well throughout the night

## 2022-02-16 LAB
GLUCOSE BLDC GLUCOMTR-MCNC: 122 MG/DL (ref 70–130)
GLUCOSE BLDC GLUCOMTR-MCNC: 193 MG/DL (ref 70–130)
GLUCOSE BLDC GLUCOMTR-MCNC: 212 MG/DL (ref 70–130)

## 2022-02-16 PROCEDURE — 97110 THERAPEUTIC EXERCISES: CPT | Performed by: OCCUPATIONAL THERAPIST

## 2022-02-16 PROCEDURE — 97110 THERAPEUTIC EXERCISES: CPT

## 2022-02-16 PROCEDURE — 97535 SELF CARE MNGMENT TRAINING: CPT | Performed by: OCCUPATIONAL THERAPIST

## 2022-02-16 PROCEDURE — 97530 THERAPEUTIC ACTIVITIES: CPT | Performed by: OCCUPATIONAL THERAPIST

## 2022-02-16 PROCEDURE — 63710000001 INSULIN ASPART PER 5 UNITS: Performed by: INTERNAL MEDICINE

## 2022-02-16 PROCEDURE — 25010000002 ENOXAPARIN PER 10 MG: Performed by: INTERNAL MEDICINE

## 2022-02-16 PROCEDURE — 97116 GAIT TRAINING THERAPY: CPT

## 2022-02-16 PROCEDURE — 63710000001 INSULIN DETEMIR PER 5 UNITS: Performed by: INTERNAL MEDICINE

## 2022-02-16 PROCEDURE — 82962 GLUCOSE BLOOD TEST: CPT

## 2022-02-16 RX ADMIN — GUAIFENESIN 1200 MG: 600 TABLET, EXTENDED RELEASE ORAL at 09:11

## 2022-02-16 RX ADMIN — CETIRIZINE HYDROCHLORIDE 10 MG: 10 TABLET, FILM COATED ORAL at 09:11

## 2022-02-16 RX ADMIN — GABAPENTIN 100 MG: 100 CAPSULE ORAL at 20:11

## 2022-02-16 RX ADMIN — INSULIN ASPART 2 UNITS: 100 INJECTION, SOLUTION INTRAVENOUS; SUBCUTANEOUS at 12:04

## 2022-02-16 RX ADMIN — Medication 1 TABLET: at 09:12

## 2022-02-16 RX ADMIN — INSULIN ASPART 3 UNITS: 100 INJECTION, SOLUTION INTRAVENOUS; SUBCUTANEOUS at 17:17

## 2022-02-16 RX ADMIN — GUAIFENESIN 1200 MG: 600 TABLET, EXTENDED RELEASE ORAL at 20:11

## 2022-02-16 RX ADMIN — ASPIRIN 81 MG: 81 TABLET, CHEWABLE ORAL at 09:11

## 2022-02-16 RX ADMIN — OXYCODONE 5 MG: 5 TABLET ORAL at 20:11

## 2022-02-16 RX ADMIN — Medication 5000 UNITS: at 09:12

## 2022-02-16 RX ADMIN — LISINOPRIL 2.5 MG: 2.5 TABLET ORAL at 09:12

## 2022-02-16 RX ADMIN — OXYCODONE HYDROCHLORIDE AND ACETAMINOPHEN 500 MG: 500 TABLET ORAL at 09:11

## 2022-02-16 RX ADMIN — ENOXAPARIN SODIUM 40 MG: 40 INJECTION SUBCUTANEOUS at 20:12

## 2022-02-16 RX ADMIN — ROSUVASTATIN CALCIUM 40 MG: 20 TABLET, FILM COATED ORAL at 20:11

## 2022-02-16 RX ADMIN — CLOPIDOGREL BISULFATE 75 MG: 75 TABLET, FILM COATED ORAL at 09:11

## 2022-02-16 RX ADMIN — ISOSORBIDE MONONITRATE 30 MG: 30 TABLET, EXTENDED RELEASE ORAL at 09:11

## 2022-02-16 RX ADMIN — INSULIN DETEMIR 12 UNITS: 100 INJECTION, SOLUTION SUBCUTANEOUS at 20:12

## 2022-02-16 NOTE — SIGNIFICANT NOTE
02/16/22 1154   Plan   Plan  attempted to contact yao Gould [phone] without success; voicemail is full.

## 2022-02-16 NOTE — THERAPY TREATMENT NOTE
Inpatient Rehabilitation - Occupational Therapy Treatment Note     [place]     Patient Name: Lisa Velazquez  : 1965  MRN: [id number]    Today's Date: 2022                 Admit Date: 2022         ICD-10-CM ICD-9-CM   1. S/P right hip fracture  Z87.81 V15.51       Patient Active Problem List   Diagnosis   • S/P right hip fracture       Past Medical History:   Diagnosis Date   • Coronary artery disease    • Diabetes mellitus (HCC)    • Elevated cholesterol    • GERD (gastroesophageal reflux disease)    • History of transfusion    • Hypertension        Past Surgical History:   Procedure Laterality Date   • ABDOMINAL SURGERY      gallbladder removal   • CARDIAC CATHETERIZATION     • CARDIAC SURGERY      stent   • COLONOSCOPY     • FRACTURE SURGERY     • TOE AMPUTATION Right     3rd toe right foot             IRF OT ASSESSMENT FLOWSHEET (last 12 hours)     IRF OT Evaluation and Treatment     Row Name 22 1400          OT Time and Intention    Document Type daily treatment  -     Mode of Treatment individual therapy; occupational therapy  -     Patient Effort good  -     Row Name 22 1400          General Information    Patient/Family/Caregiver Comments/Observations patient agreeable to therapy. patient tolerated well with no complaints,  -     Existing Precautions/Restrictions fall; hip  -     Row Name 22 1400          Cognition/Psychosocial    Affect/Mental Status (Cognitive) WFL  -     Row Name 22 1400          Motor Skills    Motor Skills coordination; functional endurance; therapeutic exercise  -     Therapeutic Exercise --  UE bike gmc,fmc,functional endurance, strength  -     Row Name 22 1500          Self-Feeding    [place] Level (Self-Feeding) modified independence  -           User Key  (r) = Recorded By, (t) = Taken By, (c) = Cosigned By    Initials Name Effective Dates     Rukhsana Dixon, OT 21 -                  Occupational  Therapy Education                 Title: PT OT SLP Therapies (Done)     Topic: Occupational Therapy (Done)     Point: ADL training (Done)     Description:   Instruct learner(s) on proper safety adaptation and remediation techniques during self care or transfers.   Instruct in proper use of assistive devices.              Learning Progress Summary           Patient Acceptance, E,TB, VU by  at 2/11/2022 2209      Show all documentation for this point (4)                 Point: Home exercise program (Done)     Description:   Instruct learner(s) on appropriate technique for monitoring, assisting and/or progressing therapeutic exercises/activities.              Learning Progress Summary           Patient Acceptance, E,TB, VU by  at 2/11/2022 2209      Show all documentation for this point (4)                 Point: Body mechanics (Done)     Description:   Instruct learner(s) on proper positioning and spine alignment during self-care, functional mobility activities and/or exercises.              Learning Progress Summary           Patient Acceptance, E,TB, VU by  at 2/11/2022 2209      Show all documentation for this point (4)                             User Key     Initials Effective Dates Name Provider Type [facility] 01/19/22 -  Hilda Salinas, Nursing Student Nursing Student Nurse                    OT Recommendation and Plan    Planned Therapy Interventions (OT): activity tolerance training, adaptive equipment training, BADL retraining, patient/caregiver education/training, ROM/therapeutic exercise, strengthening exercise, transfer/mobility retraining                    Time Calculation:      Time Calculation- OT     Row Name 02/16/22 1501             Time Calculation-     OT Start Time 0745  -      OT Stop Time 0915  -      OT Time Calculation (min) 90 min  -            User Key  (r) = Recorded By, (t) = Taken By, (c) = Cosigned By    Initials Name Provider Type     Rukhsana Dixon, OT  Occupational Therapist              Therapy Charges for Today     Code Description Service Date Service Provider Modifiers Qty    63596006295 HC OT THERAPEUTIC ACT EA 15 MIN 2/15/2022 Rukhsana Dixon, OT GO 2    92711999668 HC OT SELF CARE/MGMT/TRAIN EA 15 MIN 2/15/2022 Rukhsana Dixon, OT GO 2    61930653703 HC OT THER PROC EA 15 MIN 2/15/2022 Rukhsana Dixon, OT GO 2    27060224052 HC OT THERAPEUTIC ACT EA 15 MIN 2/16/2022 Rukhsana Dixon, OT GO 2    92376761196 HC OT SELF CARE/MGMT/TRAIN EA 15 MIN 2/16/2022 Rukhsana Dixon, OT GO 1    33310683465 HC OT THER PROC EA 15 MIN 2/16/2022 Rukhsana Dixon, OT GO 3                   Rukhsana Dixon OT  2/16/2022

## 2022-02-16 NOTE — SIGNIFICANT NOTE
02/16/22 1210   Plan   Plan Spoke to pt who says she sent son a text message about her discharge date on 2-23-22.  Will follow.

## 2022-02-16 NOTE — PROGRESS NOTES
[facility]  PROGRESS NOTE     Patient Identification:  Name:  Lisa Velazquez  Age:  56 y.o.  Sex:  female  :  1965  MRN:  [id number]  Visit Number:  [id number]  ROOM: [facility]     Primary Care Provider:  Patricia Skelton APRN    Length of stay in inpatient status:  15    Subjective     Chief Compliant: Status post right hip fracture    History of Presenting Illness: 56-year-old female who is status post right hip fracture with intramedullary nail placement, history of acute blood loss anemia, diabetes mellitus, peripheral vascular disease, hypertension hyperlipidemia.      Participated with physical and occupational therapy on 2/15/2022: Performs bed mobility with modified independent; transfer activities and supervision to modified independent; utilizes wheelchair/front wheeled walker for chair to bed, sit to stand/stand to sit transfer; ambulated 220 feet, 100 feet, 160 feet x 2 with front wheeled walker and supervision with verbal/nonverbal cues; participated with coordination; functional endurance; therapeutic exercise; required set up assist for grooming activities; required contact-guard assist to minimal assist with toileting    Objective     Current Hospital Meds:ascorbic acid, 500 mg, Oral, Daily  aspirin, 81 mg, Oral, Daily  cetirizine, 10 mg, Oral, Daily  clopidogrel, 75 mg, Oral, Daily  enoxaparin, 40 mg, Subcutaneous, Nightly  gabapentin, 100 mg, Oral, Nightly  guaiFENesin, 1,200 mg, Oral, Q12H  insulin aspart, 0-7 Units, Subcutaneous, TID AC  insulin detemir, 12 Units, Subcutaneous, Nightly  isosorbide mononitrate, 30 mg, Oral, Q24H  lisinopril, 2.5 mg, Oral, Q24H  multivitamin with minerals, 1 tablet, Oral, Daily  nystatin, , Topical, Q12H  rosuvastatin, 40 mg, Oral, Nightly  vitamin D3, 5,000 Units, Oral, Daily       ----------------------------------------------------------------------------------------------------------------------  Vital Signs:  Temp:  [97.7 °F (36.5  °C)-98.1 °F (36.7 °C)] 97.7 °F (36.5 °C)  Heart Rate:  [80-82] 80  Resp:  [18] 18  BP: (103-138)/(71-73) 103/71  SpO2:  [97 %-99 %] 97 %  on   ;   Device (Oxygen Therapy): room air  Body mass index is 25.22 kg/m².    Wt Readings from Last 3 Encounters:   02/01/22 73 kg (161 lb)     Intake & Output (last 3 days)       02/13 0701 02/14 0700 02/14 0701  02/15 0700 02/15 0701 02/16 0700 02/16 0701  02/17 0700    P.O. 1500 1720 1500     Total Intake(mL/kg) 1500 (20.5) 1720 (23.6) 1500 (20.5)     Net +1500 +1720 +1500             Urine Unmeasured Occurrence 6 x 6 x 3 x     Stool Unmeasured Occurrence   1 x         Diet Regular; Consistent Carbohydrate  ----------------------------------------------------------------------------------------------------------------------  Physical exam:  Constitutional:   No acute distress, neuropathy pain resolved with neurontin  HEENT: Normocephalic atraumatic  Neck: Supple   Cardiovascular: Regular rate and rhythm  Pulmonary/Chest: Clear to auscultation  Abdominal: Positive bowel sounds soft.   Musculoskeletal: Status post hip repair, wound ok  Neurological: No focal deficits  Skin: No rash     ----------------------------------------------------------------------------------------------------------------------    Last echocardiogram:    ----------------------------------------------------------------------------------------------------------------------                Invalid input(s): PROTEstimated Creatinine Clearance: 114.9 mL/min (by C-G formula based on SCr of 0.63 mg/dL).  No results found for: AMMONIA              Glucose   Date/Time Value Ref Range Status   02/16/2022 0626 122 70 - 130 mg/dL Final     Comment:     Meter: [id number] : [id number] Dorie John   02/15/2022 1555 131 (H) 70 - 130 mg/dL Final     Comment:     Meter: [id number] : [id number] Bryon Miranda   02/15/2022 1137 243 (H) 70 - 130 mg/dL Final     Comment:     Meter: [id number] : [id number] Bryon  Miranda   02/15/2022 0636 155 (H) 70 - 130 mg/dL Final     Comment:     Meter: [id number] : [id number] Sivakumar BERGER   02/14/2022 1630 278 (H) 70 - 130 mg/dL Final     Comment:     Meter: [id number] : [id number] Bryon Jean   02/14/2022 1129 260 (H) 70 - 130 mg/dL Final     Comment:     Meter: [id number] : [id number] JANELLE CARO   02/14/2022 0642 156 (H) 70 - 130 mg/dL Final     Comment:     Meter: [id number] : [id number] Sivakumar BERGER   02/13/2022 2021 212 (H) 70 - 130 mg/dL Final     Comment:     Meter: [id number] : [id number] Sivakumar BERGER     No results found for: TSH, FREET4  No results found for: PREGTESTUR, PREGSERUM, HCG, HCGQUANT  Pain Management Panel    There is no flowsheet data to display.       Brief Urine Lab Results     None        No results found for: BLOODCX      No results found for: URINECX  No results found for: WOUNDCX  No results found for: STOOLCX        I have personally looked at the labs and they are summarized above.  ----------------------------------------------------------------------------------------------------------------------  Detailed radiology reports for the last 24 hours:    Imaging Results (Last 24 Hours)     ** No results found for the last 24 hours. **        Final impressions for the last 30 days of radiology reports:    No radiology results for the last 30 days.  I have personally looked at the radiology images and read the final radiology report.    Assessment & Plan      Status post right intertrochanteric hip fracture with intramedullary nail placement--Participated with physical and occupational therapy on 2/15/2022: Performs bed mobility with modified independent; transfer activities and supervision to modified independent; utilizes wheelchair/front wheeled walker for chair to bed, sit to stand/stand to sit transfer; ambulated 220 feet, 100 feet, 160 feet x 2 with front wheeled walker and supervision with verbal/nonverbal cues;  participated with coordination; functional endurance; therapeutic exercise; required set up assist for grooming activities; required contact-guard assist to minimal assist with toileting    Recent acute blood loss anemia--stable    Apparently neurontin has helped significantly with her right foot pain and is resolved now.    Peripheral vascular disease continue medical management    Hypertension controlled    hyperlipidemia continue statin therapy    Gastric erosions--patient was treated with full course of Protonix and treatment for H. pylori prior to coming here.        VTE Prophylaxis:   Mechanical Order History:     None      Pharmalogical Order History:      Ordered     Dose Route Frequency Stop    02/01/22 9846  enoxaparin (LOVENOX) syringe 40 mg         40 mg SC Nightly --              Alejandrina Ann MD  02/16/22  13:32 EST

## 2022-02-16 NOTE — THERAPY RE-EVALUATION
Inpatient Rehabilitation - Physical Therapy Re-Evaluation        [facility]     Patient Name: Lisa Velazquez  : 1965  MRN: [id number]    Today's Date: 2022                    Admit Date: 2022      Visit Dx:     ICD-10-CM ICD-9-CM   1. S/P right hip fracture  Z87.81 V15.51       Patient Active Problem List   Diagnosis   • S/P right hip fracture       Past Medical History:   Diagnosis Date   • Coronary artery disease    • Diabetes mellitus (HCC)    • Elevated cholesterol    • GERD (gastroesophageal reflux disease)    • History of transfusion    • Hypertension        Past Surgical History:   Procedure Laterality Date   • ABDOMINAL SURGERY      gallbladder removal   • CARDIAC CATHETERIZATION     • CARDIAC SURGERY      stent   • COLONOSCOPY     • FRACTURE SURGERY     • TOE AMPUTATION Right     3rd toe right foot       PT ASSESSMENT (last 12 hours)     IRF PT Evaluation and Treatment     Row Name 22 1554          PT Time and Intention    Document Type re-evaluation; daily treatment  -LB     Mode of Treatment individual therapy; physical therapy  -LB     Row Name 22 1557          General Information    Patient Profile Reviewed yes  -LB     Existing Precautions/Restrictions fall  -LB     Row Name 22 1558          Cognition/Psychosocial    Affect/Mental Status (Cognitive) WFL  -LB     Follows Commands (Cognition) WFL  -LB     Personal Safety Interventions gait belt; nonskid shoes/slippers when out of bed; supervised activity  -LB     Row Name 22 5831          Pain Scale: FACES Pre/Post-Treatment    Pain: FACES Scale, Pretreatment 6-->hurts even more  -LB     Posttreatment Pain Rating 6-->hurts even more  -LB     Pain Location --  right hip  -LB     Pre/Posttreatment Pain Comment rest, repositioned  -LB     Row Name 22 855          Bed Mobility    Supine-Sit-Supine [place] (Bed Mobility) independent  -LB     Row Name 22 155          Transfers    Bed-Chair [place]  (Transfers) supervision  -LB     Chair-Bed [place] (Transfers) supervision  -LB     Assistive Device (Bed-Chair Transfers) wheelchair  -LB     Sit-Stand [place] (Transfers) supervision  -LB     Stand-Sit [place] (Transfers) supervision  -LB     Row Name 02/16/22 1550          Chair-Bed Transfer    Assistive Device (Chair-Bed Transfers) wheelchair  -LB     Row Name 02/16/22 1550          Sit-Stand Transfer    Assistive Device (Sit-Stand Transfers) walker, front-wheeled  -LB     Row Name 02/16/22 1550          Stand-Sit Transfer    Assistive Device (Stand-Sit Transfers) walker, front-wheeled  -LB     Row Name 02/16/22 1550          Gait/Stairs (Locomotion)    [place] Level (Gait) verbal cues; nonverbal cues (demo/gesture); supervision  -LB     Assistive Device (Gait) walker, front-wheeled  -LB     Distance in Feet (Gait) am-320', pm-210' 120'  -LB     Deviations/Abnormal Patterns (Gait) antalgic; gait speed decreased; stride length decreased  -LB     Bilateral Gait Deviations forward flexed posture  -LB     Row Name 02/16/22 1550          Motor Skills    Therapeutic Exercise --  sitting ex bid, standing ex in PB  -LB     Row Name 02/16/22 1550          Aerobic Exercise    Type (Aerobic Exercise) recumbent stationary bike  -LB     Time Performed (Aerobic Exercise) level 1.5, 7 min  -LB     Row Name 02/16/22 1550          IRF PT Goals    Bed Mobility Goal Selection (PT-IRF) bed mobility, PT goal 1  -LB     Transfer Goal Selection (PT-IRF) transfers, PT goal 1  -LB     Gait (Walking Locomotion) Goal Selection (PT-IRF) gait, PT goal 1  -LB     Row Name 02/16/22 1550          Bed Mobility Goal 1 (PT-IRF)    Activity/Assistive Device (Bed Mobility Goal 1, PT-IRF) sit to supine/supine to sit  -LB     [place] Level (Bed Mobility Goal 1, PT-IRF) independent  -LB     Time Frame (Bed Mobility Goal 1, PT-IRF) by discharge  -LB     Progress/Outcomes (Bed Mobility Goal 1, PT-IRF) goal met  -LB     Row Name  02/16/22 1550          Transfer Goal 1 (PT-IRF)    Activity/Assistive Device (Transfer Goal 1, PT-IRF) sit-to-stand/stand-to-sit; bed-to-chair/chair-to-bed  -LB     [place] Level (Transfer Goal 1, PT-IRF) modified independence  -LB     Time Frame (Transfer Goal 1, PT-IRF) by discharge  -LB     Progress/Outcomes (Transfer Goal 1, PT-IRF) goal ongoing  -LB     Row Name 02/16/22 1550          Gait/Walking Locomotion Goal 1 (PT-IRF)    Activity/Assistive Device (Gait/Walking Locomotion Goal 1, PT-IRF) walker, rolling  -LB     Gait/Walking Locomotion Distance Goal 1 (PT-IRF) 300'  -LB     [place] Level (Gait/Walking Locomotion Goal 1, PT-IRF) supervision required  -LB     Time Frame (Gait/Walking Locomotion Goal 1, PT-IRF) by discharge  -LB     Progress/Outcomes (Gait/Walking Locomotion Goal 1, PT-IRF) goal met  -LB     Row Name 02/16/22 1550          Positioning and Restraints    Pre-Treatment Position sitting in chair/recliner  -LB     In Bed call light within reach; encouraged to call for assist; heels elevated  pm  -LB     In Wheelchair encouraged to call for assist  left gym Indep in w/c  -LB     Row Name 02/16/22 1550          Therapy Assessment/Plan (PT)    Patient's Goals For Discharge return home; take care of myself at home  -LB     Row Name 02/16/22 1550          Therapy Assessment/Plan (PT)    Rehab Potential/Prognosis (PT) adequate, monitor progress closely  -LB     Frequency of Treatment (PT) 5 times per week  -LB     Estimated Duration of Therapy (PT) 2 weeks  -LB     Problem List (PT) balance; mobility; range of motion (ROM); strength; pain  -LB     Activity Limitations Related to Problem List (PT) unable to ambulate safely; unable to transfer safely  -LB     Row Name 02/16/22 1550          Weekly Progress Summary (PT)    Functional Goal Overall Progress (PT) progressing toward functional goals as expected  -LB     Recommendations (PT) continue PT  -LB     Row Name 02/16/22 1550          Therapy  Plan Review/Discharge Plan (PT)    Anticipated Equipment Needs at Discharge (PT Eval) --  tbd  -LB     Expected Discharge Disposition (PT Eval) home with home health care  -LB           User Key  (r) = Recorded By, (t) = Taken By, (c) = Cosigned By    Initials Name Provider Type    LB Wendi Brothers, PT Physical Therapist                 Physical Therapy Education                 Title: PT OT SLP Therapies (Done)     Topic: Physical Therapy (Done)     Point: Mobility training (Done)     Learning Progress Summary           Patient Acceptance, E, VU by LB at 2/16/2022 1617      Show all documentation for this point (16)                 Point: Home exercise program (Done)     Learning Progress Summary           Patient Acceptance, E, VU by LB at 2/16/2022 1617      Show all documentation for this point (16)                 Point: Body mechanics (Done)     Learning Progress Summary           Patient Acceptance, E, VU by LB at 2/16/2022 1617      Show all documentation for this point (16)                 Point: Precautions (Done)     Learning Progress Summary           Patient Acceptance, E, VU by LB at 2/16/2022 1617      Show all documentation for this point (16)                             User Key     Initials Effective Dates Name Provider Type Discipline     06/16/21 -  Wendi Brothers, PT Physical Therapist PT                PT Recommendation and Plan    Planned Therapy Interventions (PT): balance training, bed mobility training, gait training, patient/family education, ROM (range of motion), strengthening, transfer training  Frequency of Treatment (PT): 5 times per week  Anticipated Equipment Needs at Discharge (PT Eval):  (tbd)                  Time Calculation:      PT Charges     Row Name 02/16/22 1619 02/16/22 1618          Time Calculation    Start Time 1330  -LB 0915  -LB     Stop Time 1415  -LB 1000  -LB     Time Calculation (min) 45 min  -LB 45 min  -LB     PT Received On -- 02/16/22  -LB      PT Goal Re-Cert Due Date -- 02/23/22  -[facility]           User Key  (r) = Recorded By, (t) = Taken By, (c) = Cosigned By    Initials Name Provider Type    Wendi Henriquez, PT Physical Therapist                Therapy Charges for Today     Code Description Service Date Service Provider Modifiers Qty    [id number]  GAIT TRAINING EA 15 MIN 2/16/2022 Wendi Brothers, PT GP 2    [id number]  PT THER PROC EA 15 MIN 2/16/2022 Wendi Brothers, PT GP 4                   Wendi Brothers, PT  2/16/2022

## 2022-02-16 NOTE — PROGRESS NOTES
Occupational Therapy:    Physical Therapy: Individual: 90 minutes.    Speech Language Pathology:    Signed by: Wendi Brothers, Physical Therapist

## 2022-02-16 NOTE — PROGRESS NOTES
Case Management  Inpatient Rehabilitation Team Conference    Conference Date/Time: 2/15/2022 8:44:38 AM    Team Conference Attendees:  MD Carrie Almanza SW Jessica Bill, SUBHA,   SUBHA Lopez, PT  Candie Dixon OT    Demographics            Age: 56Y            Gender: Female    Admission Date: 2/1/2022 4:43:00 PM  Rehabilitation Diagnosis:  right hip IM nailing  Comorbidities: I25.10 Atherosclerotic heart disease of native coronary artery  without angina pectoris  K21.9 Gastro-esophageal reflux disease without esophagitis  I10 Essential (primary) hypertension  E11.51 Type 2 diabetes mellitus with diabetic peripheral angiopathy without  gangrene  E78.00 Pure hypercholesterolemia, unspecified  Z79.4 Long term (current) use of insulin  Z80.9 Family history of malignant neoplasm, unspecified  Z82.49 Family history of ischemic heart disease and other diseases of the  circulatory system  Z83.3 Family history of diabetes mellitus  Z87.891 Personal history of nicotine dependence  W19.XXXA Unspecified fall, initial encounter      Plan of Care  Anticipated Discharge Date/Estimated Length of Stay: 2-16-22  Anticipated Discharge Destination: Community discharge with assistance  Discharge Plan : Pt to return to her home with son checking on her during the  day.  Son is self-employed and can stay with pt in the evening and night if  needed.  Medical Necessity Expected Level Rationale: good  Intensity and Duration: an average of 3 hours/5 days per week  Medical Supervision and 24 Hour Rehab Nursing: x  Physical Therapy: x  PT Intensity/Duration: PT 1.5 hours per day/5 days per week  Occupational Therapy: x  OT Intensity/Duration: OT 1.5 hours per day/5 days per week  Social Work: x  Therapeutic Recreation: x  Updated (if changes indicated)    Anticipated Discharge Date/Estimated Length of Stay:   2-23-22      Discharge Plan of Care:    Based on the patient's medical and  functional status, their prognosis and  expected level of functional improvement is: good      Interdisciplinary Problem/Goals/Status  Body Function Structure    [RN] Skin Integrity(Active)  Current Status(02/03/2022): free from skin breakdown this shift  Weekly Goal(02/17/2022): free from skin breakdown this stay  Discharge Goal: home free of skin breakdown        Mobility    [PT] Bed/Chair/Wheelchair(Active)  Current Status(02/02/2022): min A  Weekly Goal(02/09/2022): MI  Discharge Goal: MI    [PT] Walk(Active)  Current Status(02/02/2022): amb 80' RW CGA  Weekly Goal(02/09/2022): amb 300' RW Sup  Discharge Goal: amb 300' RW Sup        Safety    [RN] Potential for Injury(Active)  Current Status(02/03/2022): free from injury this shift  Weekly Goal(02/17/2022): free from injury this stay  Discharge Goal: home free of injury        Self Care    [OT] Toileting(Active)  Current Status(02/21/2022): min/cga  Weekly Goal(02/23/2022): set up  Discharge Goal: set up    Comments: Recommend extending discharge date to 2-23-22 to allow more progress  in therapy.  Pt plans to return home at discharge with son checking on her  during the day and sister can stay with her in the evenings/nights.    Signed by: RASTA Landry    Physician CoSigned By: Alejandrina Ann 02/16/2022 07:04:53

## 2022-02-17 LAB
GLUCOSE BLDC GLUCOMTR-MCNC: 158 MG/DL (ref 70–130)
GLUCOSE BLDC GLUCOMTR-MCNC: 163 MG/DL (ref 70–130)
GLUCOSE BLDC GLUCOMTR-MCNC: 169 MG/DL (ref 70–130)

## 2022-02-17 PROCEDURE — 97110 THERAPEUTIC EXERCISES: CPT

## 2022-02-17 PROCEDURE — 63710000001 INSULIN ASPART PER 5 UNITS: Performed by: INTERNAL MEDICINE

## 2022-02-17 PROCEDURE — 82962 GLUCOSE BLOOD TEST: CPT

## 2022-02-17 PROCEDURE — 97530 THERAPEUTIC ACTIVITIES: CPT | Performed by: OCCUPATIONAL THERAPIST

## 2022-02-17 PROCEDURE — 63710000001 INSULIN DETEMIR PER 5 UNITS: Performed by: INTERNAL MEDICINE

## 2022-02-17 PROCEDURE — 97110 THERAPEUTIC EXERCISES: CPT | Performed by: OCCUPATIONAL THERAPIST

## 2022-02-17 PROCEDURE — 25010000002 ENOXAPARIN PER 10 MG: Performed by: INTERNAL MEDICINE

## 2022-02-17 PROCEDURE — 97116 GAIT TRAINING THERAPY: CPT

## 2022-02-17 PROCEDURE — 97530 THERAPEUTIC ACTIVITIES: CPT

## 2022-02-17 PROCEDURE — 97535 SELF CARE MNGMENT TRAINING: CPT | Performed by: OCCUPATIONAL THERAPIST

## 2022-02-17 RX ADMIN — ROSUVASTATIN CALCIUM 40 MG: 20 TABLET, FILM COATED ORAL at 20:31

## 2022-02-17 RX ADMIN — GABAPENTIN 100 MG: 100 CAPSULE ORAL at 20:31

## 2022-02-17 RX ADMIN — CETIRIZINE HYDROCHLORIDE 10 MG: 10 TABLET, FILM COATED ORAL at 08:42

## 2022-02-17 RX ADMIN — Medication 1 TABLET: at 08:43

## 2022-02-17 RX ADMIN — ENOXAPARIN SODIUM 40 MG: 40 INJECTION SUBCUTANEOUS at 20:31

## 2022-02-17 RX ADMIN — NYSTATIN: 100000 POWDER TOPICAL at 08:43

## 2022-02-17 RX ADMIN — CLOPIDOGREL BISULFATE 75 MG: 75 TABLET, FILM COATED ORAL at 08:42

## 2022-02-17 RX ADMIN — INSULIN DETEMIR 12 UNITS: 100 INJECTION, SOLUTION SUBCUTANEOUS at 20:32

## 2022-02-17 RX ADMIN — ISOSORBIDE MONONITRATE 30 MG: 30 TABLET, EXTENDED RELEASE ORAL at 08:42

## 2022-02-17 RX ADMIN — Medication 5000 UNITS: at 08:43

## 2022-02-17 RX ADMIN — OXYCODONE 5 MG: 5 TABLET ORAL at 20:31

## 2022-02-17 RX ADMIN — LISINOPRIL 2.5 MG: 2.5 TABLET ORAL at 08:42

## 2022-02-17 RX ADMIN — GUAIFENESIN 1200 MG: 600 TABLET, EXTENDED RELEASE ORAL at 08:42

## 2022-02-17 RX ADMIN — ASPIRIN 81 MG: 81 TABLET, CHEWABLE ORAL at 08:42

## 2022-02-17 RX ADMIN — OXYCODONE HYDROCHLORIDE AND ACETAMINOPHEN 500 MG: 500 TABLET ORAL at 08:42

## 2022-02-17 RX ADMIN — INSULIN ASPART 2 UNITS: 100 INJECTION, SOLUTION INTRAVENOUS; SUBCUTANEOUS at 08:42

## 2022-02-17 RX ADMIN — INSULIN ASPART 2 UNITS: 100 INJECTION, SOLUTION INTRAVENOUS; SUBCUTANEOUS at 17:19

## 2022-02-17 RX ADMIN — INSULIN ASPART 2 UNITS: 100 INJECTION, SOLUTION INTRAVENOUS; SUBCUTANEOUS at 11:35

## 2022-02-17 RX ADMIN — GUAIFENESIN 1200 MG: 600 TABLET, EXTENDED RELEASE ORAL at 20:31

## 2022-02-17 RX ADMIN — NYSTATIN 1 APPLICATION: 100000 POWDER TOPICAL at 20:32

## 2022-02-17 NOTE — THERAPY TREATMENT NOTE
Inpatient Rehabilitation - Physical Therapy Treatment Note        Junito     Patient Name: Lisa Velazquez  : 1965  MRN: [id number]    Today's Date: 2022                    Admit Date: 2022      Visit Dx:     ICD-10-CM ICD-9-CM   1. S/P right hip fracture  Z87.81 V15.51       Patient Active Problem List   Diagnosis   • S/P right hip fracture       Past Medical History:   Diagnosis Date   • Coronary artery disease    • Diabetes mellitus (HCC)    • Elevated cholesterol    • GERD (gastroesophageal reflux disease)    • History of transfusion    • Hypertension        Past Surgical History:   Procedure Laterality Date   • ABDOMINAL SURGERY      gallbladder removal   • CARDIAC CATHETERIZATION     • CARDIAC SURGERY      stent   • COLONOSCOPY     • FRACTURE SURGERY     • TOE AMPUTATION Right     3rd toe right foot       PT ASSESSMENT (last 12 hours)     IRF PT Evaluation and Treatment     Row Name 22 1526          PT Time and Intention    Document Type daily treatment  -RF     Mode of Treatment individual therapy; physical therapy  -RF     Patient/Family/Caregiver Comments/Observations Pt continues to c/o R hip pain with WB and increased activity.  -RF     Row Name 22 1526          General Information    Patient Profile Reviewed yes  -RF     Existing Precautions/Restrictions fall  -RF     Row Name 22 1526          Cognition/Psychosocial    Affect/Mental Status (Cognitive) WFL  -RF     Follows Commands (Cognition) verbal cues/prompting required  -RF     Row Name 22 1526          Pain Scale: FACES Pre/Post-Treatment    Pain: FACES Scale, Pretreatment 2-->hurts little bit  -RF     Posttreatment Pain Rating 4-->hurts little more  -RF     Row Name 22 1526          Mobility    Additional Documentation Wheelchair Mobility/Management (Group)  -RF     Row Name 22 1526          Bed Mobility    Supine-Sit [place] (Bed Mobility) modified independence  -RF     Sit-Supine  [place] (Bed Mobility) modified independence  -RF     Assistive Device (Bed Mobility) bed rails  -RF     Row Name 02/17/22 1526          Transfer Assessment/Treatment    Transfers car transfer  -RF     Row Name 02/17/22 1526          Transfers    Bed-Chair [place] (Transfers) supervision; modified independence  -RF     Chair-Bed [place] (Transfers) supervision; modified independence  -RF     Assistive Device (Bed-Chair Transfers) wheelchair; walker, front-wheeled  -RF     Sit-Stand [place] (Transfers) supervision; modified independence  -RF     Stand-Sit [place] (Transfers) supervision; modified independence  -RF     Row Name 02/17/22 1526          Chair-Bed Transfer    Assistive Device (Chair-Bed Transfers) wheelchair; walker, front-wheeled  -RF     Row Name 02/17/22 1526          Sit-Stand Transfer    Assistive Device (Sit-Stand Transfers) walker, front-wheeled  -RF     Row Name 02/17/22 1526          Stand-Sit Transfer    Assistive Device (Stand-Sit Transfers) walker, front-wheeled  -RF     Row Name 02/17/22 1526          Gait/Stairs (Locomotion)    [place] Level (Gait) verbal cues; nonverbal cues (demo/gesture); supervision  -RF     Assistive Device (Gait) walker, front-wheeled  -RF     Distance in Feet (Gait) 320 BID  -RF     Pattern (Gait) step-through  -RF     Deviations/Abnormal Patterns (Gait) antalgic; bc decreased; gait speed decreased; stride length decreased  -RF     Bilateral Gait Deviations forward flexed posture  -RF     Comment (Gait/Stairs) Improved reciprocal pattern noted this date. Antaligc pattern continually observed as pain is noted in stance phase on RLE.  -RF     Row Name 02/17/22 1526          Wheelchair Mobility/Management    Method of Wheelchair Locomotion (Mobility) bimanual (upper extremity) propulsion  -RF     Mobility Activities (Wheelchair) forward propulsion  -RF     Forward Propulsion [place] (Wheelchair) modified independence  -RF      Distance Propelled in Feet (Wheelchair) 100  -RF     Row Name 02/17/22 1526          Safety Issues, Functional Mobility    Impairments Affecting Function (Mobility) balance; endurance/activity tolerance; pain; range of motion (ROM); strength  -     Row Name 02/17/22 1526          Balance    Dynamic Standing Balance moderate impairment; unsupported; standing; other (see comments)  unilateral step ups  -RF     Comment, Balance Pt unable to perform without heavy UE reliance  -RF     Row Name 02/17/22 1526          Hip (Therapeutic Exercise)    Hip Strengthening (Therapeutic Exercise) bilateral; flexion; extension; aBduction; aDduction; heel slides; marching while seated; marching while standing; sitting; standing; 2 lb free weight; resistance band; green; 2 sets; 10 repetitions; other (see comments)  Pt practiced unilateral stance on RLE in PM; unable to perform without heavy UE reliance  -     Row Name 02/17/22 1526          Knee (Therapeutic Exercise)    Knee Strengthening (Therapeutic Exercise) bilateral; flexion; extension; marching while seated; marching while standing; LAQ (long arc quad); hamstring curls; sitting; standing; 2 lb free weight; resistance band; green; 2 sets; 10 repetitions  -     Row Name 02/17/22 1526          Ankle (Therapeutic Exercise)    Ankle Strengthening (Therapeutic Exercise) bilateral; dorsiflexion; plantarflexion; sitting; 2 lb free weight; 2 sets; 10 repetitions  -     Row Name 02/17/22 1526          Aerobic Exercise    Type (Aerobic Exercise) recumbent stationary bike  -RF     Time Performed (Aerobic Exercise) 10 min  -RF     Comment, Aerobic Exercise (Therapeutic Exercise) --  2.0  -RF     Row Name 02/17/22 1526          IRF PT Goals    Bed Mobility Goal Selection (PT-IRF) bed mobility, PT goal 1  -RF     Transfer Goal Selection (PT-IRF) transfers, PT goal 1  -RF     Gait (Walking Locomotion) Goal Selection (PT-IRF) gait, PT goal 1  -     Row Name 02/17/22 1526          Bed  Mobility Goal 1 (PT-IRF)    Activity/Assistive Device (Bed Mobility Goal 1, PT-IRF) sit to supine/supine to sit  -RF     [place] Level (Bed Mobility Goal 1, PT-IRF) independent  -RF     Time Frame (Bed Mobility Goal 1, PT-IRF) by discharge  -RF     Progress/Outcomes (Bed Mobility Goal 1, PT-IRF) goal ongoing  -RF     Row Name 02/17/22 1526          Transfer Goal 1 (PT-IRF)    Activity/Assistive Device (Transfer Goal 1, PT-IRF) sit-to-stand/stand-to-sit; bed-to-chair/chair-to-bed  -RF     [place] Level (Transfer Goal 1, PT-IRF) modified independence  -RF     Time Frame (Transfer Goal 1, PT-IRF) by discharge  -RF     Progress/Outcomes (Transfer Goal 1, PT-IRF) goal ongoing  -RF     Row Name 02/17/22 1526          Gait/Walking Locomotion Goal 1 (PT-IRF)    Activity/Assistive Device (Gait/Walking Locomotion Goal 1, PT-IRF) walker, rolling  -RF     Gait/Walking Locomotion Distance Goal 1 (PT-IRF) 300'  -RF     [place] Level (Gait/Walking Locomotion Goal 1, PT-IRF) supervision required  -RF     Time Frame (Gait/Walking Locomotion Goal 1, PT-IRF) by discharge  -RF     Progress/Outcomes (Gait/Walking Locomotion Goal 1, PT-IRF) goal ongoing  -RF     Row Name 02/17/22 1526          Positioning and Restraints    Pre-Treatment Position sitting in chair/recliner  BID  -RF     Post Treatment Position wheelchair  BID  -RF     In Wheelchair sitting; call light within reach; encouraged to call for assist  -RF     Row Name 02/17/22 1526          Therapy Assessment/Plan (PT)    Patient's Goals For Discharge return home; take care of myself at home  -RF     Row Name 02/17/22 1526          Therapy Assessment/Plan (PT)    Rehab Potential/Prognosis (PT) adequate, monitor progress closely  -RF     Frequency of Treatment (PT) 5 times per week  -RF     Estimated Duration of Therapy (PT) 2 weeks  -RF     Problem List (PT) balance; mobility; range of motion (ROM); strength; pain  -RF     Activity Limitations Related to Problem  List (PT) unable to ambulate safely; unable to transfer safely  -RF     Row Name 02/17/22 1526          Daily Progress Summary (PT)    Functional Goal Overall Progress (PT) progressing toward functional goals as expected  -RF     Daily Progress Summary (PT) Improving functional mobility and ambulation quality noted this date. Antalgic pattern and decreased WB on RLE continually noted due to LE weakness, pain, and fear avoidance. Continued skilled care required for further improvements to ensure maximum safe function upon D/C.  -RF     Impairments Still Limiting Function (PT) strength decreased; impaired functional activity tolerance; pain  -RF     Recommendations (PT) Continue per current  POC  -RF     Row Name 02/17/22 1526          Therapy Plan Review/Discharge Plan (PT)    Anticipated Equipment Needs at Discharge (PT Eval) --  tbd  -RF     Expected Discharge Disposition (PT Eval) home with home health care  -RF           User Key  (r) = Recorded By, (t) = Taken By, (c) = Cosigned By    Initials Name Provider Type    RF Julianna Ann, JESSICA Physical Therapy Assistant                 Physical Therapy Education                 Title: PT OT SLP Therapies (Done)     Topic: Physical Therapy (Done)     Point: Mobility training (Done)     Learning Progress Summary           Patient Acceptance, E,TB, VU by RF at 2/17/2022 1536      Show all documentation for this point (17)                 Point: Home exercise program (Done)     Learning Progress Summary           Patient Acceptance, E,TB, VU by RF at 2/17/2022 1536      Show all documentation for this point (17)                 Point: Body mechanics (Done)     Learning Progress Summary           Patient Acceptance, E,TB, VU by RF at 2/17/2022 1536      Show all documentation for this point (17)                 Point: Precautions (Done)     Learning Progress Summary           Patient Acceptance, E,TB, VU by RF at 2/17/2022 1536      Show all documentation for this point  (17)                             User Key     Initials Effective Dates Name Provider Type Discipline    RF 06/16/21 -  Julianna Ann PTA Physical Therapy Assistant PT                PT Recommendation and Plan    Frequency of Treatment (PT): 5 times per week  Anticipated Equipment Needs at Discharge (PT Eval):  (tbd)  Daily Progress Summary (PT)  Functional Goal Overall Progress (PT): progressing toward functional goals as expected  Daily Progress Summary (PT): Improving functional mobility and ambulation quality noted this date. Antalgic pattern and decreased WB on RLE continually noted due to LE weakness, pain, and fear avoidance. Continued skilled care required for further improvements to ensure maximum safe function upon D/C.  Impairments Still Limiting Function (PT): strength decreased, impaired functional activity tolerance, pain  Recommendations (PT): Continue per current  POC               Time Calculation:      PT Charges     Row Name 02/17/22 1537 02/17/22 1536          Time Calculation    Start Time 1245  -RF 1045  -RF     Stop Time 1330  -RF 1130  -RF     Time Calculation (min) 45 min  -RF 45 min  -RF     PT Received On 02/17/22  -RF 02/17/22  -RF     PT - Next Appointment 02/18/22  -RF 02/17/22  -RF     PT Goal Re-Cert Due Date 02/23/22  -RF 02/23/22  -RF            Time Calculation- PT    Total Timed Code Minutes- PT 45 minute(s)  -RF 45 minute(s)  -RF           User Key  (r) = Recorded By, (t) = Taken By, (c) = Cosigned By    Initials Name Provider Type    RF Julianna Ann PTA Physical Therapy Assistant                Therapy Charges for Today     Code Description Service Date Service Provider Modifiers Qty    45427200670 HC GAIT TRAINING EA 15 MIN 2/17/2022 Julianna Ann, PTA GP 2    49210984916 HC PT THER PROC EA 15 MIN 2/17/2022 Julianna Ann, PTA GP 3    34569674491 HC PT THERAPEUTIC ACT EA 15 MIN 2/17/2022 Julianna Ann, PTA GP 1                   Julianna Ann  PTA  2/17/2022

## 2022-02-17 NOTE — PROGRESS NOTES
[facility]  PROGRESS NOTE     Patient Identification:  Name:  Lisa Velazquez  Age:  56 y.o.  Sex:  female  :  1965  MRN:  [id number]  Visit Number:  [id number]  ROOM: [facility]     Primary Care Provider:  Patricia Skelton APRN    Length of stay in inpatient status:  16    Subjective     Chief Compliant: Status post right hip fracture    History of Presenting Illness: 56-year-old female who is status post right hip fracture with intramedullary nail placement, history of acute blood loss anemia, diabetes mellitus, peripheral vascular disease, hypertension hyperlipidemia.      Participated with physical and occupational therapy on 2022: Was independent for bed mobility; perform transfer activities and supervision; utilized wheelchair/Rollator for chair to bed, sit stand/stand to sit transfer; ambulated 320 feet, 210 feet x 2 with front wheeled walker and supervision with verbal/nonverbal cues; participated with coordination; functional endurance; therapeutic exercise; required modified independence for self-feeding activity    Objective     Current Hospital Meds:ascorbic acid, 500 mg, Oral, Daily  aspirin, 81 mg, Oral, Daily  cetirizine, 10 mg, Oral, Daily  clopidogrel, 75 mg, Oral, Daily  enoxaparin, 40 mg, Subcutaneous, Nightly  gabapentin, 100 mg, Oral, Nightly  guaiFENesin, 1,200 mg, Oral, Q12H  insulin aspart, 0-7 Units, Subcutaneous, TID AC  insulin detemir, 12 Units, Subcutaneous, Nightly  isosorbide mononitrate, 30 mg, Oral, Q24H  lisinopril, 2.5 mg, Oral, Q24H  multivitamin with minerals, 1 tablet, Oral, Daily  nystatin, , Topical, Q12H  rosuvastatin, 40 mg, Oral, Nightly  vitamin D3, 5,000 Units, Oral, Daily       ----------------------------------------------------------------------------------------------------------------------  Vital Signs:  Temp:  [98.1 °F (36.7 °C)-98.3 °F (36.8 °C)] 98.1 °F (36.7 °C)  Heart Rate:  [82-83] 82  Resp:  [16-20] 16  BP: (110-146)/(66-71)  110/71  SpO2:  [97 %] 97 %  on   ;   Device (Oxygen Therapy): room air  Body mass index is 25.22 kg/m².    Wt Readings from Last 3 Encounters:   02/01/22 73 kg (161 lb)     Intake & Output (last 3 days)       02/14 0701  02/15 0700 02/15 0701  02/16 0700 02/16 0701  02/17 0700 02/17 0701  02/18 0700    P.O. 1720 1500 1920 240    Total Intake(mL/kg) 1720 (23.6) 1500 (20.5) 1920 (26.3) 240 (3.3)    Net +1720 +1500 +1920 +240            Urine Unmeasured Occurrence 6 x 3 x 3 x     Stool Unmeasured Occurrence  1 x          Diet Regular; Consistent Carbohydrate  ----------------------------------------------------------------------------------------------------------------------  Physical exam:  Constitutional:   No acute distress, neuropathy pain resolved with neurontin, walking with PT  HEENT: Normocephalic atraumatic  Neck: Supple   Cardiovascular: Regular rate and rhythm  Pulmonary/Chest: Clear to auscultation  Abdominal: Positive bowel sounds soft.   Musculoskeletal: Status post hip repair, wound ok  Neurological: No focal deficits  Skin: No rash     ----------------------------------------------------------------------------------------------------------------------    Last echocardiogram:    ----------------------------------------------------------------------------------------------------------------------                Invalid input(s): PROTEstimated Creatinine Clearance: 114.9 mL/min (by C-G formula based on SCr of 0.63 mg/dL).  No results found for: AMMONIA              Glucose   Date/Time Value Ref Range Status   02/17/2022 0550 158 (H) 70 - 130 mg/dL Final     Comment:     Meter: [id number] : [id number] Jeannine Crystal   02/16/2022 1626 212 (H) 70 - 130 mg/dL Final     Comment:     Meter: [id number] : [id number] david mejias   02/16/2022 1127 193 (H) 70 - 130 mg/dL Final     Comment:     Meter: [id number] : [id number] Hanh John   02/16/2022 0626 122 70 - 130 mg/dL Final     Comment:      Meter: [id number] : [id number] Dorie John   02/15/2022 1555 131 (H) 70 - 130 mg/dL Final     Comment:     Meter: [id number] : [id number] Slater Miranda   02/15/2022 1137 243 (H) 70 - 130 mg/dL Final     Comment:     Meter: [id number] : [id number] Slater Miranda   02/15/2022 0636 155 (H) 70 - 130 mg/dL Final     Comment:     Meter: [id number] : [id number] Sivakumar Martinez C   02/14/2022 1630 278 (H) 70 - 130 mg/dL Final     Comment:     Meter: [id number] : [id number] Slater Miranda     No results found for: TSH, FREET4  No results found for: PREGTESTUR, PREGSERUM, HCG, HCGQUANT  Pain Management Panel    There is no flowsheet data to display.       Brief Urine Lab Results     None        No results found for: BLOODCX      No results found for: URINECX  No results found for: WOUNDCX  No results found for: STOOLCX        I have personally looked at the labs and they are summarized above.  ----------------------------------------------------------------------------------------------------------------------  Detailed radiology reports for the last 24 hours:    Imaging Results (Last 24 Hours)     ** No results found for the last 24 hours. **        Final impressions for the last 30 days of radiology reports:    No radiology results for the last 30 days.  I have personally looked at the radiology images and read the final radiology report.    Assessment & Plan      Status post right intertrochanteric hip fracture with intramedullary nail placement--Participated with physical and occupational therapy on 2/16/2022: Was independent for bed mobility; perform transfer activities and supervision; utilized wheelchair/Rollator for chair to bed, sit stand/stand to sit transfer; ambulated 320 feet, 210 feet x 2 with front wheeled walker and supervision with verbal/nonverbal cues; participated with coordination; functional endurance; therapeutic exercise; required modified independence for self-feeding activity    Recent  acute blood loss anemia--stable    Apparently neurontin has helped significantly with her right foot pain and is resolved now.    Peripheral vascular disease continue medical management    Hypertension controlled    hyperlipidemia continue statin therapy    Gastric erosions--patient was treated with full course of Protonix and treatment for H. pylori prior to coming here.        VTE Prophylaxis:   Mechanical Order History:     None      Pharmalogical Order History:      Ordered     Dose Route Frequency Stop    02/01/22 2406  enoxaparin (LOVENOX) syringe 40 mg         40 mg SC Nightly --              Alejandrina Ann MD  02/17/22  13:46 EST

## 2022-02-17 NOTE — THERAPY TREATMENT NOTE
Inpatient Rehabilitation - Occupational Therapy Treatment Note     Junito     Patient Name: Lisa Velazquez  : 1965  MRN: [id number]    Today's Date: 2022                 Admit Date: 2022         ICD-10-CM ICD-9-CM   1. S/P right hip fracture  Z87.81 V15.51       Patient Active Problem List   Diagnosis   • S/P right hip fracture       Past Medical History:   Diagnosis Date   • Coronary artery disease    • Diabetes mellitus (HCC)    • Elevated cholesterol    • GERD (gastroesophageal reflux disease)    • History of transfusion    • Hypertension        Past Surgical History:   Procedure Laterality Date   • ABDOMINAL SURGERY      gallbladder removal   • CARDIAC CATHETERIZATION     • CARDIAC SURGERY      stent   • COLONOSCOPY     • FRACTURE SURGERY     • TOE AMPUTATION Right     3rd toe right foot             IRF OT ASSESSMENT FLOWSHEET (last 12 hours)     IRF OT Evaluation and Treatment     Row Name 22 1400          OT Time and Intention    Document Type daily treatment  -     Mode of Treatment individual therapy; occupational therapy  -     Patient Effort good  -     Row Name 22 1400          General Information    Patient/Family/Caregiver Comments/Observations patient agreeable to therapy. patient tolerated well with no complaints.  -     Existing Precautions/Restrictions fall  -     Row Name 22 1400          Cognition/Psychosocial    Affect/Mental Status (Cognitive) WFL  -     Row Name 22 1400          Motor Skills    Motor Skills coordination; functional endurance; therapeutic exercise  -     Therapeutic Exercise --  BUE ROM,gmc,fmc,reaching, strength,functional endurance  -     Row Name 22 1400          Self-Feeding    [place] Level (Self-Feeding) modified independence  -           User Key  (r) = Recorded By, (t) = Taken By, (c) = Cosigned By    Initials Name Effective Dates     Rukhsana Dixon, OT 21 -                   Occupational Therapy Education                 Title: PT OT SLP Therapies (Done)     Topic: Occupational Therapy (Done)     Point: ADL training (Done)     Description:   Instruct learner(s) on proper safety adaptation and remediation techniques during self care or transfers.   Instruct in proper use of assistive devices.              Learning Progress Summary           Patient Acceptance, E,TB, VU by  at 2/11/2022 2209      Show all documentation for this point (4)                 Point: Home exercise program (Done)     Description:   Instruct learner(s) on appropriate technique for monitoring, assisting and/or progressing therapeutic exercises/activities.              Learning Progress Summary           Patient Acceptance, E,TB, VU by  at 2/11/2022 2209      Show all documentation for this point (4)                 Point: Body mechanics (Done)     Description:   Instruct learner(s) on proper positioning and spine alignment during self-care, functional mobility activities and/or exercises.              Learning Progress Summary           Patient Acceptance, E,TB, VU by  at 2/11/2022 2209      Show all documentation for this point (4)                             User Key     Initials Effective Dates Name Provider Type [facility] 01/19/22 -  Hilda Salinas, Nursing Student Nursing Student Nurse                    OT Recommendation and Plan    Planned Therapy Interventions (OT): activity tolerance training, adaptive equipment training, BADL retraining, patient/caregiver education/training, ROM/therapeutic exercise, strengthening exercise, transfer/mobility retraining                    Time Calculation:      Time Calculation- OT     Row Name 02/17/22 1441             Time Calculation-     OT Start Time 0745  -      OT Stop Time 0915  -      OT Time Calculation (min) 90 min  -            User Key  (r) = Recorded By, (t) = Taken By, (c) = Cosigned By    Initials Name Provider Type     Zack  Rukhsana Schreiber, OT Occupational Therapist              Therapy Charges for Today     Code Description Service Date Service Provider Modifiers Qty    40048943542 HC OT THERAPEUTIC ACT EA 15 MIN 2/16/2022 Rukhsana Dixon, OT GO 2    58947123483 HC OT SELF CARE/MGMT/TRAIN EA 15 MIN 2/16/2022 Rukhsana Dixon, OT GO 1    63804790033 HC OT THER PROC EA 15 MIN 2/16/2022 Rukhsana Dixon, OT GO 3    16105494782 HC OT THERAPEUTIC ACT EA 15 MIN 2/17/2022 Rukhsana Dixon, OT GO 2    32147832913 HC OT SELF CARE/MGMT/TRAIN EA 15 MIN 2/17/2022 Rukhsana Dixon, OT GO 1    52392993858 HC OT THER PROC EA 15 MIN 2/17/2022 Rukhsana Dixon, OT GO 3                   Rukhsana Dixon OT  2/17/2022

## 2022-02-18 LAB
GLUCOSE BLDC GLUCOMTR-MCNC: 147 MG/DL (ref 70–130)
GLUCOSE BLDC GLUCOMTR-MCNC: 175 MG/DL (ref 70–130)
GLUCOSE BLDC GLUCOMTR-MCNC: 260 MG/DL (ref 70–130)

## 2022-02-18 PROCEDURE — 97530 THERAPEUTIC ACTIVITIES: CPT

## 2022-02-18 PROCEDURE — 97535 SELF CARE MNGMENT TRAINING: CPT | Performed by: OCCUPATIONAL THERAPIST

## 2022-02-18 PROCEDURE — 82962 GLUCOSE BLOOD TEST: CPT

## 2022-02-18 PROCEDURE — 25010000002 ENOXAPARIN PER 10 MG: Performed by: INTERNAL MEDICINE

## 2022-02-18 PROCEDURE — 63710000001 INSULIN ASPART PER 5 UNITS: Performed by: INTERNAL MEDICINE

## 2022-02-18 PROCEDURE — 97116 GAIT TRAINING THERAPY: CPT

## 2022-02-18 PROCEDURE — 97110 THERAPEUTIC EXERCISES: CPT | Performed by: OCCUPATIONAL THERAPIST

## 2022-02-18 PROCEDURE — 97110 THERAPEUTIC EXERCISES: CPT

## 2022-02-18 PROCEDURE — 97530 THERAPEUTIC ACTIVITIES: CPT | Performed by: OCCUPATIONAL THERAPIST

## 2022-02-18 PROCEDURE — 63710000001 INSULIN DETEMIR PER 5 UNITS: Performed by: INTERNAL MEDICINE

## 2022-02-18 RX ADMIN — Medication 5000 UNITS: at 08:43

## 2022-02-18 RX ADMIN — CLOPIDOGREL BISULFATE 75 MG: 75 TABLET, FILM COATED ORAL at 08:42

## 2022-02-18 RX ADMIN — OXYCODONE 5 MG: 5 TABLET ORAL at 20:00

## 2022-02-18 RX ADMIN — NYSTATIN: 100000 POWDER TOPICAL at 08:42

## 2022-02-18 RX ADMIN — ISOSORBIDE MONONITRATE 30 MG: 30 TABLET, EXTENDED RELEASE ORAL at 08:42

## 2022-02-18 RX ADMIN — GABAPENTIN 100 MG: 100 CAPSULE ORAL at 20:01

## 2022-02-18 RX ADMIN — INSULIN ASPART 2 UNITS: 100 INJECTION, SOLUTION INTRAVENOUS; SUBCUTANEOUS at 17:22

## 2022-02-18 RX ADMIN — INSULIN ASPART 4 UNITS: 100 INJECTION, SOLUTION INTRAVENOUS; SUBCUTANEOUS at 12:25

## 2022-02-18 RX ADMIN — OXYCODONE HYDROCHLORIDE AND ACETAMINOPHEN 500 MG: 500 TABLET ORAL at 08:42

## 2022-02-18 RX ADMIN — ROSUVASTATIN CALCIUM 40 MG: 20 TABLET, FILM COATED ORAL at 20:03

## 2022-02-18 RX ADMIN — ASPIRIN 81 MG: 81 TABLET, CHEWABLE ORAL at 08:42

## 2022-02-18 RX ADMIN — Medication 1 TABLET: at 08:43

## 2022-02-18 RX ADMIN — INSULIN DETEMIR 12 UNITS: 100 INJECTION, SOLUTION SUBCUTANEOUS at 20:04

## 2022-02-18 RX ADMIN — ENOXAPARIN SODIUM 40 MG: 40 INJECTION SUBCUTANEOUS at 20:01

## 2022-02-18 RX ADMIN — LISINOPRIL 2.5 MG: 2.5 TABLET ORAL at 08:43

## 2022-02-18 RX ADMIN — GUAIFENESIN 1200 MG: 600 TABLET, EXTENDED RELEASE ORAL at 08:42

## 2022-02-18 RX ADMIN — GUAIFENESIN 1200 MG: 600 TABLET, EXTENDED RELEASE ORAL at 20:03

## 2022-02-18 RX ADMIN — CETIRIZINE HYDROCHLORIDE 10 MG: 10 TABLET, FILM COATED ORAL at 08:42

## 2022-02-18 NOTE — THERAPY TREATMENT NOTE
Inpatient Rehabilitation - Occupational Therapy Progress Note     Junito     Patient Name: Lisa Velazquez  : 1965  MRN: [id number]    Today's Date: 2022                 Admit Date: 2022         ICD-10-CM ICD-9-CM   1. S/P right hip fracture  Z87.81 V15.51       Patient Active Problem List   Diagnosis   • S/P right hip fracture       Past Medical History:   Diagnosis Date   • Coronary artery disease    • Diabetes mellitus (HCC)    • Elevated cholesterol    • GERD (gastroesophageal reflux disease)    • History of transfusion    • Hypertension        Past Surgical History:   Procedure Laterality Date   • ABDOMINAL SURGERY      gallbladder removal   • CARDIAC CATHETERIZATION     • CARDIAC SURGERY      stent   • COLONOSCOPY     • FRACTURE SURGERY     • TOE AMPUTATION Right     3rd toe right foot             IRF OT ASSESSMENT FLOWSHEET (last 12 hours)     IRF OT Evaluation and Treatment     Row Name 22 1400          OT Time and Intention    Document Type daily treatment; progress note  -     Mode of Treatment individual therapy; occupational therapy  -     Patient Effort good  -     Row Name 22 1400          General Information    Patient/Family/Caregiver Comments/Observations patient agreeable to therapy. patient continues to improve daily with functional activities, strength and endurance.  -     Existing Precautions/Restrictions fall; hip  -     Row Name 22 1400          Cognition/Psychosocial    Affect/Mental Status (Cognitive) WFL  -     Row Name 22 1400          Motor Skills    Motor Skills coordination; functional endurance; therapeutic exercise  -     Therapeutic Exercise --  UE bike, ROM, gmc,fmc,reaching,  strength  -     Row Name 22 1400          Bathing    [place] Level (Bathing) contact guard assist  -     Row Name 22 1400          Upper Body Dressing    [place] Level (Upper Body Dressing) set up assistance; modified  independence  -     Row Name 02/18/22 1400          Lower Body Dressing    [place] Level (Lower Body Dressing) contact guard assist  -     Row Name 02/18/22 1400          Grooming    [place] Level (Grooming) set up; modified independence  -     Row Name 02/18/22 1400          Toileting    [place] Level (Toileting) contact guard assist; supervision  -           User Key  (r) = Recorded By, (t) = Taken By, (c) = Cosigned By    Initials Name Effective Dates     Rukhsana Dixon, OT 06/16/21 -                  Occupational Therapy Education                 Title: PT OT SLP Therapies (Done)     Topic: Occupational Therapy (Done)     Point: ADL training (Done)     Description:   Instruct learner(s) on proper safety adaptation and remediation techniques during self care or transfers.   Instruct in proper use of assistive devices.              Learning Progress Summary           Patient Acceptance, E,TB, VU by  at 2/11/2022 2209      Show all documentation for this point (4)                 Point: Home exercise program (Done)     Description:   Instruct learner(s) on appropriate technique for monitoring, assisting and/or progressing therapeutic exercises/activities.              Learning Progress Summary           Patient Acceptance, E,TB, VU by  at 2/11/2022 2209      Show all documentation for this point (4)                 Point: Body mechanics (Done)     Description:   Instruct learner(s) on proper positioning and spine alignment during self-care, functional mobility activities and/or exercises.              Learning Progress Summary           Patient Acceptance, E,TB, VU by  at 2/11/2022 2209      Show all documentation for this point (4)                             User Key     Initials Effective Dates Name Provider Type Discipline     01/19/22 -  Hilda Salinas, Nursing Student Nursing Student Nurse                    OT Recommendation and Plan    Planned Therapy Interventions  (OT): activity tolerance training, adaptive equipment training, BADL retraining, patient/caregiver education/training, ROM/therapeutic exercise, strengthening exercise, transfer/mobility retraining                    Time Calculation:      Time Calculation- OT     Row Name 02/18/22 1449             Time Calculation-     OT Start Time 0745  -      OT Stop Time 0915  -      OT Time Calculation (min) 90 min  -            User Key  (r) = Recorded By, (t) = Taken By, (c) = Cosigned By    Initials Name Provider Type     Rukhsana Dixon OT Occupational Therapist              Therapy Charges for Today     Code Description Service Date Service Provider Modifiers Qty    79334314152 HC OT THERAPEUTIC ACT EA 15 MIN 2/17/2022 Rukhsana Dixon, OT GO 2    50328772112 HC OT SELF CARE/MGMT/TRAIN EA 15 MIN 2/17/2022 Rukhsana Dixon, OT GO 1    77855574957 HC OT THER PROC EA 15 MIN 2/17/2022 Rukhsana Dixon, OT GO 3    72110926641 HC OT THERAPEUTIC ACT EA 15 MIN 2/18/2022 Rukhsana Dixon, OT GO 2    54662617250 HC OT SELF CARE/MGMT/TRAIN EA 15 MIN 2/18/2022 Rukhsana Dixon, OT GO 1    85695931137 HC OT THER PROC EA 15 MIN 2/18/2022 Rukhsana Dixon OT GO 3                   Rukhsana Dixon OT  2/18/2022

## 2022-02-18 NOTE — PROGRESS NOTES
[facility]  PROGRESS NOTE     Patient Identification:  Name:  Lisa Velazquez  Age:  56 y.o.  Sex:  female  :  1965  MRN:  [id number]  Visit Number:  [id number]  ROOM: [facility]     Primary Care Provider:  Patricia Skelton APRN    Length of stay in inpatient status:  17    Subjective     Chief Compliant: Status post right hip fracture    History of Presenting Illness: 56-year-old female who is status post right hip fracture with intramedullary nail placement, history of acute blood loss anemia, diabetes mellitus, peripheral vascular disease, hypertension hyperlipidemia.      Participated with physical and occupational therapy on 2022: Performs bed mobility with modified independence; performs transfer activities and supervision to modified independent; utilizes wheelchair/rolled walker for chair to bed, sit to stand/stand to sit transfer; ambulated 320 feet twice daily with front wheeled walker and supervision with verbal/nonverbal cues; participated with coordination; functional endurance; therapeutic exercise; required modified independence for self-feeding    Objective     Current Hospital Meds:ascorbic acid, 500 mg, Oral, Daily  aspirin, 81 mg, Oral, Daily  cetirizine, 10 mg, Oral, Daily  clopidogrel, 75 mg, Oral, Daily  enoxaparin, 40 mg, Subcutaneous, Nightly  gabapentin, 100 mg, Oral, Nightly  guaiFENesin, 1,200 mg, Oral, Q12H  insulin aspart, 0-7 Units, Subcutaneous, TID AC  insulin detemir, 12 Units, Subcutaneous, Nightly  isosorbide mononitrate, 30 mg, Oral, Q24H  lisinopril, 2.5 mg, Oral, Q24H  multivitamin with minerals, 1 tablet, Oral, Daily  nystatin, , Topical, Q12H  rosuvastatin, 40 mg, Oral, Nightly  vitamin D3, 5,000 Units, Oral, Daily       ----------------------------------------------------------------------------------------------------------------------  Vital Signs:  Temp:  [98.1 °F (36.7 °C)-98.3 °F (36.8 °C)] 98.1 °F (36.7 °C)  Heart Rate:  [82-86] 82  Resp:   [18-20] 18  BP: (105-135)/(61-69) 105/69  SpO2:  [95 %-97 %] 95 %  on   ;   Device (Oxygen Therapy): room air  Body mass index is 25.22 kg/m².    Wt Readings from Last 3 Encounters:   02/01/22 73 kg (161 lb)     Intake & Output (last 3 days)       02/15 0701  02/16 0700 02/16 0701  02/17 0700 02/17 0701 02/18 0700 02/18 0701  02/19 0700    P.O. 1500 1920 720     Total Intake(mL/kg) 1500 (20.5) 1920 (26.3) 720 (9.9)     Net +1500 +1920 +720             Urine Unmeasured Occurrence 3 x 3 x 2 x     Stool Unmeasured Occurrence 1 x           Diet Regular; Consistent Carbohydrate  ----------------------------------------------------------------------------------------------------------------------  Physical exam:  Constitutional:   No acute distress, neuropathy pain resolved with neurontin, working with OT  HEENT: Normocephalic atraumatic  Neck: Supple   Cardiovascular: Regular rate and rhythm  Pulmonary/Chest: Clear to auscultation  Abdominal: Positive bowel sounds soft.   Musculoskeletal: Status post hip repair, wound ok  Neurological: No focal deficits  Skin: No rash     ----------------------------------------------------------------------------------------------------------------------    Last echocardiogram:    ----------------------------------------------------------------------------------------------------------------------                Invalid input(s): PROTEstimated Creatinine Clearance: 114.9 mL/min (by C-G formula based on SCr of 0.63 mg/dL).  No results found for: AMMONIA              Glucose   Date/Time Value Ref Range Status   02/18/2022 0554 147 (H) 70 - 130 mg/dL Final     Comment:     Meter: [id number] : [id number] Shabbir Zimmer   02/17/2022 1629 163 (H) 70 - 130 mg/dL Final     Comment:     Meter: [id number] : [id number] david mejias   02/17/2022 1120 169 (H) 70 - 130 mg/dL Final     Comment:     Meter: [id number] : [id number] liam pires   02/17/2022 0550 158 (H) 70 - 130 mg/dL  Final     Comment:     Meter: [id number] : [id number] Brimfield Crystal   02/16/2022 1626 212 (H) 70 - 130 mg/dL Final     Comment:     Meter: [id number] : [id number] david mejias   02/16/2022 1127 193 (H) 70 - 130 mg/dL Final     Comment:     Meter: [id number] : [id number] Hanh Dora   02/16/2022 0626 122 70 - 130 mg/dL Final     Comment:     Meter: [id number] : [id number] Dorie Dora   02/15/2022 1555 131 (H) 70 - 130 mg/dL Final     Comment:     Meter: [id number] : [id number] Bryon Miranda     No results found for: TSH, FREET4  No results found for: PREGTESTUR, PREGSERUM, HCG, HCGQUANT  Pain Management Panel    There is no flowsheet data to display.       Brief Urine Lab Results     None        No results found for: BLOODCX      No results found for: URINECX  No results found for: WOUNDCX  No results found for: STOOLCX        I have personally looked at the labs and they are summarized above.  ----------------------------------------------------------------------------------------------------------------------  Detailed radiology reports for the last 24 hours:    Imaging Results (Last 24 Hours)     ** No results found for the last 24 hours. **        Final impressions for the last 30 days of radiology reports:    No radiology results for the last 30 days.  I have personally looked at the radiology images and read the final radiology report.    Assessment & Plan      Status post right intertrochanteric hip fracture with intramedullary nail placement--Participated with physical and occupational therapy on 2/17/2022: Performs bed mobility with modified independence; performs transfer activities and supervision to modified independent; utilizes wheelchair/rolled walker for chair to bed, sit to stand/stand to sit transfer; ambulated 320 feet twice daily with front wheeled walker and supervision with verbal/nonverbal cues; participated with coordination; functional endurance; therapeutic exercise;  required modified independence for self-feeding    Recent acute blood loss anemia--stable    Apparently neurontin has helped significantly with her right foot pain and is resolved now.    Peripheral vascular disease continue medical management    Hypertension controlled    hyperlipidemia continue statin therapy    Gastric erosions--patient was treated with full course of Protonix and treatment for H. pylori prior to coming here.        VTE Prophylaxis:   Mechanical Order History:     None      Pharmalogical Order History:      Ordered     Dose Route Frequency Stop    02/01/22 1646  enoxaparin (LOVENOX) syringe 40 mg         40 mg SC Nightly --              Alejandrina Ann MD  02/18/22  12:47 EST

## 2022-02-18 NOTE — THERAPY TREATMENT NOTE
Inpatient Rehabilitation - Physical Therapy Treatment Note       TONI Wild     Patient Name: Lisa Velazquez  : 1965  MRN: [id number]    Today's Date: 2022                    Admit Date: 2022      Visit Dx:     ICD-10-CM ICD-9-CM   1. S/P right hip fracture  Z87.81 V15.51       Patient Active Problem List   Diagnosis   • S/P right hip fracture       Past Medical History:   Diagnosis Date   • Coronary artery disease    • Diabetes mellitus (HCC)    • Elevated cholesterol    • GERD (gastroesophageal reflux disease)    • History of transfusion    • Hypertension        Past Surgical History:   Procedure Laterality Date   • ABDOMINAL SURGERY      gallbladder removal   • CARDIAC CATHETERIZATION     • CARDIAC SURGERY      stent   • COLONOSCOPY     • FRACTURE SURGERY     • TOE AMPUTATION Right     3rd toe right foot       PT ASSESSMENT (last 12 hours)     IRF PT Evaluation and Treatment     Row Name 22 1556          PT Time and Intention    Document Type daily treatment  -RF     Mode of Treatment individual therapy; physical therapy  -RF     Patient/Family/Caregiver Comments/Observations Pt c/o R hip pain with activity.  -RF     Row Name 22 1552          General Information    Patient Profile Reviewed yes  -RF     Existing Precautions/Restrictions fall  -RF     Row Name 22 1551          Cognition/Psychosocial    Affect/Mental Status (Cognitive) WFL  -RF     Follows Commands (Cognition) verbal cues/prompting required  -RF     Row Name 22 1551          Pain Scale: FACES Pre/Post-Treatment    Pain: FACES Scale, Pretreatment 2-->hurts little bit  -RF     Posttreatment Pain Rating 4-->hurts little more  -RF     Row Name 22 1555          Bed Mobility    Supine-Sit [place] (Bed Mobility) modified independence  -RF     Sit-Supine [place] (Bed Mobility) modified independence  -RF     Assistive Device (Bed Mobility) bed rails  -RF     Row Name 22 1554          Transfer  Assessment/Treatment    Transfers car transfer  -RF     Row Name 02/18/22 1556          Transfers    Bed-Chair [place] (Transfers) supervision; modified independence  -RF     Chair-Bed [place] (Transfers) supervision; modified independence  -RF     Assistive Device (Bed-Chair Transfers) wheelchair; walker, front-wheeled  -RF     Sit-Stand [place] (Transfers) supervision; modified independence  -RF     Stand-Sit [place] (Transfers) supervision; modified independence  -RF     [place] Level (Toilet Transfer) supervision; modified independence  -RF     Assistive Device (Toilet Transfer) wheelchair; grab bars/safety frame  -RF     Row Name 02/18/22 1556          Chair-Bed Transfer    Assistive Device (Chair-Bed Transfers) wheelchair; walker, front-wheeled  -RF     Row Name 02/18/22 1556          Sit-Stand Transfer    Assistive Device (Sit-Stand Transfers) walker, front-wheeled  -RF     Row Name 02/18/22 1556          Stand-Sit Transfer    Assistive Device (Stand-Sit Transfers) walker, front-wheeled  -RF     Row Name 02/18/22 1556          Toilet Transfer    Type (Toilet Transfer) stand pivot/stand step  -RF     Row Name 02/18/22 1556          Gait/Stairs (Locomotion)    [place] Level (Gait) verbal cues; nonverbal cues (demo/gesture); supervision  -RF     Assistive Device (Gait) walker, front-wheeled  -RF     Distance in Feet (Gait) 220, 100 in AM, 100 in PM  -RF     Pattern (Gait) step-through  -RF     Deviations/Abnormal Patterns (Gait) antalgic; bc decreased; gait speed decreased; stride length decreased  -RF     Bilateral Gait Deviations forward flexed posture  -RF     Comment (Gait/Stairs) Decreased WB noted on RLE; Cues provided for WS and quad activation  -RF     Row Name 02/18/22 1556          Wheelchair Mobility/Management    Method of Wheelchair Locomotion (Mobility) bimanual (upper extremity) propulsion  -RF     Mobility Activities (Wheelchair) forward propulsion  -RF     Row  Name 02/18/22 1556          Safety Issues, Functional Mobility    Impairments Affecting Function (Mobility) balance; endurance/activity tolerance; pain; range of motion (ROM); strength  -RF     Row Name 02/18/22 1556          Hip (Therapeutic Exercise)    Hip Strengthening (Therapeutic Exercise) bilateral; flexion; extension; aBduction; aDduction; heel slides; marching while standing; sitting; standing; supine; other (see comments)  Difficulty noted with unilateral stance for standing TE on LLE; pt relies heavily on UEs.  -RF     Row Name 02/18/22 1556          Knee (Therapeutic Exercise)    Knee Strengthening (Therapeutic Exercise) bilateral; flexion; extension; heel slides; marching while standing; SLR (straight leg raise); SAQ (short arc quad); hamstring curls; standing; supine; 2 sets; 10 repetitions; other (see comments)  Difficulty noted with unilateral stance for standing TE on LLE; pt relies heavily on UEs.  -RF     Row Name 02/18/22 1556          Ankle (Therapeutic Exercise)    Ankle Strengthening (Therapeutic Exercise) bilateral; dorsiflexion; plantarflexion; standing; supine; 2 sets; 10 repetitions  -RF     Row Name 02/18/22 1556          Aerobic Exercise    Type (Aerobic Exercise) recumbent stationary bike  -RF     Time Performed (Aerobic Exercise) 10  -RF     Comment, Aerobic Exercise (Therapeutic Exercise) LVL 2.0  -RF     Row Name 02/18/22 1556          IRF PT Goals    Bed Mobility Goal Selection (PT-IRF) bed mobility, PT goal 1  -RF     Transfer Goal Selection (PT-IRF) transfers, PT goal 1  -RF     Gait (Walking Locomotion) Goal Selection (PT-IRF) gait, PT goal 1  -RF     Row Name 02/18/22 1556          Bed Mobility Goal 1 (PT-IRF)    Activity/Assistive Device (Bed Mobility Goal 1, PT-IRF) sit to supine/supine to sit  -RF     [place] Level (Bed Mobility Goal 1, PT-IRF) independent  -RF     Time Frame (Bed Mobility Goal 1, PT-IRF) by discharge  -RF     Progress/Outcomes (Bed Mobility Goal 1,  PT-IRF) goal ongoing  -RF     Row Name 02/18/22 1556          Transfer Goal 1 (PT-IRF)    Activity/Assistive Device (Transfer Goal 1, PT-IRF) sit-to-stand/stand-to-sit; bed-to-chair/chair-to-bed  -RF     [place] Level (Transfer Goal 1, PT-IRF) modified independence  -RF     Time Frame (Transfer Goal 1, PT-IRF) by discharge  -RF     Progress/Outcomes (Transfer Goal 1, PT-IRF) goal ongoing  -RF     Row Name 02/18/22 1556          Gait/Walking Locomotion Goal 1 (PT-IRF)    Activity/Assistive Device (Gait/Walking Locomotion Goal 1, PT-IRF) walker, rolling  -RF     Gait/Walking Locomotion Distance Goal 1 (PT-IRF) 300'  -RF     [place] Level (Gait/Walking Locomotion Goal 1, PT-IRF) supervision required  -RF     Time Frame (Gait/Walking Locomotion Goal 1, PT-IRF) by discharge  -RF     Progress/Outcomes (Gait/Walking Locomotion Goal 1, PT-IRF) goal ongoing  -RF     Row Name 02/18/22 1556          Positioning and Restraints    Pre-Treatment Position sitting in chair/recliner  BID  -RF     In Wheelchair sitting; call light within reach; encouraged to call for assist  BID  -RF     Row Name 02/18/22 1556          Therapy Assessment/Plan (PT)    Patient's Goals For Discharge return home; take care of myself at home  -RF     Row Name 02/18/22 1556          Therapy Assessment/Plan (PT)    Rehab Potential/Prognosis (PT) adequate, monitor progress closely  -RF     Frequency of Treatment (PT) 5 times per week  -RF     Estimated Duration of Therapy (PT) 2 weeks  -RF     Problem List (PT) balance; mobility; range of motion (ROM); strength; pain  -RF     Activity Limitations Related to Problem List (PT) unable to ambulate safely; unable to transfer safely  -RF     Row Name 02/18/22 1556          Daily Progress Summary (PT)    Functional Goal Overall Progress (PT) progressing toward functional goals as expected  -RF     Daily Progress Summary (PT) Good functional mobility noted this date, however, progress in ambulation quality  is hindered by pain with unilateral stance. Pt continues to demonstrate difficulty with WB on RLE; pt reports deficits are related to pain. Continued skilled care required for further LE strengthening to ensure maximum safe function upon D/C.  -RF     Impairments Still Limiting Function (PT) strength decreased; impaired functional activity tolerance; pain  -RF     Recommendations (PT) Continue per current POC  -RF     Row Name 02/18/22 1556          Therapy Plan Review/Discharge Plan (PT)    Anticipated Equipment Needs at Discharge (PT Eval) --  tbd  -RF     Expected Discharge Disposition (PT Eval) home with home health care  -RF           User Key  (r) = Recorded By, (t) = Taken By, (c) = Cosigned By    Initials Name Provider Type    RF Julianna Ann PTA Physical Therapy Assistant                 Physical Therapy Education                 Title: PT OT SLP Therapies (Done)     Topic: Physical Therapy (Done)     Point: Mobility training (Done)     Learning Progress Summary           Patient Acceptance, E,TB, VU by RF at 2/18/2022 1605      Show all documentation for this point (18)                 Point: Home exercise program (Done)     Learning Progress Summary           Patient Acceptance, E,TB, VU by RF at 2/18/2022 1605      Show all documentation for this point (18)                 Point: Body mechanics (Done)     Learning Progress Summary           Patient Acceptance, E,TB, VU by RF at 2/18/2022 1605      Show all documentation for this point (18)                 Point: Precautions (Done)     Learning Progress Summary           Patient Acceptance, E,TB, VU by RF at 2/18/2022 1605      Show all documentation for this point (18)                             User Key     Initials Effective Dates Name Provider Type Discipline    RF 06/16/21 -  Julianna Ann PTA Physical Therapy Assistant PT                PT Recommendation and Plan    Frequency of Treatment (PT): 5 times per week  Anticipated Equipment  Needs at Discharge (PT Eval):  (tbd)  Daily Progress Summary (PT)  Functional Goal Overall Progress (PT): progressing toward functional goals as expected  Daily Progress Summary (PT): Good functional mobility noted this date, however, progress in ambulation quality is hindered by pain with unilateral stance. Pt continues to demonstrate difficulty with WB on RLE; pt reports deficits are related to pain. Continued skilled care required for further LE strengthening to ensure maximum safe function upon D/C.  Impairments Still Limiting Function (PT): strength decreased, impaired functional activity tolerance, pain  Recommendations (PT): Continue per current POC               Time Calculation:      PT Charges     Row Name 02/18/22 1606 02/18/22 1605          Time Calculation    Start Time 1245  -RF 1045  -RF     Stop Time 1330  -RF 1130  -RF     Time Calculation (min) 45 min  -RF 45 min  -RF     PT Received On 02/18/22  -RF 02/18/22  -RF     PT - Next Appointment 02/21/22  -RF 02/18/22  -RF     PT Goal Re-Cert Due Date 02/23/22  -RF 02/23/22  -RF            Time Calculation- PT    Total Timed Code Minutes- PT 45 minute(s)  -RF 45 minute(s)  -RF           User Key  (r) = Recorded By, (t) = Taken By, (c) = Cosigned By    Initials Name Provider Type    RF Julianna Ann PTA Physical Therapy Assistant                Therapy Charges for Today     Code Description Service Date Service Provider Modifiers Qty    99148272913 HC GAIT TRAINING EA 15 MIN 2/17/2022 Julianna Ann, PTA GP 2    47470718157 HC PT THER PROC EA 15 MIN 2/17/2022 Julianna Ann, PTA GP 3    46001667316 HC PT THERAPEUTIC ACT EA 15 MIN 2/17/2022 Julianna Ann, PTA GP 1    15675256960 HC GAIT TRAINING EA 15 MIN 2/18/2022 Julianna Ann, PTA GP 2    42892734440 HC PT THER PROC EA 15 MIN 2/18/2022 Julianna Ann, PTA GP 2    06044383455 HC PT THERAPEUTIC ACT EA 15 MIN 2/18/2022 Julianna Ann, PTA GP 2                   Julianna Ann  PTA  2/18/2022

## 2022-02-19 LAB
ALBUMIN SERPL-MCNC: 3.44 G/DL (ref 3.5–5.2)
ALBUMIN/GLOB SERPL: 1.4 G/DL
ALP SERPL-CCNC: 84 U/L (ref 39–117)
ALT SERPL W P-5'-P-CCNC: 16 U/L (ref 1–33)
ANION GAP SERPL CALCULATED.3IONS-SCNC: 10.4 MMOL/L (ref 5–15)
AST SERPL-CCNC: 13 U/L (ref 1–32)
BASOPHILS # BLD AUTO: 0.03 10*3/MM3 (ref 0–0.2)
BASOPHILS NFR BLD AUTO: 0.5 % (ref 0–1.5)
BILIRUB SERPL-MCNC: 0.3 MG/DL (ref 0–1.2)
BUN SERPL-MCNC: 13 MG/DL (ref 6–20)
BUN/CREAT SERPL: 22.4 (ref 7–25)
CALCIUM SPEC-SCNC: 9.3 MG/DL (ref 8.6–10.5)
CHLORIDE SERPL-SCNC: 108 MMOL/L (ref 98–107)
CO2 SERPL-SCNC: 23.6 MMOL/L (ref 22–29)
CREAT SERPL-MCNC: 0.58 MG/DL (ref 0.57–1)
DEPRECATED RDW RBC AUTO: 49.3 FL (ref 37–54)
EOSINOPHIL # BLD AUTO: 0.19 10*3/MM3 (ref 0–0.4)
EOSINOPHIL NFR BLD AUTO: 3.3 % (ref 0.3–6.2)
ERYTHROCYTE [DISTWIDTH] IN BLOOD BY AUTOMATED COUNT: 14.8 % (ref 12.3–15.4)
GFR SERPL CREATININE-BSD FRML MDRD: 108 ML/MIN/1.73
GLOBULIN UR ELPH-MCNC: 2.5 GM/DL
GLUCOSE BLDC GLUCOMTR-MCNC: 136 MG/DL (ref 70–130)
GLUCOSE BLDC GLUCOMTR-MCNC: 190 MG/DL (ref 70–130)
GLUCOSE BLDC GLUCOMTR-MCNC: 236 MG/DL (ref 70–130)
GLUCOSE SERPL-MCNC: 174 MG/DL (ref 65–99)
HCT VFR BLD AUTO: 30.7 % (ref 34–46.6)
HGB BLD-MCNC: 9.9 G/DL (ref 12–15.9)
IMM GRANULOCYTES # BLD AUTO: 0.01 10*3/MM3 (ref 0–0.05)
IMM GRANULOCYTES NFR BLD AUTO: 0.2 % (ref 0–0.5)
LYMPHOCYTES # BLD AUTO: 2.14 10*3/MM3 (ref 0.7–3.1)
LYMPHOCYTES NFR BLD AUTO: 37.5 % (ref 19.6–45.3)
MCH RBC QN AUTO: 29.6 PG (ref 26.6–33)
MCHC RBC AUTO-ENTMCNC: 32.2 G/DL (ref 31.5–35.7)
MCV RBC AUTO: 91.9 FL (ref 79–97)
MONOCYTES # BLD AUTO: 0.42 10*3/MM3 (ref 0.1–0.9)
MONOCYTES NFR BLD AUTO: 7.4 % (ref 5–12)
NEUTROPHILS NFR BLD AUTO: 2.92 10*3/MM3 (ref 1.7–7)
NEUTROPHILS NFR BLD AUTO: 51.1 % (ref 42.7–76)
NRBC BLD AUTO-RTO: 0 /100 WBC (ref 0–0.2)
PLATELET # BLD AUTO: 159 10*3/MM3 (ref 140–450)
PMV BLD AUTO: 9.8 FL (ref 6–12)
POTASSIUM SERPL-SCNC: 4.2 MMOL/L (ref 3.5–5.2)
PROT SERPL-MCNC: 5.9 G/DL (ref 6–8.5)
RBC # BLD AUTO: 3.34 10*6/MM3 (ref 3.77–5.28)
SODIUM SERPL-SCNC: 142 MMOL/L (ref 136–145)
WBC NRBC COR # BLD: 5.71 10*3/MM3 (ref 3.4–10.8)

## 2022-02-19 PROCEDURE — 85025 COMPLETE CBC W/AUTO DIFF WBC: CPT | Performed by: INTERNAL MEDICINE

## 2022-02-19 PROCEDURE — 63710000001 INSULIN ASPART PER 5 UNITS: Performed by: INTERNAL MEDICINE

## 2022-02-19 PROCEDURE — 63710000001 INSULIN DETEMIR PER 5 UNITS: Performed by: INTERNAL MEDICINE

## 2022-02-19 PROCEDURE — 82962 GLUCOSE BLOOD TEST: CPT

## 2022-02-19 PROCEDURE — 25010000002 ENOXAPARIN PER 10 MG: Performed by: INTERNAL MEDICINE

## 2022-02-19 PROCEDURE — 63710000001 DIPHENHYDRAMINE PER 50 MG: Performed by: INTERNAL MEDICINE

## 2022-02-19 PROCEDURE — 80053 COMPREHEN METABOLIC PANEL: CPT | Performed by: INTERNAL MEDICINE

## 2022-02-19 RX ADMIN — GABAPENTIN 100 MG: 100 CAPSULE ORAL at 21:18

## 2022-02-19 RX ADMIN — CLOPIDOGREL BISULFATE 75 MG: 75 TABLET, FILM COATED ORAL at 08:31

## 2022-02-19 RX ADMIN — ISOSORBIDE MONONITRATE 30 MG: 30 TABLET, EXTENDED RELEASE ORAL at 08:31

## 2022-02-19 RX ADMIN — DIPHENHYDRAMINE HYDROCHLORIDE 25 MG: 25 CAPSULE ORAL at 21:18

## 2022-02-19 RX ADMIN — INSULIN DETEMIR 12 UNITS: 100 INJECTION, SOLUTION SUBCUTANEOUS at 21:18

## 2022-02-19 RX ADMIN — OXYCODONE 5 MG: 5 TABLET ORAL at 11:07

## 2022-02-19 RX ADMIN — OXYCODONE HYDROCHLORIDE AND ACETAMINOPHEN 500 MG: 500 TABLET ORAL at 08:31

## 2022-02-19 RX ADMIN — LISINOPRIL 2.5 MG: 2.5 TABLET ORAL at 08:31

## 2022-02-19 RX ADMIN — NYSTATIN: 100000 POWDER TOPICAL at 08:32

## 2022-02-19 RX ADMIN — ENOXAPARIN SODIUM 40 MG: 40 INJECTION SUBCUTANEOUS at 21:17

## 2022-02-19 RX ADMIN — Medication 5000 UNITS: at 08:31

## 2022-02-19 RX ADMIN — OXYCODONE 5 MG: 5 TABLET ORAL at 21:18

## 2022-02-19 RX ADMIN — INSULIN ASPART 3 UNITS: 100 INJECTION, SOLUTION INTRAVENOUS; SUBCUTANEOUS at 11:45

## 2022-02-19 RX ADMIN — GUAIFENESIN 1200 MG: 600 TABLET, EXTENDED RELEASE ORAL at 08:31

## 2022-02-19 RX ADMIN — INSULIN ASPART 2 UNITS: 100 INJECTION, SOLUTION INTRAVENOUS; SUBCUTANEOUS at 17:03

## 2022-02-19 RX ADMIN — ROSUVASTATIN CALCIUM 40 MG: 20 TABLET, FILM COATED ORAL at 21:18

## 2022-02-19 RX ADMIN — CETIRIZINE HYDROCHLORIDE 10 MG: 10 TABLET, FILM COATED ORAL at 08:31

## 2022-02-19 RX ADMIN — NYSTATIN: 100000 POWDER TOPICAL at 21:18

## 2022-02-19 RX ADMIN — Medication 1 TABLET: at 08:31

## 2022-02-19 RX ADMIN — GUAIFENESIN 1200 MG: 600 TABLET, EXTENDED RELEASE ORAL at 21:18

## 2022-02-19 RX ADMIN — ASPIRIN 81 MG: 81 TABLET, CHEWABLE ORAL at 08:31

## 2022-02-19 NOTE — PROGRESS NOTES
[facility]  PROGRESS NOTE     Patient Identification:  Name:  Lisa Velazquez  Age:  56 y.o.  Sex:  female  :  1965  MRN:  [id number]  Visit Number:  [id number]  ROOM: [facility]     Primary Care Provider:  Patricia Skelton APRN    Length of stay in inpatient status:  18    Subjective     Chief Compliant: Status post right hip fracture    History of Presenting Illness: 56-year-old female who is status post right hip fracture with intramedullary nail placement, history of acute blood loss anemia, diabetes mellitus, peripheral vascular disease, hypertension hyperlipidemia.      Participated with physical and occupational therapy, Performs bed mobility with modified independence; performs transfer activities and supervision to modified independent; utilizes wheelchair/rolled walker for chair to bed, sit to stand/stand to sit transfer; ambulated 320 feet twice daily with front wheeled walker and supervision with verbal/nonverbal cues; participated with coordination; functional endurance; therapeutic exercise; required modified independence for self-feeding    Objective     Current Hospital Meds:ascorbic acid, 500 mg, Oral, Daily  aspirin, 81 mg, Oral, Daily  cetirizine, 10 mg, Oral, Daily  clopidogrel, 75 mg, Oral, Daily  enoxaparin, 40 mg, Subcutaneous, Nightly  gabapentin, 100 mg, Oral, Nightly  guaiFENesin, 1,200 mg, Oral, Q12H  insulin aspart, 0-7 Units, Subcutaneous, TID AC  insulin detemir, 12 Units, Subcutaneous, Nightly  isosorbide mononitrate, 30 mg, Oral, Q24H  lisinopril, 2.5 mg, Oral, Q24H  multivitamin with minerals, 1 tablet, Oral, Daily  nystatin, , Topical, Q12H  rosuvastatin, 40 mg, Oral, Nightly  vitamin D3, 5,000 Units, Oral, Daily       ----------------------------------------------------------------------------------------------------------------------  Vital Signs:  Temp:  [97.7 °F (36.5 °C)-97.9 °F (36.6 °C)] 97.7 °F (36.5 °C)  Heart Rate:  [83-90] 83  Resp:  [18-20] 18  BP:  (104-151)/(59-61) 104/61  SpO2:  [97 %-98 %] 98 %  on   ;   Device (Oxygen Therapy): room air  Body mass index is 25.22 kg/m².    Wt Readings from Last 3 Encounters:   02/01/22 73 kg (161 lb)     Intake & Output (last 3 days)       02/16 0701  02/17 0700 02/17 0701  02/18 0700 02/18 0701 02/19 0700 02/19 0701  02/20 0700    P.O. [phone]     Total Intake(mL/kg) 1920 (26.3) 720 (9.9) 1920 (26.3)     Net +1920 +720 +1920             Urine Unmeasured Occurrence 3 x 2 x 3 x         Diet Regular; Consistent Carbohydrate  ----------------------------------------------------------------------------------------------------------------------  Physical exam:  Constitutional:   No acute distress, walking with toe touch with good progress  HEENT: Normocephalic atraumatic  Neck: Supple   Cardiovascular: Regular rate and rhythm  Pulmonary/Chest: Clear to auscultation  Abdominal: Positive bowel sounds soft.   Musculoskeletal: Status post hip repair, wound ok  Neurological: No focal deficits  Skin: No rash     ----------------------------------------------------------------------------------------------------------------------    Last echocardiogram:    ----------------------------------------------------------------------------------------------------------------------  Results from last 7 days   Lab Units 02/19/22  0205   WBC 10*3/mm3 5.71   HEMOGLOBIN g/dL 9.9*   HEMATOCRIT % 30.7*   MCV fL 91.9   MCHC g/dL 32.2   PLATELETS 10*3/mm3 159         Results from last 7 days   Lab Units 02/19/22  0205   SODIUM mmol/L 142   POTASSIUM mmol/L 4.2   CHLORIDE mmol/L 108*   CO2 mmol/L 23.6   BUN mg/dL 13   CREATININE mg/dL 0.58   EGFR IF NONAFRICN AM mL/min/1.73 108   CALCIUM mg/dL 9.3   GLUCOSE mg/dL 174*   ALBUMIN g/dL 3.44*   BILIRUBIN mg/dL 0.3   ALK PHOS U/L 84   AST (SGOT) U/L 13   ALT (SGPT) U/L 16   Estimated Creatinine Clearance: 124.8 mL/min (by C-G formula based on SCr of 0.58 mg/dL).  No results found for: AMMONIA               Glucose   Date/Time Value Ref Range Status   02/19/2022 0613 136 (H) 70 - 130 mg/dL Final     Comment:     Meter: [id number] : [id number] Shabbir Mesha   02/18/2022 1627 175 (H) 70 - 130 mg/dL Final     Comment:     Meter: [id number] : [id number] david magalie   02/18/2022 1150 260 (H) 70 - 130 mg/dL Final     Comment:     Meter: [id number] : [id number] Hanh John   02/18/2022 0554 147 (H) 70 - 130 mg/dL Final     Comment:     Meter: [id number] : [id number] Shabbir Mesha   02/17/2022 1629 163 (H) 70 - 130 mg/dL Final     Comment:     Meter: [id number] : [id number] david mejias   02/17/2022 1120 169 (H) 70 - 130 mg/dL Final     Comment:     Meter: [id number] : [id number] liam walker   02/17/2022 0550 158 (H) 70 - 130 mg/dL Final     Comment:     Meter: [id number] : [id number] [place] Crystal   02/16/2022 1626 212 (H) 70 - 130 mg/dL Final     Comment:     Meter: [id number] : [id number] david mejias     No results found for: TSH, FREET4  No results found for: PREGTESTUR, PREGSERUM, HCG, HCGQUANT  Pain Management Panel    There is no flowsheet data to display.       Brief Urine Lab Results     None        No results found for: BLOODCX      No results found for: URINECX  No results found for: WOUNDCX  No results found for: STOOLCX        I have personally looked at the labs and they are summarized above.  ----------------------------------------------------------------------------------------------------------------------  Detailed radiology reports for the last 24 hours:    Imaging Results (Last 24 Hours)     ** No results found for the last 24 hours. **        Final impressions for the last 30 days of radiology reports:    No radiology results for the last 30 days.  I have personally looked at the radiology images and read the final radiology report.    Assessment & Plan      Status post right intertrochanteric hip fracture with intramedullary nail  placement--Participated with physical and occupational therapy: Performs bed mobility with modified independence; performs transfer activities and supervision to modified independent; utilizes wheelchair/rolled walker for chair to bed, sit to stand/stand to sit transfer; ambulated 320 feet twice daily with front wheeled walker and supervision with verbal/nonverbal cues; participated with coordination; functional endurance; therapeutic exercise; required modified independence for self-feeding    Recent acute blood loss anemia--stable    Apparently neurontin has helped significantly with her right foot pain and is resolved now.    Peripheral vascular disease continue medical management    Hypertension controlled    hyperlipidemia continue statin therapy    Gastric erosions--patient was treated with full course of Protonix and treatment for H. pylori prior to coming here.        VTE Prophylaxis:   Mechanical Order History:     None      Pharmalogical Order History:      Ordered     Dose Route Frequency Stop    02/01/22 0806  enoxaparin (LOVENOX) syringe 40 mg         40 mg SC Nightly --              Alejandrina Ann MD  02/19/22  12:47 EST

## 2022-02-20 LAB
GLUCOSE BLDC GLUCOMTR-MCNC: 123 MG/DL (ref 70–130)
GLUCOSE BLDC GLUCOMTR-MCNC: 147 MG/DL (ref 70–130)
GLUCOSE BLDC GLUCOMTR-MCNC: 335 MG/DL (ref 70–130)

## 2022-02-20 PROCEDURE — 63710000001 INSULIN DETEMIR PER 5 UNITS: Performed by: INTERNAL MEDICINE

## 2022-02-20 PROCEDURE — 63710000001 INSULIN ASPART PER 5 UNITS: Performed by: INTERNAL MEDICINE

## 2022-02-20 PROCEDURE — 25010000002 ENOXAPARIN PER 10 MG: Performed by: INTERNAL MEDICINE

## 2022-02-20 PROCEDURE — 82962 GLUCOSE BLOOD TEST: CPT

## 2022-02-20 RX ADMIN — CLOPIDOGREL BISULFATE 75 MG: 75 TABLET, FILM COATED ORAL at 08:45

## 2022-02-20 RX ADMIN — ENOXAPARIN SODIUM 40 MG: 40 INJECTION SUBCUTANEOUS at 20:43

## 2022-02-20 RX ADMIN — Medication 1 TABLET: at 08:44

## 2022-02-20 RX ADMIN — Medication 5000 UNITS: at 08:44

## 2022-02-20 RX ADMIN — INSULIN DETEMIR 12 UNITS: 100 INJECTION, SOLUTION SUBCUTANEOUS at 20:43

## 2022-02-20 RX ADMIN — ROSUVASTATIN CALCIUM 40 MG: 20 TABLET, FILM COATED ORAL at 20:42

## 2022-02-20 RX ADMIN — LISINOPRIL 2.5 MG: 2.5 TABLET ORAL at 08:44

## 2022-02-20 RX ADMIN — OXYCODONE HYDROCHLORIDE AND ACETAMINOPHEN 500 MG: 500 TABLET ORAL at 08:45

## 2022-02-20 RX ADMIN — GUAIFENESIN 1200 MG: 600 TABLET, EXTENDED RELEASE ORAL at 08:45

## 2022-02-20 RX ADMIN — CETIRIZINE HYDROCHLORIDE 10 MG: 10 TABLET, FILM COATED ORAL at 08:45

## 2022-02-20 RX ADMIN — ISOSORBIDE MONONITRATE 30 MG: 30 TABLET, EXTENDED RELEASE ORAL at 08:44

## 2022-02-20 RX ADMIN — GUAIFENESIN 1200 MG: 600 TABLET, EXTENDED RELEASE ORAL at 20:43

## 2022-02-20 RX ADMIN — GABAPENTIN 100 MG: 100 CAPSULE ORAL at 20:43

## 2022-02-20 RX ADMIN — OXYCODONE 5 MG: 5 TABLET ORAL at 20:53

## 2022-02-20 RX ADMIN — ASPIRIN 81 MG: 81 TABLET, CHEWABLE ORAL at 08:45

## 2022-02-20 RX ADMIN — INSULIN ASPART 5 UNITS: 100 INJECTION, SOLUTION INTRAVENOUS; SUBCUTANEOUS at 12:19

## 2022-02-21 ENCOUNTER — APPOINTMENT (OUTPATIENT)
Dept: GENERAL RADIOLOGY | Facility: HOSPITAL | Age: 57
End: 2022-02-21

## 2022-02-21 LAB
GLUCOSE BLDC GLUCOMTR-MCNC: 136 MG/DL (ref 70–130)
GLUCOSE BLDC GLUCOMTR-MCNC: 143 MG/DL (ref 70–130)
GLUCOSE BLDC GLUCOMTR-MCNC: 173 MG/DL (ref 70–130)
GLUCOSE BLDC GLUCOMTR-MCNC: 216 MG/DL (ref 70–130)
QT INTERVAL: 396 MS
QTC INTERVAL: 479 MS
TROPONIN T SERPL-MCNC: <0.01 NG/ML (ref 0–0.03)

## 2022-02-21 PROCEDURE — 73501 X-RAY EXAM HIP UNI 1 VIEW: CPT | Performed by: RADIOLOGY

## 2022-02-21 PROCEDURE — 63710000001 INSULIN DETEMIR PER 5 UNITS: Performed by: INTERNAL MEDICINE

## 2022-02-21 PROCEDURE — 82962 GLUCOSE BLOOD TEST: CPT

## 2022-02-21 PROCEDURE — 97530 THERAPEUTIC ACTIVITIES: CPT | Performed by: OCCUPATIONAL THERAPIST

## 2022-02-21 PROCEDURE — 97530 THERAPEUTIC ACTIVITIES: CPT

## 2022-02-21 PROCEDURE — 97110 THERAPEUTIC EXERCISES: CPT

## 2022-02-21 PROCEDURE — 97535 SELF CARE MNGMENT TRAINING: CPT | Performed by: OCCUPATIONAL THERAPIST

## 2022-02-21 PROCEDURE — 93005 ELECTROCARDIOGRAM TRACING: CPT | Performed by: FAMILY MEDICINE

## 2022-02-21 PROCEDURE — 84484 ASSAY OF TROPONIN QUANT: CPT | Performed by: FAMILY MEDICINE

## 2022-02-21 PROCEDURE — 63710000001 INSULIN ASPART PER 5 UNITS: Performed by: INTERNAL MEDICINE

## 2022-02-21 PROCEDURE — 25010000002 ENOXAPARIN PER 10 MG: Performed by: INTERNAL MEDICINE

## 2022-02-21 PROCEDURE — 97116 GAIT TRAINING THERAPY: CPT

## 2022-02-21 PROCEDURE — 97110 THERAPEUTIC EXERCISES: CPT | Performed by: OCCUPATIONAL THERAPIST

## 2022-02-21 PROCEDURE — 99231 SBSQ HOSP IP/OBS SF/LOW 25: CPT | Performed by: FAMILY MEDICINE

## 2022-02-21 PROCEDURE — 73501 X-RAY EXAM HIP UNI 1 VIEW: CPT

## 2022-02-21 PROCEDURE — 93010 ELECTROCARDIOGRAM REPORT: CPT | Performed by: INTERNAL MEDICINE

## 2022-02-21 RX ADMIN — OXYCODONE 5 MG: 5 TABLET ORAL at 20:11

## 2022-02-21 RX ADMIN — ENOXAPARIN SODIUM 40 MG: 40 INJECTION SUBCUTANEOUS at 20:06

## 2022-02-21 RX ADMIN — OXYCODONE HYDROCHLORIDE AND ACETAMINOPHEN 500 MG: 500 TABLET ORAL at 08:54

## 2022-02-21 RX ADMIN — LISINOPRIL 2.5 MG: 2.5 TABLET ORAL at 08:54

## 2022-02-21 RX ADMIN — GUAIFENESIN 1200 MG: 600 TABLET, EXTENDED RELEASE ORAL at 08:54

## 2022-02-21 RX ADMIN — ROSUVASTATIN CALCIUM 40 MG: 20 TABLET, FILM COATED ORAL at 20:06

## 2022-02-21 RX ADMIN — GUAIFENESIN 1200 MG: 600 TABLET, EXTENDED RELEASE ORAL at 20:06

## 2022-02-21 RX ADMIN — ISOSORBIDE MONONITRATE 30 MG: 30 TABLET, EXTENDED RELEASE ORAL at 08:54

## 2022-02-21 RX ADMIN — CLOPIDOGREL BISULFATE 75 MG: 75 TABLET, FILM COATED ORAL at 08:54

## 2022-02-21 RX ADMIN — INSULIN DETEMIR 12 UNITS: 100 INJECTION, SOLUTION SUBCUTANEOUS at 20:07

## 2022-02-21 RX ADMIN — ASPIRIN 81 MG: 81 TABLET, CHEWABLE ORAL at 08:54

## 2022-02-21 RX ADMIN — GABAPENTIN 100 MG: 100 CAPSULE ORAL at 20:06

## 2022-02-21 RX ADMIN — INSULIN ASPART 3 UNITS: 100 INJECTION, SOLUTION INTRAVENOUS; SUBCUTANEOUS at 16:41

## 2022-02-21 RX ADMIN — INSULIN ASPART 2 UNITS: 100 INJECTION, SOLUTION INTRAVENOUS; SUBCUTANEOUS at 12:40

## 2022-02-21 RX ADMIN — NYSTATIN: 100000 POWDER TOPICAL at 20:13

## 2022-02-21 RX ADMIN — CETIRIZINE HYDROCHLORIDE 10 MG: 10 TABLET, FILM COATED ORAL at 08:54

## 2022-02-21 RX ADMIN — Medication 5000 UNITS: at 08:54

## 2022-02-21 RX ADMIN — Medication 1 TABLET: at 12:39

## 2022-02-21 NOTE — PROGRESS NOTES
[facility]  PROGRESS NOTE     Patient Identification:  Name:  Lisa Velazquez  Age:  56 y.o.  Sex:  female  :  1965  MRN:  [id number]  Visit Number:  [id number]  ROOM: [facility]     Primary Care Provider:  Patricia Skelton APRN    Length of stay in inpatient status:  20    Subjective     Chief Compliant:  No chief complaint on file.      History of Presenting Illness: 56-year-old female who is status post right hip repair.  Patient states she feels a different sensation in the right hip and is unsure as to what is going on at this time.  Patient concerned about this patient also complains of sharp discomfort in her chest but states that there is no pressure there is no shortness of breath no other difficulties at this time.    Objective     Current Hospital Meds:ascorbic acid, 500 mg, Oral, Daily  aspirin, 81 mg, Oral, Daily  cetirizine, 10 mg, Oral, Daily  clopidogrel, 75 mg, Oral, Daily  enoxaparin, 40 mg, Subcutaneous, Nightly  gabapentin, 100 mg, Oral, Nightly  guaiFENesin, 1,200 mg, Oral, Q12H  insulin aspart, 0-7 Units, Subcutaneous, TID AC  insulin detemir, 12 Units, Subcutaneous, Nightly  isosorbide mononitrate, 30 mg, Oral, Q24H  lisinopril, 2.5 mg, Oral, Q24H  multivitamin with minerals, 1 tablet, Oral, Daily  nystatin, , Topical, Q12H  rosuvastatin, 40 mg, Oral, Nightly  vitamin D3, 5,000 Units, Oral, Daily       ----------------------------------------------------------------------------------------------------------------------  Vital Signs:  Temp:  [97.7 °F (36.5 °C)-97.8 °F (36.6 °C)] 97.8 °F (36.6 °C)  Heart Rate:  [81-84] 84  Resp:  [18] 18  BP: (121-127)/(62-75) 127/75  SpO2:  [96 %] 96 %  on   ;   Device (Oxygen Therapy): room air  Body mass index is 25.22 kg/m².    Wt Readings from Last 3 Encounters:   22 73 kg (161 lb)     Intake & Output (last 3 days)        0701   0700  0701   07 0701   0700  0701   0700    P.O. 1920 1800  1440     Total Intake(mL/kg) 1920 (26.3) 1800 (24.7) 1440 (19.7)     Net +1920 +1800 +1440             Urine Unmeasured Occurrence 3 x 2 x 4 x         Diet Regular; Consistent Carbohydrate  ----------------------------------------------------------------------------------------------------------------------  Physical exam:  Constitutional:   No acute distress  HEENT: Normocephalic atraumatic  Neck: Supple   Cardiovascular: Regular rate and rhythm  Pulmonary/Chest: Clear to auscultation  Abdominal: Positive bowel sounds soft.   Musculoskeletal: Right hip--there is no erythema noted.  No point tenderness.  Neurological: No focal deficits  Skin: No rash  Peripheral vascular:  Genitourinary:  ----------------------------------------------------------------------------------------------------------------------    Last echocardiogram:    ----------------------------------------------------------------------------------------------------------------------  Results from last 7 days   Lab Units 02/19/22  0205   WBC 10*3/mm3 5.71   HEMOGLOBIN g/dL 9.9*   HEMATOCRIT % 30.7*   MCV fL 91.9   MCHC g/dL 32.2   PLATELETS 10*3/mm3 159         Results from last 7 days   Lab Units 02/19/22  0205   SODIUM mmol/L 142   POTASSIUM mmol/L 4.2   CHLORIDE mmol/L 108*   CO2 mmol/L 23.6   BUN mg/dL 13   CREATININE mg/dL 0.58   EGFR IF NONAFRICN AM mL/min/1.73 108   CALCIUM mg/dL 9.3   GLUCOSE mg/dL 174*   ALBUMIN g/dL 3.44*   BILIRUBIN mg/dL 0.3   ALK PHOS U/L 84   AST (SGOT) U/L 13   ALT (SGPT) U/L 16   Estimated Creatinine Clearance: 124.8 mL/min (by C-G formula based on SCr of 0.58 mg/dL).  No results found for: AMMONIA              Glucose   Date/Time Value Ref Range Status   02/21/2022 0617 136 (H) 70 - 130 mg/dL Final     Comment:     Meter: [id number] : [id number] Sivakumar BERGER   02/20/2022 1623 147 (H) 70 - 130 mg/dL Final     Comment:     Meter: [id number] : [id number] Tommie ESPINO   02/20/2022 1029 335 (H) 70 - 130 mg/dL  "Final     Comment:     Meter: [id number] : [id number] Tommie ESPINO   02/20/2022 0626 123 70 - 130 mg/dL Final     Comment:     Meter: [id number] : [id number] Ruben Jones   02/19/2022 1633 190 (H) 70 - 130 mg/dL Final     Comment:     Meter: [id number] : [id number] Hanh John   02/19/2022 1050 236 (H) 70 - 130 mg/dL Final     Comment:     Meter: [id number] : [id number] Hanh John   02/19/2022 0613 136 (H) 70 - 130 mg/dL Final     Comment:     Meter: [id number] : [id number] Shabbir Zimmer   02/18/2022 1627 175 (H) 70 - 130 mg/dL Final     Comment:     Meter: [id number] : [id number] david mejias     No results found for: TSH, FREET4  No results found for: PREGTESTUR, PREGSERUM, HCG, HCGQUANT  Pain Management Panel    There is no flowsheet data to display.       Brief Urine Lab Results     None        No results found for: BLOODCX      No results found for: URINECX  No results found for: WOUNDCX  No results found for: STOOLCX        I have personally looked at the labs and they are summarized above.  ----------------------------------------------------------------------------------------------------------------------  Detailed radiology reports for the last 24 hours:    Imaging Results (Last 24 Hours)     ** No results found for the last 24 hours. **        Final impressions for the last 30 days of radiology reports:    No radiology results for the last 30 days.  I have personally looked at the radiology images and read the final radiology report.    Assessment & Plan    Status post right hip fracture--we'll obtain x-ray of the right hip and pelvis today.  Do not think that there is any significant change at this time.  Cannot explain the difference in \"sensation\" for patient at this time.  Patient independent for transfers; ambulated 200 feet, 100 feet in the morning and 100 feet in the afternoon last week with front wheel walker.  Patient requiring contact-guard for help with " ADLs.    Atypical chest pain--EKG shows left bundle branch block.  We'll attempt to obtain old EKGs as we do not have any old EKGs and on the chart.  Check troponin level as well at this time.  Have low index of suspicion the patient having acute cardiac issues at this time    Anemia stable    Peripheral vascular disease medical management    Pretension controlled    Hyperlipidemia continue statin therapy    Gastric erosions continue Protonix.      VTE Prophylaxis:   Mechanical Order History:     None      Pharmalogical Order History:      Ordered     Dose Route Frequency Stop    02/01/22 6756  enoxaparin (LOVENOX) syringe 40 mg         40 mg SC Nightly --                    Rob Camarillo MD  [facility]  02/21/22  09:41 EST

## 2022-02-21 NOTE — THERAPY TREATMENT NOTE
Inpatient Rehabilitation - Physical Therapy Treatment Note        [place]     Patient Name: Lisa Velazquez  : 1965  MRN: [id number]    Today's Date: 2022                    Admit Date: 2022      Visit Dx:     ICD-10-CM ICD-9-CM   1. S/P right hip fracture  Z87.81 V15.51       Patient Active Problem List   Diagnosis   • S/P right hip fracture       Past Medical History:   Diagnosis Date   • Coronary artery disease    • Diabetes mellitus (HCC)    • Elevated cholesterol    • GERD (gastroesophageal reflux disease)    • History of transfusion    • Hypertension        Past Surgical History:   Procedure Laterality Date   • ABDOMINAL SURGERY      gallbladder removal   • CARDIAC CATHETERIZATION     • CARDIAC SURGERY      stent   • COLONOSCOPY     • FRACTURE SURGERY     • TOE AMPUTATION Right     3rd toe right foot       PT ASSESSMENT (last 12 hours)     IRF PT Evaluation and Treatment     Row Name 22 1425          PT Time and Intention    Document Type daily treatment  -RG     Mode of Treatment individual therapy; physical therapy  -RG     Patient/Family/Caregiver Comments/Observations Pt and nursing in agreement for skilled PT on this date. Pt was able to ambulate outside today with frquent rest breaks.  -RG     Row Name 22 1425          General Information    Patient Profile Reviewed yes  -RG     Existing Precautions/Restrictions fall  -RG     Row Name 22 1425          Cognition/Psychosocial    Affect/Mental Status (Cognitive) WFL  -RG     Follows Commands (Cognition) verbal cues/prompting required  -RG     Personal Safety Interventions gait belt; nonskid shoes/slippers when out of bed; fall prevention program maintained  -RG     Row Name 22 1425          Pain Scale: FACES Pre/Post-Treatment    Pain: FACES Scale, Pretreatment 2-->hurts little bit  -RG     Posttreatment Pain Rating 4-->hurts little more  -RG     Row Name 22 1425          Bed Mobility    Supine-Sit [place]  (Bed Mobility) modified independence  -RG     Sit-Supine [place] (Bed Mobility) modified independence  -RG     Assistive Device (Bed Mobility) bed rails  -RG     Row Name 02/21/22 1425          Transfer Assessment/Treatment    Transfers car transfer  -RG     Row Name 02/21/22 1425          Transfers    Bed-Chair [place] (Transfers) supervision; modified independence  -RG     Chair-Bed [place] (Transfers) supervision; modified independence  -RG     Assistive Device (Bed-Chair Transfers) wheelchair; walker, front-wheeled  -RG     Sit-Stand [place] (Transfers) supervision; modified independence  -RG     Stand-Sit [place] (Transfers) supervision; modified independence  -RG     Row Name 02/21/22 1425          Chair-Bed Transfer    Assistive Device (Chair-Bed Transfers) wheelchair; walker, front-wheeled  -RG     Row Name 02/21/22 1425          Sit-Stand Transfer    Assistive Device (Sit-Stand Transfers) walker, front-wheeled  -RG     Row Name 02/21/22 1425          Stand-Sit Transfer    Assistive Device (Stand-Sit Transfers) walker, front-wheeled  -RG     Row Name 02/21/22 1425          Gait/Stairs (Locomotion)    [place] Level (Gait) verbal cues; nonverbal cues (demo/gesture); supervision  -RG     Assistive Device (Gait) walker, front-wheeled  -RG     Distance in Feet (Gait) 200, 120 in am and 200' x 2 pm outside  -RG     Pattern (Gait) step-through  -RG     Deviations/Abnormal Patterns (Gait) antalgic; bc decreased; gait speed decreased; stride length decreased  -RG     Bilateral Gait Deviations forward flexed posture  -RG     Comment (Gait/Stairs) Frequent rest breaks  -RG     Row Name 02/21/22 1425          Wheelchair Mobility/Management    Method of Wheelchair Locomotion (Mobility) bimanual (upper extremity) propulsion  -RG     Mobility Activities (Wheelchair) forward propulsion  -RG     Row Name 02/21/22 1425          Safety Issues, Functional Mobility    Impairments Affecting  Function (Mobility) balance; endurance/activity tolerance; pain; range of motion (ROM); strength  -RG     Row Name 02/21/22 1425          Hip (Therapeutic Exercise)    Hip Strengthening (Therapeutic Exercise) bilateral; flexion; aBduction; aDduction; marching while seated; sitting; resistance band; green; 10 repetitions; 2 sets  -RG     Row Name 02/21/22 1425          Knee (Therapeutic Exercise)    Knee Strengthening (Therapeutic Exercise) bilateral; flexion; extension; marching while seated; LAQ (long arc quad); sitting; resistance band; green; 10 repetitions; 2 sets  -RG     Row Name 02/21/22 1425          Ankle (Therapeutic Exercise)    Ankle Strengthening (Therapeutic Exercise) bilateral; dorsiflexion; plantarflexion; sitting; 10 repetitions; 2 sets  -RG     Row Name 02/21/22 1425          Aerobic Exercise    Type (Aerobic Exercise) recumbent stationary bike  -RG     Time Performed (Aerobic Exercise) 11  -RG     Comment, Aerobic Exercise (Therapeutic Exercise) L 2  -RG     Row Name 02/21/22 1425          IRF PT Goals    Bed Mobility Goal Selection (PT-IRF) bed mobility, PT goal 1  -RG     Transfer Goal Selection (PT-IRF) transfers, PT goal 1  -RG     Gait (Walking Locomotion) Goal Selection (PT-IRF) gait, PT goal 1  -RG     Row Name 02/21/22 1425          Bed Mobility Goal 1 (PT-IRF)    Activity/Assistive Device (Bed Mobility Goal 1, PT-IRF) sit to supine/supine to sit  -RG     [place] Level (Bed Mobility Goal 1, PT-IRF) independent  -RG     Time Frame (Bed Mobility Goal 1, PT-IRF) by discharge  -RG     Progress/Outcomes (Bed Mobility Goal 1, PT-IRF) goal ongoing  -RG     Row Name 02/21/22 1425          Transfer Goal 1 (PT-IRF)    Activity/Assistive Device (Transfer Goal 1, PT-IRF) sit-to-stand/stand-to-sit; bed-to-chair/chair-to-bed  -RG     [place] Level (Transfer Goal 1, PT-IRF) modified independence  -RG     Time Frame (Transfer Goal 1, PT-IRF) by discharge  -RG     Progress/Outcomes (Transfer Goal  1, PT-IRF) goal ongoing  -RG     Row Name 02/21/22 1425          Gait/Walking Locomotion Goal 1 (PT-IRF)    Activity/Assistive Device (Gait/Walking Locomotion Goal 1, PT-IRF) walker, rolling  -RG     Gait/Walking Locomotion Distance Goal 1 (PT-IRF) 300'  -RG     [place] Level (Gait/Walking Locomotion Goal 1, PT-IRF) supervision required  -RG     Time Frame (Gait/Walking Locomotion Goal 1, PT-IRF) by discharge  -RG     Progress/Outcomes (Gait/Walking Locomotion Goal 1, PT-IRF) goal ongoing  -RG     Row Name 02/21/22 1425          Positioning and Restraints    Pre-Treatment Position sitting in chair/recliner  -RG     Post Treatment Position wheelchair  -RG     In Bed notified nsg; sitting; with OT  -RG     Row Name 02/21/22 1425          Therapy Assessment/Plan (PT)    Patient's Goals For Discharge return home; take care of myself at home  -RG     Row Name 02/21/22 1425          Therapy Assessment/Plan (PT)    Rehab Potential/Prognosis (PT) adequate, monitor progress closely  -RG     Frequency of Treatment (PT) 5 times per week  -RG     Estimated Duration of Therapy (PT) 2 weeks  -RG     Problem List (PT) balance; mobility; range of motion (ROM); strength; pain  -RG     Activity Limitations Related to Problem List (PT) unable to ambulate safely; unable to transfer safely  -RG     Row Name 02/21/22 1425          Daily Progress Summary (PT)    Impairments Still Limiting Function (PT) strength decreased; impaired functional activity tolerance; pain  -RG     Row Name 02/21/22 1425          Therapy Plan Review/Discharge Plan (PT)    Anticipated Equipment Needs at Discharge (PT Eval) --  tbd  -RG     Expected Discharge Disposition (PT Eval) home with home health care  -RG           User Key  (r) = Recorded By, (t) = Taken By, (c) = Cosigned By    Initials Name Provider Type    RG Chidi Harris PTA Physical Therapy Assistant                 Physical Therapy Education                 Title: PT OT SLP Therapies (Done)      Topic: Physical Therapy (Done)     Point: Mobility training (Done)     Learning Progress Summary           Patient Acceptance, E,D, VU,NR by  at 2/21/2022 1432      Show all documentation for this point (20)                 Point: Home exercise program (Done)     Learning Progress Summary           Patient Acceptance, E,D, VU,NR by RG at 2/21/2022 1432      Show all documentation for this point (20)                 Point: Body mechanics (Done)     Learning Progress Summary           Patient Acceptance, E,D, VU,NR by  at 2/21/2022 1432      Show all documentation for this point (20)                 Point: Precautions (Done)     Learning Progress Summary           Patient Acceptance, E,D, VU,NR by  at 2/21/2022 1432      Show all documentation for this point (20)                             User Key     Initials Effective Dates Name Provider Type Discipline     06/16/21 -  Chidi Harris PTA Physical Therapy Assistant PT                PT Recommendation and Plan    Frequency of Treatment (PT): 5 times per week  Anticipated Equipment Needs at Discharge (PT Eval):  (tbd)  Daily Progress Summary (PT)  Impairments Still Limiting Function (PT): strength decreased, impaired functional activity tolerance, pain               Time Calculation:      PT Charges     Row Name 02/21/22 1434 02/21/22 1433          Time Calculation    Start Time 1330  - 1000  -     Stop Time 1415  -RG 1045  -RG     Time Calculation (min) 45 min  -RG 45 min  -RG     PT Received On 02/21/22  - 02/21/22  -RG            Time Calculation- PT    Total Timed Code Minutes- PT 45 minute(s)  -RG 45 minute(s)  -RG           User Key  (r) = Recorded By, (t) = Taken By, (c) = Cosigned By    Initials Name Provider Type     Chidi Harris PTA Physical Therapy Assistant                Therapy Charges for Today     Code Description Service Date Service Provider Modifiers Qty    96024442955 HC GAIT TRAINING EA 15 MIN 2/21/2022 Chidi Harris PTA  GP, CQ 2    [id number]  PT THERAPEUTIC ACT EA 15 MIN 2/21/2022 Chidi Harris PTA GP, CQ 2    [id number]  PT THER PROC EA 15 MIN 2/21/2022 Chidi Harris PTA GP, CQ 2                   Chidi Harris, JESSICA  2/21/2022

## 2022-02-21 NOTE — PLAN OF CARE
Goal Outcome Evaluation:  Plan of Care Reviewed With: patient        Progress: improving  Outcome Summary: patient up with therapies this am. xray and ekg obtained. ekg reports from previous facilities in process of being obtained. will continue to monitor. continue plan of care.

## 2022-02-21 NOTE — THERAPY TREATMENT NOTE
Inpatient Rehabilitation - Occupational Therapy Treatment Note     [place]     Patient Name: Lisa Velazquez  : 1965  MRN: [id number]    Today's Date: 2022                 Admit Date: 2022         ICD-10-CM ICD-9-CM   1. S/P right hip fracture  Z87.81 V15.51       Patient Active Problem List   Diagnosis   • S/P right hip fracture       Past Medical History:   Diagnosis Date   • Coronary artery disease    • Diabetes mellitus (HCC)    • Elevated cholesterol    • GERD (gastroesophageal reflux disease)    • History of transfusion    • Hypertension        Past Surgical History:   Procedure Laterality Date   • ABDOMINAL SURGERY      gallbladder removal   • CARDIAC CATHETERIZATION     • CARDIAC SURGERY      stent   • COLONOSCOPY     • FRACTURE SURGERY     • TOE AMPUTATION Right     3rd toe right foot             IRF OT ASSESSMENT FLOWSHEET (last 12 hours)     IRF OT Evaluation and Treatment     Row Name 22 1500          OT Time and Intention    Document Type daily treatment  -     Mode of Treatment individual therapy; occupational therapy  -     Patient Effort good  -     Row Name 22 1500          General Information    Patient/Family/Caregiver Comments/Observations patient agreeable to therapy. patient tolerated therapy well improving with self care and strength  -     Existing Precautions/Restrictions fall; hip  -     Row Name 22 1500          Cognition/Psychosocial    Affect/Mental Status (Cognitive) WFL  -     Row Name 22 1500          Transfers    [place] Level (Toilet Transfer) supervision; verbal cues  -     Row Name 22 1500          Toilet Transfer    Type (Toilet Transfer) stand pivot/stand step  -     Row Name 22 1500          Motor Skills    Motor Skills coordination; functional endurance; therapeutic exercise  -     Therapeutic Exercise --  BUE ROM, reaching, fmc,gmc,   -     Row Name 22 1500          Bathing    [place]  Level (Bathing) supervision  -     Row Name 02/21/22 1500          Upper Body Dressing    [place] Level (Upper Body Dressing) modified independence; set up assistance  -     Row Name 02/21/22 1500          Lower Body Dressing    [place] Level (Lower Body Dressing) supervision; set up  -     Row Name 02/21/22 1500          Grooming    [place] Level (Grooming) modified independence  -     Row Name 02/21/22 1500          Toileting    [place] Level (Toileting) supervision; set up assistance  -           User Key  (r) = Recorded By, (t) = Taken By, (c) = Cosigned By    Initials Name Effective Dates     Rukhsana Dixon, OT 06/16/21 -                  Occupational Therapy Education                 Title: PT OT SLP Therapies (Done)     Topic: Occupational Therapy (Done)     Point: ADL training (Done)     Description:   Instruct learner(s) on proper safety adaptation and remediation techniques during self care or transfers.   Instruct in proper use of assistive devices.              Learning Progress Summary           Patient Acceptance, E,TB, VU by  at 2/19/2022 2043      Show all documentation for this point (5)                 Point: Home exercise program (Done)     Description:   Instruct learner(s) on appropriate technique for monitoring, assisting and/or progressing therapeutic exercises/activities.              Learning Progress Summary           Patient Acceptance, E,TB, VU by DG at 2/19/2022 2043      Show all documentation for this point (5)                 Point: Body mechanics (Done)     Description:   Instruct learner(s) on proper positioning and spine alignment during self-care, functional mobility activities and/or exercises.              Learning Progress Summary           Patient Acceptance, E,TB, VU by  at 2/19/2022 2043      Show all documentation for this point (5)                             User Key     Initials Effective Dates Name Provider Type Discipline     DG 06/16/21 -  Karen Miranda, RN Registered Nurse Nurse                    OT Recommendation and Plan    Planned Therapy Interventions (OT): activity tolerance training, adaptive equipment training, BADL retraining, patient/caregiver education/training, ROM/therapeutic exercise, strengthening exercise, transfer/mobility retraining                    Time Calculation:      Time Calculation- OT     Row Name 02/21/22 1535 02/21/22 1534          Time Calculation- OT    OT Start Time 1415  - 0745  -     OT Stop Time 1515  - 0830  -     OT Time Calculation (min) 60 min  - 45 min  -           User Key  (r) = Recorded By, (t) = Taken By, (c) = Cosigned By    Initials Name Provider Type     Rukhsana Dixon OT Occupational Therapist              Therapy Charges for Today     Code Description Service Date Service Provider Modifiers Qty    21156149657 HC OT THERAPEUTIC ACT EA 15 MIN 2/21/2022 Rukhsana Dixon OT GO 2    37427205437 HC OT SELF CARE/MGMT/TRAIN EA 15 MIN 2/21/2022 Rukhsana Dixon OT GO 3    [id number]  OT THER PROC EA 15 MIN 2/21/2022 Rukhsana Dixon OT GO 2                   Rukhsana Dixon OT  2/21/2022

## 2022-02-21 NOTE — PROGRESS NOTES
Inpatient Rehabilitation Functional Measures Assessment and Plan of Care    Plan of Care  Self Care    [OT] Toileting(Resolved)  Current Status(02/21/2022): sup  Weekly Goal(02/23/2022): mod ind  Discharge Goal: set up    Functional Measures  KIRAN Eating:  KIRAN Grooming:  KIRAN Bathing:  KIRAN Upper Body Dressing:  KIRAN Lower Body Dressing:  KIRAN Toileting:    KIRAN Bladder Management  Level of Assistance:  Frequency/Number of Accidents this Shift:    KIRAN Bowel Management  Level of Assistance:  Frequency/Number of Accidents this Shift:    KIRAN Bed/Chair/Wheelchair Transfer:  KIRAN Toilet Transfer:  KIRAN Tub/Shower Transfer:    Previously Documented Mode of Locomotion at Discharge:  [facility] Expected Mode of Locomotion at Discharge:  KIRAN Walk/Wheelchair:  KIRAN Stairs:    KIRAN Comprehension:  KIRAN Expression:  KIRAN Social Interaction:  [facility] Problem Solving:  KIRAN Memory:    Therapy Mode Minutes  Occupational Therapy: Individual: 105 minutes.  Physical Therapy:  Speech Language Pathology:    Discharge Functional Goals:    Signed by: Candie Dixon, Occupational Therapist

## 2022-02-21 NOTE — PROGRESS NOTES
Occupational Therapy:    Physical Therapy: Individual: 90 minutes.    Speech Language Pathology:    Signed by: Chidi Harris PTA

## 2022-02-22 LAB
GLUCOSE BLDC GLUCOMTR-MCNC: 143 MG/DL (ref 70–130)
GLUCOSE BLDC GLUCOMTR-MCNC: 166 MG/DL (ref 70–130)
GLUCOSE BLDC GLUCOMTR-MCNC: 228 MG/DL (ref 70–130)

## 2022-02-22 PROCEDURE — 63710000001 INSULIN ASPART PER 5 UNITS: Performed by: INTERNAL MEDICINE

## 2022-02-22 PROCEDURE — 97110 THERAPEUTIC EXERCISES: CPT | Performed by: OCCUPATIONAL THERAPIST

## 2022-02-22 PROCEDURE — 63710000001 DIPHENHYDRAMINE PER 50 MG: Performed by: INTERNAL MEDICINE

## 2022-02-22 PROCEDURE — 97530 THERAPEUTIC ACTIVITIES: CPT | Performed by: OCCUPATIONAL THERAPIST

## 2022-02-22 PROCEDURE — 97110 THERAPEUTIC EXERCISES: CPT

## 2022-02-22 PROCEDURE — 25010000002 ENOXAPARIN PER 10 MG: Performed by: INTERNAL MEDICINE

## 2022-02-22 PROCEDURE — 82962 GLUCOSE BLOOD TEST: CPT

## 2022-02-22 PROCEDURE — 97116 GAIT TRAINING THERAPY: CPT

## 2022-02-22 PROCEDURE — 97535 SELF CARE MNGMENT TRAINING: CPT | Performed by: OCCUPATIONAL THERAPIST

## 2022-02-22 PROCEDURE — 63710000001 INSULIN DETEMIR PER 5 UNITS: Performed by: INTERNAL MEDICINE

## 2022-02-22 PROCEDURE — 97530 THERAPEUTIC ACTIVITIES: CPT

## 2022-02-22 RX ADMIN — GABAPENTIN 100 MG: 100 CAPSULE ORAL at 20:47

## 2022-02-22 RX ADMIN — CLOPIDOGREL BISULFATE 75 MG: 75 TABLET, FILM COATED ORAL at 09:35

## 2022-02-22 RX ADMIN — GUAIFENESIN 1200 MG: 600 TABLET, EXTENDED RELEASE ORAL at 09:35

## 2022-02-22 RX ADMIN — ROSUVASTATIN CALCIUM 40 MG: 20 TABLET, FILM COATED ORAL at 20:46

## 2022-02-22 RX ADMIN — CETIRIZINE HYDROCHLORIDE 10 MG: 10 TABLET, FILM COATED ORAL at 09:35

## 2022-02-22 RX ADMIN — Medication 5000 UNITS: at 09:35

## 2022-02-22 RX ADMIN — INSULIN DETEMIR 12 UNITS: 100 INJECTION, SOLUTION SUBCUTANEOUS at 21:26

## 2022-02-22 RX ADMIN — Medication 1 TABLET: at 09:35

## 2022-02-22 RX ADMIN — ISOSORBIDE MONONITRATE 30 MG: 30 TABLET, EXTENDED RELEASE ORAL at 09:35

## 2022-02-22 RX ADMIN — LISINOPRIL 2.5 MG: 2.5 TABLET ORAL at 09:35

## 2022-02-22 RX ADMIN — ENOXAPARIN SODIUM 40 MG: 40 INJECTION SUBCUTANEOUS at 20:46

## 2022-02-22 RX ADMIN — OXYCODONE HYDROCHLORIDE AND ACETAMINOPHEN 500 MG: 500 TABLET ORAL at 09:35

## 2022-02-22 RX ADMIN — OXYCODONE 5 MG: 5 TABLET ORAL at 20:47

## 2022-02-22 RX ADMIN — INSULIN ASPART 2 UNITS: 100 INJECTION, SOLUTION INTRAVENOUS; SUBCUTANEOUS at 09:34

## 2022-02-22 RX ADMIN — DIPHENHYDRAMINE HYDROCHLORIDE 25 MG: 25 CAPSULE ORAL at 20:46

## 2022-02-22 RX ADMIN — ASPIRIN 81 MG: 81 TABLET, CHEWABLE ORAL at 09:35

## 2022-02-22 RX ADMIN — NYSTATIN: 100000 POWDER TOPICAL at 21:31

## 2022-02-22 RX ADMIN — INSULIN ASPART 3 UNITS: 100 INJECTION, SOLUTION INTRAVENOUS; SUBCUTANEOUS at 12:39

## 2022-02-22 RX ADMIN — GUAIFENESIN 1200 MG: 600 TABLET, EXTENDED RELEASE ORAL at 20:46

## 2022-02-22 NOTE — SIGNIFICANT NOTE
02/22/22 1000   Plan   Plan Team conference held today.  Spoke to pt about plans for discharge on 2-23-22, how she has progressed in therapy, recommendations for PT/OT to provide home exercise programs, rolling walker and bedside commode.  Pt says she has RW and BSC at home.  Pt contacted son Luis Fernando and gave phone to SS.  Informed son about plans for pt to be discharged on 2-23-22 with home exercise programs from PT/OT and pt having DME at home.  Son says his wife Tameka will be staying with pt during the day.  Pt says her sister Shayy Flores will stay in the evenings/nights with her.  Son will come to rehab around 10:00 am for pt's discharge instructions and transportation home.  Pt is returning to her home at discharge.

## 2022-02-22 NOTE — THERAPY TREATMENT NOTE
Inpatient Rehabilitation - Physical Therapy Treatment Note        [place]     Patient Name: Lisa Velazquez  : 1965  MRN: [id number]    Today's Date: 2022                    Admit Date: 2022      Visit Dx:     ICD-10-CM ICD-9-CM   1. S/P right hip fracture  Z87.81 V15.51       Patient Active Problem List   Diagnosis   • S/P right hip fracture       Past Medical History:   Diagnosis Date   • Coronary artery disease    • Diabetes mellitus (HCC)    • Elevated cholesterol    • GERD (gastroesophageal reflux disease)    • History of transfusion    • Hypertension        Past Surgical History:   Procedure Laterality Date   • ABDOMINAL SURGERY      gallbladder removal   • CARDIAC CATHETERIZATION     • CARDIAC SURGERY      stent   • COLONOSCOPY     • FRACTURE SURGERY     • TOE AMPUTATION Right     3rd toe right foot       PT ASSESSMENT (last 12 hours)     IRF PT Evaluation and Treatment     Row Name 22 1455          PT Time and Intention    Document Type daily treatment  -RG     Mode of Treatment individual therapy; physical therapy  -RG     Patient/Family/Caregiver Comments/Observations Pt and nursing in agreement for skilled PT. No c/o or concerns.  -RG     Row Name 22 1455          General Information    Patient Profile Reviewed yes  -RG     Existing Precautions/Restrictions fall  -RG     Row Name 22 1455          Cognition/Psychosocial    Affect/Mental Status (Cognitive) WFL  -RG     Follows Commands (Cognition) verbal cues/prompting required  -RG     Personal Safety Interventions gait belt; nonskid shoes/slippers when out of bed; fall prevention program maintained  -RG     Row Name 22 1455          Pain Scale: FACES Pre/Post-Treatment    Pain: FACES Scale, Pretreatment 2-->hurts little bit  -RG     Posttreatment Pain Rating 4-->hurts little more  -RG     Row Name 22 1455          Bed Mobility    Supine-Sit [place] (Bed Mobility) modified independence  -RG     Sit-Supine  [place] (Bed Mobility) modified independence  -RG     Assistive Device (Bed Mobility) bed rails  -RG     Row Name 02/22/22 1455          Transfer Assessment/Treatment    Transfers car transfer  -RG     Row Name 02/22/22 1455          Transfers    Bed-Chair [place] (Transfers) supervision; modified independence  -RG     Chair-Bed [place] (Transfers) supervision; modified independence  -RG     Assistive Device (Bed-Chair Transfers) wheelchair; walker, front-wheeled  -RG     Sit-Stand [place] (Transfers) supervision; modified independence  -RG     Stand-Sit [place] (Transfers) supervision; modified independence  -RG     Row Name 02/22/22 1455          Chair-Bed Transfer    Assistive Device (Chair-Bed Transfers) wheelchair; walker, front-wheeled  -RG     Row Name 02/22/22 1455          Sit-Stand Transfer    Assistive Device (Sit-Stand Transfers) walker, front-wheeled  -RG     Row Name 02/22/22 1455          Stand-Sit Transfer    Assistive Device (Stand-Sit Transfers) walker, front-wheeled  -RG     Row Name 02/22/22 1455          Gait/Stairs (Locomotion)    [place] Level (Gait) verbal cues; nonverbal cues (demo/gesture); supervision  -RG     Assistive Device (Gait) walker, front-wheeled  -RG     Distance in Feet (Gait) 320 in am and 320 pm  -RG     Pattern (Gait) step-through  -RG     Deviations/Abnormal Patterns (Gait) antalgic; bc decreased; gait speed decreased; stride length decreased  -RG     Bilateral Gait Deviations forward flexed posture  -RG     Comment (Gait/Stairs) rest breaks as needed  -RG     Row Name 02/22/22 1455          Wheelchair Mobility/Management    Method of Wheelchair Locomotion (Mobility) bimanual (upper extremity) propulsion  -RG     Mobility Activities (Wheelchair) forward propulsion  -RG     Row Name 02/22/22 1455          Safety Issues, Functional Mobility    Impairments Affecting Function (Mobility) balance; endurance/activity tolerance; pain; range of  motion (ROM); strength  -RG     Row Name 02/22/22 1455          Hip (Therapeutic Exercise)    Hip Strengthening (Therapeutic Exercise) bilateral; flexion; aBduction; aDduction; marching while seated; sitting; resistance band; green; 10 repetitions; 2 sets; heel slides; supine  -RG     Row Name 02/22/22 1455          Knee (Therapeutic Exercise)    Knee Strengthening (Therapeutic Exercise) bilateral; flexion; extension; marching while seated; LAQ (long arc quad); SAQ (short arc quad); SLR (straight leg raise); sitting; supine; resistance band; green; 10 repetitions; 2 sets  -RG     Row Name 02/22/22 1455          Ankle (Therapeutic Exercise)    Ankle Strengthening (Therapeutic Exercise) bilateral; dorsiflexion; plantarflexion; sitting; 10 repetitions; 2 sets  -RG     Row Name 02/22/22 1455          Aerobic Exercise    Type (Aerobic Exercise) recumbent stationary bike  -RG     Time Performed (Aerobic Exercise) 10  -RG     Comment, Aerobic Exercise (Therapeutic Exercise) L2  -RG     Row Name 02/22/22 1455          IRF PT Goals    Bed Mobility Goal Selection (PT-IRF) bed mobility, PT goal 1  -RG     Transfer Goal Selection (PT-IRF) transfers, PT goal 1  -RG     Gait (Walking Locomotion) Goal Selection (PT-IRF) gait, PT goal 1  -RG     Row Name 02/22/22 1455          Bed Mobility Goal 1 (PT-IRF)    Activity/Assistive Device (Bed Mobility Goal 1, PT-IRF) sit to supine/supine to sit  -RG     [place] Level (Bed Mobility Goal 1, PT-IRF) independent  -RG     Time Frame (Bed Mobility Goal 1, PT-IRF) by discharge  -RG     Progress/Outcomes (Bed Mobility Goal 1, PT-IRF) goal ongoing  -RG     Row Name 02/22/22 1455          Transfer Goal 1 (PT-IRF)    Activity/Assistive Device (Transfer Goal 1, PT-IRF) sit-to-stand/stand-to-sit; bed-to-chair/chair-to-bed  -RG     [place] Level (Transfer Goal 1, PT-IRF) modified independence  -RG     Time Frame (Transfer Goal 1, PT-IRF) by discharge  -RG     Progress/Outcomes (Transfer  Goal 1, PT-IRF) goal ongoing  -RG     Row Name 02/22/22 1455          Gait/Walking Locomotion Goal 1 (PT-IRF)    Activity/Assistive Device (Gait/Walking Locomotion Goal 1, PT-IRF) walker, rolling  -RG     Gait/Walking Locomotion Distance Goal 1 (PT-IRF) 300'  -RG     [place] Level (Gait/Walking Locomotion Goal 1, PT-IRF) supervision required  -RG     Time Frame (Gait/Walking Locomotion Goal 1, PT-IRF) by discharge  -RG     Progress/Outcomes (Gait/Walking Locomotion Goal 1, PT-IRF) goal ongoing  -RG     Row Name 02/22/22 1455          Positioning and Restraints    Pre-Treatment Position sitting in chair/recliner  -RG     Post Treatment Position wheelchair  -RG     In Wheelchair notified nsg; sitting  in wc throughout facility  -RG     Row Name 02/22/22 1455          Therapy Assessment/Plan (PT)    Patient's Goals For Discharge return home; take care of myself at home  -RG     Row Name 02/22/22 1455          Therapy Assessment/Plan (PT)    Rehab Potential/Prognosis (PT) adequate, monitor progress closely  -RG     Frequency of Treatment (PT) 5 times per week  -RG     Estimated Duration of Therapy (PT) 2 weeks  -RG     Problem List (PT) balance; mobility; range of motion (ROM); strength; pain  -RG     Activity Limitations Related to Problem List (PT) unable to ambulate safely; unable to transfer safely  -RG     Row Name 02/22/22 1455          Daily Progress Summary (PT)    Impairments Still Limiting Function (PT) strength decreased; impaired functional activity tolerance; pain  -RG     Row Name 02/22/22 1455          Therapy Plan Review/Discharge Plan (PT)    Anticipated Equipment Needs at Discharge (PT Eval) --  tbd  -RG     Expected Discharge Disposition (PT Eval) home with home health care  -RG           User Key  (r) = Recorded By, (t) = Taken By, (c) = Cosigned By    Initials Name Provider Type    RG Chidi Harris PTA Physical Therapy Assistant                 Physical Therapy Education                  Title: PT OT SLP Therapies (Done)     Topic: Physical Therapy (Done)     Point: Mobility training (Done)     Learning Progress Summary           Patient Acceptance, E,D, VU,NR by  at 2/22/2022 1500      Show all documentation for this point (21)                 Point: Home exercise program (Done)     Learning Progress Summary           Patient Acceptance, E,D, VU,NR by  at 2/22/2022 1500      Show all documentation for this point (21)                 Point: Body mechanics (Done)     Learning Progress Summary           Patient Acceptance, E,D, VU,NR by  at 2/22/2022 1500      Show all documentation for this point (21)                 Point: Precautions (Done)     Learning Progress Summary           Patient Acceptance, E,D, VU,NR by  at 2/22/2022 1500      Show all documentation for this point (21)                             User Key     Initials Effective Dates Name Provider Type Discipline     06/16/21 -  Chidi Harris PTA Physical Therapy Assistant PT                PT Recommendation and Plan    Frequency of Treatment (PT): 5 times per week  Anticipated Equipment Needs at Discharge (PT Eval):  (tbd)  Daily Progress Summary (PT)  Impairments Still Limiting Function (PT): strength decreased, impaired functional activity tolerance, pain               Time Calculation:      PT Charges     Row Name 02/22/22 1501 02/22/22 1500          Time Calculation    Start Time 1245  - 1000  -     Stop Time 1330  -RG 1045  -RG     Time Calculation (min) 45 min  -RG 45 min  -RG     PT Received On 02/22/22  -RG 02/22/22  -RG            Time Calculation- PT    Total Timed Code Minutes- PT 45 minute(s)  -RG 45 minute(s)  -RG           User Key  (r) = Recorded By, (t) = Taken By, (c) = Cosigned By    Initials Name Provider Type     Chidi Harris PTA Physical Therapy Assistant                Therapy Charges for Today     Code Description Service Date Service Provider Modifiers Qty    71642193053 HC GAIT TRAINING EA 15  MIN 2/21/2022 Chidi Harris PTA GP, CQ 2    69365112992 HC PT THERAPEUTIC ACT EA 15 MIN 2/21/2022 Chidi Harris PTA GP, CQ 2    94312434333 HC PT THER PROC EA 15 MIN 2/21/2022 Chidi Harris, PTA GP, CQ 2    34463646936 HC GAIT TRAINING EA 15 MIN 2/22/2022 Chidi Harris, PTA GP, CQ 2    82511478506 HC PT THERAPEUTIC ACT EA 15 MIN 2/22/2022 Chidi Harris PTA GP, CQ 2    00868560774 HC PT THER PROC EA 15 MIN 2/22/2022 Chidi Harris PTA GP, CQ 2                   Chidi Harris PTA  2/22/2022

## 2022-02-22 NOTE — THERAPY TREATMENT NOTE
Inpatient Rehabilitation - Occupational Therapy Treatment Note     [place]     Patient Name: Lisa Velazquez  : 1965  MRN: [id number]    Today's Date: 2022                 Admit Date: 2022         ICD-10-CM ICD-9-CM   1. S/P right hip fracture  Z87.81 V15.51       Patient Active Problem List   Diagnosis   • S/P right hip fracture       Past Medical History:   Diagnosis Date   • Coronary artery disease    • Diabetes mellitus (HCC)    • Elevated cholesterol    • GERD (gastroesophageal reflux disease)    • History of transfusion    • Hypertension        Past Surgical History:   Procedure Laterality Date   • ABDOMINAL SURGERY      gallbladder removal   • CARDIAC CATHETERIZATION     • CARDIAC SURGERY      stent   • COLONOSCOPY     • FRACTURE SURGERY     • TOE AMPUTATION Right     3rd toe right foot             IRF OT ASSESSMENT FLOWSHEET (last 12 hours)     IRF OT Evaluation and Treatment     Row Name 22 1400          OT Time and Intention    Document Type daily treatment  -     Mode of Treatment individual therapy; occupational therapy  -     Patient Effort good  -     Row Name 22 1400          General Information    Patient/Family/Caregiver Comments/Observations patient continues to improve with strength, endurance and self care. patient tolerated therapy well with no complaints  -     Existing Precautions/Restrictions fall; hip  -     Row Name 22 1400          Motor Skills    Motor Skills functional endurance; coordination; therapeutic exercise  -     Therapeutic Exercise --  UE ROM, UE bike, gmc,fmc,reaching, functional endurance  -     Row Name 22 1400          Grooming    [place] Level (Grooming) modified independence  -           User Key  (r) = Recorded By, (t) = Taken By, (c) = Cosigned By    Initials Name Effective Dates     Rukhsana Dixon, OT 21 -                  Occupational Therapy Education                 Title: PT OT SLP  Therapies (Done)     Topic: Occupational Therapy (Done)     Point: ADL training (Done)     Description:   Instruct learner(s) on proper safety adaptation and remediation techniques during self care or transfers.   Instruct in proper use of assistive devices.              Learning Progress Summary           Patient Acceptance, E,TB, VU by  at 2/19/2022 2043      Show all documentation for this point (5)                 Point: Home exercise program (Done)     Description:   Instruct learner(s) on appropriate technique for monitoring, assisting and/or progressing therapeutic exercises/activities.              Learning Progress Summary           Patient Acceptance, E,TB, VU by  at 2/19/2022 2043      Show all documentation for this point (5)                 Point: Body mechanics (Done)     Description:   Instruct learner(s) on proper positioning and spine alignment during self-care, functional mobility activities and/or exercises.              Learning Progress Summary           Patient Acceptance, E,TB, VU by  at 2/19/2022 2043      Show all documentation for this point (5)                             User Key     Initials Effective Dates Name Provider Type [facility] 06/16/21 -  Karen Miranda, RN Registered Nurse Nurse                    OT Recommendation and Plan    Planned Therapy Interventions (OT): activity tolerance training, adaptive equipment training, BADL retraining, patient/caregiver education/training, ROM/therapeutic exercise, strengthening exercise, transfer/mobility retraining                    Time Calculation:      Time Calculation- OT     Row Name 02/22/22 1453 02/22/22 1452          Time Calculation- OT    OT Start Time 1330  - 0745  -     OT Stop Time 1415  - 0830  -     OT Time Calculation (min) 45 min  - 45 min  -           User Key  (r) = Recorded By, (t) = Taken By, (c) = Cosigned By    Initials Name Provider Type     Rukhsana Dixon, OT Occupational Therapist               Therapy Charges for Today     Code Description Service Date Service Provider Modifiers Qty    49248953878 HC OT THERAPEUTIC ACT EA 15 MIN 2/21/2022 Rukhsana Dixon, OT GO 2    03321323829 HC OT SELF CARE/MGMT/TRAIN EA 15 MIN 2/21/2022 Rukhsana Dioxn, OT GO 3    50495494042 HC OT THER PROC EA 15 MIN 2/21/2022 Rukhsana Dixon, OT GO 2    64509602168 HC OT THERAPEUTIC ACT EA 15 MIN 2/22/2022 Rukhsana Dixon, OT GO 2    52087987914 HC OT THER PROC EA 15 MIN 2/22/2022 Rukhsana Dixon, OT GO 1    25283054539 HC OT SELF CARE/MGMT/TRAIN EA 15 MIN 2/22/2022 Rukhsana Dixon, OT GO 3                   Rukhsana Dixon OT  2/22/2022

## 2022-02-22 NOTE — PLAN OF CARE
Problem: Rehabilitation (IRF) Plan of Care  Goal: Plan of Care Review  Outcome: Ongoing, Progressing  Goal: Patient-Specific Goal (Individualized)  Outcome: Ongoing, Progressing  Goal: Absence of New-Onset Illness or Injury  Outcome: Ongoing, Progressing  Intervention: Prevent Fall and Fall Injury  Recent Flowsheet Documentation  Taken 2/21/2022 2000 by Jenna Morfin RN  Safety Promotion/Fall Prevention:   assistive device/personal items within reach   safety round/check completed  Intervention: Prevent Infection  Recent Flowsheet Documentation  Taken 2/21/2022 2000 by Jenna Morfin RN  Infection Prevention: rest/sleep promoted  Intervention: Prevent VTE (Venous Thromboembolism)  Recent Flowsheet Documentation  Taken 2/21/2022 2000 by Jenna Morfin RN  VTE Prevention/Management: (see MAR) bleeding risk factor(s) identified  Goal: Optimal Comfort and Wellbeing  Outcome: Ongoing, Progressing  Goal: Home and Community Transition Plan Established  Outcome: Ongoing, Progressing     Problem: Fall Injury Risk  Goal: Absence of Fall and Fall-Related Injury  Outcome: Ongoing, Progressing  Intervention: Promote Injury-Free Environment  Recent Flowsheet Documentation  Taken 2/21/2022 2000 by Jenna Morfin RN  Safety Promotion/Fall Prevention:   assistive device/personal items within reach   safety round/check completed     Problem: Skin Injury Risk Increased  Goal: Skin Health and Integrity  Outcome: Ongoing, Progressing   Goal Outcome Evaluation:

## 2022-02-23 VITALS
OXYGEN SATURATION: 98 % | TEMPERATURE: 98 F | RESPIRATION RATE: 18 BRPM | HEART RATE: 81 BPM | BODY MASS INDEX: 25.27 KG/M2 | HEIGHT: 67 IN | DIASTOLIC BLOOD PRESSURE: 81 MMHG | WEIGHT: 161 LBS | SYSTOLIC BLOOD PRESSURE: 120 MMHG

## 2022-02-23 LAB
GLUCOSE BLDC GLUCOMTR-MCNC: 118 MG/DL (ref 70–130)
GLUCOSE BLDC GLUCOMTR-MCNC: 232 MG/DL (ref 70–130)

## 2022-02-23 PROCEDURE — 82962 GLUCOSE BLOOD TEST: CPT

## 2022-02-23 PROCEDURE — 99238 HOSP IP/OBS DSCHRG MGMT 30/<: CPT | Performed by: FAMILY MEDICINE

## 2022-02-23 PROCEDURE — 97530 THERAPEUTIC ACTIVITIES: CPT

## 2022-02-23 RX ORDER — POLYETHYLENE GLYCOL 3350 17 G/17G
17 POWDER, FOR SOLUTION ORAL DAILY PRN
Qty: 30 EACH | Refills: 1 | Status: SHIPPED | OUTPATIENT
Start: 2022-02-23 | End: 2022-03-25

## 2022-02-23 RX ORDER — ASPIRIN 81 MG/1
81 TABLET ORAL DAILY
Qty: 30 TABLET | Refills: 0 | Status: SHIPPED | OUTPATIENT
Start: 2022-02-23 | End: 2022-03-25

## 2022-02-23 RX ORDER — MULTIPLE VITAMINS W/ MINERALS TAB 9MG-400MCG
1 TAB ORAL DAILY
Qty: 30 TABLET | Refills: 0 | Status: SHIPPED | OUTPATIENT
Start: 2022-02-24 | End: 2022-03-26

## 2022-02-23 RX ORDER — ISOSORBIDE MONONITRATE 30 MG/1
30 TABLET, EXTENDED RELEASE ORAL
Qty: 30 TABLET | Refills: 0 | Status: ON HOLD | OUTPATIENT
Start: 2022-02-24 | End: 2022-06-09

## 2022-02-23 RX ORDER — LISINOPRIL 2.5 MG/1
2.5 TABLET ORAL
Qty: 30 TABLET | Refills: 0 | Status: ON HOLD | OUTPATIENT
Start: 2022-02-24 | End: 2022-06-09

## 2022-02-23 RX ORDER — OXYCODONE HYDROCHLORIDE 5 MG/1
5 TABLET ORAL EVERY 6 HOURS PRN
Qty: 24 TABLET | Refills: 0 | Status: SHIPPED | OUTPATIENT
Start: 2022-02-23 | End: 2022-03-01

## 2022-02-23 RX ORDER — DIPHENHYDRAMINE HCL 25 MG
25 CAPSULE ORAL EVERY 6 HOURS PRN
Qty: 30 CAPSULE | Refills: 0 | Status: SHIPPED | OUTPATIENT
Start: 2022-02-23 | End: 2022-03-25

## 2022-02-23 RX ORDER — ASCORBIC ACID 500 MG
500 TABLET ORAL DAILY
Qty: 30 TABLET | Refills: 0 | Status: SHIPPED | OUTPATIENT
Start: 2022-02-24 | End: 2022-03-26

## 2022-02-23 RX ORDER — ROSUVASTATIN CALCIUM 40 MG/1
40 TABLET, COATED ORAL NIGHTLY
Qty: 30 TABLET | Refills: 0 | Status: ON HOLD | OUTPATIENT
Start: 2022-02-23 | End: 2022-06-09

## 2022-02-23 RX ORDER — INSULIN GLARGINE 100 [IU]/ML
12 INJECTION, SOLUTION SUBCUTANEOUS NIGHTLY
Qty: 10 ML | Refills: 12 | Status: ON HOLD | OUTPATIENT
Start: 2022-02-23 | End: 2022-06-09

## 2022-02-23 RX ORDER — GABAPENTIN 100 MG/1
100 CAPSULE ORAL NIGHTLY
Qty: 30 CAPSULE | Refills: 0 | Status: ON HOLD | OUTPATIENT
Start: 2022-02-23 | End: 2022-06-09

## 2022-02-23 RX ORDER — CLOPIDOGREL BISULFATE 75 MG/1
75 TABLET ORAL DAILY
Qty: 30 TABLET | Refills: 0 | Status: SHIPPED | OUTPATIENT
Start: 2022-02-23 | End: 2022-03-25

## 2022-02-23 RX ORDER — BISACODYL 5 MG/1
10 TABLET, DELAYED RELEASE ORAL DAILY PRN
Qty: 10 TABLET | Refills: 0 | Status: SHIPPED | OUTPATIENT
Start: 2022-02-23 | End: 2022-03-25

## 2022-02-23 RX ADMIN — ISOSORBIDE MONONITRATE 30 MG: 30 TABLET, EXTENDED RELEASE ORAL at 08:18

## 2022-02-23 RX ADMIN — GUAIFENESIN 1200 MG: 600 TABLET, EXTENDED RELEASE ORAL at 08:19

## 2022-02-23 RX ADMIN — Medication 5000 UNITS: at 08:18

## 2022-02-23 RX ADMIN — ASPIRIN 81 MG: 81 TABLET, CHEWABLE ORAL at 08:18

## 2022-02-23 RX ADMIN — NYSTATIN: 100000 POWDER TOPICAL at 08:18

## 2022-02-23 RX ADMIN — Medication 1 TABLET: at 08:18

## 2022-02-23 RX ADMIN — CETIRIZINE HYDROCHLORIDE 10 MG: 10 TABLET, FILM COATED ORAL at 08:19

## 2022-02-23 RX ADMIN — LISINOPRIL 2.5 MG: 2.5 TABLET ORAL at 08:18

## 2022-02-23 RX ADMIN — OXYCODONE HYDROCHLORIDE AND ACETAMINOPHEN 500 MG: 500 TABLET ORAL at 08:18

## 2022-02-23 RX ADMIN — CLOPIDOGREL BISULFATE 75 MG: 75 TABLET, FILM COATED ORAL at 08:18

## 2022-02-23 NOTE — THERAPY DISCHARGE NOTE
Inpatient Rehabilitation - Physical Therapy Treatment Note/Discharge  TONI Cardwell     Patient Name: Lisa Velazquez  : 1965  MRN: [id number]  Today's Date: 2022                Admit Date: 2022    Visit Dx:    ICD-10-CM ICD-9-CM   1. Neuropathic pain  M79.2 729.2   2. S/P right hip fracture  Z87.81 V15.51     Patient Active Problem List   Diagnosis   • S/P right hip fracture     Past Medical History:   Diagnosis Date   • Coronary artery disease    • Diabetes mellitus (HCC)    • Elevated cholesterol    • GERD (gastroesophageal reflux disease)    • History of transfusion    • Hypertension      Past Surgical History:   Procedure Laterality Date   • ABDOMINAL SURGERY      gallbladder removal   • CARDIAC CATHETERIZATION     • CARDIAC SURGERY      stent   • COLONOSCOPY     • FRACTURE SURGERY     • TOE AMPUTATION Right     3rd toe right foot       PT ASSESSMENT (last 12 hours)     IRF PT Evaluation and Treatment     Row Name 22 1507          PT Time and Intention    Document Type discharge evaluation  -RG     Mode of Treatment individual therapy; physical therapy  -RG     Patient/Family/Caregiver Comments/Observations Pt DC on this date with [facility], [facility], and Home Safety.  -RG     Row Name 22 1507          General Information    Patient Profile Reviewed yes  -RG     Existing Precautions/Restrictions fall  -RG     Row Name 22 1507          Cognition/Psychosocial    Affect/Mental Status (Cognitive) WFL  -RG     Follows Commands (Cognition) verbal cues/prompting required  -RG     Row Name 22 1507          Pain Scale: FACES Pre/Post-Treatment    Pain: FACES Scale, Pretreatment 2-->hurts little bit  -RG     Posttreatment Pain Rating 4-->hurts little more  -RG     Row Name 22 1507          Bed Mobility    Supine-Sit [place] (Bed Mobility) modified independence  -RG     Sit-Supine [place] (Bed Mobility) modified independence  -RG     Assistive Device (Bed Mobility) bed rails   -RG     Row Name 02/23/22 1507          Transfer Assessment/Treatment    Transfers car transfer  -RG     Row Name 02/23/22 1507          Transfers    Bed-Chair [place] (Transfers) supervision; modified independence  -RG     Chair-Bed [place] (Transfers) supervision; modified independence  -RG     Assistive Device (Bed-Chair Transfers) wheelchair; walker, front-wheeled  -RG     Sit-Stand [place] (Transfers) supervision; modified independence  -RG     Stand-Sit [place] (Transfers) supervision; modified independence  -RG     Row Name 02/23/22 1507          Chair-Bed Transfer    Assistive Device (Chair-Bed Transfers) wheelchair; walker, front-wheeled  -RG     Row Name 02/23/22 1507          Sit-Stand Transfer    Assistive Device (Sit-Stand Transfers) walker, front-wheeled  -RG     Row Name 02/23/22 1507          Stand-Sit Transfer    Assistive Device (Stand-Sit Transfers) walker, front-wheeled  -RG     Row Name 02/23/22 1507          Gait/Stairs (Locomotion)    [place] Level (Gait) verbal cues; nonverbal cues (demo/gesture); supervision  -RG     Assistive Device (Gait) walker, front-wheeled  -RG     Pattern (Gait) step-through  -RG     Deviations/Abnormal Patterns (Gait) antalgic; bc decreased; gait speed decreased; stride length decreased  -RG     Bilateral Gait Deviations forward flexed posture  -RG     Row Name 02/23/22 1507          Wheelchair Mobility/Management    Method of Wheelchair Locomotion (Mobility) bimanual (upper extremity) propulsion  -     Mobility Activities (Wheelchair) forward propulsion  -     Row Name 02/23/22 1507          Safety Issues, Functional Mobility    Impairments Affecting Function (Mobility) balance; endurance/activity tolerance; pain; range of motion (ROM); strength  -     Row Name 02/23/22 1507          IRF PT Goals    Bed Mobility Goal Selection (PT-IRF) bed mobility, PT goal 1  -RG     Transfer Goal Selection (PT-IRF) transfers, PT goal 1  -RG      Gait (Walking Locomotion) Goal Selection (PT-IRF) gait, PT goal 1  -RG     Row Name 02/23/22 1507          Bed Mobility Goal 1 (PT-IRF)    Activity/Assistive Device (Bed Mobility Goal 1, PT-IRF) sit to supine/supine to sit  -RG     [place] Level (Bed Mobility Goal 1, PT-IRF) independent  -RG     Time Frame (Bed Mobility Goal 1, PT-IRF) by discharge  -RG     Progress/Outcomes (Bed Mobility Goal 1, PT-IRF) goal met  -RG     Row Name 02/23/22 1507          Transfer Goal 1 (PT-IRF)    Activity/Assistive Device (Transfer Goal 1, PT-IRF) sit-to-stand/stand-to-sit; bed-to-chair/chair-to-bed  -RG     [place] Level (Transfer Goal 1, PT-IRF) modified independence  -RG     Time Frame (Transfer Goal 1, PT-IRF) by discharge  -RG     Progress/Outcomes (Transfer Goal 1, PT-IRF) goal met  -RG     Row Name 02/23/22 1507          Gait/Walking Locomotion Goal 1 (PT-IRF)    Activity/Assistive Device (Gait/Walking Locomotion Goal 1, PT-IRF) walker, rolling  -RG     Gait/Walking Locomotion Distance Goal 1 (PT-IRF) 300'  -RG     [place] Level (Gait/Walking Locomotion Goal 1, PT-IRF) supervision required  -RG     Time Frame (Gait/Walking Locomotion Goal 1, PT-IRF) by discharge  -RG     Progress/Outcomes (Gait/Walking Locomotion Goal 1, PT-IRF) goal met  -RG     Row Name 02/23/22 1509          Therapy Assessment/Plan (PT)    Patient's Goals For Discharge return home; take care of myself at home  -RG     Row Name 02/23/22 1505          Therapy Assessment/Plan (PT)    Rehab Potential/Prognosis (PT) adequate, monitor progress closely  -RG     Frequency of Treatment (PT) 5 times per week  -RG     Estimated Duration of Therapy (PT) 2 weeks  -RG     Problem List (PT) balance; mobility; range of motion (ROM); strength; pain  -RG     Activity Limitations Related to Problem List (PT) unable to ambulate safely; unable to transfer safely  -RG     Row Name 02/23/22 150          Daily Progress Summary (PT)    Impairments Still Limiting  Function (PT) strength decreased; impaired functional activity tolerance; pain  -     Row Name 02/23/22 1507          Therapy Plan Review/Discharge Plan (PT)    Anticipated Equipment Needs at Discharge (PT Eval) --  tbd  -     Expected Discharge Disposition (PT Eval) home with home health care  -           User Key  (r) = Recorded By, (t) = Taken By, (c) = Cosigned By    Initials Name Provider Type    RG Chidi Harris PTA Physical Therapy Assistant                Physical Therapy Education                 Title: PT OT SLP Therapies (Done)     Topic: Physical Therapy (Resolved)     Point: Mobility training (Resolved)     Learning Progress Summary           Patient Acceptance, E,D, VU,NR by  at 2/22/2022 1500      Show all documentation for this point (21)                 Point: Home exercise program (Resolved)     Learning Progress Summary           Patient Acceptance, E,D, VU,NR by  at 2/22/2022 1500      Show all documentation for this point (21)                 Point: Body mechanics (Resolved)     Learning Progress Summary           Patient Acceptance, E,D, VU,NR by  at 2/22/2022 1500      Show all documentation for this point (21)                 Point: Precautions (Resolved)     Learning Progress Summary           Patient Acceptance, E,D, VU,NR by  at 2/22/2022 1500      Show all documentation for this point (21)                             User Key     Initials Effective Dates Name Provider Type Discipline     06/16/21 -  Chidi Harris PTA Physical Therapy Assistant PT                PT Recommendation and Plan        Daily Progress Summary (PT)  Impairments Still Limiting Function (PT): strength decreased, impaired functional activity tolerance, pain         Time Calculation:    PT Charges     Row Name 02/23/22 1510             Time Calculation    PT Received On 02/23/22  -              Time Calculation- PT    Total Timed Code Minutes- PT 15 minute(s)  -            User Key  (r) =  Recorded By, (t) = Taken By, (c) = Cosigned By    Initials Name Provider Type    Chidi Lerner PTA Physical Therapy Assistant                Therapy Charges for Today     Code Description Service Date Service Provider Modifiers Qty    13005533300 HC GAIT TRAINING EA 15 MIN 2/22/2022 Chidi Harris PTA GP, CQ 2    44969982654 HC PT THERAPEUTIC ACT EA 15 MIN 2/22/2022 Chidi Harris PTA GP, CQ 2    10780084463 HC PT THER PROC EA 15 MIN 2/22/2022 Chidi Harris PTA GP, CQ 2    80864463017 HC PT THERAPEUTIC ACT EA 15 MIN 2/23/2022 Chidi Harris PTA GP, CQ 1               PT Discharge Summary  Reason for Discharge: Discharge from facility  Outcomes Achieved: Able to achieve all goals within established timeline  Discharge Destination: Home with assist    Chidi Harris PTA  2/23/2022        68 F PMHX HTN, gastric bypass 10 years ago, presents to ED for sudden onset epigastric/RUQ abd pain about 7 hours PTA after she had eaten lunch. Pain descirbed as twisting, waxing waning, non radiating. Also w/ nausea and spitting up but no vomiting. Denies fever, dysuria, diarrhea, cp, or sob. States last BM and flatus was this morning.

## 2022-02-23 NOTE — SIGNIFICANT NOTE
02/23/22 1050   Plan   Plan Spoke to pt and son about discharge.  PT/OT will provide home exercise programs.  Pt has RW and BSC at home.  Son will transport pt home today and says his spouse will stay with pt during the day.  Pt's sister Shayy is suppose to stay evenings/nights.  No other needs identified at this time.   Patient/Family in Agreement with Plan yes   Final Discharge Disposition Code 01 - home or self-care

## 2022-02-23 NOTE — PROGRESS NOTES
Occupational Therapy:    Physical Therapy: Individual: 90 minutes.    Speech Language Pathology:    Signed by: Felicia Lundberg, Supervisor

## 2022-02-23 NOTE — DISCHARGE SUMMARY
[facility] DISCHARGE SUMMARY    Patient Identification:  Name:  Lisa Velazquez  Age:  56 y.o.  Sex:  female  :  1965  MRN:  [id number]  Visit Number:  [id number]    Date of Admission: 2022  Date of Discharge:  2022     PCP: Patricia Skelton, APRMARICRUZ    DISCHARGE DIAGNOSIS  Status post right intertrochanteric hip fracture status post hip nailing  Acute blood loss anemia  Coronary artery disease  Peripheral arterial disease  Diabetes mellitus  Hypertension  Hyperlipidemia    CONSULTS       PROCEDURES PERFORMED      HOSPITAL COURSE  Patient is a 56 y.o. female presented to [facility] acute inpatient physical rehab in transfer from [facility].  Patient admitted initially admitted to [facility] for left arterial stenting but after states she fell and fractured her right hip due to orthostatic hypotension.  Patient was taken to the OR for TFN hip nailing for fixation of by basocervical intertrochanteric fracture.  Patient is weightbearing as tolerated.  Patient did have acute blood loss anemia hemoglobin was 7.1 and was transfused with 1 unit packed red blood cells.  Patient also does have significant coronary disease and is waiting for the hip to heal more before proceeding with CABG.  Patient improved medically at [facility] was transferred to our service for further treatment.    Right hip fracture--at the time of admission, patient required minimum assist for bed mobility; contact-guard for transfers; required contact-guard for help with ambulation was able to walk 40 feet x 2 and 80 feet and 60 feet in the afternoon with front wheel walker.  At the time admission, patient required moderate assistance for help with bathing; supervision for upper body dressing; maximum assistance for lower body dressing; supervision for grooming; dependent for toileting.  At the time of discharge, patient requiring supervision for transfers; ambulated 320  feet monitoring 20 feet in the afternoon with front wheel walker.  Patient with modified independence for ADLs except for supervision with toileting.    Coronary artery disease--has been stable throughout the admission.  Patient did have EKG during the admission which showed a left bundle branch block which is chronic.  Troponins were negative during the admission.  Have recommended patient follow-up with cardiology next week    Peripheral arterial disease stable throughout admission continue medical management    Diabetes mellitus well controlled throughout the admission    Hypertension controlled throughout the admission    Hyperlipidemia continue statin therapy      VITAL SIGNS:  Temp:  [97.8 °F (36.6 °C)-98 °F (36.7 °C)] 98 °F (36.7 °C)  Heart Rate:  [81-84] 81  Resp:  [18] 18  BP: (120-122)/(75-81) 120/81  SpO2:  [97 %-98 %] 98 %  on   ;   Device (Oxygen Therapy): room air    Body mass index is 25.22 kg/m².  Wt Readings from Last 3 Encounters:   02/01/22 73 kg (161 lb)       PHYSICAL EXAM:  Constitutional: No acute distress  HEENT: Normocephalic atraumatic  Neck: Supple  Cardiovascular: Regular rate and rhythm  Pulmonary/Chest: Clear to auscultation  Abdominal: Positive bowel sounds soft.   Musculoskeletal: Status post hip repair  Neurological: No focal deficits  Skin: No rash  Peripheral vascular:  Genitourinary::    DISCHARGE DISPOSITION   Stable    DISCHARGE MEDICATIONS:     Discharge Medications      New Medications      Instructions Start Date   ascorbic acid 500 MG tablet  Commonly known as: VITAMIN C   500 mg, Oral, Daily   Start Date: February 24, 2022     bisacodyl 5 MG EC tablet  Commonly known as: DULCOLAX   Take 2 tablets by mouth Daily As Needed for Constipation.      diphenhydrAMINE 25 mg capsule  Commonly known as: BENADRYL   Take 1 capsule by mouth Every 6 (Six) Hours As Needed for Allergies.      gabapentin 100 MG capsule  Commonly known as: NEURONTIN   100 mg, Oral, Nightly      insulin aspart  100 UNIT/ML injection  Commonly known as: novoLOG  Notes to patient: Blood glucose 150-199 mg/dL - 2 units  Blood glucose 200-249 mg/dL - 3 units  Blood glucose 250-299 mg/dL - 4 units  Blood glucose 300-349 mg/dL - 5 units  Blood glucose 350-400 mg/dL - 6 units  Blood glucose greater than 400 mg/dL - 7 units and call provider   Inject 0-7 Units under the skin into the appropriate area as directed 3 (Three) Times a Day Before Meals.      isosorbide mononitrate 30 MG 24 hr tablet  Commonly known as: IMDUR   30 mg, Oral, Every 24 Hours Scheduled   Start Date: February 24, 2022     multivitamin with minerals tablet tablet   1 tablet, Oral, Daily   Start Date: February 24, 2022     oxyCODONE 5 MG immediate release tablet  Commonly known as: ROXICODONE   Take 1 tablet by mouth Every 6 (Six) Hours As Needed for Moderate Pain for up to 6 days.      polyethylene glycol 17 g packet  Commonly known as: MIRALAX   Mix 17 grams (1 packet) of powder in 4 to 8 ounces of liquid and take by mouth Daily As Needed (constipation) for up to 30 days.         Changes to Medications      Instructions Start Date   insulin glargine 100 UNIT/ML injection  Commonly known as: LANTUS, SEMGLEE  What changed:   · how much to take  · when to take this   12 Units, Subcutaneous, Nightly      lisinopril 2.5 MG tablet  Commonly known as: YRISZESTRIL  What changed:   · medication strength  · how much to take  · when to take this   2.5 mg, Oral, Every 24 Hours Scheduled   Start Date: February 24, 2022        Continue These Medications      Instructions Start Date   alendronate 70 MG tablet  Commonly known as: FOSAMAX   70 mg, Oral, Every 7 Days      aspirin 81 MG EC tablet   81 mg, Oral, Daily      clopidogrel 75 MG tablet  Commonly known as: PLAVIX   75 mg, Oral, Daily      rosuvastatin 40 MG tablet  Commonly known as: CRESTOR   40 mg, Oral, Nightly      vitamin D 1.25 MG (19815 UT) capsule capsule  Commonly known as: ERGOCALCIFEROL   50,000 Units,  Oral, Weekly         Stop These Medications    bismuth-metronidazole-tetracycline 140-125-125 MG per capsule  Commonly known as: PYLERA     omeprazole 20 MG capsule  Commonly known as: priLOSEC             Your medication list      START taking these medications      Instructions Last Dose Given Next Dose Due   ascorbic acid 500 MG tablet  Commonly known as: VITAMIN C  Start taking on: February 24, 2022      Take 1 tablet by mouth Daily for 30 days.       bisacodyl 5 MG EC tablet  Commonly known as: DULCOLAX      Take 2 tablets by mouth Daily As Needed for Constipation for up to 30 days.       diphenhydrAMINE 25 mg capsule  Commonly known as: BENADRYL      Take 1 capsule by mouth Every 6 (Six) Hours As Needed for Allergies for up to 30 days.       gabapentin 100 MG capsule  Commonly known as: NEURONTIN      Take 1 capsule by mouth Every Night for 30 days.       insulin aspart 100 UNIT/ML injection  Commonly known as: novoLOG      Inject 0-7 Units under the skin into the appropriate area as directed 3 (Three) Times a Day Before Meals for 30 days.       isosorbide mononitrate 30 MG 24 hr tablet  Commonly known as: IMDUR  Start taking on: February 24, 2022      Take 1 tablet by mouth Daily for 30 days.       multivitamin with minerals tablet tablet  Start taking on: February 24, 2022      Take 1 tablet by mouth Daily for 30 days.       oxyCODONE 5 MG immediate release tablet  Commonly known as: ROXICODONE      Take 1 tablet by mouth Every 6 (Six) Hours As Needed for Moderate Pain  for up to 6 days.       polyethylene glycol 17 g packet  Commonly known as: MIRALAX      mix 17 grams of powder in 4 to 8 ounces of liquid and take by mouth Daily As Needed (constipation) for up to 30 days.          CHANGE how you take these medications      Instructions Last Dose Given Next Dose Due   insulin glargine 100 UNIT/ML injection  Commonly known as: LANTUS, SEMGLEE  What changed:   · how much to take  · when to take this       Inject 12 Units under the skin into the appropriate area as directed Every Night.       lisinopril 2.5 MG tablet  Commonly known as: PRINSAMANTAILZESTRIL  Start taking on: February 24, 2022  What changed:   · medication strength  · how much to take  · when to take this      Take 1 tablet by mouth Daily for 30 days.          CONTINUE taking these medications      Instructions Last Dose Given Next Dose Due   alendronate 70 MG tablet  Commonly known as: FOSAMAX      Take 70 mg by mouth Every 7 (Seven) Days.       aspirin 81 MG EC tablet      Take 1 tablet by mouth Daily for 30 days.       clopidogrel 75 MG tablet  Commonly known as: PLAVIX      Take 1 tablet by mouth Daily for 30 days.       rosuvastatin 40 MG tablet  Commonly known as: CRESTOR      Take 1 tablet by mouth Every Night for 30 days.       vitamin D 1.25 MG (13237 UT) capsule capsule  Commonly known as: ERGOCALCIFEROL      Take 50,000 Units by mouth 1 (One) Time Per Week.          STOP taking these medications    bismuth-metronidazole-tetracycline 140-125-125 MG per capsule  Commonly known as: PYLERA        omeprazole 20 MG capsule  Commonly known as: priLOSEC              Where to Get Your Medications      These medications were sent to [facility]  [address], [place] [address]    Hours: 8AM-6PM Mon-Fri Phone: [phone]   · ascorbic acid 500 MG tablet  · aspirin 81 MG EC tablet  · bisacodyl 5 MG EC tablet  · clopidogrel 75 MG tablet  · diphenhydrAMINE 25 mg capsule  · gabapentin 100 MG capsule  · insulin aspart 100 UNIT/ML injection  · insulin glargine 100 UNIT/ML injection  · isosorbide mononitrate 30 MG 24 hr tablet  · lisinopril 2.5 MG tablet  · multivitamin with minerals tablet tablet  · oxyCODONE 5 MG immediate release tablet  · polyethylene glycol 17 g packet  · rosuvastatin 40 MG tablet         Diet Instructions     Diet: Cardiac, Consistent Carbohydrate      Discharge Diet:  Cardiac  Consistent Carbohydrate           Activity  Instructions    Per PT/OT instructions         No future appointments.  Additional Instructions for the Follow-ups that You Need to Schedule     Discharge Follow-up with Specified Provider: Dr Reed; 1 Week   As directed      To: Dr Reed    Follow Up: 1 Week    Follow Up Details: followup of cad/ascvd         Discharge Follow-up with Specified Provider: Dr Malone; 1 Week   As directed      To: Dr Malone    Follow Up: 1 Week    Follow Up Details: followup of hip fx            Follow-up Information     Patricia Skelton, RAVIN. Go on 3/1/2022.    Specialties: Nurse Practitioner, Family Medicine  Why: at 4:00 PM for hospital follow up.   Contact information:  [address]  [address] [place] [address]  [phone]             Dr. Geovany Malone. Go on 3/1/2022.    Why: at 11:00 AM for orthopedic surgery follow up.  You will need to bring your ID, insurance cards, and medication list.  Contact information:  [address] [facility], [place] [id number] [phone]           Jaciel Reed MD. Go on 3/9/2022.    Specialty: Cardiology  Why: at 2:00 PM for follow up CAD/ASCVD  Contact information:  [address] [facility] [address]  [facility] [address] [phone]                          TEST  RESULTS PENDING AT DISCHARGE       CODE STATUS  Code Status and Medical Interventions:   Ordered at: 02/02/22 1159     Level Of Support Discussed With:    Patient     Code Status (Patient has no pulse and is not breathing):    CPR (Attempt to Resuscitate)     Medical Interventions (Patient has pulse or is breathing):    Full Support       Rob Camarillo MD  [facility]  02/23/22  10:53 EST    Please note that this discharge summary required less than 30 minutes to complete.

## 2022-02-23 NOTE — PROGRESS NOTES
Patient Assessment Instrument  Quality Indicators - Discharge    Section GG. Self-Care Performance      Section GG. Mobility Performance      Section J. Health Conditions Discharge  Fall(s) Since Admission:  No    Section M. Skin Conditions Discharge  Unhealed Pressure Ulcer(s)/Injurie(s) at Stage 1 or Higher:  No    . Current Number of Unhealed Pressure Ulcers    Number of Unhealed Stage 2: 0  Number of Unhealed Stage 3: 0  Number of Unhealed Stage 4: 0    Section N. Medication    Signed by: Jeromy Slater RN

## 2022-02-23 NOTE — PROGRESS NOTES
Occupational Therapy:    Physical Therapy: Individual: 135 minutes.    Speech Language Pathology:    Signed by: Felicia Lundberg, Supervisor

## 2022-02-23 NOTE — PROGRESS NOTES
Patient Assessment Instrument  Quality Indicators - Discharge    Section GG. Self-Care Performance      Section GG. Mobility Performance     Roll Left and Right: Patient completed the activities by him/herself with no  assistance from a helper.   Sit to Lying: Patient completed the activities by him/herself with no  assistance from a helper.   Lying to Sitting on Side of Bed: Patient completed the activities by  him/herself with no assistance from a helper.   Sit to Stand: [place] sets up or cleans up; patient completes activity. [place]  assists only prior to or following the activity.   Chair/Bed to Chair Transfer: [place] sets up or cleans up; patient completes  activity. [place] assists only prior to or following the activity.   Toilet Transfer [place] sets up or cleans up; patient completes activity. [place]  assists only prior to or following the activity.   Car Transfer: [place] sets up or cleans up; patient completes activity. [place]  assists only prior to or following the activity.   Walk 10 Feet:   [place] sets up or cleans up; patient completes activity. [place]  assists only prior to or following the activity.  Walk 50 Feet with 2 Turns:   [place] provides verbal cues and/or  touching/steadying and/or contact guard assistance as patient completes  activity. Assistance may be provided throughout the activity or intermittently.  Walk 150 Feet:   [place] provides verbal cues and/or touching/steadying and/or  contact guard assistance as patient completes activity. Assistance may be  provided throughout the activity or intermittently.  Walking 10 Feet on Uneven Surfaces:   [place] provides verbal cues and/or  touching/steadying and/or contact guard assistance as patient completes  activity. Assistance may be provided throughout the activity or intermittently.  1 Step Over Curb or Up/Down Stair:   [place] provides verbal cues and/or  touching/steadying and/or contact guard assistance as patient completes  activity.  Assistance may be provided throughout the activity or intermittently.  4 Steps Up and Down, With/Without Rail:   [place] provides verbal cues and/or  touching/steadying and/or contact guard assistance as patient completes  activity. Assistance may be provided throughout the activity or intermittently.  12 Steps Up and Down, With/Without Rail:   [place] provides verbal cues and/or  touching/steadying and/or contact guard assistance as patient completes  activity. Assistance may be provided throughout the activity or intermittently.  Picking up an Object:   [place] provides verbal cues and/or touching/steadying  and/or contact guard assistance as patient completes activity. Assistance may be  provided throughout the activity or intermittently. Uses Wheelchair and/or  Scooter: Yes  Wheel 50 Feet with Two Turns: Patient completed the activities by him/herself  with no assistance from a helper.  Type of Wheelchair or Scooter Used: Manual  Wheel 150 Feet: Patient completed the activities by him/herself with no  assistance from a helper.  Type of Wheelchair or Scooter Used: Manual    Section J. Health Conditions Discharge      Section M. Skin Conditions Discharge      . Current Number of Unhealed Pressure Ulcers      Section N. Medication    Signed by: Chidi Harris PTA

## 2022-02-23 NOTE — SIGNIFICANT NOTE
02/23/22 1509   PT Discharge Summary   Reason for Discharge Discharge from facility   Outcomes Achieved Able to achieve all goals within established timeline   Discharge Destination Home with assist

## 2022-02-23 NOTE — PROGRESS NOTES
Patient Assessment Instrument  Quality Indicators - Discharge    Section GG. Self-Care Performance     Eating: Patient completed the activities by him/herself with no assistance from  a helper.   Oral Hygiene: Patient completed the activities by him/herself with no  assistance from a helper.   Toileting Hygiene: : [place] provides verbal cues and/or touching/steadying  and/or contact guard assistance as patient completes activity.   Shower/Bathe Self: [place] sets up or cleans up; patient completes activity.  [place] assists only prior to or following the activity.   Upper Body Dressing: Patient completed the activities by him/herself with no  assistance from a helper.   Lower Body Dressing: Patient completed the activities by him/herself with no  assistance from a helper.   Putting On/Taking Off Footwear: Patient completed the activities by him/herself  with no assistance from a helper.    Section GG. Mobility Performance      Section J. Health Conditions Discharge      Section M. Skin Conditions Discharge      . Current Number of Unhealed Pressure Ulcers      Section N. Medication    Signed by: Candie Dixon, Occupational Therapist

## 2022-02-23 NOTE — PROGRESS NOTES
Case Management  Inpatient Rehabilitation Team Conference    Conference Date/Time: 2/22/2022 1:16:04 PM    Team Conference Attendees:  MD Carrie Price SW Jessica Bill, SUBHA,   SUBHA Lopez, PT  Candie Dixon OT    Demographics            Age: 56Y            Gender: Female    Admission Date: 2/1/2022 4:43:00 PM  Rehabilitation Diagnosis:  right hip IM nailing  Comorbidities: I25.10 Atherosclerotic heart disease of native coronary artery  without angina pectoris  K21.9 Gastro-esophageal reflux disease without esophagitis  I10 Essential (primary) hypertension  E11.51 Type 2 diabetes mellitus with diabetic peripheral angiopathy without  gangrene  E78.00 Pure hypercholesterolemia, unspecified  Z79.4 Long term (current) use of insulin  Z80.9 Family history of malignant neoplasm, unspecified  Z82.49 Family history of ischemic heart disease and other diseases of the  circulatory system  Z83.3 Family history of diabetes mellitus  Z87.891 Personal history of nicotine dependence  W19.XXXA Unspecified fall, initial encounter      Plan of Care  Anticipated Discharge Date/Estimated Length of Stay: 2-23-22  Anticipated Discharge Destination: Community discharge with assistance  Discharge Plan : Pt to return to her home with son checking on her during the  day.  Son is self-employed and can stay with pt in the evening and night if  needed.  Medical Necessity Expected Level Rationale: good  Intensity and Duration: an average of 3 hours/5 days per week  Medical Supervision and 24 Hour Rehab Nursing: x  Physical Therapy: x  PT Intensity/Duration: PT 1.5 hours per day/5 days per week  Occupational Therapy: x  OT Intensity/Duration: OT 1.5 hours per day/5 days per week  Social Work: x  Therapeutic Recreation: x  Updated (if changes indicated)    Anticipated Discharge Date/Estimated Length of Stay:   2-23-22      Discharge Plan of Care: Home Program    Based on the patient's medical  and functional status, their prognosis and  expected level of functional improvement is: good      Interdisciplinary Problem/Goals/Status  Body Function Structure    [RN] Skin Integrity(Active)  Current Status(02/03/2022): free from skin breakdown this shift  Weekly Goal(02/23/2022): free from skin breakdown this stay  Discharge Goal: home free of skin breakdown        Mobility    [PT] Bed/Chair/Wheelchair(Active)  Current Status(02/02/2022): min A  Weekly Goal(02/09/2022): MI  Discharge Goal: MI    [PT] Walk(Active)  Current Status(02/02/2022): amb 80' RW CGA  Weekly Goal(02/09/2022): amb 300' RW Sup  Discharge Goal: amb 300' RW Sup        Safety    [RN] Potential for Injury(Active)  Current Status(02/03/2022): free from injury this shift  Weekly Goal(02/23/2022): free from injury this stay  Discharge Goal: home free of injury        Self Care    [OT] Toileting(Resolved)  Current Status(02/21/2022): sup  Weekly Goal(02/23/2022): mod ind  Discharge Goal: set up    Comments: Pt plans to return home with daughter-in-law staying during the day  and sister staying evenings/nights.  Pt has rolling walker and bedside commode  at home.    Signed by: RASTA Landry    Physician CoSigned By: Rob Camarillo 02/23/2022 10:20:20

## 2022-02-23 NOTE — PROGRESS NOTES
Occupational Therapy: Individual: 15 minutes.    Physical Therapy:    Speech Language Pathology:    Signed by: Candie Dixon, Occupational Therapist

## 2022-02-23 NOTE — THERAPY DISCHARGE NOTE
Inpatient Rehabilitation - IRF Occupational Therapy Treatment Note/Discharge   [place]     Patient Name: Lisa Velazquez  : 1965  MRN: [id number]  Today's Date: 2022               Admit Date: 2022       ICD-10-CM ICD-9-CM   1. Neuropathic pain  M79.2 729.2   2. S/P right hip fracture  Z87.81 V15.51     Patient Active Problem List   Diagnosis   • S/P right hip fracture     Past Medical History:   Diagnosis Date   • Coronary artery disease    • Diabetes mellitus (HCC)    • Elevated cholesterol    • GERD (gastroesophageal reflux disease)    • History of transfusion    • Hypertension      Past Surgical History:   Procedure Laterality Date   • ABDOMINAL SURGERY      gallbladder removal   • CARDIAC CATHETERIZATION     • CARDIAC SURGERY      stent   • COLONOSCOPY     • FRACTURE SURGERY     • TOE AMPUTATION Right     3rd toe right foot       IRF OT ASSESSMENT FLOWSHEET (last 12 hours)     IRF OT Evaluation and Treatment     Row Name 22 1400          OT Time and Intention    Document Type discharge evaluation  -     Mode of Treatment individual therapy; occupational therapy  -     Row Name 22 1400          General Information    Patient/Family/Caregiver Comments/Observations patient discharged home today with family. patient voiced no concerns regarding self care activity at home patient instructed in utilize AE for self care and safety with functional mobility.  -     Existing Precautions/Restrictions fall; hip  -     Row Name 22 1400          Bathing    [place] Level (Bathing) modified independence  -     Row Name 22 1400          Upper Body Dressing    [place] Level (Upper Body Dressing) modified independence  -     Row Name 22 1400          Lower Body Dressing    [place] Level (Lower Body Dressing) modified [facility]     Assistive Device Use (Lower Body Dressing) sock-aid; reacher  -     Row Name 22 1400          Grooming     [place] Level (Grooming) modified independence  -     Row Name 02/23/22 1400          Toileting    [place] Level (Toileting) set up assistance  -           User Key  (r) = Recorded By, (t) = Taken By, (c) = Cosigned By    Initials Name Effective Dates     Rukhsana Dixon, OT 06/16/21 -                    Occupational Therapy Education                 Title: PT OT SLP Therapies (Done)     Topic: Occupational Therapy (Done)     Point: ADL training (Done)     Description:   Instruct learner(s) on proper safety adaptation and remediation techniques during self care or transfers.   Instruct in proper use of assistive devices.              Learning Progress Summary           Patient Acceptance, E, VU by  at 2/23/2022 1417    Comment: AE for self care, safety with functional mobility   Family Acceptance, E, VU by  at 2/23/2022 1417    Comment: AE for self care, safety with functional mobility      Show all documentation for this point (6)                 Point: Home exercise program (Done)     Description:   Instruct learner(s) on appropriate technique for monitoring, assisting and/or progressing therapeutic exercises/activities.              Learning Progress Summary           Patient Acceptance, E, VU by  at 2/23/2022 1417    Comment: AE for self care, safety with functional mobility   Family Acceptance, E, VU by  at 2/23/2022 1417    Comment: AE for self care, safety with functional mobility      Show all documentation for this point (6)                 Point: Precautions (Done)     Description:   Instruct learner(s) on prescribed precautions during self-care and functional transfers.              Learning Progress Summary           Patient Acceptance, E, VU by  at 2/23/2022 1417    Comment: AE for self care, safety with functional mobility   Family Acceptance, E, VU by  at 2/23/2022 1417    Comment: AE for self care, safety with functional mobility                   Point: Body  mechanics (Done)     Description:   Instruct learner(s) on proper positioning and spine alignment during self-care, functional mobility activities and/or exercises.              Learning Progress Summary           Patient Acceptance, E, VU by  at 2/23/2022 1417    Comment: AE for self care, safety with functional mobility   Family Acceptance, E, VU by  at 2/23/2022 1417    Comment: AE for self care, safety with functional mobility      Show all documentation for this point (6)                             User Key     Initials Effective Dates Name Provider Type [facility] 06/16/21 -  Rukhsana Dixon, OT Occupational Therapist OT                OT Recommendation and Plan  Anticipated Discharge Disposition (OT): home with assist  Planned Therapy Interventions (OT): activity tolerance training, adaptive equipment training, BADL retraining, patient/caregiver education/training, ROM/therapeutic exercise, strengthening exercise, transfer/mobility retraining           OT IRF GOALS     Row Name 02/23/22 1400 02/18/22 1400          Bathing Goal 1 (OT-IRF)    Progress/Outcomes (Bathing Goal 1, OT-IRF) goal met  -AH --            Bathing Goal 2 (OT-IRF)    Progress/Outcomes (Bathing Goal 2, OT-IRF) goal met  -AH goal partially met  -AH            LB Dressing Goal 1 (OT-IRF)    Progress/Outcomes (LB Dressing Goal 1, OT-IRF) goal met  -AH --            LB Dressing Goal 2 (OT-IRF)    Progress/Outcomes (LB Dressing Goal 2, OT-IRF) goal met  -AH goal partially met  -AH            Toileting Goal 1 (OT-IRF)    Progress/Outcomes (Toileting Goal 1, OT-IRF) goal met  -AH --            Toileting Goal 2 (OT-IRF)    Progress/Outcomes (Toileting Goal 2, OT-IRF) goal met  - goal partially met  -AH           User Key  (r) = Recorded By, (t) = Taken By, (c) = Cosigned By    Initials Name Provider Type     Rukhsana Dixon, OT Occupational Therapist                    Time Calculation:    Time Calculation- OT     Row  Name 02/23/22 1417             Time Calculation- OT    OT Start Time 1130  -      OT Stop Time 1145  -      OT Time Calculation (min) 15 min  -            User Key  (r) = Recorded By, (t) = Taken By, (c) = Cosigned By    Initials Name Provider Type     Rukhsana Dixon OT Occupational Therapist                Therapy Charges for Today     Code Description Service Date Service Provider Modifiers Qty    94201239426  OT THERAPEUTIC ACT EA 15 MIN 2/22/2022 Rukhsana Dixon, OT GO 2    [id number]  OT THER PROC EA 15 MIN 2/22/2022 Rukhsana Dixon OT GO 1    [id number]  OT SELF CARE/MGMT/TRAIN EA 15 MIN 2/22/2022 Rukhsana Dixon, ABELARDO GO 3               OT Discharge Summary  Anticipated Discharge Disposition (OT): home with assist  Reason for Discharge: Discharge from facility  Discharge Destination: Home with assist    Rukhsana Dixon OT  2/23/2022

## 2022-02-23 NOTE — PROGRESS NOTES
Patient Assessment Instrument  Quality Indicators - Discharge    Section GG. Self-Care Performance      Section GG. Mobility Performance      Section J. Health Conditions Discharge      Section M. Skin Conditions Discharge      . Current Number of Unhealed Pressure Ulcers      Section N. Medication    Medication Intervention: N/A - There were no potential clinically significant  medication issues identified since admission or patient is not taking any  medications.    Signed by: Felicia Lundberg, Supervisor

## 2022-02-23 NOTE — PROGRESS NOTES
Occupational Therapy:    Physical Therapy: Individual: 15 minutes.    Speech Language Pathology:    Signed by: Chidi Harris PTA

## 2022-02-28 NOTE — PROGRESS NOTES
PPS CMG Coordinator  Inpatient Rehabilitation Discharge    Mode of Locomotion: Walking.    Discharge Against Medical Advice:  No.  Discharge Information  Patient Discharged Alive:  Yes  Discharge Destination/Living Setting: Home.  At discharge, the patient was discharged to live (with) (02)  Family / Relatives    Diagnosis for Interruption/Death:    Impairment Group: 08.11 Status Post Unilateral Hip Fracture    Comorbidities: Rank Code      Description      1    I25.10    Atherosclerotic heart disease of native                 coronary artery without angina pectoris  2    K21.9     Gastro-esophageal reflux disease without                 esophagitis  3    I10       Essential (primary) hypertension  4    E11.51    Type 2 diabetes mellitus with diabetic                 peripheral angiopathy without gangrene  5    E78.00    Pure hypercholesterolemia, unspecified  6    Z79.4     Long term (current) use of insulin  7    Z80.9     Family history of malignant neoplasm,                 unspecified  8    Z82.49    Family history of ischemic heart disease and                 other diseases of the circulatory system  9    Z83.3     Family history of diabetes mellitus  10   Z87.891   Personal history of nicotine dependence  11   W19.XXXA  Unspecified fall, initial encounter    Complications:      KIRAN Bladder Accidents: 0  - Accidents.  Bladder Score = 7. Patient has not had an accident.  KIRAN Bowel Accident: 0  - Accidents.  Bowel Score = 6. Patient has no accidents, but uses a device/medications. [email or web address]    Signed by: Nurse Giovanni

## 2022-05-27 ENCOUNTER — HOSPITAL ENCOUNTER (OUTPATIENT)
Dept: HOSPITAL 79 - LAB | Age: 57
End: 2022-05-27
Payer: OTHER GOVERNMENT

## 2022-05-27 DIAGNOSIS — I25.10: Primary | ICD-10-CM

## 2022-05-27 DIAGNOSIS — Z20.822: ICD-10-CM

## 2022-05-27 PROCEDURE — U0003 INFECTIOUS AGENT DETECTION BY NUCLEIC ACID (DNA OR RNA); SEVERE ACUTE RESPIRATORY SYNDROME CORONAVIRUS 2 (SARS-COV-2) (CORONAVIRUS DISEASE [COVID-19]), AMPLIFIED PROBE TECHNIQUE, MAKING USE OF HIGH THROUGHPUT TECHNOLOGIES AS DESCRIBED BY CMS-2020-01-R: HCPCS

## 2022-06-08 ENCOUNTER — PREP FOR SURGERY (OUTPATIENT)
Dept: OTHER | Facility: HOSPITAL | Age: 57
End: 2022-06-08

## 2022-06-08 ENCOUNTER — HOSPITAL ENCOUNTER (INPATIENT)
Facility: HOSPITAL | Age: 57
LOS: 14 days | Discharge: HOME | End: 2022-06-22
Attending: INTERNAL MEDICINE | Admitting: INTERNAL MEDICINE

## 2022-06-08 DIAGNOSIS — J96.01 ACUTE RESPIRATORY FAILURE WITH HYPOXIA: Primary | ICD-10-CM

## 2022-06-08 DIAGNOSIS — J96.01 ACUTE RESPIRATORY FAILURE WITH HYPOXIA: ICD-10-CM

## 2022-06-08 LAB — GLUCOSE BLDC GLUCOMTR-MCNC: 153 MG/DL (ref 70–130)

## 2022-06-08 PROCEDURE — 63710000001 INSULIN DETEMIR PER 5 UNITS: Performed by: INTERNAL MEDICINE

## 2022-06-08 PROCEDURE — 82962 GLUCOSE BLOOD TEST: CPT

## 2022-06-08 RX ORDER — NICOTINE POLACRILEX 4 MG
15 LOZENGE BUCCAL
Status: DISCONTINUED | OUTPATIENT
Start: 2022-06-08 | End: 2022-06-22 | Stop reason: HOSPADM

## 2022-06-08 RX ORDER — POTASSIUM CHLORIDE 1.5 G/1.77G
40 POWDER, FOR SOLUTION ORAL AS NEEDED
Status: DISCONTINUED | OUTPATIENT
Start: 2022-06-08 | End: 2022-06-22 | Stop reason: HOSPADM

## 2022-06-08 RX ORDER — MULTIPLE VITAMINS W/ MINERALS TAB 9MG-400MCG
1 TAB ORAL DAILY
Status: CANCELLED | OUTPATIENT
Start: 2022-06-08

## 2022-06-08 RX ORDER — POLYETHYLENE GLYCOL 3350 17 G/17G
17 POWDER, FOR SOLUTION ORAL DAILY PRN
Status: DISCONTINUED | OUTPATIENT
Start: 2022-06-08 | End: 2022-06-22 | Stop reason: HOSPADM

## 2022-06-08 RX ORDER — METHOCARBAMOL 500 MG/1
500 TABLET, FILM COATED ORAL EVERY 6 HOURS PRN
Status: CANCELLED | OUTPATIENT
Start: 2022-06-08

## 2022-06-08 RX ORDER — DIPHENHYDRAMINE HCL 25 MG
25 CAPSULE ORAL EVERY 6 HOURS PRN
Status: DISCONTINUED | OUTPATIENT
Start: 2022-06-08 | End: 2022-06-22 | Stop reason: HOSPADM

## 2022-06-08 RX ORDER — ACETAMINOPHEN 325 MG/1
650 TABLET ORAL EVERY 4 HOURS PRN
Status: DISCONTINUED | OUTPATIENT
Start: 2022-06-08 | End: 2022-06-22 | Stop reason: HOSPADM

## 2022-06-08 RX ORDER — POTASSIUM CHLORIDE 20 MEQ/1
20 TABLET, EXTENDED RELEASE ORAL DAILY
Status: CANCELLED | OUTPATIENT
Start: 2022-06-08 | End: 2022-06-11

## 2022-06-08 RX ORDER — MAGNESIUM SULFATE HEPTAHYDRATE 40 MG/ML
2 INJECTION, SOLUTION INTRAVENOUS AS NEEDED
Status: CANCELLED | OUTPATIENT
Start: 2022-06-08

## 2022-06-08 RX ORDER — POTASSIUM CHLORIDE 7.45 MG/ML
10 INJECTION INTRAVENOUS
Status: CANCELLED | OUTPATIENT
Start: 2022-06-08

## 2022-06-08 RX ORDER — ASPIRIN 81 MG/1
81 TABLET, CHEWABLE ORAL DAILY
Status: DISCONTINUED | OUTPATIENT
Start: 2022-06-09 | End: 2022-06-22 | Stop reason: HOSPADM

## 2022-06-08 RX ORDER — MAGNESIUM SULFATE HEPTAHYDRATE 40 MG/ML
2 INJECTION, SOLUTION INTRAVENOUS AS NEEDED
Status: DISCONTINUED | OUTPATIENT
Start: 2022-06-08 | End: 2022-06-22 | Stop reason: HOSPADM

## 2022-06-08 RX ORDER — ENOXAPARIN SODIUM 100 MG/ML
40 INJECTION SUBCUTANEOUS EVERY 24 HOURS
Status: DISCONTINUED | OUTPATIENT
Start: 2022-06-09 | End: 2022-06-22 | Stop reason: HOSPADM

## 2022-06-08 RX ORDER — NICOTINE POLACRILEX 4 MG
15 LOZENGE BUCCAL
Status: CANCELLED | OUTPATIENT
Start: 2022-06-08

## 2022-06-08 RX ORDER — ROSUVASTATIN CALCIUM 20 MG/1
40 TABLET, COATED ORAL NIGHTLY
Status: DISCONTINUED | OUTPATIENT
Start: 2022-06-08 | End: 2022-06-22 | Stop reason: HOSPADM

## 2022-06-08 RX ORDER — BISACODYL 10 MG
10 SUPPOSITORY, RECTAL RECTAL DAILY PRN
Status: DISCONTINUED | OUTPATIENT
Start: 2022-06-08 | End: 2022-06-22 | Stop reason: HOSPADM

## 2022-06-08 RX ORDER — ACETAMINOPHEN 325 MG/1
650 TABLET ORAL EVERY 4 HOURS PRN
Status: CANCELLED | OUTPATIENT
Start: 2022-06-08

## 2022-06-08 RX ORDER — ASCORBIC ACID 500 MG
1000 TABLET ORAL DAILY
Status: CANCELLED | OUTPATIENT
Start: 2022-06-08

## 2022-06-08 RX ORDER — DEXTROSE MONOHYDRATE 25 G/50ML
25 INJECTION, SOLUTION INTRAVENOUS
Status: DISCONTINUED | OUTPATIENT
Start: 2022-06-08 | End: 2022-06-22 | Stop reason: HOSPADM

## 2022-06-08 RX ORDER — DOCUSATE SODIUM 100 MG/1
100 CAPSULE, LIQUID FILLED ORAL 2 TIMES DAILY
Status: DISCONTINUED | OUTPATIENT
Start: 2022-06-08 | End: 2022-06-22 | Stop reason: HOSPADM

## 2022-06-08 RX ORDER — MAGNESIUM SULFATE HEPTAHYDRATE 40 MG/ML
4 INJECTION, SOLUTION INTRAVENOUS AS NEEDED
Status: CANCELLED | OUTPATIENT
Start: 2022-06-08

## 2022-06-08 RX ORDER — FUROSEMIDE 40 MG/1
40 TABLET ORAL DAILY
Status: DISCONTINUED | OUTPATIENT
Start: 2022-06-09 | End: 2022-06-11

## 2022-06-08 RX ORDER — MULTIPLE VITAMINS W/ MINERALS TAB 9MG-400MCG
1 TAB ORAL DAILY
Status: DISCONTINUED | OUTPATIENT
Start: 2022-06-09 | End: 2022-06-22 | Stop reason: HOSPADM

## 2022-06-08 RX ORDER — CALCIUM CARBONATE 200(500)MG
1 TABLET,CHEWABLE ORAL 2 TIMES DAILY PRN
Status: DISCONTINUED | OUTPATIENT
Start: 2022-06-08 | End: 2022-06-22 | Stop reason: HOSPADM

## 2022-06-08 RX ORDER — OXYCODONE HYDROCHLORIDE 5 MG/1
5 TABLET ORAL EVERY 6 HOURS PRN
Status: CANCELLED | OUTPATIENT
Start: 2022-06-08 | End: 2022-06-15

## 2022-06-08 RX ORDER — ASCORBIC ACID 500 MG
1000 TABLET ORAL DAILY
Status: DISCONTINUED | OUTPATIENT
Start: 2022-06-08 | End: 2022-06-08

## 2022-06-08 RX ORDER — OXYCODONE HYDROCHLORIDE 5 MG/1
5 TABLET ORAL EVERY 6 HOURS PRN
Status: DISPENSED | OUTPATIENT
Start: 2022-06-08 | End: 2022-06-15

## 2022-06-08 RX ORDER — DEXTROSE MONOHYDRATE 25 G/50ML
25 INJECTION, SOLUTION INTRAVENOUS
Status: CANCELLED | OUTPATIENT
Start: 2022-06-08

## 2022-06-08 RX ORDER — ROSUVASTATIN CALCIUM 20 MG/1
40 TABLET, COATED ORAL NIGHTLY
Status: CANCELLED | OUTPATIENT
Start: 2022-06-08

## 2022-06-08 RX ORDER — CLOPIDOGREL BISULFATE 75 MG/1
75 TABLET ORAL DAILY
Status: DISCONTINUED | OUTPATIENT
Start: 2022-06-09 | End: 2022-06-22 | Stop reason: HOSPADM

## 2022-06-08 RX ORDER — CLOPIDOGREL BISULFATE 75 MG/1
75 TABLET ORAL DAILY
Status: DISCONTINUED | OUTPATIENT
Start: 2022-06-08 | End: 2022-06-08

## 2022-06-08 RX ORDER — POLYETHYLENE GLYCOL 3350 17 G/17G
17 POWDER, FOR SOLUTION ORAL DAILY PRN
Status: CANCELLED | OUTPATIENT
Start: 2022-06-08

## 2022-06-08 RX ORDER — POTASSIUM CHLORIDE 20 MEQ/1
20 TABLET, EXTENDED RELEASE ORAL DAILY
Status: DISCONTINUED | OUTPATIENT
Start: 2022-06-09 | End: 2022-06-11

## 2022-06-08 RX ORDER — CLOPIDOGREL BISULFATE 75 MG/1
75 TABLET ORAL DAILY
Status: CANCELLED | OUTPATIENT
Start: 2022-06-08

## 2022-06-08 RX ORDER — ASCORBIC ACID 500 MG
1000 TABLET ORAL DAILY
Status: DISCONTINUED | OUTPATIENT
Start: 2022-06-09 | End: 2022-06-22 | Stop reason: HOSPADM

## 2022-06-08 RX ORDER — POTASSIUM CHLORIDE 7.45 MG/ML
10 INJECTION INTRAVENOUS
Status: DISCONTINUED | OUTPATIENT
Start: 2022-06-08 | End: 2022-06-22 | Stop reason: HOSPADM

## 2022-06-08 RX ORDER — ASPIRIN 81 MG/1
81 TABLET, CHEWABLE ORAL DAILY
Status: DISCONTINUED | OUTPATIENT
Start: 2022-06-08 | End: 2022-06-08

## 2022-06-08 RX ORDER — POTASSIUM CHLORIDE 1.5 G/1.77G
40 POWDER, FOR SOLUTION ORAL AS NEEDED
Status: CANCELLED | OUTPATIENT
Start: 2022-06-08

## 2022-06-08 RX ORDER — GABAPENTIN 100 MG/1
100 CAPSULE ORAL NIGHTLY
Status: DISCONTINUED | OUTPATIENT
Start: 2022-06-08 | End: 2022-06-22 | Stop reason: HOSPADM

## 2022-06-08 RX ORDER — POTASSIUM CHLORIDE 20 MEQ/1
20 TABLET, EXTENDED RELEASE ORAL DAILY
Status: DISCONTINUED | OUTPATIENT
Start: 2022-06-08 | End: 2022-06-08

## 2022-06-08 RX ORDER — FUROSEMIDE 40 MG/1
40 TABLET ORAL DAILY
Status: DISCONTINUED | OUTPATIENT
Start: 2022-06-08 | End: 2022-06-08

## 2022-06-08 RX ORDER — MAGNESIUM SULFATE HEPTAHYDRATE 40 MG/ML
4 INJECTION, SOLUTION INTRAVENOUS AS NEEDED
Status: DISCONTINUED | OUTPATIENT
Start: 2022-06-08 | End: 2022-06-22 | Stop reason: HOSPADM

## 2022-06-08 RX ORDER — ENOXAPARIN SODIUM 100 MG/ML
40 INJECTION SUBCUTANEOUS EVERY 24 HOURS
Status: DISCONTINUED | OUTPATIENT
Start: 2022-06-08 | End: 2022-06-08

## 2022-06-08 RX ORDER — ONDANSETRON 4 MG/1
4 TABLET, FILM COATED ORAL EVERY 6 HOURS PRN
Status: CANCELLED | OUTPATIENT
Start: 2022-06-08

## 2022-06-08 RX ORDER — BISACODYL 10 MG
10 SUPPOSITORY, RECTAL RECTAL DAILY PRN
Status: CANCELLED | OUTPATIENT
Start: 2022-06-08

## 2022-06-08 RX ORDER — POTASSIUM CHLORIDE 750 MG/1
40 CAPSULE, EXTENDED RELEASE ORAL AS NEEDED
Status: CANCELLED | OUTPATIENT
Start: 2022-06-08

## 2022-06-08 RX ORDER — ONDANSETRON 4 MG/1
4 TABLET, FILM COATED ORAL EVERY 6 HOURS PRN
Status: DISCONTINUED | OUTPATIENT
Start: 2022-06-08 | End: 2022-06-22 | Stop reason: HOSPADM

## 2022-06-08 RX ORDER — MULTIPLE VITAMINS W/ MINERALS TAB 9MG-400MCG
1 TAB ORAL DAILY
Status: DISCONTINUED | OUTPATIENT
Start: 2022-06-08 | End: 2022-06-08

## 2022-06-08 RX ORDER — ENOXAPARIN SODIUM 100 MG/ML
40 INJECTION SUBCUTANEOUS EVERY 24 HOURS
Status: CANCELLED | OUTPATIENT
Start: 2022-06-08

## 2022-06-08 RX ORDER — INSULIN ASPART 100 [IU]/ML
0-7 INJECTION, SOLUTION INTRAVENOUS; SUBCUTANEOUS
Status: DISCONTINUED | OUTPATIENT
Start: 2022-06-09 | End: 2022-06-22 | Stop reason: HOSPADM

## 2022-06-08 RX ORDER — METHOCARBAMOL 500 MG/1
500 TABLET, FILM COATED ORAL EVERY 6 HOURS PRN
Status: DISCONTINUED | OUTPATIENT
Start: 2022-06-08 | End: 2022-06-22 | Stop reason: HOSPADM

## 2022-06-08 RX ORDER — ASPIRIN 81 MG/1
81 TABLET, CHEWABLE ORAL DAILY
Status: CANCELLED | OUTPATIENT
Start: 2022-06-08

## 2022-06-08 RX ORDER — CALCIUM CARBONATE 200(500)MG
1 TABLET,CHEWABLE ORAL 2 TIMES DAILY PRN
Status: CANCELLED | OUTPATIENT
Start: 2022-06-08

## 2022-06-08 RX ORDER — DIPHENHYDRAMINE HCL 25 MG
25 CAPSULE ORAL EVERY 6 HOURS PRN
Status: CANCELLED | OUTPATIENT
Start: 2022-06-08

## 2022-06-08 RX ORDER — INSULIN ASPART 100 [IU]/ML
0-7 INJECTION, SOLUTION INTRAVENOUS; SUBCUTANEOUS
Status: CANCELLED | OUTPATIENT
Start: 2022-06-08

## 2022-06-08 RX ORDER — DOCUSATE SODIUM 100 MG/1
100 CAPSULE, LIQUID FILLED ORAL 2 TIMES DAILY
Status: CANCELLED | OUTPATIENT
Start: 2022-06-08

## 2022-06-08 RX ORDER — FUROSEMIDE 40 MG/1
40 TABLET ORAL DAILY
Status: CANCELLED | OUTPATIENT
Start: 2022-06-08 | End: 2022-06-11

## 2022-06-08 RX ORDER — POTASSIUM CHLORIDE 1500 MG/1
40 TABLET, FILM COATED, EXTENDED RELEASE ORAL AS NEEDED
Status: DISCONTINUED | OUTPATIENT
Start: 2022-06-08 | End: 2022-06-22 | Stop reason: HOSPADM

## 2022-06-08 RX ORDER — GABAPENTIN 100 MG/1
100 CAPSULE ORAL NIGHTLY
Status: CANCELLED | OUTPATIENT
Start: 2022-06-08

## 2022-06-08 RX ADMIN — INSULIN DETEMIR 15 UNITS: 100 INJECTION, SOLUTION SUBCUTANEOUS at 22:00

## 2022-06-08 RX ADMIN — GABAPENTIN 100 MG: 100 CAPSULE ORAL at 22:20

## 2022-06-08 RX ADMIN — METOPROLOL TARTRATE 37.5 MG: 25 TABLET, FILM COATED ORAL at 22:11

## 2022-06-08 RX ADMIN — ROSUVASTATIN CALCIUM 40 MG: 20 TABLET, FILM COATED ORAL at 22:13

## 2022-06-09 LAB
ALBUMIN SERPL-MCNC: 2.95 G/DL (ref 3.5–5.2)
ALBUMIN/GLOB SERPL: 1 G/DL
ALP SERPL-CCNC: 90 U/L (ref 39–117)
ALT SERPL W P-5'-P-CCNC: 16 U/L (ref 1–33)
ANION GAP SERPL CALCULATED.3IONS-SCNC: 9.9 MMOL/L (ref 5–15)
AST SERPL-CCNC: 12 U/L (ref 1–32)
BASOPHILS # BLD AUTO: 0.04 10*3/MM3 (ref 0–0.2)
BASOPHILS NFR BLD AUTO: 0.5 % (ref 0–1.5)
BILIRUB SERPL-MCNC: 0.8 MG/DL (ref 0–1.2)
BUN SERPL-MCNC: 16 MG/DL (ref 6–20)
BUN/CREAT SERPL: 20 (ref 7–25)
CALCIUM SPEC-SCNC: 9.4 MG/DL (ref 8.6–10.5)
CHLORIDE SERPL-SCNC: 106 MMOL/L (ref 98–107)
CO2 SERPL-SCNC: 25.1 MMOL/L (ref 22–29)
CREAT SERPL-MCNC: 0.8 MG/DL (ref 0.57–1)
DEPRECATED RDW RBC AUTO: 48.4 FL (ref 37–54)
EGFRCR SERPLBLD CKD-EPI 2021: 86.6 ML/MIN/1.73
EOSINOPHIL # BLD AUTO: 0.18 10*3/MM3 (ref 0–0.4)
EOSINOPHIL NFR BLD AUTO: 2.2 % (ref 0.3–6.2)
ERYTHROCYTE [DISTWIDTH] IN BLOOD BY AUTOMATED COUNT: 15.3 % (ref 12.3–15.4)
GLOBULIN UR ELPH-MCNC: 3.1 GM/DL
GLUCOSE BLDC GLUCOMTR-MCNC: 138 MG/DL (ref 70–130)
GLUCOSE BLDC GLUCOMTR-MCNC: 159 MG/DL (ref 70–130)
GLUCOSE SERPL-MCNC: 131 MG/DL (ref 65–99)
HCT VFR BLD AUTO: 31 % (ref 34–46.6)
HGB BLD-MCNC: 9.8 G/DL (ref 12–15.9)
IMM GRANULOCYTES # BLD AUTO: 0.19 10*3/MM3 (ref 0–0.05)
IMM GRANULOCYTES NFR BLD AUTO: 2.3 % (ref 0–0.5)
LYMPHOCYTES # BLD AUTO: 2.04 10*3/MM3 (ref 0.7–3.1)
LYMPHOCYTES NFR BLD AUTO: 24.5 % (ref 19.6–45.3)
MAGNESIUM SERPL-MCNC: 1.9 MG/DL (ref 1.6–2.6)
MCH RBC QN AUTO: 29 PG (ref 26.6–33)
MCHC RBC AUTO-ENTMCNC: 31.6 G/DL (ref 31.5–35.7)
MCV RBC AUTO: 91.7 FL (ref 79–97)
MONOCYTES # BLD AUTO: 0.7 10*3/MM3 (ref 0.1–0.9)
MONOCYTES NFR BLD AUTO: 8.4 % (ref 5–12)
NEUTROPHILS NFR BLD AUTO: 5.16 10*3/MM3 (ref 1.7–7)
NEUTROPHILS NFR BLD AUTO: 62.1 % (ref 42.7–76)
NRBC BLD AUTO-RTO: 0.4 /100 WBC (ref 0–0.2)
PLATELET # BLD AUTO: 233 10*3/MM3 (ref 140–450)
PMV BLD AUTO: 8.9 FL (ref 6–12)
POTASSIUM SERPL-SCNC: 4.1 MMOL/L (ref 3.5–5.2)
PROT SERPL-MCNC: 6 G/DL (ref 6–8.5)
RBC # BLD AUTO: 3.38 10*6/MM3 (ref 3.77–5.28)
SODIUM SERPL-SCNC: 141 MMOL/L (ref 136–145)
WBC NRBC COR # BLD: 8.31 10*3/MM3 (ref 3.4–10.8)

## 2022-06-09 PROCEDURE — 97116 GAIT TRAINING THERAPY: CPT

## 2022-06-09 PROCEDURE — 85025 COMPLETE CBC W/AUTO DIFF WBC: CPT | Performed by: INTERNAL MEDICINE

## 2022-06-09 PROCEDURE — 80053 COMPREHEN METABOLIC PANEL: CPT | Performed by: INTERNAL MEDICINE

## 2022-06-09 PROCEDURE — 25010000002 ENOXAPARIN PER 10 MG: Performed by: INTERNAL MEDICINE

## 2022-06-09 PROCEDURE — 97530 THERAPEUTIC ACTIVITIES: CPT

## 2022-06-09 PROCEDURE — 63710000001 INSULIN ASPART PER 5 UNITS: Performed by: INTERNAL MEDICINE

## 2022-06-09 PROCEDURE — 97166 OT EVAL MOD COMPLEX 45 MIN: CPT | Performed by: OCCUPATIONAL THERAPIST

## 2022-06-09 PROCEDURE — 63710000001 INSULIN DETEMIR PER 5 UNITS: Performed by: INTERNAL MEDICINE

## 2022-06-09 PROCEDURE — 97530 THERAPEUTIC ACTIVITIES: CPT | Performed by: OCCUPATIONAL THERAPIST

## 2022-06-09 PROCEDURE — 97110 THERAPEUTIC EXERCISES: CPT

## 2022-06-09 PROCEDURE — 83735 ASSAY OF MAGNESIUM: CPT | Performed by: INTERNAL MEDICINE

## 2022-06-09 PROCEDURE — 97535 SELF CARE MNGMENT TRAINING: CPT | Performed by: OCCUPATIONAL THERAPIST

## 2022-06-09 PROCEDURE — 82962 GLUCOSE BLOOD TEST: CPT

## 2022-06-09 PROCEDURE — 97161 PT EVAL LOW COMPLEX 20 MIN: CPT

## 2022-06-09 RX ORDER — INSULIN GLARGINE 100 [IU]/ML
15 INJECTION, SOLUTION SUBCUTANEOUS 2 TIMES DAILY
COMMUNITY

## 2022-06-09 RX ORDER — GABAPENTIN 100 MG/1
100 CAPSULE ORAL DAILY
COMMUNITY

## 2022-06-09 RX ORDER — PHENOL 1.4 %
600 AEROSOL, SPRAY (ML) MUCOUS MEMBRANE DAILY
COMMUNITY

## 2022-06-09 RX ORDER — ASCORBIC ACID 500 MG
1000 TABLET ORAL DAILY
COMMUNITY

## 2022-06-09 RX ORDER — MULTIPLE VITAMINS W/ MINERALS TAB 9MG-400MCG
1 TAB ORAL DAILY
COMMUNITY

## 2022-06-09 RX ORDER — ASPIRIN 81 MG/1
81 TABLET ORAL DAILY
COMMUNITY

## 2022-06-09 RX ORDER — CLOPIDOGREL BISULFATE 75 MG/1
75 TABLET ORAL DAILY
COMMUNITY

## 2022-06-09 RX ORDER — ROSUVASTATIN CALCIUM 40 MG/1
40 TABLET, COATED ORAL DAILY
COMMUNITY

## 2022-06-09 RX ORDER — CHOLECALCIFEROL (VITAMIN D3) 125 MCG
5 CAPSULE ORAL NIGHTLY
Status: DISCONTINUED | OUTPATIENT
Start: 2022-06-09 | End: 2022-06-22 | Stop reason: HOSPADM

## 2022-06-09 RX ADMIN — GABAPENTIN 100 MG: 100 CAPSULE ORAL at 21:18

## 2022-06-09 RX ADMIN — DOCUSATE SODIUM 100 MG: 100 CAPSULE ORAL at 08:57

## 2022-06-09 RX ADMIN — METHOCARBAMOL 500 MG: 500 TABLET, FILM COATED ORAL at 00:46

## 2022-06-09 RX ADMIN — ROSUVASTATIN CALCIUM 40 MG: 20 TABLET, FILM COATED ORAL at 21:16

## 2022-06-09 RX ADMIN — CLOPIDOGREL BISULFATE 75 MG: 75 TABLET, FILM COATED ORAL at 08:56

## 2022-06-09 RX ADMIN — Medication 1 TABLET: at 08:56

## 2022-06-09 RX ADMIN — Medication 5000 UNITS: at 08:56

## 2022-06-09 RX ADMIN — INSULIN DETEMIR 15 UNITS: 100 INJECTION, SOLUTION SUBCUTANEOUS at 21:16

## 2022-06-09 RX ADMIN — Medication 5 MG: at 21:30

## 2022-06-09 RX ADMIN — INSULIN ASPART 2 UNITS: 100 INJECTION, SOLUTION INTRAVENOUS; SUBCUTANEOUS at 12:14

## 2022-06-09 RX ADMIN — FUROSEMIDE 40 MG: 40 TABLET ORAL at 08:56

## 2022-06-09 RX ADMIN — OXYCODONE 5 MG: 5 TABLET ORAL at 21:25

## 2022-06-09 RX ADMIN — ENOXAPARIN SODIUM 40 MG: 40 INJECTION SUBCUTANEOUS at 08:56

## 2022-06-09 RX ADMIN — METOPROLOL TARTRATE 37.5 MG: 25 TABLET, FILM COATED ORAL at 21:15

## 2022-06-09 RX ADMIN — POTASSIUM CHLORIDE 20 MEQ: 20 TABLET, EXTENDED RELEASE ORAL at 08:56

## 2022-06-09 RX ADMIN — OXYCODONE HYDROCHLORIDE AND ACETAMINOPHEN 1000 MG: 500 TABLET ORAL at 08:56

## 2022-06-09 RX ADMIN — METOPROLOL TARTRATE 37.5 MG: 25 TABLET, FILM COATED ORAL at 08:56

## 2022-06-09 RX ADMIN — ASPIRIN 81 MG: 81 TABLET, CHEWABLE ORAL at 08:56

## 2022-06-09 RX ADMIN — INSULIN DETEMIR 15 UNITS: 100 INJECTION, SOLUTION SUBCUTANEOUS at 09:42

## 2022-06-09 NOTE — H&P
HISTORY AND PHYSICAL        Patient Identification:  Name:  Lisa Velazquez  Age:  56 y.o.  Sex:  female  :  1965  MRN:  [id number]   Visit Number:  [id number]  Primary Care Physician:  Patricia Skelton APRN       Subjective     Subjective     Chief complaint:     Debility    History of presenting illness:     This patient is seen today by me for post admission physician evaluation to the inpatient rehabilitation facility.    57 y/o female admitted for intervention regarding multivessel CAD. Previous workup showed R SFA with 99% stenosis, L SFA stent occlusion, occluded R popliteal artery and PT, and occluded L PT. LHC revealed 70% stenosis in LAD, 70% stenosis in LCx, and total occlusion of RCA with L to R collaterals. ECHO showed an EF of 55-60%, diastolic dysfunction, mild to moderate MR, and mild TR and stress test showed anterior wall with reversible defect. 22 she underwent sternotomy, CABG x 2, endoscopic vein harvesting of RLE greater saphenous vein from the ascending aorta to the 1st obtuse marginal coronary artery, vein from side of the obtuse marginal graft the posterior descending coronary artery, L internal mammary artery the L anterior descending coronary artery with leg closure. During admission she was treated for acute hypoxic respiratory failure with improved with diuresis and was able to wean from HFNC to NC.    Patient continued to progress medically and physical therapy and Occupational Therapy evaluations were completed with recommended acute inpatient rehabilitation referral for continued functional mobility intervention and reeducation.  Acute rehab referral ordered for continued medical monitoring and management post prolonged hospitalization, continued respiratory status monitoring, lab monitoring, pain management needs, bowel and bladder management with new medication reeducation, skin monitoring and breakdown prevention along with ongoing multiple comorbidities that  require intervention for regulation.    The preadmission mini-FIM score as assessed by the referring facility is as follows: Eating 5; grooming 5; bathing 3; dressing-upper body 5; dressing-lower body to; toileting 2; transfers: Bed, chair, wheelchair 4; transfers: Toilet 4; locomotion: Walk, wheelchair 4; bladder management 6; bowel management 6; convergence 7; expression 7; social interaction 7; problem solving 7; memory 7.    Estimated length of stay 14 days.    Patient previously lived at home and plans to return home with nursing assistance, PT/OT, DME.      Patient is going to require intensive inpatient physical therapy for therapeutic exercises, range of motion, endurance, gait training, safety, stairs training, strengthening for at least 1 to 2 hours a day for 5 to 6 days a week as well as occupational therapy for dressing, ADLs, feeding, home skills, safety and toileting techniques for 1 to 2 hours a day for 5 to 6 days a week, as well as speech and language pathology therapy for communication, expression, dysphasia, aspiration needs for 30 to 90 minutes a day for 5 to 6 days a week.      ---------------------------------------------------------------------------------------------------------------------     Review Of Systems:    Constitutional: no fever, chills and night sweats.  Generalized weakness.  Eyes: no eye drainage, itching or redness.  HEENT: no mouth sores, dysphagia or nose bleed.  Respiratory: no for shortness of breath, cough or production of sputum.  Cardiovascular: no chest pain, no palpitations, no orthopnea.  Gastrointestinal: no nausea, vomiting or diarrhea. No abdominal pain, hematemesis or rectal bleeding.  Genitourinary: no dysuria or polyuria.  Musculoskeletal: no muscle or joint pain.  Skin: No rash and no itching. Midline sternal incision POA  Neurological: no loss of consciousness, no seizure, no headache.  Behavioral/Psych: no depression or suicidal ideation.  Endocrine: no hot  flashes.  Immunologic: negative.      ---------------------------------------------------------------------------------------------------------------------     Past Medical History    Past Medical History:   Diagnosis Date   • Coronary artery disease    • Diabetes mellitus (HCC)    • Elevated cholesterol    • GERD (gastroesophageal reflux disease)    • History of transfusion    • Hypertension        Past Surgical History    Past Surgical History:   Procedure Laterality Date   • ABDOMINAL SURGERY      gallbladder removal   • CARDIAC CATHETERIZATION     • CARDIAC SURGERY      stent   • COLONOSCOPY     • FRACTURE SURGERY     • TOE AMPUTATION Right     3rd toe right foot       Family History    Family History   Problem Relation Age of Onset   • Cancer Mother    • Diabetes Mother    • Heart disease Mother    • Heart disease Father    • Diabetes Brother    • Heart disease Brother    • Heart disease Daughter        Social History    Social History     Tobacco Use   • Smoking status: Former Smoker     Packs/day: 2.00     Years: 15.00     Pack years: 30.00     Types: Cigarettes     Quit date: 2020     Years since quittin.4   • Smokeless tobacco: Never Used   Vaping Use   • Vaping Use: Never used   Substance Use Topics   • Alcohol use: Never   • Drug use: Never       Allergies    Patient has no known allergies.  ---------------------------------------------------------------------------------------------------------------------     Home Medications:    Prior to Admission Medications     Prescriptions Last Dose Informant Patient Reported? Taking?    alendronate (FOSAMAX) 70 MG tablet  Pharmacy Yes No    Take 70 mg by mouth Every 7 (Seven) Days.    gabapentin (NEURONTIN) 100 MG capsule   No No    Take 1 capsule by mouth Every Night for 30 days.    insulin aspart (novoLOG) 100 UNIT/ML injection   No No    Inject 0-7 Units under the skin into the appropriate area as directed 3 (Three) Times a Day Before Meals.     insulin glargine (LANTUS, SEMGLEE) 100 UNIT/ML injection   No No    Inject 12 Units under the skin into the appropriate area as directed Every Night.    isosorbide mononitrate (IMDUR) 30 MG 24 hr tablet   No No    Take 1 tablet by mouth Daily for 30 days.    lisinopril (PRINIVIL,ZESTRIL) 2.5 MG tablet   No No    Take 1 tablet by mouth Daily for 30 days.    rosuvastatin (CRESTOR) 40 MG tablet   No No    Take 1 tablet by mouth Every Night for 30 days.    vitamin D (ERGOCALCIFEROL) 1.25 MG (35301 UT) capsule capsule  Pharmacy Yes No    Take 50,000 Units by mouth 1 (One) Time Per Week.        ---------------------------------------------------------------------------------------------------------------------    Objective     Objective     Hospital Scheduled Meds:  ascorbic acid, 1,000 mg, Oral, Daily  aspirin, 81 mg, Oral, Daily  clopidogrel, 75 mg, Oral, Daily  docusate sodium, 100 mg, Oral, BID  enoxaparin, 40 mg, Subcutaneous, Q24H  furosemide, 40 mg, Oral, Daily  gabapentin, 100 mg, Oral, Nightly  Insulin Aspart, 0-7 Units, Subcutaneous, TID AC  insulin detemir, 15 Units, Subcutaneous, Q12H  melatonin, 5 mg, Oral, Nightly  metoprolol tartrate, 37.5 mg, Oral, Q12H  multivitamin with minerals, 1 tablet, Oral, Daily  potassium chloride, 20 mEq, Oral, Daily  rosuvastatin, 40 mg, Oral, Nightly  vitamin D3, 5,000 Units, Oral, Daily         ---------------------------------------------------------------------------------------------------------------------   Vital Signs:  Temp:  [98.1 °F (36.7 °C)] 98.1 °F (36.7 °C)  Heart Rate:  [81] 81  Resp:  [20] 20  BP: (134)/(62) 134/62  No data found.  SpO2 Percentage    06/08/22 1907   SpO2: 94%     SpO2:  [94 %] 94 %  on   ;   Device (Oxygen Therapy): room air    Body mass index is 25.2 kg/m².  Wt Readings from Last 3 Encounters:   06/08/22 73 kg (160 lb 15 oz)   02/01/22 73 kg (161 lb)      ---------------------------------------------------------------------------------------------------------------------     Physical Exam:    General Appearance: Chronically ill-appearing  female alert, pleasant, interactive and cooperative.  Generalized weakness.    Head: normocephalic, without obvious abnormality and atraumatic    Eyes: lids and lashes normal, conjunctivae and sclerae normal, no icterus, no  pallor, corneas clear and PERRLA    Ears: ears appear intact with no abnormalities noted    Nose: nares normal, septum midline, mucosa normal and no drainage     Throat: no oral lesions, no thrush, oral mucosa moist and oopharynx normal    Neck: no adenopathy, supple, trachea midline, no thyromegaly, no carotid bruit  and no JVD    Lungs: clear to auscultation, respirations regular, respirations even and  respirations unlabored. No accessory muscle use.     Heart:: regular rhythm & normal rate, normal S1, S2, no murmur    Abdomen: normal bowel sounds in all quadrants, soft non-tender, no guarding and no rebound tenderness    Extremities: no edema, no cyanosis, no redness, no tenderness, no clubbing    Musculoskeletal: joints with no effusion nor erythema nor warmth.  Pedal pulses palpable and equal bilaterally    Skin: no bleeding, bruising or rash.  Midline sternal incision healing well without evidence of erythema, edema, or drainage.    Neurologic:    Mental Status: Patient is awake alert and oriented x3.  Appropriate.   Sensory: Sensory overall seems to be intact in BLE and BUE.   Motor strength: Generalized weakness      ---------------------------------------------------------------------------------------------------------------------             Results from last 7 days   Lab Units 06/09/22  0148 06/08/22 0428 06/07/22  0421   WBC 10*3/mm3 8.31 6.46 5.59   HEMOGLOBIN g/dL 9.8* 9.8* 9.4*   HEMATOCRIT % 31.0* 30.3* 29.6*   MCV fL 91.7 90 90   MCHC g/dL 31.6 32.3 31.8   PLATELETS 10*3/mm3 233 216  206     Results from last 7 days   Lab Units 06/09/22  0148 06/08/22  0428 06/07/22  0421 06/06/22  0341 06/03/22  0217 06/02/22  1630   SODIUM mmol/L 141 141 140 140   < > 137   POTASSIUM mmol/L 4.1 3.9 4.0 4.6   < > 4.8   MAGNESIUM mg/dL 1.9 2.0 1.8* 2.0   < > 2.1   CHLORIDE mmol/L 106 103 102 104   < > 101   TOTAL CO2 mmol/L  --  28 26 28   < > 29   CO2 mmol/L 25.1  --   --   --   --   --    BUN mg/dL 16 14 14 15   < > 19   CREATININE mg/dL 0.80 0.69 0.70 0.65   < > 0.71   EGFR IF NONAFRICN AM mL/min/1.73m*2  --  >60 >60 >60   < > >60   EGFR IF AFRICN AM mL/min/1.73m*2  --  >60 >60 >60   < > >60   CALCIUM mg/dL 9.4 9.6 9.2 9.0   < > 8.6*   GLUCOSE mg/dL 131*  --   --   --   --   --    ALBUMIN g/dL 2.95*  --   --   --   --  3.3*   BILIRUBIN mg/dL 0.8  --   --   --   --   --    ALK PHOS U/L 90  --   --   --   --   --    AST (SGOT) U/L 12  --   --   --   --   --    ALT (SGPT) U/L 16  --   --   --   --   --     < > = values in this interval not displayed.   Estimated Creatinine Clearance: 90.5 mL/min (by C-G formula based on SCr of 0.8 mg/dL).  No results found for: AMMONIA    Glucose   Date/Time Value Ref Range Status   06/09/2022 1129 159 (H) 70 - 130 mg/dL Final     Comment:     Meter: [id number] : [id number] domenic sarkar   06/08/2022 1943 153 (H) 70 - 130 mg/dL Final     Comment:     Meter: [id number] : [id number] Tj Milian   06/08/2022 1259 253 (H) 74 - 99 mg/dL Final     Comment:     Accuracy of a glucose result obtained from a capillary whole blood specimen relies upon adequate, non-compromised capillary blood flow.  If the capillary glucose result is not consistent with the patient's clinical signs and symptoms, glucose testing should be repeated with either an arterial or venous sample on the glucometer or sent to the main labortory for testing.   06/08/2022 0908 142 (H) 74 - 99 mg/dL Final     Comment:     Accuracy of a glucose result obtained from a capillary whole blood specimen relies  upon adequate, non-compromised capillary blood flow.  If the capillary glucose result is not consistent with the patient's clinical signs and symptoms, glucose testing should be repeated with either an arterial or venous sample on the glucometer or sent to the main labortory for testing.   06/07/2022 2039 155 (H) 74 - 99 mg/dL Final     Comment:     Accuracy of a glucose result obtained from a capillary whole blood specimen relies upon adequate, non-compromised capillary blood flow.  If the capillary glucose result is not consistent with the patient's clinical signs and symptoms, glucose testing should be repeated with either an arterial or venous sample on the glucometer or sent to the main labortory for testing.   06/07/2022 1804 100 (H) 74 - 99 mg/dL Final     Comment:     Accuracy of a glucose result obtained from a capillary whole blood specimen relies upon adequate, non-compromised capillary blood flow.  If the capillary glucose result is not consistent with the patient's clinical signs and symptoms, glucose testing should be repeated with either an arterial or venous sample on the glucometer or sent to the main labortory for testing.   06/07/2022 1245 144 (H) 74 - 99 mg/dL Final     Comment:     Accuracy of a glucose result obtained from a capillary whole blood specimen relies upon adequate, non-compromised capillary blood flow.  If the capillary glucose result is not consistent with the patient's clinical signs and symptoms, glucose testing should be repeated with either an arterial or venous sample on the glucometer or sent to the main labortory for testing.   06/07/2022 0902 124 (H) 74 - 99 mg/dL Final     Comment:     Accuracy of a glucose result obtained from a capillary whole blood specimen relies upon adequate, non-compromised capillary blood flow.  If the capillary glucose result is not consistent with the patient's clinical signs and symptoms, glucose testing should be repeated with either an  arterial or venous sample on the glucometer or sent to the main labortory for testing.   06/06/2022 2030 140 (H) 74 - 99 mg/dL Final     Comment:     Accuracy of a glucose result obtained from a capillary whole blood specimen relies upon adequate, non-compromised capillary blood flow.  If the capillary glucose result is not consistent with the patient's clinical signs and symptoms, glucose testing should be repeated with either an arterial or venous sample on the glucometer or sent to the main labortory for testing.   06/06/2022 1725 116 (H) 74 - 99 mg/dL Final     Comment:     Accuracy of a glucose result obtained from a capillary whole blood specimen relies upon adequate, non-compromised capillary blood flow.  If the capillary glucose result is not consistent with the patient's clinical signs and symptoms, glucose testing should be repeated with either an arterial or venous sample on the glucometer or sent to the main labortory for testing.     Lab Results   Component Value Date    HGBA1C 6.5 (H) 05/12/2022     No results found for: TSH, FREET4    No results found for: BLOODCX  No results found for: URINECX  No results found for: WOUNDCX  No results found for: STOOLCX  No results found for: RESPCX  Pain Management Panel    There is no flowsheet data to display.       I have personally reviewed the above laboratory results.   ---------------------------------------------------------------------------------------------------------------------  Imaging Results (Last 7 Days)     ** No results found for the last 168 hours. **        I have personally reviewed the above radiology results.   ---------------------------------------------------------------------------------------------------------------------      Assessment & Plan          ASSESSMENT:    Debility  CABG x3  Lower extremity vein harvest incisions  Diabetes mellitus  Hypertension  Hyperlipidemia  Constipation    PLAN:    Debility-- will initiate physical and  Occupational Therapy evaluations today    Diabetes mellitus-- Levemir 15 units every 12 hours, sliding scale insulin    Hypertension-- vital signs stable, continue current treatment    Insomnia --melatonin 5 mg at bedtime ordered    Status post CABG-- sternal incision, continue local care    Hyperlipidemia-- continue Crestor    DVT prophylaxis-- discussed with CT surgery from  and lisandro to restart Lovenox daily      Estimated length of stay 14 days.    Patient previously lived at home and plans to return home with nursing assistance, PT/OT, DME.    Patient's findings and recommendations were discussed with patient himself, referring facility, intake nurse and nursing staff.      We decided that this patient was appropriate for inpatient rehabilitation based on the new waiver that insurances have allowed due to their medical necessity to help decrease the impact of a COVID surge in referring facilities.      Code Status:     Code Status and Medical Interventions:   Ordered at: 06/08/22 1947     Code Status (Patient has no pulse and is not breathing):    CPR (Attempt to Resuscitate)     Medical Interventions (Patient has pulse or is breathing):    Full Support     Scribed for Alejandrina Ann MD by RAVIN Amado. 6/9/2022  12:27 EDT       RAVIN Amado  06/09/22  12:27 EDT     This was an audio and video enabled telemedicine encounter. I was unable to see the patient face to face as I was in COVID quarantine. The entire visit was performed on audio and video while the APRN performed the physical exam.      Alejandrina Ann MD  06/10/22  09:00 EDT

## 2022-06-09 NOTE — PROGRESS NOTES
Patient Assessment Instrument  Quality Indicators - Admission    Section B. Hearing, Speech Vision      Section C. Cognitive Patterns      Section [id number]. Prior Functioning      Section [id number]. Prior Device Use      Section [id number]. Self Care Performance     Eating: [place] sets up or cleans up; patient completes activity. [place] assists  only prior to or following the activity.   Oral Hygiene: [place] provides verbal cues and/or touching/steadying and/or  contact guard assistance as patient completes activity.   Toileting Hygiene: [place] does more than half the effort. [place] lifts or holds  trunk or limbs and provides more than half the effort.   Shower/Bathe Self: [place] does more than half the effort. [place] lifts or holds  trunk or limbs and provides more than half the effort.   Upper Body Dressing: [place] provides verbal cues and/or touching/steadying  and/or contact guard assistance as patient completes activity.   Lower Body Dressing: [place] does more than half the effort. [place] lifts or  holds trunk or limbs and provides more than half the effort.   Putting On/Taking Off Footwear: [place] does more than half the effort. [place]  lifts or holds trunk or limbs and provides more than half the effort.    Section [id number]. Self Care Discharge Goals     Eating: Patient completed the activities by him/herself with no assistance from  a helper.   Oral Hygiene: Patient completed the activities by him/herself with no  assistance from a helper.   Toileting Hygiene: [place] sets up or cleans up; patient completes activity.  [place] assists only prior to or following the activity.   Shower/Bathe Self: [place] sets up or cleans up; patient completes activity.  [place] assists only prior to or following the activity.   Upper Body Dressing: [place] sets up or cleans up; patient completes activity.  [place] assists only prior to or following the activity.   Lower Body Dressing: [place] sets up or cleans up; patient completes  activity.  [place] assists only prior to or following the activity.   Putting On/Taking Off Footwear: [place] sets up or cleans up; patient completes  activity. [place] assists only prior to or following the activity.    Section [id number]. Mobility Performance      Section QR9819. Mobility Discharge Goals      Section H. Bladder and Bowel      Section I. Active Diagnosis      Section J. Health Conditions      Section K. Swallowing/Nutritional Status      Section M. Skin Conditions      Section N. Medication      Section O. Special Treatments, Procedures, and Programs      OPTIONAL BRANCH FOR TRACKING FALLS  Fall(s) During Shift:    Signed by: Candie Dixon, Occupational Therapist

## 2022-06-09 NOTE — PROGRESS NOTES
Patient Assessment Instrument  Quality Indicators - Admission    Section B. Hearing, Speech Vision  Expression of Ideas and Wants: Expresses complex messages without difficulty and  with speech that is clear and easy to understand.  Understanding Verbal and Non-Verbal Content: Understands: Clear comprehension  without cues or repetitions.    Section C. Cognitive Patterns      Section SI0966. Prior Functioning      Section DB8548. Prior Device Use      Section SJ3947. Self Care Performance      Section GR1330. Self Care Discharge Goals      Section [id number]. Mobility Performance      Section [id number]. Mobility Discharge Goals      Section H. Bladder and Bowel  Bladder Continence: Stress incontinence only  Bowel Continence: Occasionally incontinent (one episode of bowel incontinence).    Section I. Active Diagnosis      Section J. Health Conditions      Section K. Swallowing/Nutritional Status      Section M. Skin Conditions      Section N. Medication      Section O. Special Treatments, Procedures, and Programs      OPTIONAL BRANCH FOR TRACKING FALLS  Fall(s) During Shift:    Signed by: Jane Dixon Nurse

## 2022-06-09 NOTE — PROGRESS NOTES
Patient Assessment Instrument  Quality Indicators - Admission    Section B. Hearing, Speech Vision      Section C. Cognitive Patterns      Section [id number]. Prior Functioning      Section [id number]. Prior Device Use      Section EQ1798. Self Care Performance      Section [id number]. Self Care Discharge Goals      Section OB1479. Mobility Performance     Roll Left and Right: [place] does less than half the effort. [place] lifts, holds  or supports trunk or limbs but provides less than half the effort.   Sit to Lying: [place] does less than half the effort. [place] lifts, holds or  supports trunk or limbs but provides less than half the effort.   Lying to Sitting on Side of Bed: [place] does less than half the effort. [place]  lifts, holds or supports trunk or limbs but provides less than half the effort.   Sit to Stand: [place] provides verbal cues and/or touching/steadying and/or  contact guard assistance as patient completes activity. Assistance may be  provided throughout the activity or intermittently.   Chair/Bed to Chair Transfer: [place] provides verbal cues and/or  touching/steadying and/or contact guard assistance as patient completes  activity. Assistance may be provided throughout the activity or intermittently.   Toilet Transfer [place] provides verbal cues and/or touching/steadying and/or  contact guard assistance as patient completes activity. Assistance may be  provided throughout the activity or intermittently.   Car Transfer: [place] provides verbal cues and/or touching/steadying and/or  contact guard assistance as patient completes activity. Assistance may be  provided throughout the activity or intermittently.   Walk 10 Feet:   [place] provides verbal cues and/or touching/steadying and/or  contact guard assistance as patient completes activity. Assistance may be  provided throughout the activity or intermittently.  Walk 50 Feet with 2 Turns:   [place] provides verbal cues and/or  touching/steadying and/or contact guard  assistance as patient completes  activity. Assistance may be provided throughout the activity or intermittently.  Walk 150 Feet:   [place] provides verbal cues and/or touching/steadying and/or  contact guard assistance as patient completes activity. Assistance may be  provided throughout the activity or intermittently.  Walking 10 Feet on Uneven Surfaces:   Not attempted due to medical or safety  concerns.  1 Step Over Curb or Up/Down Stair:   Not attempted due to medical or safety  concerns.  Picking up an Object:   Not attempted due to medical or safety concerns.  Uses Wheelchair/Scooter: No    Section [id number]. Mobility Discharge Goals     Sit to Stand: Patient completed the activities by him/herself with no  assistance from a helper.   Chair/Bed to Chair Transfer: Patient completed the activities by him/herself  with no assistance from a helper.   Walk Discharge Goals:   Walk 150 Feet: Patient completed the activities by him/herself with no  assistance from a helper.    Section H. Bladder and Bowel      Section I. Active Diagnosis      Section J. Health Conditions      Section K. Swallowing/Nutritional Status      Section M. Skin Conditions      Section N. Medication      Section O. Special Treatments, Procedures, and Programs      OPTIONAL BRANCH FOR TRACKING FALLS  Fall(s) During Shift:    Signed by: Wendi Brothers, Physical Therapist

## 2022-06-09 NOTE — PROGRESS NOTES
Inpatient Rehabilitation Functional Measures Assessment and Plan of Care    Plan of Care  Self Care    [OT] Toileting(Active)  Current Status(06/09/2022): max  Weekly Goal(06/16/2022): mod  Discharge Goal: set up    Functional Measures  KIRAN Eating:  KIRAN Grooming:  KIRAN Bathing:  KIRAN Upper Body Dressing:  KIRAN Lower Body Dressing:  KIRAN Toileting:    KIRAN Bladder Management  Level of Assistance:  Frequency/Number of Accidents this Shift:    KIRAN Bowel Management  Level of Assistance:  Frequency/Number of Accidents this Shift:    KIRAN Bed/Chair/Wheelchair Transfer:  KIRAN Toilet Transfer:  KIRAN Tub/Shower Transfer:    Previously Documented Mode of Locomotion at Discharge:  [facility] Expected Mode of Locomotion at Discharge:  KIRAN Walk/Wheelchair:  KIRAN Stairs:    KIRAN Comprehension:  KIRAN Expression:  [facility] Social Interaction:  [facility] Problem Solving:  KIRAN Memory:    Therapy Mode Minutes  Occupational Therapy: Individual: 90 minutes.  Physical Therapy:  Speech Language Pathology:    Discharge Functional Goals:    Signed by: Candie Dixon, Occupational Therapist

## 2022-06-09 NOTE — PROGRESS NOTES
Rehabilitation Nursing  Inpatient Rehabilitation Admission Assessment of Functional Measures  and Plan of Care    Plan of Care  Copy from POCBody Function Structure    Skin Integrity (Active)  Current Status (2/3/2022 12:00:00 AM): free from skin breakdown this shift  Weekly Goal: free from skin breakdown this stay  Discharge Goal: home free of skin breakdown    Safety    Potential for Injury (Active)  Current Status (2/3/2022 12:00:00 AM): free from injury this shift  Weekly Goal: free from injury this stay  Discharge Goal: home free of injury    Functional Measures  KIRAN Eating:  KIRAN Grooming:  KIRAN Bathing:  KIRAN Upper Body Dressing:  KIRAN Lower Body Dressing:  KIRAN Toileting:    KIRAN Bladder Management  Level of Assistance:  Bladder Score = 5.  Patient is supervision/set-up for  bladder management, requiring: Setting out equipment. Emptying equipment.  Patient requires the following assistive device(s):  Bedpan.  Frequency/Number of Accidents (4 days prior to admission):  Frequency/Number of Accidents this Shift:    KIRAN Bowel Management  Level of Assistance: Bowel Score = 6.  Patient is modified independent for bowel  management.  Patient did not have bowel movement.  Medication/intervention was  provided.  Frequency/Number of Accidents (4 days prior to admission): 0 (Unknown)  Frequency/Number of Accidents this Shift: Bowel accidents this shift: 0 .  Patient has not had an accident this shift.    KIRAN Bed/Chair/Wheelchair Transfer:  KIRAN Toilet Transfer:  KIRAN Tub/Shower Transfer:    Previously Documented Mode of Locomotion at Discharge:  KIRAN Expected Mode of Locomotion at Discharge:  KIRAN Walk/Wheelchair:  KIRAN Stairs:    KIRAN Comprehension:  Both ( auditory and visual) modes of comprehension are used  equally. Comprehension Score = 6, Modified [place].  Patient comprehends  complex/abstract information in their primary language, requiring: Additional  time.  KIRAN Expression:  Both ( vocal and non-vocal) modes of  expression are used  equally. Expression Score = 6,  Modified Independent. Patient expresses  complex/abstract information in their primary language, requiring: Additional  time.  KIRAN Social Interaction:  Social Interaction Score = 6, Modified Independent.  Patient is modified independent for social interaction, requiring: Requires  additional time.  KIRAN Problem Solving:  Problem Solving Score = 6, Modified [place].  Patient  makes appropriate decisions in order to solve complex problems, but requires  extra time.  KIRAN Memory:  Memory Score = 6, Modified [place].  Patient is modified  independent for memory, requiring: Requires additional time.    Discharge Functional Goals:  Bladder: 7  Bowel: 7  Mode of Comprehension: B  Comprehension: 7  Mode of Expression: B  Expression: 7  Expression: 7  Problem Solvin  Social Interaction: 7  Memory: 7    Signed by: Jane Dixon Nurse

## 2022-06-09 NOTE — PROGRESS NOTES
Patient Assessment Instrument  Quality Indicators - Admission    Section B. Hearing, Speech Vision  Expression of Ideas and Wants: Expresses complex messages without difficulty and  with speech that is clear and easy to understand.  Understanding Verbal and Non-Verbal Content: Understands: Clear comprehension  without cues or repetitions.    Section C. Cognitive Patterns      Section TH2556. Prior Functioning      Section EY8415. Prior Device Use      Section XJ2430. Self Care Performance      Section FP9998. Self Care Discharge Goals      Section NX5797. Mobility Performance      Section SJ8765. Mobility Discharge Goals      Section H. Bladder and Bowel      Section I. Active Diagnosis      Section J. Health Conditions      Section K. Swallowing/Nutritional Status      Section M. Skin Conditions      Section N. Medication      Section O. Special Treatments, Procedures, and Programs      OPTIONAL BRANCH FOR TRACKING FALLS  Fall(s) During Shift:    Signed by: Felicia Lundberg, Supervisor

## 2022-06-09 NOTE — PROGRESS NOTES
Case Management  Inpatient Rehabilitation Plan of Care and Discharge Plan Note    Rehabilitation Diagnosis:  debility  Date of Onset:  5-31-22    Medical Summary:  debility, CAD, s/p CABG x 3    Plan of Care      Expected Intensity:  Average of 3 hours of therapy 5 days/week.  Interdisciplinary Team:  Interdisciplinary Team: Medical Supervision and 24 Hour Rehabilitation Nursing.,  Physical Therapy:, Occupational Therapy:, Social Work, Therapeutic Recreation.  Physical Therapy Intensity/Duration: PT 1.5 hours per day/5 days per week  Occupational Therapy Intensity/Duration: OT 1.5 hours per day/5 days per week  Estimated Length of Stay/Anticipated Discharge Date: 14 days  Anticipated Discharge Destination:  Anticipated discharge destination from inpatient rehabilitation is community  discharge with assistance. Pt plans to return home with sister staying with her  if needed or she can go to niece's home at discharge.      Based on the patient's medical and functional status, their prognosis and  expected level of functional improvement is:  good    Signed by: RASTA Landry

## 2022-06-09 NOTE — THERAPY EVALUATION
Inpatient Rehabilitation - Physical Therapy Initial Evaluation        [place]     Patient Name: Lisa Velazquez  : 1965  MRN: [id number]    Today's Date: 2022                    Admit Date: 2022      Visit Dx:     ICD-10-CM ICD-9-CM   1. Acute respiratory failure with hypoxia (HCC)  J96.01 518.81       Patient Active Problem List   Diagnosis   • S/P right hip fracture   • Acute respiratory failure with hypoxia (HCC)       Past Medical History:   Diagnosis Date   • Coronary artery disease    • Diabetes mellitus (HCC)    • Elevated cholesterol    • GERD (gastroesophageal reflux disease)    • History of transfusion    • Hypertension        Past Surgical History:   Procedure Laterality Date   • ABDOMINAL SURGERY      gallbladder removal   • CARDIAC CATHETERIZATION     • CARDIAC SURGERY      stent   • COLONOSCOPY     • FRACTURE SURGERY     • TOE AMPUTATION Right     3rd toe right foot       PT ASSESSMENT (last 12 hours)     IRF PT Evaluation and Treatment     Row Name 22 1554          PT Time and Intention    Document Type initial evaluation;daily treatment  -LB     Mode of Treatment individual therapy;physical therapy  -LB     Row Name 22 1554          General Information    Existing Precautions/Restrictions fall;sternal  -LB     Row Name 22 1554          Pain Scale: FACES Pre/Post-Treatment    Pain: FACES Scale, Pretreatment 6-->hurts even more  -LB     Posttreatment Pain Rating 6-->hurts even more  -LB     Pain Location - chest  -LB     Pre/Posttreatment Pain Comment rest, repositioned, she used her heart shaped pillow  -LB     Row Name 22 155          Cognition/Psychosocial    Affect/Mental Status (Cognition) WFL  -LB     Follows Commands (Cognition) WFL  -LB     Personal Safety Interventions gait belt;nonskid shoes/slippers when out of bed;supervised activity  -LB     Row Name 22 1554          Bed Mobility    Supine-Sit-Supine [place] (Bed Mobility) minimum assist  (75% patient effort);verbal cues;nonverbal cues (demo/gesture)  -LB     Row Name 06/09/22 1554          Transfers    Bed-Chair [place] (Transfers) minimum assist (75% patient effort);verbal cues;nonverbal cues (demo/gesture)  -LB     Chair-Bed [place] (Transfers) minimum assist (75% patient effort);verbal cues;nonverbal cues (demo/gesture)  -LB     Assistive Device (Bed-Chair Transfers) wheelchair  -LB     Sit-Stand [place] (Transfers) minimum assist (75% patient effort);verbal cues;nonverbal cues (demo/gesture)  -LB     Stand-Sit [place] (Transfers) verbal cues;nonverbal cues (demo/gesture)  -LB     Row Name 06/09/22 1554          Chair-Bed Transfer    Assistive Device (Chair-Bed Transfers) wheelchair  -LB     Row Name 06/09/22 1554          Sit-Stand Transfer    Assistive Device (Sit-Stand Transfers) walker, front-wheeled  -LB     Row Name 06/09/22 1554          Stand-Sit Transfer    Assistive Device (Stand-Sit Transfers) walker, front-wheeled  -LB     Row Name 06/09/22 1554          Gait/Stairs (Locomotion)    [place] Level (Gait) contact guard;verbal cues;nonverbal cues (demo/gesture)  -LB     Assistive Device (Gait) walker, front-wheeled  -LB     Distance in Feet (Gait) am-320', pm-160' x2  -LB     Deviations/Abnormal Patterns (Gait) bc decreased;gait speed decreased;stride length decreased  -LB     Bilateral Gait Deviations forward flexed posture  -LB     Row Name 06/09/22 1557          Motor Skills    Therapeutic Exercise --  sitting ex bid, standing ex in PB  -LB     Additional Documentation --  she needed rest breaks and was fatigued by afternoon session  -LB     Row Name 06/09/22 1558          Positioning and Restraints    Pre-Treatment Position --  am-w/c, pm-bed  -LB     In Bed call light within reach;encouraged to call for assist  pm  -LB     In Wheelchair with OT  am  -LB     Row Name 06/09/22 1552          Vital Signs    Pre SpO2 (%) 96  -LB     O2 Delivery Pre Treatment  room air  -LB     Intra SpO2 (%) 95  -LB     O2 Delivery Intra Treatment room air  -LB     Row Name 06/09/22 1554          Therapy Assessment/Plan (PT)    Patient's Goals For Discharge return home  -LB     Row Name 06/09/22 1554          Therapy Assessment/Plan (PT)    PT Diagnosis (PT) s/p CABG, she was in rehab a couple months ago due to right hip nailing.  -LB     Rehab Potential/Prognosis (PT) adequate, monitor progress closely  -LB     Frequency of Treatment (PT) 5 times per week  -LB     Estimated Duration of Therapy (PT) 2 weeks  -LB     Problem List (PT) balance;mobility;strength;pain;postural control  -LB     Activity Limitations Related to Problem List (PT) unable to ambulate safely;unable to transfer safely  -LB     Row Name 06/09/22 1554          Therapy Plan Review/Discharge Plan (PT)    Therapy Plan Review (PT) evaluation/treatment results reviewed;care plan/treatment goals reviewed;risks/benefits reviewed;participants voiced agreement with care plan  -LB     Anticipated Equipment Needs at Discharge (PT Eval) --  tbd  -LB     Anticipated Discharge Disposition (PT) home with home health  -LB     Row Name 06/09/22 1554          IRF PT Goals    Bed Mobility Goal Selection (PT-IRF) bed mobility, PT goal 1  -LB     Transfer Goal Selection (PT-IRF) transfers, PT goal 1  -LB     Gait (Walking Locomotion) Goal Selection (PT-IRF) gait, PT goal 1  -LB     Row Name 06/09/22 1554          Bed Mobility Goal 1 (PT-IRF)    Activity/Assistive Device (Bed Mobility Goal 1, PT-IRF) sit to supine/supine to sit  -LB     [place] Level (Bed Mobility Goal 1, PT-IRF) modified independence  -LB     Time Frame (Bed Mobility Goal 1, PT-IRF) by discharge  -LB     Row Name 06/09/22 8414          Transfer Goal 1 (PT-IRF)    Activity/Assistive Device (Transfer Goal 1, PT-IRF) sit-to-stand/stand-to-sit;bed-to-chair/chair-to-bed  -LB     [place] Level (Transfer Goal 1, PT-IRF) modified independence  -LB     Time Frame  (Transfer Goal 1, PT-IRF) by discharge  -LB     Row Name 06/09/22 1554          Gait/Walking Locomotion Goal 1 (PT-IRF)    Activity/Assistive Device (Gait/Walking Locomotion Goal 1, PT-IRF) --  RW or AAD  -LB     Gait/Walking Locomotion Distance Goal 1 (PT-IRF) 300'  -LB     [place] Level (Gait/Walking Locomotion Goal 1, PT-IRF) modified independence  -LB     Time Frame (Gait/Walking Locomotion Goal 1, PT-IRF) by discharge  -LB           User Key  (r) = Recorded By, (t) = Taken By, (c) = Cosigned By    Initials Name Provider Type    LB Wendi Brothers, PT Physical Therapist              Wound Other (See comments) midline sternal Incision (Active)   Dressing Appearance dry;intact;open to air 06/09/22 0755   Drainage Amount none 06/09/22 0755       Wound Right distal thigh Incision (Active)   Closure Open to air;Liquid skin adhesive 06/09/22 0755   Drainage Amount none 06/09/22 0755       Wound Right proximal leg Incision (Active)   Dressing Appearance open to air 06/09/22 0755   Drainage Amount none 06/09/22 0755     Physical Therapy Education                 Title: PT OT SLP Therapies (In Progress)     Topic: Physical Therapy (Done)     Point: Mobility training (Done)     Learning Progress Summary           Patient Acceptance, E, VU,NR by LB at 6/9/2022 1615                   Point: Home exercise program (Done)     Learning Progress Summary           Patient Acceptance, E, VU,NR by LB at 6/9/2022 1615                   Point: Body mechanics (Done)     Learning Progress Summary           Patient Acceptance, E, VU,NR by LB at 6/9/2022 1615                   Point: Precautions (Done)     Learning Progress Summary           Patient Acceptance, E, VU,NR by LB at 6/9/2022 1615                               User Key     Initials Effective Dates Name Provider Type Discipline    LB 06/16/21 -  Wendi Brothers, PT Physical Therapist PT                PT Recommendation and Plan    Planned Therapy Interventions  (PT): balance training, bed mobility training, gait training, patient/family education, postural re-education, strengthening, transfer training  Frequency of Treatment (PT): 5 times per week  Anticipated Equipment Needs at Discharge (PT Eval):  (tbd)                  Time Calculation:      PT Charges     Row Name 06/09/22 1616 06/09/22 1615          Time Calculation    Start Time 1330  -LB 0915  -LB     Stop Time 1415  -LB 1000  -LB     Time Calculation (min) 45 min  -LB 45 min  -LB     PT Received On -- 06/09/22  -LB     PT Goal Re-Cert Due Date -- 06/16/22  -LB           User Key  (r) = Recorded By, (t) = Taken By, (c) = Cosigned By    Initials Name Provider Type    Wendi Henriquez, PT Physical Therapist                Therapy Charges for Today     Code Description Service Date Service Provider Modifiers Qty    64900984884 HC PT EVAL LOW COMPLEXITY 1 6/9/2022 Wendi Brothers, PT GP 1    47340621724 HC GAIT TRAINING EA 15 MIN 6/9/2022 Wendi Brothers, PT GP 1    95120211036 HC PT THER PROC EA 15 MIN 6/9/2022 Wendi Brothers, PT GP 3    65196742541 HC PT THERAPEUTIC ACT EA 15 MIN 6/9/2022 Wendi Brothers, PT GP 1                   Wendi Brothers, PT  6/9/2022

## 2022-06-09 NOTE — THERAPY EVALUATION
Inpatient Rehabilitation - Occupational Therapy Initial Evaluation     [place]     Patient Name: Lisa Velazquez  : 1965  MRN: [id number]    Today's Date: 2022                 Admit Date: 2022         ICD-10-CM ICD-9-CM   1. Acute respiratory failure with hypoxia (HCC)  J96.01 518.81       Patient Active Problem List   Diagnosis   • S/P right hip fracture   • Acute respiratory failure with hypoxia (HCC)       Past Medical History:   Diagnosis Date   • Coronary artery disease    • Diabetes mellitus (HCC)    • Elevated cholesterol    • GERD (gastroesophageal reflux disease)    • History of transfusion    • Hypertension        Past Surgical History:   Procedure Laterality Date   • ABDOMINAL SURGERY      gallbladder removal   • CARDIAC CATHETERIZATION     • CARDIAC SURGERY      stent   • COLONOSCOPY     • FRACTURE SURGERY     • TOE AMPUTATION Right     3rd toe right foot             IRF OT ASSESSMENT FLOWSHEET (last 12 hours)     IRF OT Evaluation and Treatment     Row Name 22 1500          OT Time and Intention    Document Type daily treatment;initial evaluation  -     Mode of Treatment individual therapy;occupational therapy  -     Patient Effort good  -     Row Name 22 1500          General Information    Patient/Family/Caregiver Comments/Observations patient agreeable to therapy. patient tolerated well with rest breaks provided.  -     Existing Precautions/Restrictions fall;cardiac;sternal  -     Limitations/Impairments --  5 lb lifting per patient  -     Row Name 22 1500          Previous Level of Function/Home Environm    BADLs, Premorbid Functional Level independent  -     Row Name 22 1500          Living Environment    Current Living Arrangements home  -     Row Name 22 1500          Cognition/Psychosocial    Affect/Mental Status (Cognition) WFL  -     Orientation Status (Cognition) oriented to;person;place;situation  -     Follows Commands  (Cognition) WFL  -     Cognitive Function WFL  -AH     Row Name 06/09/22 1500          Range of Motion Comprehensive    General Range of Motion bilateral upper extremity ROM WFL  -AH     Row Name 06/09/22 1500          Strength Comprehensive (MMT)    General Manual Muscle Testing (MMT) Assessment upper extremity strength deficits identified  -     Comment, General Manual Muscle Testing (MMT) Assessment 4-/5 BUE  -AH     Row Name 06/09/22 1500          Bathing    [place] Level (Bathing) maximum assist (25% patient effort)  -AH     Row Name 06/09/22 1500          Upper Body Dressing    [place] Level (Upper Body Dressing) minimum assist (75% or more patient effort)  -AH     Row Name 06/09/22 1500          Lower Body Dressing    [place] Level (Lower Body Dressing) maximum assist (25% patient effort)  -AH     Row Name 06/09/22 1500          Grooming    [place] Level (Grooming) supervision  -AH     Row Name 06/09/22 1500          Toileting    [place] Level (Toileting) maximum assist (25% patient effort)  -AH     Row Name 06/09/22 1500          Self-Feeding    [place] Level (Self-Feeding) supervision  -AH     Row Name 06/09/22 1500          Transfers    Chair-Bed [place] (Transfers) contact guard;minimum assist (75% patient effort)  -     [place] Level (Toilet Transfer) minimum assist (75% patient effort);contact guard  -AH     Row Name 06/09/22 1500          Toilet Transfer    Type (Toilet Transfer) stand pivot/stand step  -AH     Row Name 06/09/22 1500          Motor Skills    Motor Skills coordination;functional endurance  -     Therapeutic Exercise --  BUE ROM, gmc,fmc  -AH     Row Name 06/09/22 1500          Therapy Assessment/Plan (OT)    Patient's Goals For Discharge return home;take care of myself at home  -AH     Row Name 06/09/22 1500          Therapy Assessment/Plan (OT)    Rehab Potential/Prognosis (OT) good, to achieve stated therapy goals  -     Frequency of  Treatment (OT) 5 times per week  -     Estimated Duration of Therapy (OT) 2 weeks  -     Activity Limitations Related to Problem List (OT) BADLs not performed adequately or safely  -     Planned Therapy Interventions (OT) activity tolerance training;adaptive equipment training;BADL retraining;patient/caregiver education/training;ROM/therapeutic exercise;strengthening exercise;transfer/mobility retraining  -     Row Name 06/09/22 1500          Therapy Plan Review/Discharge Plan (OT)    Therapy Plan Review (OT) patient  -     Row Name 06/09/22 1500          Bathing Goal 1 (OT-IRF)    Activity/Device (Bathing Goal 1, OT-IRF) bathing skills, all  -AH     [place] Level (Bathing Goal 1, OT-IRF) minimum assist (75% or more patient effort)  -     Row Name 06/09/22 1500          Bathing Goal 2 (OT-IRF)    Activity/Device (Bathing Goal 2, OT-IRF) bathing skills, all  -AH     [place] Level (Bathing Goal 2, OT-IRF) set-up required  -     Row Name 06/09/22 1500          LB Dressing Goal 1 (OT-IRF)    Activity/Device (LB Dressing Goal 1, OT-IRF) lower body dressing  -AH     [place] (LB Dressing Goal 1, OT-IRF) minimum assist (75% or more patient effort);moderate assist (50-74% patient effort)  -     Row Name 06/09/22 1500          LB Dressing Goal 2 (OT-IRF)    Activity/Device (LB Dressing Goal 2, OT-IRF) lower body dressing  -AH     [place] (LB Dressing Goal 2, OT-IRF) set-up required  -     Row Name 06/09/22 1500          Toileting Goal 1 (OT-IRF)    Activity/Device (Toileting Goal 1, OT-IRF) toileting skills, all  -AH     [place] Level (Toileting Goal 1, OT-IRF) moderate assist (50-74% patient effort)  -     Row Name 06/09/22 1500          Toileting Goal 2 (OT-IRF)    Activity/Device (Toileting Goal 2, OT-IRF) toileting skills, all  -AH     [place] Level (Toileting Goal 2, OT-IRF) set-up required;supervision required  -           User Key  (r) = Recorded By, (t) = Taken By,  (c) = Cosigned By    Initials Name Effective Dates     Rukhsana Dixon, OT 06/16/21 -                  Occupational Therapy Education                 Title: PT OT SLP Therapies (In Progress)     Topic: Occupational Therapy (In Progress)     Point: ADL training (Done)     Description:   Instruct learner(s) on proper safety adaptation and remediation techniques during self care or transfers.   Instruct in proper use of assistive devices.              Learning Progress Summary           Patient Acceptance, E,TB, VU by  at 6/8/2022 1959                   Point: Home exercise program (Done)     Description:   Instruct learner(s) on appropriate technique for monitoring, assisting and/or progressing therapeutic exercises/activities.              Learning Progress Summary           Patient Acceptance, E,TB, VU by DL at 6/8/2022 1959                   Point: Precautions (Done)     Description:   Instruct learner(s) on prescribed precautions during self-care and functional transfers.              Learning Progress Summary           Patient Acceptance, E,TB, VU by DL at 6/8/2022 1959                   Point: Body mechanics (Not Started)     Description:   Instruct learner(s) on proper positioning and spine alignment during self-care, functional mobility activities and/or exercises.              Learner Progress:  Not documented in this visit.                      User Key     Initials Effective Dates Name Provider Type Discipline     03/16/22 -  Ashley Mccord, RN Registered Nurse Nurse                    OT Recommendation and Plan    Planned Therapy Interventions (OT): activity tolerance training, adaptive equipment training, BADL retraining, patient/caregiver education/training, ROM/therapeutic exercise, strengthening exercise, transfer/mobility retraining                    Time Calculation:      Time Calculation- OT     Row Name 06/09/22 1531             Time Calculation- OT    OT Start Time 1000  -      OT  Stop Time 1130  -      OT Time Calculation (min) 90 min  -            User Key  (r) = Recorded By, (t) = Taken By, (c) = Cosigned By    Initials Name Provider Type     Rukhsana Dixon, ABELARDO Occupational Therapist              Therapy Charges for Today     Code Description Service Date Service Provider Modifiers Qty    38262416790  OT EVAL MOD COMPLEXITY 3 6/9/2022 Rukhsana Dixon, OT GO 1    44730431667  OT THERAPEUTIC ACT EA 15 MIN 6/9/2022 Rukhsana Dixon OT GO 2    [id number]  OT SELF CARE/MGMT/TRAIN EA 15 MIN 6/9/2022 Rukhsana Dixon OT GO 1                   Rukhsana Dixon OT  6/9/2022

## 2022-06-09 NOTE — SIGNIFICANT NOTE
06/09/22 1555   Living Environment   People in Home alone   Current Living Arrangements home   Primary Care Provided by self   Provides Primary Care For no one   Family Caregiver if Needed sibling(s)   Family Caregiver Names Pt says sister Denia Flores can stay with her if needed at discharge or she can go to [facility]'s home in [place] if needed.   Quality of Family Relationships supportive   Able to Return to Prior Arrangements yes   Living Arrangement Comments SS spoke to pt who states being a  and living at home alone.  Pt has one son Luis Fernando Farrar who lives in [place].  Pt receives Social Security disability, [employer] '[employer] pension, [employer] and [employer] insurance.  Pt has medication coverage from [employer].  Pt does not have POA or living will.  PCP is RAVIN Gonzalez.  Pt uses [employer].  Pt was independent with ADL's, driving and used a quad cane outside the home.   Resource/Environmental Concerns   Resource/Environmental Concerns none   Transition Planning   Patient/Family Anticipates Transition to home with family   Patient/Family Anticipated Services at Transition home health care   Transportation Anticipated family or friend will provide   Discharge Needs Assessment (IRF)   Current Outpatient/Agency/Support Group   (Pt did not receive home health or outpatient therapy prior to admission.)   Concerns to be Addressed adjustment to diagnosis/illness   Concerns Comments Pt had recent CABG x 3 and lives alone.   Equipment Currently Used at Home walker, rolling;cane, quad;bath bench;wheelchair;bp cuff;glucometer  (Pt's wheelchair is borrowed from sister.  Pt has an elevated toilet seat at home.)   Current Discharge Risk lives alone   Discharge Coordination/Progress Pt plans to return to her home with sister Denia Flores staying with her if needed or she has the option of going to [facility]'s home in [place] if needed.  Team conference will be  held on 6-14-22.  SS will follow and assist with discharge planning.

## 2022-06-09 NOTE — PROGRESS NOTES
Patient Assessment Instrument  Quality Indicators - Admission    Section B. Hearing, Speech Vision      Section C. Cognitive Patterns      Section [id number]. Prior Functioning      Section QL7824. Prior Device Use  Patient does not use manual or motorized wheelchair or scooter, mechanical lift,  walker, or an orthotic/prosthesis.    Section SO8067. Self Care Performance      Section DK3967. Self Care Discharge Goals      Section QL0871. Mobility Performance      Section EL8115. Mobility Discharge Goals      Section H. Bladder and Bowel      Section I. Active Diagnosis      Section J. Health Conditions      Section K. Swallowing/Nutritional Status      Section M. Skin Conditions      Section N. Medication      Section O. Special Treatments, Procedures, and Programs      OPTIONAL BRANCH FOR TRACKING FALLS  Fall(s) During Shift:    Signed by: RASTA Landry

## 2022-06-10 LAB
GLUCOSE BLDC GLUCOMTR-MCNC: 149 MG/DL (ref 70–130)
GLUCOSE BLDC GLUCOMTR-MCNC: 189 MG/DL (ref 70–130)
GLUCOSE BLDC GLUCOMTR-MCNC: 81 MG/DL (ref 70–130)

## 2022-06-10 PROCEDURE — 97110 THERAPEUTIC EXERCISES: CPT | Performed by: OCCUPATIONAL THERAPIST

## 2022-06-10 PROCEDURE — 25010000002 ENOXAPARIN PER 10 MG: Performed by: INTERNAL MEDICINE

## 2022-06-10 PROCEDURE — 97116 GAIT TRAINING THERAPY: CPT

## 2022-06-10 PROCEDURE — 97110 THERAPEUTIC EXERCISES: CPT

## 2022-06-10 PROCEDURE — 82962 GLUCOSE BLOOD TEST: CPT

## 2022-06-10 PROCEDURE — 63710000001 INSULIN ASPART PER 5 UNITS: Performed by: INTERNAL MEDICINE

## 2022-06-10 PROCEDURE — 97530 THERAPEUTIC ACTIVITIES: CPT | Performed by: OCCUPATIONAL THERAPIST

## 2022-06-10 PROCEDURE — 97530 THERAPEUTIC ACTIVITIES: CPT

## 2022-06-10 PROCEDURE — 63710000001 INSULIN DETEMIR PER 5 UNITS: Performed by: INTERNAL MEDICINE

## 2022-06-10 PROCEDURE — 97535 SELF CARE MNGMENT TRAINING: CPT | Performed by: OCCUPATIONAL THERAPIST

## 2022-06-10 RX ADMIN — ROSUVASTATIN CALCIUM 40 MG: 20 TABLET, FILM COATED ORAL at 21:56

## 2022-06-10 RX ADMIN — FUROSEMIDE 40 MG: 40 TABLET ORAL at 09:47

## 2022-06-10 RX ADMIN — GABAPENTIN 100 MG: 100 CAPSULE ORAL at 21:58

## 2022-06-10 RX ADMIN — INSULIN ASPART 2 UNITS: 100 INJECTION, SOLUTION INTRAVENOUS; SUBCUTANEOUS at 17:11

## 2022-06-10 RX ADMIN — INSULIN DETEMIR 15 UNITS: 100 INJECTION, SOLUTION SUBCUTANEOUS at 21:00

## 2022-06-10 RX ADMIN — METOPROLOL TARTRATE 37.5 MG: 25 TABLET, FILM COATED ORAL at 21:56

## 2022-06-10 RX ADMIN — DOCUSATE SODIUM 100 MG: 100 CAPSULE ORAL at 21:56

## 2022-06-10 RX ADMIN — OXYCODONE 5 MG: 5 TABLET ORAL at 21:57

## 2022-06-10 RX ADMIN — OXYCODONE HYDROCHLORIDE AND ACETAMINOPHEN 1000 MG: 500 TABLET ORAL at 09:47

## 2022-06-10 RX ADMIN — CLOPIDOGREL BISULFATE 75 MG: 75 TABLET, FILM COATED ORAL at 09:48

## 2022-06-10 RX ADMIN — DOCUSATE SODIUM 100 MG: 100 CAPSULE ORAL at 09:48

## 2022-06-10 RX ADMIN — METHOCARBAMOL 500 MG: 500 TABLET, FILM COATED ORAL at 21:57

## 2022-06-10 RX ADMIN — POTASSIUM CHLORIDE 20 MEQ: 20 TABLET, EXTENDED RELEASE ORAL at 09:47

## 2022-06-10 RX ADMIN — ENOXAPARIN SODIUM 40 MG: 40 INJECTION SUBCUTANEOUS at 09:48

## 2022-06-10 RX ADMIN — INSULIN DETEMIR 15 UNITS: 100 INJECTION, SOLUTION SUBCUTANEOUS at 10:36

## 2022-06-10 RX ADMIN — METOPROLOL TARTRATE 37.5 MG: 25 TABLET, FILM COATED ORAL at 09:47

## 2022-06-10 RX ADMIN — Medication 1 TABLET: at 09:47

## 2022-06-10 RX ADMIN — ASPIRIN 81 MG: 81 TABLET, CHEWABLE ORAL at 09:48

## 2022-06-10 RX ADMIN — Medication 5000 UNITS: at 09:47

## 2022-06-10 NOTE — PROGRESS NOTES
[facility] CMG Coordinator  Inpatient Rehabilitation Admission    Ethnic Group: White.  Marital Status:  Marital Status: .    IRF Admission Date:  06/08/2022  Admission Class: Initial Rehab.  Admit From:  [facility]    Pre-Hospital Living: Home. Pre-Hospital Living  With: (1) Alone.    Payment Sources: Primary: Not Listed.  Secondary: Not Listed.  Impairment Group: 09 Cardiac  Date of Onset of Impairment: 05/31/2022    Etiologic Diagnosis Code(s):  Rank Code      Description  1    I25.10    Atherosclerotic heart disease of native                 coronary artery without angina pectoris    Comorbidities:      Height on Admission: 67 inches.  Weight on Admission: 161 pounds.    Are there any arthritis conditions recorded for Impairment Group, Etiologic  Diagnosis, or Comorbid Conditions that meet all of the regulatory requirements  for IRF classification (in 42 .29(b)(2)(x), (xi), and xii))?    KIRAN Bladder Accidents:  0 - Accidents.  Bladder Score = 7. Patient has not had an accident.  KIRAN Bowel Accident: 0 -Accidents.  Bowel Score = 6. Patient has no accidents, but uses a device/medications.  medication    Signed by: Felicia Lundberg, Supervisor

## 2022-06-10 NOTE — PROGRESS NOTES
"Patient Assessment Instrument  Quality Indicators - Admission    Section B. Hearing, Speech Vision      Section C. Cognitive Patterns  Brief Interview for Mental Status (BIMS) was conducted.  Repetition of Three Words: Three words  Able to report correct year: Correct  Able to report correct month: Accurate within 5 days  Able to report correct day of the week: Correct  Able to recall \"sock\": Yes, no cue required  Able to recall \"blue\": Yes, no cue required  Able to recall \"bed\": Yes, no cue required    BIMS SUMMARY SCORE: 15 Cognitively intact Patient was able to complete the Brief  Interview for Mental Status    Section UE3603. Prior Functioning    Self Care: Patient completed the activities by him/herself, with or without an  assistive device, with no assistance from a helper.  Indoor Mobility: Patient completed the activities by him/herself, with or  without an assistive device, with no assistance from a helper.  Stairs: Patient completed the activities by him/herself, with or without an  assistive device, with no assistance from a helper.  Functional Cognition: Patient completed the activities by him/herself, with or  without an assistive device, with no assistance from a helper.    Section CT7711. Prior Device Use      Section XF2397. Self Care Performance      Section VB8509. Self Care Discharge Goals      Section GH7843. Mobility Performance      Section UW5677. Mobility Discharge Goals      Section H. Bladder and Bowel  Bladder Continence: Always continent (no documented incontinence).  Bowel Continence: Always continent (no documented incontinence).    Section I. Active Diagnosis  Comorbidities and Co-existing Conditions:   Diabetes Mellitus (DM) - e.g.,  diabetic retinopathy, nephropathy, and neuropathy).    Section J. Health Conditions  Patient has had two or more falls, or a fall with injury, in the past year.  Patient has had major surgery during the 100 days prior to admission.    Section K. " Swallowing/Nutritional Status  Regular food (solids and liquids swallowed safely without supervision or  modified food or liquid consistency).    Section M. Skin Conditions  Unhealed Pressure Ulcer/Injuries at Stage 1 or Higher on Admission:  No.    Section N. Medication    Potential Clinically Significant Medication Issues: No issues found during  review    Section O. Special Treatments, Procedures, and Programs  Patient did not receive total parenteral nutrition treatment at the time of  admission.    OPTIONAL BRANCH FOR TRACKING FALLS  Fall(s) During Shift: No falls.    Signed by: Felicia Lundberg, Supervisor

## 2022-06-10 NOTE — PROGRESS NOTES
Physical Medicine and Rehabilitation  Inpatient Rehabilitation Interdisciplinary Plan of Care    Demographics            Age: 56Y            Gender: Female    Admission Date: 6/8/2022 6:28:00 PM  Rehabilitation Diagnosis:  debility    Plan of Care  Anticipated Discharge Date/Estimated Length of Stay: 14 days  Anticipated Discharge Destination: Community discharge with assistance  Discharge Plan : Pt plans to return home with sister staying with her if needed  or she can go to niece's home at discharge.  Medical Necessity Expected Level Rationale: good  Intensity and Duration: an average of 3 hours/5 days per week  Medical Supervision and 24 Hour Rehab Nursing: x  Physical Therapy: x  PT Intensity/Duration: PT 1.5 hours per day/5 days per week  Occupational Therapy: x  OT Intensity/Duration: OT 1.5 hours per day/5 days per week  Social Work: x  Therapeutic Recreation: x  Updated (if changes indicated)  No changes to plan.    Based on the patient's medical and functional status, their prognosis and  expected level of functional improvement is: good    Interdisciplinary Problem/Goals/Status    Mobility    [PT] Bed/Chair/Wheelchair (Active)  Current Status (6/9/2022 12:00:00 AM): CGA-min A  Weekly Goal: MI  Discharge Goal: MI    [PT] Walk (Active)  Current Status (6/9/2022 12:00:00 AM): amb 320' RW CGA  Weekly Goal: amb 300' AAD MI  Discharge Goal: amb 300' AAD MI    Self Care    [OT] Toileting (Active)  Current Status (6/9/2022 12:00:00 AM): max  Weekly Goal: mod  Discharge Goal: set up    Body Function Structure    [RN] Skin Integrity (Active)  Current Status (2/3/2022 12:00:00 AM): free from skin breakdown this shift  Weekly Goal: free from skin breakdown this stay  Discharge Goal: home free of skin breakdown    Safety    [RN] Potential for Injury (Active)  Current Status (2/3/2022 12:00:00 AM): free from injury this shift  Weekly Goal: free from injury this stay  Discharge Goal: home free of injury    Medical  Problems    Debility  CABG x3  Lower extremity vein harvest incisions  Diabetes mellitus  Hypertension  Hyperlipidemia  Constipation    Comments:    Signed by: Alejandrina Ann, Physician

## 2022-06-10 NOTE — THERAPY TREATMENT NOTE
Inpatient Rehabilitation - Occupational Therapy Treatment Note     [place]     Patient Name: Lisa Velazquez  : 1965  MRN: [id number]    Today's Date: 6/10/2022                 Admit Date: 2022         ICD-10-CM ICD-9-CM   1. Acute respiratory failure with hypoxia (HCC)  J96.01 518.81       Patient Active Problem List   Diagnosis   • S/P right hip fracture   • Acute respiratory failure with hypoxia (HCC)       Past Medical History:   Diagnosis Date   • Coronary artery disease    • Diabetes mellitus (HCC)    • Elevated cholesterol    • GERD (gastroesophageal reflux disease)    • History of transfusion    • Hypertension        Past Surgical History:   Procedure Laterality Date   • ABDOMINAL SURGERY      gallbladder removal   • CARDIAC CATHETERIZATION     • CARDIAC SURGERY      stent   • COLONOSCOPY     • FRACTURE SURGERY     • TOE AMPUTATION Right     3rd toe right foot             IRF OT ASSESSMENT FLOWSHEET (last 12 hours)     IRF OT Evaluation and Treatment     Row Name 06/10/22 1447          OT Time and Intention    Document Type daily treatment  -     Mode of Treatment individual therapy;occupational therapy  -     Patient Effort good  -     Row Name 06/10/22 1447          General Information    Patient/Family/Caregiver Comments/Observations patient agreeable to therapy. patient tolerated well with no complaints.  -     Existing Precautions/Restrictions fall;cardiac;sternal  -     Row Name 06/10/22 1447          Cognition/Psychosocial    Affect/Mental Status (Cognition) WFL  -     Row Name 06/10/22 1447          Bathing    [place] Level (Bathing) minimum assist (75% patient effort);contact guard assist  -     Row Name 06/10/22 1447          Upper Body Dressing    [place] Level (Upper Body Dressing) supervision;set up assistance  -     Row Name 06/10/22 1447          Lower Body Dressing    [place] Level (Lower Body Dressing) minimum assist (75% patient effort);contact  guard assist  -     Row Name 06/10/22 1447          Grooming    [place] Level (Grooming) supervision;set up  -     Row Name 06/10/22 1447          Toileting    [place] Level (Toileting) minimum assist (75% patient effort);contact guard assist  -     Row Name 06/10/22 1447          Transfers    Bed-Chair [place] (Transfers) contact guard  -     [place] Level (Toilet Transfer) minimum assist (75% patient effort);contact guard  -     Row Name 06/10/22 1447          Toilet Transfer    Type (Toilet Transfer) stand pivot/stand step  -     Row Name 06/10/22 1447          Motor Skills    Motor Skills coordination;functional endurance  -     Therapeutic Exercise --  BUE ROM, gmc,fmc,reaching,  strength  -           User Key  (r) = Recorded By, (t) = Taken By, (c) = Cosigned By    Initials Name Effective Dates    Rukhsana Olivo, OT 06/16/21 -                  Occupational Therapy Education                 Title: PT OT SLP Therapies (In Progress)     Topic: Occupational Therapy (In Progress)     Point: ADL training (Done)     Description:   Instruct learner(s) on proper safety adaptation and remediation techniques during self care or transfers.   Instruct in proper use of assistive devices.              Learning Progress Summary           Patient Acceptance, E,TB, VU by DL at 6/8/2022 1959                   Point: Home exercise program (Done)     Description:   Instruct learner(s) on appropriate technique for monitoring, assisting and/or progressing therapeutic exercises/activities.              Learning Progress Summary           Patient Acceptance, E,TB, VU by DL at 6/8/2022 1959                   Point: Precautions (Done)     Description:   Instruct learner(s) on prescribed precautions during self-care and functional transfers.              Learning Progress Summary           Patient Acceptance, E,TB, VU by DL at 6/8/2022 1959                   Point: Body mechanics (Not  Started)     Description:   Instruct learner(s) on proper positioning and spine alignment during self-care, functional mobility activities and/or exercises.              Learner Progress:  Not documented in this visit.                      User Key     Initials Effective Dates Name Provider Type Discipline    DL 03/16/22 -  Ashley Mccord, RN Registered Nurse Nurse                    OT Recommendation and Plan    Planned Therapy Interventions (OT): activity tolerance training, adaptive equipment training, BADL retraining, patient/caregiver education/training, ROM/therapeutic exercise, strengthening exercise, transfer/mobility retraining                    Time Calculation:      Time Calculation- OT     Row Name 06/10/22 1449             Time Calculation-     OT Start Time 0745  -      OT Stop Time 0915  -      OT Time Calculation (min) 90 min  -            User Key  (r) = Recorded By, (t) = Taken By, (c) = Cosigned By    Initials Name Provider Type     Rukhsana Dixon, OT Occupational Therapist              Therapy Charges for Today     Code Description Service Date Service Provider Modifiers Qty    43367185446 HC OT EVAL MOD COMPLEXITY 3 6/9/2022 Rukhsana Dixon, OT GO 1    88757677590 HC OT THERAPEUTIC ACT EA 15 MIN 6/9/2022 Rukhsana Dixon, OT GO 2    69091304265 HC OT SELF CARE/MGMT/TRAIN EA 15 MIN 6/9/2022 Rukhsana Dixon OT GO 1    65318837922 HC OT SELF CARE/MGMT/TRAIN EA 15 MIN 6/10/2022 Rukhsana Dixon OT GO 3    98590787335 HC OT THERAPEUTIC ACT EA 15 MIN 6/10/2022 Rukhsana Dixon OT GO 2    98847440865 HC OT THER PROC EA 15 MIN 6/10/2022 Rukhsana Dixon, OT GO 1                   Rukhsana Dixon OT  6/10/2022

## 2022-06-10 NOTE — THERAPY TREATMENT NOTE
Inpatient Rehabilitation - Physical Therapy Treatment Note        [place]     Patient Name: Lisa Velazquez  : 1965  MRN: [id number]    Today's Date: 6/10/2022                    Admit Date: 2022      Visit Dx:     ICD-10-CM ICD-9-CM   1. Acute respiratory failure with hypoxia (HCC)  J96.01 518.81       Patient Active Problem List   Diagnosis   • S/P right hip fracture   • Acute respiratory failure with hypoxia (HCC)       Past Medical History:   Diagnosis Date   • Coronary artery disease    • Diabetes mellitus (HCC)    • Elevated cholesterol    • GERD (gastroesophageal reflux disease)    • History of transfusion    • Hypertension        Past Surgical History:   Procedure Laterality Date   • ABDOMINAL SURGERY      gallbladder removal   • CARDIAC CATHETERIZATION     • CARDIAC SURGERY      stent   • COLONOSCOPY     • FRACTURE SURGERY     • TOE AMPUTATION Right     3rd toe right foot       PT ASSESSMENT (last 12 hours)     IRF PT Evaluation and Treatment     Row Name 06/10/22 1418          PT Time and Intention    Document Type daily treatment  -RG     Mode of Treatment individual therapy;physical therapy  -RG     Patient/Family/Caregiver Comments/Observations Pt and nursing in agreement for skilled PT on this date.  Pt c/o of not feeling well in pm session and requested only sitting exercises for today.  -RG     Row Name 06/10/22 1418          General Information    Existing Precautions/Restrictions fall;sternal  -RG     Row Name 06/10/22 1418          Pain Scale: FACES Pre/Post-Treatment    Pain: FACES Scale, Pretreatment 6-->hurts even more  -RG     Posttreatment Pain Rating 6-->hurts even more  -RG     Row Name 06/10/22 1418          Cognition/Psychosocial    Affect/Mental Status (Cognition) WFL  -RG     Follows Commands (Cognition) WFL  -RG     Personal Safety Interventions gait belt;nonskid shoes/slippers when out of bed;fall prevention program maintained  -RG     Row Name 06/10/22 1418          Bed  Mobility    Supine-Sit-Supine [place] (Bed Mobility) minimum assist (75% patient effort);verbal cues;nonverbal cues (demo/gesture)  -RG     Row Name 06/10/22 1418          Transfers    Bed-Chair [place] (Transfers) minimum assist (75% patient effort);verbal cues;nonverbal cues (demo/gesture)  -RG     Chair-Bed [place] (Transfers) minimum assist (75% patient effort);verbal cues;nonverbal cues (demo/gesture)  -RG     Assistive Device (Bed-Chair Transfers) wheelchair  -RG     Sit-Stand [place] (Transfers) minimum assist (75% patient effort);verbal cues;nonverbal cues (demo/gesture)  -RG     Stand-Sit [place] (Transfers) verbal cues;nonverbal cues (demo/gesture)  -RG     Row Name 06/10/22 1418          Chair-Bed Transfer    Assistive Device (Chair-Bed Transfers) wheelchair  -RG     Row Name 06/10/22 1418          Sit-Stand Transfer    Assistive Device (Sit-Stand Transfers) walker, front-wheeled  -RG     Row Name 06/10/22 1418          Stand-Sit Transfer    Assistive Device (Stand-Sit Transfers) walker, front-wheeled  -RG     Row Name 06/10/22 1418          Gait/Stairs (Locomotion)    Comment, (Gait/Stairs) c/o not feeling well in pm, denies any pain  -     Row Name 06/10/22 1418          Motor Skills    Therapeutic Exercise hip;knee;ankle  -     Row Name 06/10/22 1418          Hip (Therapeutic Exercise)    Hip (Therapeutic Exercise) strengthening exercise  -RG     Hip Strengthening (Therapeutic Exercise) bilateral;flexion;aBduction;aDduction;marching while seated;sitting;resistance band;red;10 repetitions;2 sets  -     Row Name 06/10/22 1418          Knee (Therapeutic Exercise)    Knee (Therapeutic Exercise) strengthening exercise  -RG     Knee Strengthening (Therapeutic Exercise) bilateral;flexion;extension;marching while seated;LAQ (long arc quad);sitting;resistance band;red;10 repetitions;2 sets  -     Row Name 06/10/22 1418          Ankle (Therapeutic Exercise)    Ankle (Therapeutic  Exercise) strengthening exercise  -RG     Ankle Strengthening (Therapeutic Exercise) bilateral;dorsiflexion;plantarflexion;sitting;10 repetitions;2 sets  -RG     Row Name 06/10/22 1418          Positioning and Restraints    Pre-Treatment Position sitting in chair/recliner  -RG     Post Treatment Position bed  -RG     In Bed notified nsg;sitting EOB;call light within reach;encouraged to call for assist  -RG     Row Name 06/10/22 1418          Therapy Assessment/Plan (PT)    Patient's Goals For Discharge return home  -RG     Row Name 06/10/22 1418          Therapy Assessment/Plan (PT)    Rehab Potential/Prognosis (PT) adequate, monitor progress closely  -RG     Frequency of Treatment (PT) 5 times per week  -RG     Estimated Duration of Therapy (PT) 2 weeks  -RG     Problem List (PT) balance;mobility;strength;pain;postural control  -RG     Activity Limitations Related to Problem List (PT) unable to ambulate safely;unable to transfer safely  -RG     Row Name 06/10/22 1418          Therapy Plan Review/Discharge Plan (PT)    Anticipated Equipment Needs at Discharge (PT Eval) --  tbd  -RG     Anticipated Discharge Disposition (PT) home with home health  -RG     Row Name 06/10/22 1418          IRF PT Goals    Bed Mobility Goal Selection (PT-IRF) bed mobility, PT goal 1  -RG     Transfer Goal Selection (PT-IRF) transfers, PT goal 1  -RG     Gait (Walking Locomotion) Goal Selection (PT-IRF) gait, PT goal 1  -RG     Row Name 06/10/22 1418          Bed Mobility Goal 1 (PT-IRF)    Activity/Assistive Device (Bed Mobility Goal 1, PT-IRF) sit to supine/supine to sit  -RG     [place] Level (Bed Mobility Goal 1, PT-IRF) modified independence  -RG     Time Frame (Bed Mobility Goal 1, PT-IRF) by discharge  -RG     Row Name 06/10/22 1418          Transfer Goal 1 (PT-IRF)    Activity/Assistive Device (Transfer Goal 1, PT-IRF) sit-to-stand/stand-to-sit;bed-to-chair/chair-to-bed  -RG     [place] Level (Transfer Goal 1, PT-IRF)  modified independence  -RG     Time Frame (Transfer Goal 1, PT-IRF) by discharge  -RG     Row Name 06/10/22 1418          Gait/Walking Locomotion Goal 1 (PT-IRF)    Activity/Assistive Device (Gait/Walking Locomotion Goal 1, PT-IRF) --  RW or AAD  -RG     Gait/Walking Locomotion Distance Goal 1 (PT-IRF) 300'  -RG     [place] Level (Gait/Walking Locomotion Goal 1, PT-IRF) modified independence  -RG     Time Frame (Gait/Walking Locomotion Goal 1, PT-IRF) by discharge  -RG           User Key  (r) = Recorded By, (t) = Taken By, (c) = Cosigned By    Initials Name Provider Type    RG Chidi Harris PTA Physical Therapist Assistant              Wound Other (See comments) midline sternal Incision (Active)   Dressing Appearance dry;intact 06/10/22 0805   Drainage Amount none 06/10/22 0805       Wound Right distal thigh Incision (Active)   Closure Open to air;Liquid skin adhesive 06/10/22 0805   Drainage Amount none 06/10/22 0805       Wound Right proximal leg Incision (Active)   Dressing Appearance open to air 06/10/22 0805   Drainage Amount none 06/10/22 0805     Physical Therapy Education                 Title: PT OT SLP Therapies (In Progress)     Topic: Physical Therapy (Done)     Point: Mobility training (Done)     Learning Progress Summary           Patient Acceptance, E,D, VU,NR by RG at 6/10/2022 1428    Acceptance, E, VU,NR by LB at 6/9/2022 1615                   Point: Home exercise program (Done)     Learning Progress Summary           Patient Acceptance, E,D, VU,NR by RG at 6/10/2022 1428    Acceptance, E, VU,NR by LB at 6/9/2022 1615                   Point: Body mechanics (Done)     Learning Progress Summary           Patient Acceptance, E,D, VU,NR by RG at 6/10/2022 1428    Acceptance, E, VU,NR by LB at 6/9/2022 1615                   Point: Precautions (Done)     Learning Progress Summary           Patient Acceptance, E,D, VU,NR by RG at 6/10/2022 1428    Acceptance, E, VU,NR by LB at 6/9/2022 1615                                User Key     Initials Effective Dates Name Provider Type Discipline    LB 06/16/21 -  Wendi Brothers, PT Physical Therapist PT    RG 06/16/21 -  Chidi Harris PTA Physical Therapist Assistant PT                PT Recommendation and Plan    Frequency of Treatment (PT): 5 times per week  Anticipated Equipment Needs at Discharge (PT Eval):  (tbd)                  Time Calculation:      PT Charges     Row Name 06/10/22 1428             Time Calculation    Start Time 1330  -RG      Stop Time 1415  -RG      Time Calculation (min) 45 min  -RG      PT Received On 06/10/22  -RG              Time Calculation- PT    Total Timed Code Minutes- PT 45 minute(s)  -RG            User Key  (r) = Recorded By, (t) = Taken By, (c) = Cosigned By    Initials Name Provider Type    RG Chidi Harris PTA Physical Therapist Assistant                Therapy Charges for Today     Code Description Service Date Service Provider Modifiers Qty    05902025113 HC PT THERAPEUTIC ACT EA 15 MIN 6/10/2022 Chidi Harris PTA GP, CQ 1    14695239193 HC PT THER PROC EA 15 MIN 6/10/2022 Chidi Harris PTA GP, CQ 2                   Chidi Harris PTA  6/10/2022

## 2022-06-10 NOTE — PROGRESS NOTES
Rehabilitation Nursing  Inpatient Rehabilitation Plan of Care Note    Plan of Care  Body Function Structure    Skin Integrity (Active)  Current Status (2/3/2022 12:00:00 AM): free from skin breakdown this shift  Weekly Goal: free from skin breakdown this stay  Discharge Goal: home free of skin breakdown    Safety    Potential for Injury (Active)  Current Status (2/3/2022 12:00:00 AM): free from injury this shift  Weekly Goal: free from injury this stay  Discharge Goal: home free of injuryC    Signed by: Lolita Hernandez Nurse

## 2022-06-10 NOTE — THERAPY TREATMENT NOTE
Inpatient Rehabilitation - Physical Therapy Treatment Note       TONI Wild     Patient Name: Lisa Velazquez  : 1965  MRN: [id number]    Today's Date: 6/10/2022                    Admit Date: 2022      Visit Dx:     ICD-10-CM ICD-9-CM   1. Acute respiratory failure with hypoxia (HCC)  J96.01 518.81       Patient Active Problem List   Diagnosis   • S/P right hip fracture   • Acute respiratory failure with hypoxia (HCC)       Past Medical History:   Diagnosis Date   • Coronary artery disease    • Diabetes mellitus (HCC)    • Elevated cholesterol    • GERD (gastroesophageal reflux disease)    • History of transfusion    • Hypertension        Past Surgical History:   Procedure Laterality Date   • ABDOMINAL SURGERY      gallbladder removal   • CARDIAC CATHETERIZATION     • CARDIAC SURGERY      stent   • COLONOSCOPY     • FRACTURE SURGERY     • TOE AMPUTATION Right     3rd toe right foot       PT ASSESSMENT (last 12 hours)     IRF PT Evaluation and Treatment     Row Name 06/10/22 1444 06/10/22 1418       PT Time and Intention    Document Type daily treatment  AM session  -LL daily treatment  -RG    Mode of Treatment individual therapy;physical therapy  -LL individual therapy;physical therapy  -RG    Patient/Family/Caregiver Comments/Observations Patient & RN in agreement for AM PT session  -LL Pt and nursing in agreement for skilled PT on this date.  Pt c/o of not feeling well in pm session and requested only sitting exercises for today.  -RG    Row Name 06/10/22 1444 06/10/22 1418       General Information    Existing Precautions/Restrictions fall;sternal  -LL fall;sternal  -RG    Row Name 06/10/22 1444 06/10/22 1418       Pain Scale: FACES Pre/Post-Treatment    Pain: FACES Scale, Pretreatment 6-->hurts even more  -LL 6-->hurts even more  -RG    Posttreatment Pain Rating 6-->hurts even more  -LL 6-->hurts even more  -RG    Row Name 06/10/22 1444 06/10/22 1418       Cognition/Psychosocial    Affect/Mental Status  (Cognition) WFL  -LL WFL  -RG    Orientation Status (Cognition) oriented x 3  -LL --    Follows Commands (Cognition) WFL  -LL WFL  -RG    Personal Safety Interventions gait belt;nonskid shoes/slippers when out of bed  -LL gait belt;nonskid shoes/slippers when out of bed;fall prevention program maintained  -RG    Cognitive Function WFL  -LL --    Row Name 06/10/22 1418          Bed Mobility    Supine-Sit-Supine [place] (Bed Mobility) minimum assist (75% patient effort);verbal cues;nonverbal cues (demo/gesture)  -     Row Name 06/10/22 1444 06/10/22 1418       Transfers    Bed-Chair [place] (Transfers) -- minimum assist (75% patient effort);verbal cues;nonverbal cues (demo/gesture)  -RG    Chair-Bed [place] (Transfers) -- minimum assist (75% patient effort);verbal cues;nonverbal cues (demo/gesture)  -RG    Assistive Device (Bed-Chair Transfers) -- wheelchair  -RG    Sit-Stand [place] (Transfers) verbal cues;nonverbal cues (demo/gesture);contact guard  - minimum assist (75% patient effort);verbal cues;nonverbal cues (demo/gesture)  -RG    Stand-Sit [place] (Transfers) verbal cues;nonverbal cues (demo/gesture);contact guard  - verbal cues;nonverbal cues (demo/gesture)  -    Row Name 06/10/22 1418          Chair-Bed Transfer    Assistive Device (Chair-Bed Transfers) wheelchair  -     Row Name 06/10/22 1444 06/10/22 1418       Sit-Stand Transfer    Assistive Device (Sit-Stand Transfers) walker, front-wheeled  -LL walker, front-wheeled  -    Row Name 06/10/22 1444 06/10/22 1418       Stand-Sit Transfer    Assistive Device (Stand-Sit Transfers) walker, front-wheeled  -LL walker, front-wheeled  -    Row Name 06/10/22 1444 06/10/22 1418       Gait/Stairs (Locomotion)    [place] Level (Gait) contact guard;verbal cues;nonverbal cues (demo/gesture)  - --    Assistive Device (Gait) walker, front-wheeled  - --    Distance in Feet (Gait) 320'  -LL --    Deviations/Abnormal Patterns  (Gait) bc decreased;gait speed decreased;stride length decreased  -LL --    Bilateral Gait Deviations forward flexed posture  -LL --    Comment, (Gait/Stairs) -- c/o not feeling well in pm, denies any pain  -    Row Name 06/10/22 1444 06/10/22 1418       Motor Skills    Therapeutic Exercise --  Stretching to B gastroc & hamstring mm  -LL hip;knee;ankle  -    Row Name 06/10/22 1444 06/10/22 1418       Hip (Therapeutic Exercise)    Hip (Therapeutic Exercise) -- strengthening exercise  -RG    Hip Strengthening (Therapeutic Exercise) bilateral;flexion;extension;aBduction;aDduction;marching while seated;sitting;1 lb free weight;resistance band;red  -LL bilateral;flexion;aBduction;aDduction;marching while seated;sitting;resistance band;red;10 repetitions;2 sets  -    Row Name 06/10/22 1444 06/10/22 1418       Knee (Therapeutic Exercise)    Knee (Therapeutic Exercise) -- strengthening exercise  -RG    Knee Strengthening (Therapeutic Exercise) bilateral;flexion;extension;marching while seated;LAQ (long arc quad);hamstring curls;sitting;1 lb free weight;resistance band;red  -LL bilateral;flexion;extension;marching while seated;LAQ (long arc quad);sitting;resistance band;red;10 repetitions;2 sets  -    Row Name 06/10/22 1444 06/10/22 1418       Ankle (Therapeutic Exercise)    Ankle (Therapeutic Exercise) -- strengthening exercise  -RG    Ankle Strengthening (Therapeutic Exercise) bilateral;dorsiflexion;plantarflexion;sitting  -LL bilateral;dorsiflexion;plantarflexion;sitting;10 repetitions;2 sets  -    Row Name 06/10/22 1444 06/10/22 1418       Positioning and Restraints    Pre-Treatment Position --  WC in room  -LL sitting in chair/recliner  -RG    Post Treatment Position wheelchair  -LL bed  -RG    In Bed -- notified nsg;sitting EOB;call light within reach;encouraged to call for assist  -RG    In Wheelchair sitting;call light within reach;encouraged to call for assist  In room with tray tbale in front of her   -LL --    Row Name 06/10/22 1444 06/10/22 1418       Therapy Assessment/Plan (PT)    Patient's Goals For Discharge return home  -LL return home  -RG    Row Name 06/10/22 1444 06/10/22 1418       Therapy Assessment/Plan (PT)    Rehab Potential/Prognosis (PT) adequate, monitor progress closely  -LL adequate, monitor progress closely  -RG    Frequency of Treatment (PT) 5 times per week  -LL 5 times per week  -RG    Estimated Duration of Therapy (PT) 2 weeks  -LL 2 weeks  -RG    Problem List (PT) balance;mobility;strength;pain;postural control  -LL balance;mobility;strength;pain;postural control  -RG    Activity Limitations Related to Problem List (PT) unable to ambulate safely;unable to transfer safely  -LL unable to ambulate safely;unable to transfer safely  -RG    Row Name 06/10/22 1444 06/10/22 1418       Therapy Plan Review/Discharge Plan (PT)    Anticipated Equipment Needs at Discharge (PT Eval) --  tbd  -LL --  tbd  -RG    Anticipated Discharge Disposition (PT) home with home health  -LL home with home health  -RG    Row Name 06/10/22 1444 06/10/22 1418       IRF PT Goals    Bed Mobility Goal Selection (PT-IRF) bed mobility, PT goal 1  -LL bed mobility, PT goal 1  -RG    Transfer Goal Selection (PT-IRF) transfers, PT goal 1  -LL transfers, PT goal 1  -RG    Gait (Walking Locomotion) Goal Selection (PT-IRF) gait, PT goal 1  -LL gait, PT goal 1  -RG    Row Name 06/10/22 1444 06/10/22 1418       Bed Mobility Goal 1 (PT-IRF)    Activity/Assistive Device (Bed Mobility Goal 1, PT-IRF) sit to supine/supine to sit  -LL sit to supine/supine to sit  -RG    [place] Level (Bed Mobility Goal 1, PT-IRF) modified independence  -LL modified independence  -RG    Time Frame (Bed Mobility Goal 1, PT-IRF) by discharge  -LL by discharge  -RG    Row Name 06/10/22 1444 06/10/22 1418       Transfer Goal 1 (PT-IRF)    Activity/Assistive Device (Transfer Goal 1, PT-IRF) sit-to-stand/stand-to-sit;bed-to-chair/chair-to-bed  -LL  sit-to-stand/stand-to-sit;bed-to-chair/chair-to-bed  -RG    [place] Level (Transfer Goal 1, PT-IRF) modified independence  -LL modified independence  -RG    Time Frame (Transfer Goal 1, PT-IRF) by discharge  -LL by discharge  -RG    Row Name 06/10/22 1444 06/10/22 1418       Gait/Walking Locomotion Goal 1 (PT-IRF)    Activity/Assistive Device (Gait/Walking Locomotion Goal 1, PT-IRF) --  RW or AAD  -LL --  RW or AAD  -RG    Gait/Walking Locomotion Distance Goal 1 (PT-IRF) 300'  -'  -RG    [place] Level (Gait/Walking Locomotion Goal 1, PT-IRF) modified independence  -LL modified independence  -RG    Time Frame (Gait/Walking Locomotion Goal 1, PT-IRF) by discharge  -LL by discharge  -RG          User Key  (r) = Recorded By, (t) = Taken By, (c) = Cosigned By    Initials Name Provider Type    LL Shelly Mccord, JESSICA Physical Therapist Assistant    Chidi Lerner PTA Physical Therapist Assistant              Wound Other (See comments) midline sternal Incision (Active)   Dressing Appearance dry;intact 06/10/22 0805   Drainage Amount none 06/10/22 0805       Wound Right distal thigh Incision (Active)   Closure Open to air;Liquid skin adhesive 06/10/22 0805   Drainage Amount none 06/10/22 0805       Wound Right proximal leg Incision (Active)   Dressing Appearance open to air 06/10/22 0805   Drainage Amount none 06/10/22 0805     Physical Therapy Education                 Title: PT OT SLP Therapies (In Progress)     Topic: Physical Therapy (Done)     Point: Mobility training (Done)     Learning Progress Summary           Patient Acceptance, E,D, VU,NR by LL at 6/10/2022 1450    Acceptance, E,D, VU,NR by RG at 6/10/2022 1428    Acceptance, E, VU,NR by LB at 6/9/2022 1615                   Point: Home exercise program (Done)     Learning Progress Summary           Patient Acceptance, E,D, VU,NR by LL at 6/10/2022 1450    Acceptance, E,D, VU,NR by RG at 6/10/2022 1428    Acceptance, E, VU,NR by LB at 6/9/2022  1615                   Point: Body mechanics (Done)     Learning Progress Summary           Patient Acceptance, E,D, VU,NR by LL at 6/10/2022 1450    Acceptance, E,D, VU,NR by RG at 6/10/2022 1428    Acceptance, E, VU,NR by LB at 6/9/2022 1615                   Point: Precautions (Done)     Learning Progress Summary           Patient Acceptance, E,D, VU,NR by LL at 6/10/2022 1450    Acceptance, E,D, VU,NR by RG at 6/10/2022 1428    Acceptance, E, VU,NR by LB at 6/9/2022 1615                               User Key     Initials Effective Dates Name Provider Type Discipline     06/16/21 -  Wendi Brothers, PT Physical Therapist PT     05/02/16 -  Shelly Mccord PTA Physical Therapist Assistant PT    RG 06/16/21 -  Chidi Harris PTA Physical Therapist Assistant PT                PT Recommendation and Plan    Frequency of Treatment (PT): 5 times per week  Anticipated Equipment Needs at Discharge (PT Eval):  (tbd)                  Time Calculation:      PT Charges     Row Name 06/10/22 1451 06/10/22 1428          Time Calculation    Start Time 1045  -LL 1330  -RG     Stop Time 1130  -LL 1415  -RG     Time Calculation (min) 45 min  -LL 45 min  -RG     PT Received On 06/10/22  - 06/10/22  -RG            Time Calculation- PT    Total Timed Code Minutes- PT 45 minute(s)  -LL 45 minute(s)  -RG           User Key  (r) = Recorded By, (t) = Taken By, (c) = Cosigned By    Initials Name Provider Type    LL Shelly Mccord PTA Physical Therapist Assistant    RG Chidi Harris PTA Physical Therapist Assistant                Therapy Charges for Today     Code Description Service Date Service Provider Modifiers Qty    49918198948 HC GAIT TRAINING EA 15 MIN 6/10/2022 Shelly Mccord PTA GP, CQ 1    21567269918 HC PT THERAPEUTIC ACT EA 15 MIN 6/10/2022 Shelly Mccord PTA GP, CQ 1    08780160842 HC PT THER PROC EA 15 MIN 6/10/2022 Shelly Mccord PTA GP, CQ 1                   Yoshi Mccord PTA  6/10/2022

## 2022-06-10 NOTE — PROGRESS NOTES
PROGRESS NOTE         Patient Identification:  Name:  Lisa Velazquez  Age:  56 y.o.  Sex:  female  :  1965  MRN:  [id number]  Visit Number:  [id number]  Primary Care Provider:  Patricia Skelton APRN         LOS: 2 days       ----------------------------------------------------------------------------------------------------------------------  Subjective       Chief Complaints:    Debility      Interval History:    57 y/o female admitted for intervention regarding multivessel CAD.22 she underwent sternotomy, CABG x 2, endoscopic vein harvesting of RLE greater saphenous vein from the ascending aorta to the 1st obtuse marginal coronary artery, vein from side of the obtuse marginal graft the posterior descending coronary artery, L internal mammary artery the L anterior descending coronary artery with leg closure. During admission she was treated for acute hypoxic respiratory failure with improved with diuresis and was able to wean from HFNC to NC.      Patient is awake and alert, seen while working with occupational therapy, working therapy without any difficulty.  Complains of some tenderness around midline sternal incision, incision examined.  No further complaints.  Vital signs stable on room air.  No acute distress noted.      ----------------------------------------------------------------------------------------------------------------------      Objective       Current Hospital Meds:  ascorbic acid, 1,000 mg, Oral, Daily  aspirin, 81 mg, Oral, Daily  clopidogrel, 75 mg, Oral, Daily  docusate sodium, 100 mg, Oral, BID  enoxaparin, 40 mg, Subcutaneous, Q24H  furosemide, 40 mg, Oral, Daily  gabapentin, 100 mg, Oral, Nightly  Insulin Aspart, 0-7 Units, Subcutaneous, TID AC  insulin detemir, 15 Units, Subcutaneous, Q12H  melatonin, 5 mg, Oral, Nightly  metoprolol tartrate, 37.5 mg, Oral, Q12H  multivitamin with minerals, 1 tablet, Oral, Daily  potassium chloride, 20 mEq, Oral,  Daily  rosuvastatin, 40 mg, Oral, Nightly  vitamin D3, 5,000 Units, Oral, Daily         ----------------------------------------------------------------------------------------------------------------------    Vital Signs:  Temp:  [98.1 °F (36.7 °C)] 98.1 °F (36.7 °C)  Heart Rate:  [79] 79  Resp:  [20] 20  BP: (138)/(65) 138/65  No data found.  SpO2 Percentage    06/08/22 1907 06/09/22 1908   SpO2: 94% 94%     SpO2:  [94 %] 94 %  on   ;   Device (Oxygen Therapy): room air    Body mass index is 25.2 kg/m².  Wt Readings from Last 3 Encounters:   06/08/22 73 kg (160 lb 15 oz)   02/01/22 73 kg (161 lb)        Intake/Output Summary (Last 24 hours) at 6/10/2022 1155  Last data filed at 6/10/2022 0753  Gross per 24 hour   Intake 720 ml   Output --   Net 720 ml     Diet Regular; Cardiac  ----------------------------------------------------------------------------------------------------------------------      Physical Exam:    General Appearance: Chronically ill-appearing  female alert, pleasant, interactive and cooperative.    Seen while working with occupational therapy, participating without difficulty.  No acute distress.     Head: normocephalic, without obvious abnormality and atraumatic     Eyes: lids and lashes normal, conjunctivae and sclerae normal, no icterus, no  pallor, corneas clear and PERRLA     Ears: ears appear intact with no abnormalities noted     Nose: nares normal, septum midline, mucosa normal and no drainage                Throat: no oral lesions, no thrush, oral mucosa moist and oopharynx normal     Neck: no adenopathy, supple, trachea midline, no thyromegaly, no carotid bruit  and no JVD     Lungs:  Clear to auscultation bilaterally, respirations regular, respirations even and  respirations unlabored. No accessory muscle use.      Heart:: regular rhythm & normal rate, normal S1, S2, no murmur     Abdomen: normal bowel sounds in all quadrants, soft non-tender, no guarding and no rebound  tenderness     Extremities: no edema, no cyanosis, no redness, no tenderness, no clubbing     Musculoskeletal: joints with no effusion nor erythema nor warmth.  Pedal pulses palpable and equal bilaterally     Skin: no bleeding, bruising or rash.    Midline sternal incision examined, healing well without any erythema, edema, or drainage.  No evidence of dehiscence    Neurologic:               Mental Status: Patient is awake alert and oriented x3.  Appropriate.              Sensory: Sensory overall seems to be intact in BLE and BUE.              Motor strength: Generalized weakness        ----------------------------------------------------------------------------------------------------------------------            Results from last 7 days   Lab Units 06/09/22 0148 06/08/22 0428 06/07/22 0421   WBC 10*3/mm3 8.31 6.46 5.59   HEMOGLOBIN g/dL 9.8* 9.8* 9.4*   HEMATOCRIT % 31.0* 30.3* 29.6*   MCV fL 91.7 90 90   MCHC g/dL 31.6 32.3 31.8   PLATELETS 10*3/mm3 233 216 206     Results from last 7 days   Lab Units 06/09/22 0148 06/08/22 0428 06/07/22  0421 06/06/22  0341   SODIUM mmol/L 141 141 140 140   POTASSIUM mmol/L 4.1 3.9 4.0 4.6   MAGNESIUM mg/dL 1.9 2.0 1.8* 2.0   CHLORIDE mmol/L 106 103 102 104   TOTAL CO2 mmol/L  --  28 26 28   CO2 mmol/L 25.1  --   --   --    BUN mg/dL 16 14 14 15   CREATININE mg/dL 0.80 0.69 0.70 0.65   EGFR IF NONAFRICN AM mL/min/1.73m*2  --  >60 >60 >60   EGFR IF AFRICN AM mL/min/1.73m*2  --  >60 >60 >60   CALCIUM mg/dL 9.4 9.6 9.2 9.0   GLUCOSE mg/dL 131*  --   --   --    ALBUMIN g/dL 2.95*  --   --   --    BILIRUBIN mg/dL 0.8  --   --   --    ALK PHOS U/L 90  --   --   --    AST (SGOT) U/L 12  --   --   --    ALT (SGPT) U/L 16  --   --   --    Estimated Creatinine Clearance: 90.5 mL/min (by C-G formula based on SCr of 0.8 mg/dL).  No results found for: AMMONIA    Glucose   Date/Time Value Ref Range Status   06/10/2022 1120 149 (H) 70 - 130 mg/dL Final     Comment:     Meter: [id number]  : [id number] domenic adkinsd   06/10/2022 0657 81 70 - 130 mg/dL Final     Comment:     Meter: [id number] : [id number] Tj Arellano Rukhsana   06/09/2022 1629 138 (H) 70 - 130 mg/dL Final     Comment:     Meter: [id number] : [id number] domenic adkinsd   06/09/2022 1129 159 (H) 70 - 130 mg/dL Final     Comment:     Meter: [id number] : [id number] domenic adkinsd   06/08/2022 1943 153 (H) 70 - 130 mg/dL Final     Comment:     Meter: [id number] : [id number] Tj Arellano Rukhsana   06/08/2022 1259 253 (H) 74 - 99 mg/dL Final     Comment:     Accuracy of a glucose result obtained from a capillary whole blood specimen relies upon adequate, non-compromised capillary blood flow.  If the capillary glucose result is not consistent with the patient's clinical signs and symptoms, glucose testing should be repeated with either an arterial or venous sample on the glucometer or sent to the main labortory for testing.   06/08/2022 0908 142 (H) 74 - 99 mg/dL Final     Comment:     Accuracy of a glucose result obtained from a capillary whole blood specimen relies upon adequate, non-compromised capillary blood flow.  If the capillary glucose result is not consistent with the patient's clinical signs and symptoms, glucose testing should be repeated with either an arterial or venous sample on the glucometer or sent to the main labortory for testing.   06/07/2022 2039 155 (H) 74 - 99 mg/dL Final     Comment:     Accuracy of a glucose result obtained from a capillary whole blood specimen relies upon adequate, non-compromised capillary blood flow.  If the capillary glucose result is not consistent with the patient's clinical signs and symptoms, glucose testing should be repeated with either an arterial or venous sample on the glucometer or sent to the main labortory for testing.   06/07/2022 1804 100 (H) 74 - 99 mg/dL Final     Comment:     Accuracy of a glucose result obtained from a capillary whole blood specimen relies upon  adequate, non-compromised capillary blood flow.  If the capillary glucose result is not consistent with the patient's clinical signs and symptoms, glucose testing should be repeated with either an arterial or venous sample on the glucometer or sent to the main labortory for testing.   06/07/2022 1245 144 (H) 74 - 99 mg/dL Final     Comment:     Accuracy of a glucose result obtained from a capillary whole blood specimen relies upon adequate, non-compromised capillary blood flow.  If the capillary glucose result is not consistent with the patient's clinical signs and symptoms, glucose testing should be repeated with either an arterial or venous sample on the glucometer or sent to the main labortory for testing.     Lab Results   Component Value Date    HGBA1C 6.5 (H) 05/12/2022     No results found for: TSH, FREET4    No results found for: BLOODCX  No results found for: URINECX  No results found for: WOUNDCX  No results found for: STOOLCX  No results found for: RESPCX  Pain Management Panel    There is no flowsheet data to display.           ----------------------------------------------------------------------------------------------------------------------  Imaging Results (Last 24 Hours)     ** No results found for the last 24 hours. **          ----------------------------------------------------------------------------------------------------------------------    Assessment/Plan       ASSESSMENT:    Debility  CABG x3  Lower extremity vein harvest incisions  Diabetes mellitus  Hypertension  Hyperlipidemia  Constipation    PLAN:    Debility--  participated with physical and occupational therapy evaluation on 6/9/2022: Performs bed mobility activities with minimal assist and verbal/nonverbal cues; performs transfer activities with minimal assist and verbal/nonverbal cues; utilizes wheelchair for chair to bed transfer; utilizes front wheeled walker for sit stand/to sit transfer; ambulated 320 feet and 160 feet x 2  with front wheeled walker and contact-guard assist with verbal/nonverbal cues; strength MMT 4-/5 BUE; max assist for bathing activity; minimal assist for upper body dressing; max assist for lower body dressing; supervision for grooming activity; max assist for toileting activity; supervision for self-feeding     Diabetes mellitus-- Levemir 15 units every 12 hours, sliding scale insulin     Hypertension-- vital signs stable, continue current treatment     Insomnia --melatonin 5 mg at bedtime, states she slept well    Status post CABG-- sternal incision, continue local care     Hyperlipidemia-- continue Crestor     DVT prophylaxis-- discussed with CT surgery from  and lisandro to restart Lovenox daily        Code Status:   Code Status and Medical Interventions:   Ordered at: 06/08/22 1947     Code Status (Patient has no pulse and is not breathing):    CPR (Attempt to Resuscitate)     Medical Interventions (Patient has pulse or is breathing):    Full Support       Kelly Slater, APRN  06/10/22  11:53 EDT

## 2022-06-11 ENCOUNTER — APPOINTMENT (OUTPATIENT)
Dept: GENERAL RADIOLOGY | Facility: HOSPITAL | Age: 57
End: 2022-06-11

## 2022-06-11 ENCOUNTER — APPOINTMENT (OUTPATIENT)
Dept: ULTRASOUND IMAGING | Facility: HOSPITAL | Age: 57
End: 2022-06-11

## 2022-06-11 LAB
GLUCOSE BLDC GLUCOMTR-MCNC: 114 MG/DL (ref 70–130)
GLUCOSE BLDC GLUCOMTR-MCNC: 123 MG/DL (ref 70–130)
GLUCOSE BLDC GLUCOMTR-MCNC: 88 MG/DL (ref 70–130)

## 2022-06-11 PROCEDURE — 72114 X-RAY EXAM L-S SPINE BENDING: CPT | Performed by: RADIOLOGY

## 2022-06-11 PROCEDURE — 97530 THERAPEUTIC ACTIVITIES: CPT | Performed by: OCCUPATIONAL THERAPIST

## 2022-06-11 PROCEDURE — 76775 US EXAM ABDO BACK WALL LIM: CPT

## 2022-06-11 PROCEDURE — 97530 THERAPEUTIC ACTIVITIES: CPT

## 2022-06-11 PROCEDURE — 73501 X-RAY EXAM HIP UNI 1 VIEW: CPT

## 2022-06-11 PROCEDURE — 82962 GLUCOSE BLOOD TEST: CPT

## 2022-06-11 PROCEDURE — 97116 GAIT TRAINING THERAPY: CPT

## 2022-06-11 PROCEDURE — 73501 X-RAY EXAM HIP UNI 1 VIEW: CPT | Performed by: RADIOLOGY

## 2022-06-11 PROCEDURE — 97535 SELF CARE MNGMENT TRAINING: CPT | Performed by: OCCUPATIONAL THERAPIST

## 2022-06-11 PROCEDURE — 97110 THERAPEUTIC EXERCISES: CPT

## 2022-06-11 PROCEDURE — 25010000002 ENOXAPARIN PER 10 MG: Performed by: INTERNAL MEDICINE

## 2022-06-11 PROCEDURE — 72114 X-RAY EXAM L-S SPINE BENDING: CPT

## 2022-06-11 PROCEDURE — 97110 THERAPEUTIC EXERCISES: CPT | Performed by: OCCUPATIONAL THERAPIST

## 2022-06-11 PROCEDURE — 63710000001 INSULIN DETEMIR PER 5 UNITS: Performed by: INTERNAL MEDICINE

## 2022-06-11 RX ADMIN — INSULIN DETEMIR 15 UNITS: 100 INJECTION, SOLUTION SUBCUTANEOUS at 09:24

## 2022-06-11 RX ADMIN — Medication 5 MG: at 21:36

## 2022-06-11 RX ADMIN — GABAPENTIN 100 MG: 100 CAPSULE ORAL at 21:36

## 2022-06-11 RX ADMIN — ENOXAPARIN SODIUM 40 MG: 40 INJECTION SUBCUTANEOUS at 09:23

## 2022-06-11 RX ADMIN — INSULIN DETEMIR 15 UNITS: 100 INJECTION, SOLUTION SUBCUTANEOUS at 21:00

## 2022-06-11 RX ADMIN — ROSUVASTATIN CALCIUM 40 MG: 20 TABLET, FILM COATED ORAL at 21:36

## 2022-06-11 RX ADMIN — ASPIRIN 81 MG: 81 TABLET, CHEWABLE ORAL at 09:24

## 2022-06-11 RX ADMIN — DOCUSATE SODIUM 100 MG: 100 CAPSULE ORAL at 09:24

## 2022-06-11 RX ADMIN — METHOCARBAMOL 500 MG: 500 TABLET, FILM COATED ORAL at 19:07

## 2022-06-11 RX ADMIN — Medication 5000 UNITS: at 09:24

## 2022-06-11 RX ADMIN — POTASSIUM CHLORIDE 20 MEQ: 20 TABLET, EXTENDED RELEASE ORAL at 09:24

## 2022-06-11 RX ADMIN — Medication 1 TABLET: at 09:24

## 2022-06-11 RX ADMIN — FUROSEMIDE 40 MG: 40 TABLET ORAL at 09:23

## 2022-06-11 RX ADMIN — METOPROLOL TARTRATE 37.5 MG: 25 TABLET, FILM COATED ORAL at 21:36

## 2022-06-11 RX ADMIN — CLOPIDOGREL BISULFATE 75 MG: 75 TABLET, FILM COATED ORAL at 09:24

## 2022-06-11 RX ADMIN — OXYCODONE HYDROCHLORIDE AND ACETAMINOPHEN 1000 MG: 500 TABLET ORAL at 09:23

## 2022-06-11 RX ADMIN — OXYCODONE 5 MG: 5 TABLET ORAL at 18:25

## 2022-06-11 RX ADMIN — ACETAMINOPHEN 650 MG: 325 TABLET ORAL at 21:36

## 2022-06-11 RX ADMIN — DOCUSATE SODIUM 100 MG: 100 CAPSULE ORAL at 21:36

## 2022-06-11 NOTE — PROGRESS NOTES
Occupational Therapy: Individual: 90 minutes.    Physical Therapy:    Speech Language Pathology:    Signed by: Araceli Lua OT

## 2022-06-11 NOTE — PROGRESS NOTES
Occupational Therapy:    Physical Therapy: Individual: 100 minutes.    Speech Language Pathology:    Signed by: Shelly Mccord PTA

## 2022-06-11 NOTE — PLAN OF CARE
Problem: Rehabilitation ([facility]) Plan of Care  Goal: Plan of Care Review  Outcome: Ongoing, Progressing  Flowsheets (Taken 6/11/2022 0628)  Progress: improving  Plan of Care Reviewed With: patient  Goal: Patient-Specific Goal (Individualized)  Outcome: Ongoing, Progressing  Goal: Absence of New-Onset Illness or Injury  Outcome: Ongoing, Progressing  Intervention: Prevent Fall and Fall Injury  Recent Flowsheet Documentation  Taken 6/11/2022 0628 by Kelly Morfin RN  Safety Promotion/Fall Prevention:   safety round/check completed   nonskid shoes/slippers when out of bed  Taken 6/11/2022 0400 by Kelly Morfin RN  Safety Promotion/Fall Prevention:   safety round/check completed   nonskid shoes/slippers when out of bed  Taken 6/11/2022 0227 by Kelly Morfin RN  Safety Promotion/Fall Prevention:   room organization consistent   safety round/check completed   clutter free environment maintained   nonskid shoes/slippers when out of bed  Taken 6/11/2022 0000 by Kelly Morfin RN  Safety Promotion/Fall Prevention:   nonskid shoes/slippers when out of bed   safety round/check completed   clutter free environment maintained  Taken 6/10/2022 2200 by Kelly Morfin RN  Safety Promotion/Fall Prevention:   safety round/check completed   nonskid shoes/slippers when out of bed   clutter free environment maintained  Taken 6/10/2022 2000 by Kelly Morfin RN  Safety Promotion/Fall Prevention:   clutter free environment maintained   safety round/check completed   nonskid shoes/slippers when out of bed  Goal: Optimal Comfort and Wellbeing  Outcome: Ongoing, Progressing  Goal: Home and Community Transition Plan Established  Outcome: Ongoing, Progressing  Goal: Plan of Care Review  Outcome: Ongoing, Progressing  Flowsheets (Taken 6/11/2022 0628)  Progress: improving  Plan of Care Reviewed With: patient  Goal: Patient-Specific Goal (Individualized)  Outcome: Ongoing,  Progressing  Goal: Absence of New-Onset Illness or Injury  Outcome: Ongoing, Progressing  Intervention: Prevent Fall and Fall Injury  Recent Flowsheet Documentation  Taken 6/11/2022 0628 by Kelly Morfin RN  Safety Promotion/Fall Prevention:   safety round/check completed   nonskid shoes/slippers when out of bed  Taken 6/11/2022 0400 by Kelly Morfin, RN  Safety Promotion/Fall Prevention:   safety round/check completed   nonskid shoes/slippers when out of bed  Taken 6/11/2022 0227 by Kelly Morfin RN  Safety Promotion/Fall Prevention:   room organization consistent   safety round/check completed   clutter free environment maintained   nonskid shoes/slippers when out of bed  Taken 6/11/2022 0000 by Kelly Morfin RN  Safety Promotion/Fall Prevention:   nonskid shoes/slippers when out of bed   safety round/check completed   clutter free environment maintained  Taken 6/10/2022 2200 by Kelly Morfin RN  Safety Promotion/Fall Prevention:   safety round/check completed   nonskid shoes/slippers when out of bed   clutter free environment maintained  Taken 6/10/2022 2000 by Kelly Morfin RN  Safety Promotion/Fall Prevention:   clutter free environment maintained   safety round/check completed   nonskid shoes/slippers when out of bed  Goal: Optimal Comfort and Wellbeing  Outcome: Ongoing, Progressing  Goal: Home and Community Transition Plan Established  Outcome: Ongoing, Progressing   Goal Outcome Evaluation:  Plan of Care Reviewed With: patient        Progress: improving

## 2022-06-11 NOTE — THERAPY TREATMENT NOTE
Inpatient Rehabilitation - Physical Therapy Treatment Note       TONI Wild     Patient Name: Lisa Velazquez  : 1965  MRN: [id number]    Today's Date: 2022                    Admit Date: 2022      Visit Dx:     ICD-10-CM ICD-9-CM   1. Acute respiratory failure with hypoxia (HCC)  J96.01 518.81       Patient Active Problem List   Diagnosis   • S/P right hip fracture   • Acute respiratory failure with hypoxia (HCC)       Past Medical History:   Diagnosis Date   • Coronary artery disease    • Diabetes mellitus (HCC)    • Elevated cholesterol    • GERD (gastroesophageal reflux disease)    • History of transfusion    • Hypertension        Past Surgical History:   Procedure Laterality Date   • ABDOMINAL SURGERY      gallbladder removal   • CARDIAC CATHETERIZATION     • CARDIAC SURGERY      stent   • COLONOSCOPY     • FRACTURE SURGERY     • TOE AMPUTATION Right     3rd toe right foot       PT ASSESSMENT (last 12 hours)     IRF PT Evaluation and Treatment     Row Name 22 1428          PT Time and Intention    Document Type daily treatment  BID treatment session  -LL     Mode of Treatment individual therapy;physical therapy  -LL     Patient/Family/Caregiver Comments/Observations Patient & RN in agreement for participation with PT this date.  -LL     Row Name 22 1428          General Information    Existing Precautions/Restrictions fall;sternal;cardiac  -LL     Row Name 22 1428          Pain Scale: FACES Pre/Post-Treatment    Pain: FACES Scale, Pretreatment 6-->hurts even more  -LL     Posttreatment Pain Rating 6-->hurts even more  -LL     Row Name 22 1428          Cognition/Psychosocial    Affect/Mental Status (Cognition) WFL  -LL     Orientation Status (Cognition) oriented x 3  -LL     Follows Commands (Cognition) WFL  -LL     Personal Safety Interventions gait belt;nonskid shoes/slippers when out of bed  -LL     Cognitive Function WFL  -LL     Row Name 22 1428          Bed  Mobility    Supine-Sit-Supine [place] (Bed Mobility) minimum assist (75% patient effort);verbal cues;nonverbal cues (demo/gesture)  -LL     Row Name 06/11/22 1428          Transfers    Chair-Bed [place] (Transfers) contact guard  -LL     Sit-Stand [place] (Transfers) verbal cues;nonverbal cues (demo/gesture);contact guard  -LL     Stand-Sit [place] (Transfers) verbal cues;nonverbal cues (demo/gesture);contact guard  -LL     [place] Level (Toilet Transfer) contact guard;verbal cues  -LL     Row Name 06/11/22 1428          Chair-Bed Transfer    Assistive Device (Chair-Bed Transfers) wheelchair  -LL     Row Name 06/11/22 1428          Sit-Stand Transfer    Assistive Device (Sit-Stand Transfers) walker, front-wheeled  -LL     Row Name 06/11/22 1428          Stand-Sit Transfer    Assistive Device (Stand-Sit Transfers) walker, front-wheeled  -LL     Row Name 06/11/22 1428          Toilet Transfer    Type (Toilet Transfer) stand pivot/stand step  -LL     Row Name 06/11/22 1428          Gait/Stairs (Locomotion)    [place] Level (Gait) contact guard;verbal cues;nonverbal cues (demo/gesture)  -LL     Assistive Device (Gait) walker, front-wheeled  -LL     Distance in Feet (Gait) 320'  -LL     Pattern (Gait) step-through  -LL     Deviations/Abnormal Patterns (Gait) bc decreased;gait speed decreased;stride length decreased  -LL     Bilateral Gait Deviations forward flexed posture  -LL     Row Name 06/11/22 1428          Hip (Therapeutic Exercise)    Hip Strengthening (Therapeutic Exercise) bilateral;flexion;extension;aBduction;aDduction;marching while seated;marching while standing;sitting;standing;1 lb free weight;resistance band;green  GS  -LL     Row Name 06/11/22 1428          Knee (Therapeutic Exercise)    Knee Strengthening (Therapeutic Exercise) bilateral;flexion;extension;marching while seated;marching while standing;LAQ (long arc quad);hamstring curls;sitting;standing;1 lb free  weight;resistance band;green  -     Row Name 06/11/22 1428          Ankle (Therapeutic Exercise)    Ankle Strengthening (Therapeutic Exercise) bilateral;dorsiflexion;plantarflexion;sitting;standing  -     Row Name 06/11/22 1428          Positioning and Restraints    Pre-Treatment Position --  WC in room in AM & PM  -LL     Post Treatment Position wheelchair  in AM; bed in PM  -LL     In Bed supine;call light within reach;encouraged to call for assist;side rails up x3;heels elevated  HOB elevated in PM  -LL     In Wheelchair sitting;call light within reach;encouraged to call for assist  in AM in room with tray table in front of her  -     Row Name 06/11/22 1428          Vital Signs    Intra Systolic BP Rehab 108  -LL     Intra Treatment Diastolic BP 73  -LL     Intra Patient Position Sitting  -     Row Name 06/11/22 1428          Therapy Assessment/Plan (PT)    Patient's Goals For Discharge return home  -[facility] Name 06/11/22 1428          Therapy Assessment/Plan (PT)    Rehab Potential/Prognosis (PT) adequate, monitor progress closely  -     Frequency of Treatment (PT) 5 times per week  -     Estimated Duration of Therapy (PT) 2 weeks  -     Problem List (PT) balance;mobility;strength;pain;postural control  -     Activity Limitations Related to Problem List (PT) unable to ambulate safely;unable to transfer safely  -     Row Name 06/11/22 1428          Therapy Plan Review/Discharge Plan (PT)    Anticipated Equipment Needs at Discharge (PT Eval) --  tbd  -LL     Anticipated Discharge Disposition (PT) home with home health  -     Row Name 06/11/22 1428          IRF PT Goals    Bed Mobility Goal Selection (PT-IRF) bed mobility, PT goal 1  -LL     Transfer Goal Selection (PT-IRF) transfers, PT goal 1  -LL     Gait (Walking Locomotion) Goal Selection (PT-IRF) gait, PT goal 1  -     Row Name 06/11/22 1428          Bed Mobility Goal 1 (PT-IRF)    Activity/Assistive Device (Bed Mobility Goal 1,  PT-IRF) sit to supine/supine to sit  -LL     [place] Level (Bed Mobility Goal 1, PT-IRF) modified independence  -LL     Time Frame (Bed Mobility Goal 1, PT-IRF) by discharge  -     Row Name 06/11/22 1428          Transfer Goal 1 (PT-IRF)    Activity/Assistive Device (Transfer Goal 1, PT-IRF) sit-to-stand/stand-to-sit;bed-to-chair/chair-to-bed  -LL     [place] Level (Transfer Goal 1, PT-IRF) modified independence  -LL     Time Frame (Transfer Goal 1, PT-IRF) by discharge  -     Row Name 06/11/22 1428          Gait/Walking Locomotion Goal 1 (PT-IRF)    Activity/Assistive Device (Gait/Walking Locomotion Goal 1, PT-IRF) --  RW or AAD  -LL     Gait/Walking Locomotion Distance Goal 1 (PT-IRF) 300'  -LL     [place] Level (Gait/Walking Locomotion Goal 1, PT-IRF) modified independence  -LL     Time Frame (Gait/Walking Locomotion Goal 1, PT-IRF) by discharge  -           User Key  (r) = Recorded By, (t) = Taken By, (c) = Cosigned By    Initials Name Provider Type    Shelly Leiva PTA Physical Therapist Assistant              Wound Other (See comments) midline sternal Incision (Active)   Dressing Appearance dry;intact;open to air;no drainage 06/11/22 0730   Closure Liquid skin adhesive 06/11/22 0730   Drainage Amount none 06/11/22 0730       Wound Right distal thigh Incision (Active)   Dressing Appearance open to air 06/11/22 0730   Closure Open to air 06/11/22 0730   Drainage Amount none 06/11/22 0730       Wound Right proximal leg Incision (Active)   Dressing Appearance open to air 06/11/22 0730   Closure Open to air 06/11/22 0730   Drainage Amount none 06/11/22 0730     Physical Therapy Education                 Title: PT OT SLP Therapies (In Progress)     Topic: Physical Therapy (Done)     Point: Mobility training (Done)     Learning Progress Summary           Patient Acceptance, E,D, VU,NR by  at 6/11/2022 1432      Show all documentation for this point (3)                 Point: Home exercise  program (Done)     Learning Progress Summary           Patient Acceptance, E,D, VU,NR by  at 6/11/2022 1432      Show all documentation for this point (3)                 Point: Body mechanics (Done)     Learning Progress Summary           Patient Acceptance, E,D, VU,NR by  at 6/11/2022 1432      Show all documentation for this point (3)                 Point: Precautions (Done)     Learning Progress Summary           Patient Acceptance, E,D, VU,NR by  at 6/11/2022 1432      Show all documentation for this point (3)                             User Key     Initials Effective Dates Name Provider Type Discipline    LL 05/02/16 -  Shelly Mccord PTA Physical Therapist Assistant PT                PT Recommendation and Plan    Frequency of Treatment (PT): 5 times per week  Anticipated Equipment Needs at Discharge (PT Eval):  (tbd)                  Time Calculation:      PT Charges     Row Name 06/11/22 1434 06/11/22 1432          Time Calculation    Start Time 1245  -LL 0915  -LL     Stop Time 1340  -LL 1000  -LL     Time Calculation (min) 55 min  -LL 45 min  -LL     PT Received On -- 06/11/22  -LL            Time Calculation- PT    Total Timed Code Minutes- PT 55 minute(s)  -LL 45 minute(s)  -LL           User Key  (r) = Recorded By, (t) = Taken By, (c) = Cosigned By    Initials Name Provider Type    LL Shelly Mccord PTA Physical Therapist Assistant                Therapy Charges for Today     Code Description Service Date Service Provider Modifiers Qty    18376866110 HC GAIT TRAINING EA 15 MIN 6/10/2022 Shelly Mccord, JESSICA GP, CQ 1    05622535871 HC PT THERAPEUTIC ACT EA 15 MIN 6/10/2022 Shelly Mccord, PTA GP, CQ 1    59859202444 HC PT THER PROC EA 15 MIN 6/10/2022 Shelly Mccord, PTA GP, CQ 1    75229498697 HC GAIT TRAINING EA 15 MIN 6/11/2022 Shelly Mccord, PTA GP, CQ 1    32881058557 HC PT THERAPEUTIC ACT EA 15 MIN 6/11/2022 Shelly Mccord, PTA GP, CQ 2    10874511644 HC PT THER PROC EA 15 MIN  6/11/2022 Shelly Mccord, JESSICA GP, CQ 4                   Shelly. JESSICA Mccord  6/11/2022

## 2022-06-11 NOTE — THERAPY TREATMENT NOTE
Inpatient Rehabilitation - Occupational Therapy Treatment Note     Junito     Patient Name: Lisa Velazquez  : 1965  MRN: [id number]    Today's Date: 2022                 Admit Date: 2022         ICD-10-CM ICD-9-CM   1. Acute respiratory failure with hypoxia (HCC)  J96.01 518.81       Patient Active Problem List   Diagnosis   • S/P right hip fracture   • Acute respiratory failure with hypoxia (HCC)       Past Medical History:   Diagnosis Date   • Coronary artery disease    • Diabetes mellitus (HCC)    • Elevated cholesterol    • GERD (gastroesophageal reflux disease)    • History of transfusion    • Hypertension        Past Surgical History:   Procedure Laterality Date   • ABDOMINAL SURGERY      gallbladder removal   • CARDIAC CATHETERIZATION     • CARDIAC SURGERY      stent   • COLONOSCOPY     • FRACTURE SURGERY     • TOE AMPUTATION Right     3rd toe right foot             IRF OT ASSESSMENT FLOWSHEET (last 12 hours)     IRF OT Evaluation and Treatment     Row Name 22 1201          OT Time and Intention    Document Type daily treatment  -BF     Mode of Treatment occupational therapy  -BF     Patient Effort good  -BF     Symptoms Noted During/After Treatment none  -BF     Row Name 22 1201          General Information    Existing Precautions/Restrictions fall;cardiac;sternal;lifting  5 lbs  -BF     Row Name 22 1201          Cognition/Psychosocial    Orientation Status (Cognition) oriented x 3  -BF     Follows Commands (Cognition) WFL  -BF     Row Name 22 1201          Grooming    [place] Level (Grooming) supervision;set up  -BF     Position (Grooming) supported sitting  -BF     Row Name 22 1201          Self-Feeding    [place] Level (Self-Feeding) modified independence  -BF     Position (Self-Feeding) supported sitting  -BF     Row Name 22 1201          Transfers    Bed-Chair [place] (Transfers) contact guard;verbal cues  -BF     Assistive Device  (Bed-Chair Transfers) wheelchair  -BF     Row Name 06/11/22 1201          Motor Skills    Motor Skills motor control/coordination interventions  -BF     Motor Control/Coordination Interventions fine motor manipulation/dexterity activities;gross motor coordination activities;therapeutic exercise/ROM  BUE GMC/FMC theract, light strengthening  -BF     Row Name 06/11/22 1201          Positioning and Restraints    In Wheelchair sitting;call light within reach;encouraged to call for assist  -BF           User Key  (r) = Recorded By, (t) = Taken By, (c) = Cosigned By    Initials Name Effective Dates    BF Araceli Lua, ABELARDO 06/16/21 -                  Occupational Therapy Education                 Title: PT OT SLP Therapies (In Progress)     Topic: Occupational Therapy (In Progress)     Point: ADL training (Done)     Description:   Instruct learner(s) on proper safety adaptation and remediation techniques during self care or transfers.   Instruct in proper use of assistive devices.              Learning Progress Summary           Patient Acceptance, E, VU,NR by BF at 6/11/2022 1201    Acceptance, E,TB, VU by DL at 6/8/2022 1959                   Point: Home exercise program (Done)     Description:   Instruct learner(s) on appropriate technique for monitoring, assisting and/or progressing therapeutic exercises/activities.              Learning Progress Summary           Patient Acceptance, E,TB, VU by DL at 6/8/2022 1959                   Point: Precautions (Done)     Description:   Instruct learner(s) on prescribed precautions during self-care and functional transfers.              Learning Progress Summary           Patient Acceptance, E, VU,NR by BF at 6/11/2022 1201    Acceptance, E,TB, VU by DL at 6/8/2022 1959                   Point: Body mechanics (Not Started)     Description:   Instruct learner(s) on proper positioning and spine alignment during self-care, functional mobility activities and/or exercises.               Learner Progress:  Not documented in this visit.                      User Key     Initials Effective Dates Name Provider Type Discipline    BF 06/16/21 -  Araceli Lua OT Occupational Therapist OT    DL 03/16/22 -  Ashley Mccord, RN Registered Nurse Nurse                    OT Recommendation and Plan                         Time Calculation:      Time Calculation- OT     Row Name 06/11/22 1204             Time Calculation- OT    OT Start Time 0705  -BF      OT Stop Time 0835  -BF      OT Time Calculation (min) 90 min  -BF      Total Timed Code Minutes- OT 90 minute(s)  -BF      OT Non-Billable Time (min) 10 min  -BF            User Key  (r) = Recorded By, (t) = Taken By, (c) = Cosigned By    Initials Name Provider Type    BF Araceli Lua, OT Occupational Therapist              Therapy Charges for Today     Code Description Service Date Service Provider Modifiers Qty    95485477841 HC OT SELF CARE/MGMT/TRAIN EA 15 MIN 6/11/2022 Araceli Lua OT GO 2    40378660039 HC OT THERAPEUTIC ACT EA 15 MIN 6/11/2022 Araceli Lua OT GO 3    72639699802 HC OT THER PROC EA 15 MIN 6/11/2022 Araceli Lua OT GO 1                   Araceli Lua OT  6/11/2022

## 2022-06-12 LAB
GLUCOSE BLDC GLUCOMTR-MCNC: 100 MG/DL (ref 70–130)
GLUCOSE BLDC GLUCOMTR-MCNC: 103 MG/DL (ref 70–130)
GLUCOSE BLDC GLUCOMTR-MCNC: 119 MG/DL (ref 70–130)
GLUCOSE BLDC GLUCOMTR-MCNC: 183 MG/DL (ref 70–130)

## 2022-06-12 PROCEDURE — 63710000001 INSULIN DETEMIR PER 5 UNITS: Performed by: INTERNAL MEDICINE

## 2022-06-12 PROCEDURE — 82962 GLUCOSE BLOOD TEST: CPT

## 2022-06-12 PROCEDURE — 25010000002 ENOXAPARIN PER 10 MG: Performed by: INTERNAL MEDICINE

## 2022-06-12 PROCEDURE — 63710000001 INSULIN ASPART PER 5 UNITS: Performed by: INTERNAL MEDICINE

## 2022-06-12 RX ADMIN — INSULIN DETEMIR 15 UNITS: 100 INJECTION, SOLUTION SUBCUTANEOUS at 11:12

## 2022-06-12 RX ADMIN — ASPIRIN 81 MG: 81 TABLET, CHEWABLE ORAL at 09:45

## 2022-06-12 RX ADMIN — INSULIN DETEMIR 15 UNITS: 100 INJECTION, SOLUTION SUBCUTANEOUS at 21:00

## 2022-06-12 RX ADMIN — INSULIN ASPART 2 UNITS: 100 INJECTION, SOLUTION INTRAVENOUS; SUBCUTANEOUS at 17:33

## 2022-06-12 RX ADMIN — OXYCODONE 5 MG: 5 TABLET ORAL at 18:51

## 2022-06-12 RX ADMIN — CLOPIDOGREL BISULFATE 75 MG: 75 TABLET, FILM COATED ORAL at 09:45

## 2022-06-12 RX ADMIN — Medication 5000 UNITS: at 09:45

## 2022-06-12 RX ADMIN — DOCUSATE SODIUM 100 MG: 100 CAPSULE ORAL at 21:22

## 2022-06-12 RX ADMIN — METHOCARBAMOL 500 MG: 500 TABLET, FILM COATED ORAL at 02:08

## 2022-06-12 RX ADMIN — ENOXAPARIN SODIUM 40 MG: 40 INJECTION SUBCUTANEOUS at 09:45

## 2022-06-12 RX ADMIN — METOPROLOL TARTRATE 37.5 MG: 25 TABLET, FILM COATED ORAL at 21:22

## 2022-06-12 RX ADMIN — ACETAMINOPHEN 650 MG: 325 TABLET ORAL at 21:22

## 2022-06-12 RX ADMIN — ROSUVASTATIN CALCIUM 40 MG: 20 TABLET, FILM COATED ORAL at 21:22

## 2022-06-12 RX ADMIN — GABAPENTIN 100 MG: 100 CAPSULE ORAL at 21:22

## 2022-06-12 RX ADMIN — OXYCODONE HYDROCHLORIDE AND ACETAMINOPHEN 1000 MG: 500 TABLET ORAL at 09:45

## 2022-06-12 RX ADMIN — Medication 5 MG: at 21:22

## 2022-06-12 RX ADMIN — METHOCARBAMOL 500 MG: 500 TABLET, FILM COATED ORAL at 18:51

## 2022-06-12 RX ADMIN — OXYCODONE 5 MG: 5 TABLET ORAL at 02:08

## 2022-06-12 RX ADMIN — Medication 1 TABLET: at 09:45

## 2022-06-12 NOTE — PLAN OF CARE
Problem: Rehabilitation (IRF) Plan of Care  Goal: Plan of Care Review  Outcome: Ongoing, Progressing  Flowsheets (Taken 6/12/2022 0501)  Progress: no change  Plan of Care Reviewed With: patient  Goal: Patient-Specific Goal (Individualized)  Outcome: Ongoing, Progressing  Goal: Absence of New-Onset Illness or Injury  Outcome: Ongoing, Progressing  Intervention: Prevent Fall and Fall Injury  Recent Flowsheet Documentation  Taken 6/12/2022 0400 by Kelly Morfin, RN  Safety Promotion/Fall Prevention:   room organization consistent   safety round/check completed  Taken 6/12/2022 0224 by Kelly Morfin, RN  Safety Promotion/Fall Prevention:   safety round/check completed   room organization consistent  Taken 6/12/2022 0000 by Kelly Morfin RN  Safety Promotion/Fall Prevention:   safety round/check completed   nonskid shoes/slippers when out of bed  Taken 6/11/2022 2200 by Kelly Morfin, RN  Safety Promotion/Fall Prevention:   nonskid shoes/slippers when out of bed   room organization consistent  Taken 6/11/2022 2000 by Kelly Morfin RN  Safety Promotion/Fall Prevention:   nonskid shoes/slippers when out of bed   safety round/check completed  Goal: Optimal Comfort and Wellbeing  Outcome: Ongoing, Progressing  Goal: Home and Community Transition Plan Established  Outcome: Ongoing, Progressing  Goal: Plan of Care Review  Outcome: Ongoing, Progressing  Flowsheets (Taken 6/12/2022 0501)  Progress: no change  Plan of Care Reviewed With: patient  Goal: Patient-Specific Goal (Individualized)  Outcome: Ongoing, Progressing  Goal: Absence of New-Onset Illness or Injury  Outcome: Ongoing, Progressing  Intervention: Prevent Fall and Fall Injury  Recent Flowsheet Documentation  Taken 6/12/2022 0400 by Kelly Morfin, RN  Safety Promotion/Fall Prevention:   room organization consistent   safety round/check completed  Taken 6/12/2022 0224 by Kelly Morfin  RN  Safety Promotion/Fall Prevention:   safety round/check completed   room organization consistent  Taken 6/12/2022 0000 by Kelly Morfin RN  Safety Promotion/Fall Prevention:   safety round/check completed   nonskid shoes/slippers when out of bed  Taken 6/11/2022 2200 by Kelly Morfin, RN  Safety Promotion/Fall Prevention:   nonskid shoes/slippers when out of bed   room organization consistent  Taken 6/11/2022 2000 by Kelly Morfin, RN  Safety Promotion/Fall Prevention:   nonskid shoes/slippers when out of bed   safety round/check completed  Goal: Optimal Comfort and Wellbeing  Outcome: Ongoing, Progressing  Goal: Home and Community Transition Plan Established  Outcome: Ongoing, Progressing   Goal Outcome Evaluation:  Plan of Care Reviewed With: patient        Progress: no change

## 2022-06-13 LAB
GLUCOSE BLDC GLUCOMTR-MCNC: 105 MG/DL (ref 70–130)
GLUCOSE BLDC GLUCOMTR-MCNC: 117 MG/DL (ref 70–130)
GLUCOSE BLDC GLUCOMTR-MCNC: 162 MG/DL (ref 70–130)
GLUCOSE BLDC GLUCOMTR-MCNC: 183 MG/DL (ref 70–130)

## 2022-06-13 PROCEDURE — 97110 THERAPEUTIC EXERCISES: CPT | Performed by: OCCUPATIONAL THERAPIST

## 2022-06-13 PROCEDURE — 99231 SBSQ HOSP IP/OBS SF/LOW 25: CPT | Performed by: FAMILY MEDICINE

## 2022-06-13 PROCEDURE — 25010000002 ENOXAPARIN PER 10 MG: Performed by: INTERNAL MEDICINE

## 2022-06-13 PROCEDURE — 82962 GLUCOSE BLOOD TEST: CPT

## 2022-06-13 PROCEDURE — 97530 THERAPEUTIC ACTIVITIES: CPT

## 2022-06-13 PROCEDURE — 63710000001 INSULIN DETEMIR PER 5 UNITS: Performed by: INTERNAL MEDICINE

## 2022-06-13 PROCEDURE — 97530 THERAPEUTIC ACTIVITIES: CPT | Performed by: OCCUPATIONAL THERAPIST

## 2022-06-13 PROCEDURE — 97535 SELF CARE MNGMENT TRAINING: CPT | Performed by: OCCUPATIONAL THERAPIST

## 2022-06-13 PROCEDURE — 97116 GAIT TRAINING THERAPY: CPT

## 2022-06-13 PROCEDURE — 97110 THERAPEUTIC EXERCISES: CPT

## 2022-06-13 PROCEDURE — 63710000001 INSULIN ASPART PER 5 UNITS: Performed by: INTERNAL MEDICINE

## 2022-06-13 RX ADMIN — GABAPENTIN 100 MG: 100 CAPSULE ORAL at 21:41

## 2022-06-13 RX ADMIN — INSULIN ASPART 2 UNITS: 100 INJECTION, SOLUTION INTRAVENOUS; SUBCUTANEOUS at 12:03

## 2022-06-13 RX ADMIN — INSULIN DETEMIR 15 UNITS: 100 INJECTION, SOLUTION SUBCUTANEOUS at 09:05

## 2022-06-13 RX ADMIN — Medication 1 TABLET: at 09:04

## 2022-06-13 RX ADMIN — ASPIRIN 81 MG: 81 TABLET, CHEWABLE ORAL at 08:57

## 2022-06-13 RX ADMIN — DOCUSATE SODIUM 100 MG: 100 CAPSULE ORAL at 08:56

## 2022-06-13 RX ADMIN — INSULIN DETEMIR 15 UNITS: 100 INJECTION, SOLUTION SUBCUTANEOUS at 21:43

## 2022-06-13 RX ADMIN — Medication 5 MG: at 21:41

## 2022-06-13 RX ADMIN — DOCUSATE SODIUM 100 MG: 100 CAPSULE ORAL at 21:40

## 2022-06-13 RX ADMIN — METOPROLOL TARTRATE 37.5 MG: 25 TABLET, FILM COATED ORAL at 08:57

## 2022-06-13 RX ADMIN — METOPROLOL TARTRATE 37.5 MG: 25 TABLET, FILM COATED ORAL at 21:40

## 2022-06-13 RX ADMIN — CLOPIDOGREL BISULFATE 75 MG: 75 TABLET, FILM COATED ORAL at 08:57

## 2022-06-13 RX ADMIN — OXYCODONE HYDROCHLORIDE AND ACETAMINOPHEN 1000 MG: 500 TABLET ORAL at 08:57

## 2022-06-13 RX ADMIN — ENOXAPARIN SODIUM 40 MG: 40 INJECTION SUBCUTANEOUS at 08:59

## 2022-06-13 RX ADMIN — METHOCARBAMOL 500 MG: 500 TABLET, FILM COATED ORAL at 21:40

## 2022-06-13 RX ADMIN — OXYCODONE 5 MG: 5 TABLET ORAL at 21:41

## 2022-06-13 RX ADMIN — ROSUVASTATIN CALCIUM 40 MG: 20 TABLET, FILM COATED ORAL at 21:41

## 2022-06-13 RX ADMIN — Medication 5000 UNITS: at 08:57

## 2022-06-13 NOTE — PLAN OF CARE
Problem: Rehabilitation (IRF) Plan of Care  Goal: Plan of Care Review  Outcome: Ongoing, Progressing  Flowsheets (Taken 6/13/2022 0234)  Progress: no change  Plan of Care Reviewed With: patient  Outcome Evaluation:   pt calm and cooperative   resting well throughout the night   no acute distress noted  Goal: Patient-Specific Goal (Individualized)  Outcome: Ongoing, Progressing  Goal: Absence of New-Onset Illness or Injury  Outcome: Ongoing, Progressing  Intervention: Prevent Fall and Fall Injury  Recent Flowsheet Documentation  Taken 6/13/2022 0200 by Kelly Morfin RN  Safety Promotion/Fall Prevention:   safety round/check completed   clutter free environment maintained  Taken 6/13/2022 0000 by Kelly Morfin RN  Safety Promotion/Fall Prevention:   safety round/check completed   clutter free environment maintained  Taken 6/12/2022 2200 by Kelly Morfin, RN  Safety Promotion/Fall Prevention:   safety round/check completed   clutter free environment maintained  Taken 6/12/2022 2000 by Kelly Morfin RN  Safety Promotion/Fall Prevention:   safety round/check completed   clutter free environment maintained  Goal: Optimal Comfort and Wellbeing  Outcome: Ongoing, Progressing  Goal: Home and Community Transition Plan Established  Outcome: Ongoing, Progressing  Goal: Plan of Care Review  Outcome: Ongoing, Progressing  Flowsheets (Taken 6/13/2022 0234)  Progress: no change  Plan of Care Reviewed With: patient  Outcome Evaluation:   pt calm and cooperative   resting well throughout the night   no acute distress noted  Goal: Patient-Specific Goal (Individualized)  Outcome: Ongoing, Progressing  Goal: Absence of New-Onset Illness or Injury  Outcome: Ongoing, Progressing  Intervention: Prevent Fall and Fall Injury  Recent Flowsheet Documentation  Taken 6/13/2022 0200 by Kelly Morfin, RN  Safety Promotion/Fall Prevention:   safety round/check completed   clutter free  environment maintained  Taken 6/13/2022 0000 by Kelly Morfin, RN  Safety Promotion/Fall Prevention:   safety round/check completed   clutter free environment maintained  Taken 6/12/2022 2200 by Kelly Morfin, RN  Safety Promotion/Fall Prevention:   safety round/check completed   clutter free environment maintained  Taken 6/12/2022 2000 by Kelly Morfin, RN  Safety Promotion/Fall Prevention:   safety round/check completed   clutter free environment maintained  Goal: Optimal Comfort and Wellbeing  Outcome: Ongoing, Progressing  Goal: Home and Community Transition Plan Established  Outcome: Ongoing, Progressing   Goal Outcome Evaluation:  Plan of Care Reviewed With: patient        Progress: no change  Outcome Evaluation: pt calm and cooperative; resting well throughout the night; no acute distress noted

## 2022-06-13 NOTE — PROGRESS NOTES
Inpatient Rehabilitation Functional Measures Assessment and Plan of Care    Plan of Care  Self Care    [OT] Toileting(Active)  Current Status(06/13/2022): min  Weekly Goal(06/23/2022): sup  Discharge Goal: set up    Functional Measures  KIRAN Eating:  KIRAN Grooming:  KIRAN Bathing:  KIRAN Upper Body Dressing:  KIRAN Lower Body Dressing:  KIRAN Toileting:    KIRAN Bladder Management  Level of Assistance:  Frequency/Number of Accidents this Shift:    KIRAN Bowel Management  Level of Assistance:  Frequency/Number of Accidents this Shift:    KIRAN Bed/Chair/Wheelchair Transfer:  KIRAN Toilet Transfer:  KIRAN Tub/Shower Transfer:    Previously Documented Mode of Locomotion at Discharge:  [facility] Expected Mode of Locomotion at Discharge:  KIRAN Walk/Wheelchair:  KIRAN Stairs:    KIRAN Comprehension:  KIRAN Expression:  [facility] Social Interaction:  [facility] Problem Solving:  KIRAN Memory:    Therapy Mode Minutes  Occupational Therapy: Individual: 90 minutes.  Physical Therapy:  Speech Language Pathology:    Discharge Functional Goals:    Signed by: Candie Dixon, Occupational Therapist

## 2022-06-13 NOTE — THERAPY TREATMENT NOTE
Inpatient Rehabilitation - Physical Therapy Treatment Note       TONI Wild     Patient Name: Lisa Velazquez  : 1965  MRN: [id number]    Today's Date: 2022                    Admit Date: 2022      Visit Dx:     ICD-10-CM ICD-9-CM   1. Acute respiratory failure with hypoxia (HCC)  J96.01 518.81       Patient Active Problem List   Diagnosis   • S/P right hip fracture   • Acute respiratory failure with hypoxia (HCC)       Past Medical History:   Diagnosis Date   • Coronary artery disease    • Diabetes mellitus (HCC)    • Elevated cholesterol    • GERD (gastroesophageal reflux disease)    • History of transfusion    • Hypertension        Past Surgical History:   Procedure Laterality Date   • ABDOMINAL SURGERY      gallbladder removal   • CARDIAC CATHETERIZATION     • CARDIAC SURGERY      stent   • COLONOSCOPY     • FRACTURE SURGERY     • TOE AMPUTATION Right     3rd toe right foot       PT ASSESSMENT (last 12 hours)     IRF PT Evaluation and Treatment     Row Name 22 1510 22 1031       PT Time and Intention    Document Type initial evaluation;daily treatment  -LB daily treatment  BID treatment session  -RG    Mode of Treatment individual therapy;physical therapy  -LB individual therapy;physical therapy  -RG    Patient/Family/Caregiver Comments/Observations -- Pt and nursing in agreement for skilled PT on this date. Pt c/o dizziness.  -RG    Row Name 22 1510 22 1031       General Information    Existing Precautions/Restrictions fall;sternal  -LB fall;sternal;cardiac  -RG    Row Name 22 1510 22 1031       Pain Scale: FACES Pre/Post-Treatment    Pain: FACES Scale, Pretreatment 4-->hurts little more  -LB 6-->hurts even more  -RG    Posttreatment Pain Rating 4-->hurts little more  -LB 6-->hurts even more  -RG    Pain Location - --  chest and arms  -LB --    Pre/Posttreatment Pain Comment rest, repositioned, RN gave meds  -LB --    Row Name 22 1510 22 1031        Cognition/Psychosocial    Affect/Mental Status (Cognition) WFL  -LB WFL  -RG    Orientation Status (Cognition) -- oriented x 3  -RG    Follows Commands (Cognition) WFL  -LB WFL  -RG    Personal Safety Interventions gait belt;nonskid shoes/slippers when out of bed;supervised activity  -LB gait belt;nonskid shoes/slippers when out of bed;fall prevention program maintained  -RG    Cognitive Function -- WFL  -RG    Row Name 06/13/22 1031          Bed Mobility    Supine-Sit-Supine [place] (Bed Mobility) minimum assist (75% patient effort);verbal cues;nonverbal cues (demo/gesture)  -RG     Row Name 06/13/22 1510 06/13/22 1031       Transfers    Bed-Chair [place] (Transfers) verbal cues;nonverbal cues (demo/gesture);standby assist  -LB --    Chair-Bed [place] (Transfers) verbal cues;nonverbal cues (demo/gesture);standby assist  -LB contact guard  -RG    Assistive Device (Bed-Chair Transfers) wheelchair  -LB --    Sit-Stand [place] (Transfers) verbal cues;nonverbal cues (demo/gesture);standby assist  -LB verbal cues;nonverbal cues (demo/gesture);contact guard  -RG    Stand-Sit [place] (Transfers) verbal cues;nonverbal cues (demo/gesture);standby assist  -LB verbal cues;nonverbal cues (demo/gesture);contact guard  -RG    [place] Level (Toilet Transfer) supervision  -LB --    Assistive Device (Toilet Transfer) grab bars/safety frame;walker, front-wheeled  -LB --    Row Name 06/13/22 1510 06/13/22 1031       Chair-Bed Transfer    Assistive Device (Chair-Bed Transfers) wheelchair  -LB wheelchair  -RG    Row Name 06/13/22 1510 06/13/22 1031       Sit-Stand Transfer    Assistive Device (Sit-Stand Transfers) walker, front-wheeled  -LB walker, front-wheeled  -RG    Row Name 06/13/22 1510 06/13/22 1031       Stand-Sit Transfer    Assistive Device (Stand-Sit Transfers) walker, front-wheeled  -LB walker, front-wheeled  -RG    Row Name 06/13/22 1510          Toilet Transfer    Type (Toilet Transfer) stand  pivot/stand step  -LB     Row Name 06/13/22 1510 06/13/22 1031       Gait/Stairs (Locomotion)    [place] Level (Gait) verbal cues;nonverbal cues (demo/gesture);standby assist  -LB --    Assistive Device (Gait) walker, front-wheeled  -LB --    Distance in Feet (Gait) 320'  -LB --    Deviations/Abnormal Patterns (Gait) bc decreased;gait speed decreased;stride length decreased  -LB --    Bilateral Gait Deviations forward flexed posture  -LB --    Comment, (Gait/Stairs) -- pt stated that she had just walked with PT the previous session.  -RG    Row Name 06/13/22 1510          Motor Skills    Therapeutic Exercise --  sitting ex  -LB     Row Name 06/13/22 1031          Hip (Therapeutic Exercise)    Hip Strengthening (Therapeutic Exercise) bilateral;flexion;aBduction;aDduction;marching while seated;marching while standing;sitting;standing;resistance band;green;10 repetitions;2 sets  -     Row Name 06/13/22 1031          Knee (Therapeutic Exercise)    Knee Strengthening (Therapeutic Exercise) bilateral;flexion;extension;marching while seated;marching while standing;LAQ (long arc quad);standing;sitting;resistance band;green;10 repetitions;2 sets  -RG     Row Name 06/13/22 1031          Ankle (Therapeutic Exercise)    Ankle Strengthening (Therapeutic Exercise) bilateral;dorsiflexion;plantarflexion;sitting;20 repititions  -     Row Name 06/13/22 1510 06/13/22 1031       Positioning and Restraints    Pre-Treatment Position --  EOB  -LB sitting in chair/recliner  -RG    Post Treatment Position -- wheelchair  -RG    In Wheelchair with PT  -LB notified nsg;sitting;with OT;legs elevated  -RG    Row Name 06/13/22 1510          Vital Signs    Pre Systolic BP Rehab 118  -LB     Pre Treatment Diastolic BP 66  -LB     Pretreatment Heart Rate (beats/min) 71  -LB     Pre SpO2 (%) 98  -LB     O2 Delivery Pre Treatment room air  -     Row Name 06/13/22 1510 06/13/22 1031       Therapy Assessment/Plan (PT)    Patient's Goals  For Discharge return home  -LB return home  -RG    Row Name 06/13/22 1510 06/13/22 1031       Therapy Assessment/Plan (PT)    Rehab Potential/Prognosis (PT) adequate, monitor progress closely  -LB adequate, monitor progress closely  -RG    Frequency of Treatment (PT) 5 times per week  -LB 5 times per week  -RG    Estimated Duration of Therapy (PT) 2 weeks  -LB 2 weeks  -RG    Problem List (PT) balance;mobility;strength;pain;postural control  -LB balance;mobility;strength;pain;postural control  -RG    Activity Limitations Related to Problem List (PT) unable to ambulate safely;unable to transfer safely  -LB unable to ambulate safely;unable to transfer safely  -RG    Row Name 06/13/22 1510          Daily Progress Summary (PT)    Recommendations (PT) continue PT  -LB     Row Name 06/13/22 1510 06/13/22 1031       Therapy Plan Review/Discharge Plan (PT)    Anticipated Equipment Needs at Discharge (PT Eval) --  tbd  -LB --  tbd  -RG    Anticipated Discharge Disposition (PT) home with home health  -LB home with home health  -RG    Row Name 06/13/22 1510 06/13/22 1031       IRF PT Goals    Bed Mobility Goal Selection (PT-IRF) bed mobility, PT goal 1  -LB bed mobility, PT goal 1  -RG    Transfer Goal Selection (PT-IRF) transfers, PT goal 1  -LB transfers, PT goal 1  -RG    Gait (Walking Locomotion) Goal Selection (PT-IRF) gait, PT goal 1  -LB gait, PT goal 1  -RG    Row Name 06/13/22 1510 06/13/22 1031       Bed Mobility Goal 1 (PT-IRF)    Activity/Assistive Device (Bed Mobility Goal 1, PT-IRF) sit to supine/supine to sit  -LB sit to supine/supine to sit  -RG    [place] Level (Bed Mobility Goal 1, PT-IRF) modified independence  -LB modified independence  -RG    Time Frame (Bed Mobility Goal 1, PT-IRF) by discharge  -LB by discharge  -RG    Row Name 06/13/22 1510 06/13/22 1031       Transfer Goal 1 (PT-IRF)    Activity/Assistive Device (Transfer Goal 1, PT-IRF) sit-to-stand/stand-to-sit;bed-to-chair/chair-to-bed  -LB  sit-to-stand/stand-to-sit;bed-to-chair/chair-to-bed  -RG    [place] Level (Transfer Goal 1, PT-IRF) modified independence  -LB modified independence  -RG    Time Frame (Transfer Goal 1, PT-IRF) by discharge  -LB by discharge  -RG    Row Name 06/13/22 1510 06/13/22 1031       Gait/Walking Locomotion Goal 1 (PT-IRF)    Activity/Assistive Device (Gait/Walking Locomotion Goal 1, PT-IRF) --  RW or AAD  -LB --  RW or AAD  -RG    Gait/Walking Locomotion Distance Goal 1 (PT-IRF) 300'  -'  -RG    [place] Level (Gait/Walking Locomotion Goal 1, PT-IRF) modified independence  -LB modified independence  -RG    Time Frame (Gait/Walking Locomotion Goal 1, PT-IRF) by discharge  -LB by discharge  -RG          User Key  (r) = Recorded By, (t) = Taken By, (c) = Cosigned By    Initials Name Provider Type    LB Wendi Brothers, PT Physical Therapist    RG Chidi Harris, PTA Physical Therapist Assistant              Wound Other (See comments) midline sternal Incision (Active)   Dressing Appearance dry;intact;open to air 06/13/22 1020   Closure Liquid skin adhesive 06/13/22 1020   Periwound Temperature warm 06/13/22 1020   Periwound Skin Turgor soft 06/13/22 1020   Drainage Amount none 06/13/22 1020       Wound Right distal thigh Incision (Active)   Dressing Appearance open to air 06/13/22 1020   Periwound Temperature warm 06/13/22 1020   Drainage Amount none 06/13/22 1020       Wound Right proximal leg Incision (Active)   Dressing Appearance open to air 06/13/22 1020   Periwound Temperature warm 06/13/22 1020   Periwound Skin Turgor soft 06/13/22 1020   Drainage Amount none 06/13/22 1020     Physical Therapy Education                 Title: PT OT SLP Therapies (In Progress)     Topic: Physical Therapy (Done)     Point: Mobility training (Done)     Learning Progress Summary           Patient Acceptance, E, VU,NR by NIMCO at 6/13/2022 1519      Show all documentation for this point (5)                 Point: Home  exercise program (Done)     Learning Progress Summary           Patient Acceptance, E, VU,NR by LB at 6/13/2022 1519      Show all documentation for this point (5)                 Point: Body mechanics (Done)     Learning Progress Summary           Patient Acceptance, E, VU,NR by LB at 6/13/2022 1519      Show all documentation for this point (5)                 Point: Precautions (Done)     Learning Progress Summary           Patient Acceptance, E, VU,NR by LB at 6/13/2022 1519      Show all documentation for this point (5)                             User Key     Initials Effective Dates Name Provider Type Discipline     06/16/21 -  Wendi Brothers, PT Physical Therapist PT                PT Recommendation and Plan    Planned Therapy Interventions (PT): balance training, bed mobility training, gait training, patient/family education, postural re-education, strengthening, transfer training  Frequency of Treatment (PT): 5 times per week  Anticipated Equipment Needs at Discharge (PT Eval):  (tbd)  Daily Progress Summary (PT)  Recommendations (PT): continue PT               Time Calculation:      PT Charges     Row Name 06/13/22 1519 06/13/22 1036          Time Calculation    Start Time 0830  -LB 0915  -RG     Stop Time 0915  -LB 1000  -RG     Time Calculation (min) 45 min  -LB 45 min  -RG     PT Received On 06/13/22  -LB 06/13/22  -RG            Time Calculation- PT    Total Timed Code Minutes- PT -- 45 minute(s)  -RG           User Key  (r) = Recorded By, (t) = Taken By, (c) = Cosigned By    Initials Name Provider Type     Wendi Brothers, PT Physical Therapist    RG Chidi Harris, PTA Physical Therapist Assistant                Therapy Charges for Today     Code Description Service Date Service Provider Modifiers Qty    95372976628 HC GAIT TRAINING EA 15 MIN 6/13/2022 Wendi Brothers, PT GP 1    14693801435 HC PT THER PROC EA 15 MIN 6/13/2022 Wendi Brothers, PT GP 1    45308306162 HC  PT THERAPEUTIC ACT EA 15 MIN 6/13/2022 Wendi Brothers, PT GP 1                   Wendi Brothers, PT  6/13/2022

## 2022-06-13 NOTE — PROGRESS NOTES
[facility]  PROGRESS NOTE     Patient Identification:  Name:  Lisa Velazquez  Age:  56 y.o.  Sex:  female  :  1965  MRN:  [id number]  Visit Number:  [id number]  ROOM: 110/2S     Primary Care Provider:  Patricia Skelton APRN    Length of stay in inpatient status:  5    Subjective     Chief Compliant:  No chief complaint on file.      History of Presenting Illness: 56-year-old female who is s/p multivessel CAD with CABG x2.  Patient did have acute hypoxic respiratory failure during her stay and and this improved with diuresis.  Patient states she is feeling well this morning except for mild dizziness when she arose to eat.  States otherwise she is doing very well    Objective     Current Hospital Meds:ascorbic acid, 1,000 mg, Oral, Daily  aspirin, 81 mg, Oral, Daily  clopidogrel, 75 mg, Oral, Daily  docusate sodium, 100 mg, Oral, BID  enoxaparin, 40 mg, Subcutaneous, Q24H  gabapentin, 100 mg, Oral, Nightly  Insulin Aspart, 0-7 Units, Subcutaneous, TID AC  insulin detemir, 15 Units, Subcutaneous, Q12H  melatonin, 5 mg, Oral, Nightly  metoprolol tartrate, 37.5 mg, Oral, Q12H  multivitamin with minerals, 1 tablet, Oral, Daily  rosuvastatin, 40 mg, Oral, Nightly  vitamin D3, 5,000 Units, Oral, Daily       ----------------------------------------------------------------------------------------------------------------------  Vital Signs:  Temp:  [98 °F (36.7 °C)-98.5 °F (36.9 °C)] 98.5 °F (36.9 °C)  Heart Rate:  [63-81] 70  Resp:  [16-18] 16  BP: ()/(53-77) 127/58  SpO2:  [92 %-96 %] 92 %  on   ;   Device (Oxygen Therapy): room air  Body mass index is 25.2 kg/m².    Wt Readings from Last 3 Encounters:   22 73 kg (160 lb 15 oz)   22 73 kg (161 lb)     Intake & Output (last 3 days)       06/10 0701   07 07 07 07 07 07 0700    P.O.  240    Total Intake(mL/kg) 960 (13.2) 960 (13.2) 1200 (16.4) 240 (3.3)    Net +960  +960 +1200 +240            Urine Unmeasured Occurrence 4 x 4 x 3 x 1 x        Diet Regular; Cardiac  ----------------------------------------------------------------------------------------------------------------------  Physical exam:  Constitutional:   No acute distress  HEENT: Normocephalic atraumatic  Neck: Supple   Cardiovascular: Regular rate and rhythm  Pulmonary/Chest: Clear to auscultation  Abdominal:  .  Positive bowel sounds soft  Musculoskeletal: No arthropathy  Neurological: No focal deficits  Skin: Incision site is clean no erythema noted  Peripheral vascular:  Genitourinary:  ----------------------------------------------------------------------------------------------------------------------    Last echocardiogram:    ----------------------------------------------------------------------------------------------------------------------  Results from last 7 days   Lab Units 06/09/22  0148 06/08/22  0428 06/07/22  0421   WBC 10*3/mm3 8.31 6.46 5.59   HEMOGLOBIN g/dL 9.8* 9.8* 9.4*   HEMATOCRIT % 31.0* 30.3* 29.6*   MCV fL 91.7 90 90   MCHC g/dL 31.6 32.3 31.8   PLATELETS 10*3/mm3 233 216 206         Results from last 7 days   Lab Units 06/09/22  0148 06/08/22  0428 06/07/22  0421   SODIUM mmol/L 141 141 140   POTASSIUM mmol/L 4.1 3.9 4.0   MAGNESIUM mg/dL 1.9 2.0 1.8*   CHLORIDE mmol/L 106 103 102   TOTAL CO2 mmol/L  --  28 26   CO2 mmol/L 25.1  --   --    BUN mg/dL 16 14 14   CREATININE mg/dL 0.80 0.69 0.70   EGFR IF NONAFRICN AM mL/min/1.73m*2  --  >60 >60   EGFR IF AFRICN AM mL/min/1.73m*2  --  >60 >60   CALCIUM mg/dL 9.4 9.6 9.2   IONIZED CALCIUM mg/dL  --  5.1 4.6   GLUCOSE mg/dL 131*  --   --    ALBUMIN g/dL 2.95*  --   --    BILIRUBIN mg/dL 0.8  --   --    ALK PHOS U/L 90  --   --    AST (SGOT) U/L 12  --   --    ALT (SGPT) U/L 16  --   --    Estimated Creatinine Clearance: 90.5 mL/min (by C-G formula based on SCr of 0.8 mg/dL).  No results found for: AMMONIA              Glucose   Date/Time  Value Ref Range Status   06/13/2022 1125 162 (H) 70 - 130 mg/dL Final     Comment:     Meter: [id number] : [id number] Hanh John   06/13/2022 0855 183 (H) 70 - 130 mg/dL Final     Comment:     Meter: [id number] : [id number] Narcisa Flores   06/13/2022 0613 117 70 - 130 mg/dL Final     Comment:     Meter: [id number] : [id number] Jeannine Crystal   06/12/2022 1605 183 (H) 70 - 130 mg/dL Final     Comment:     RN Notified Meter: [id number] : [id number] ROBERT MELVIN   06/12/2022 1108 119 70 - 130 mg/dL Final     Comment:     Meter: [id number] : [id number] Narcisa Flores   06/12/2022 0943 100 70 - 130 mg/dL Final     Comment:     Meter: [id number] : [id number] Narcisa Flores   06/12/2022 0614 103 70 - 130 mg/dL Final     Comment:     Meter: [id number] : [id number] Spencer Eileendevi Milian   06/11/2022 1645 123 70 - 130 mg/dL Final     Comment:     Meter: [id number] : [id number] Hanh John     No results found for: TSH, FREET4  No results found for: PREGTESTUR, PREGSERUM, HCG, HCGQUANT  Pain Management Panel    There is no flowsheet data to display.       Brief Urine Lab Results  (Last result in the past 365 days)      Color   Clarity   Blood   Leuk Est   Nitrite   Protein   CREAT   Urine HCG        06/06/22 1657 Yellow   Cloudy     Moderate                   No results found for: BLOODCX  Results from last 7 days   Lab Units 06/06/22  1657   BACTERIA UA  Negative   SQUAM EPITHEL UA /HPF 0-5     No results found for: URINECX  No results found for: WOUNDCX  No results found for: STOOLCX        I have personally looked at the labs and they are summarized above.  ----------------------------------------------------------------------------------------------------------------------  Detailed radiology reports for the last 24 hours:    Imaging Results (Last 24 Hours)     ** No results found for the last 24 hours. **        Final impressions for the last 30 days of radiology reports:    XR  Spine Lumbar Complete With Flex & Ext    Result Date: 6/12/2022  Unremarkable appearance of the lumbar spine.  This report was finalized on 6/12/2022 11:21 AM by Dr. Mario Alberto Griggs MD.      US Renal Limited    Result Date: 6/11/2022  Both kidneys are normal in size and ultrasound appearance. Signer Name: Corby Roa MD  Signed: 6/11/2022 8:40 PM  Workstation Name: [facility]  [facility]    XR Chest 1 View    Result Date: 6/7/2022  Stable aeration. CRITICAL RESULT:   No. COMMUNICATION: Per this written report. Dictated by Jorge Azevedo on 6/7/2022 9:45 AM Signed by Jorge Azevedo on 6/7/2022 9:46 AM    XR Chest 1 View    Result Date: 6/5/2022  Stable aeration with decreased lung volumes, bibasilar atelectasis, and small left-sided pleural effusion. CRITICAL RESULT:   No. COMMUNICATION: Per this written report. Approved by Jose Manuel Lackey D.O. on 6/5/2022 9:56 AM By electronically signing this report, I, the attending physician, attest that I have personally reviewed the images/data for the above examination(s) and agree with the final edited report. Dictated by Jose Manuel Lackey D.O. on 6/5/2022 9:56 AM Signed by Corby Sow on 6/5/2022 11:39 AM    XR Chest 1 View    Result Date: 6/3/2022  Mild increase in the basilar atelectasis and small left effusion. CRITICAL RESULT:   No. COMMUNICATION: Per this written report. Dictated by Zamzam Kennedy on 6/3/2022 11:04 AM Signed by Zamzam Kennedy on 6/3/2022 11:05 AM    XR Chest 1 View    Result Date: 6/2/2022  Interval removal of right IJ Pooler-Octavio catheter with introducer sheath remaining in place. Otherwise, stable chest exam. CRITICAL RESULT:   No. COMMUNICATION: Per this written report. Approved by Annmarie Alcaraz on 6/2/2022 10:13 AM By electronically signing this report, I, the attending physician, attest that I have personally reviewed the images/data for the above examination(s) and agree with the final edited report.  Dictated by Annmarie Alcaraz on 6/2/2022 10:13 AM Signed by Kahlil Sebastian on 6/2/2022 10:42 AM    XR Chest 1 View    Result Date: 6/1/2022  Increased left basilar atelectasis status post extubation. CRITICAL RESULT:   No. COMMUNICATION: Per this written report. Dictated by Ralph Roman on 6/1/2022 9:34 AM Signed by Ralph Roman on 6/1/2022 9:34 AM    XR Chest 1 View    Result Date: 5/31/2022  Expected postoperative appearance of the chest. CRITICAL RESULT:   No. COMMUNICATION: Per this written report. Dictated by Ralph Roman on 5/31/2022 4:15 PM Signed by Ralph Roman on 5/31/2022 4:16 PM    XR Hip With or Without Pelvis 1 View Left    Result Date: 6/12/2022    No acute findings in the left hip.  This report was finalized on 6/12/2022 11:22 AM by Dr. Mario Alberto Griggs MD.      I have personally looked at the radiology images and read the final radiology report.    Assessment & Plan    Debility--patient required minimum assistance for up with bed mobility; contact-guard for transfers; independent for self-feeding; set up for grooming; ambulated 320 feet with front wheel walker and contact-guard last week    CAD with recent CABG--patient doing well continue medical management.  Patient currently on aspirin, Plavix, metoprolol and Crestor.    CHF continue Lasix 40 mg daily    Diabetes mellitus continue Levemir and sliding scale coverage.    Insomnia melatonin    Hypertension controlled    VTE Prophylaxis:   Mechanical Order History:     None      Pharmalogical Order History:      Ordered     Dose Route Frequency Stop    06/08/22 1953  Enoxaparin Sodium (LOVENOX) syringe 40 mg         40 mg SC Every 24 Hours --    06/08/22 1947  Enoxaparin Sodium (LOVENOX) syringe 40 mg  Status:  Discontinued         40 mg SC Every 24 Hours 06/08/22 1953                    Rob Camarillo MD  [facility]  06/13/22  12:00 EDT

## 2022-06-13 NOTE — THERAPY TREATMENT NOTE
Inpatient Rehabilitation - Occupational Therapy Treatment Note     [place]     Patient Name: Lisa Velazquez  : 1965  MRN: [id number]    Today's Date: 2022                 Admit Date: 2022         ICD-10-CM ICD-9-CM   1. Acute respiratory failure with hypoxia (HCC)  J96.01 518.81       Patient Active Problem List   Diagnosis   • S/P right hip fracture   • Acute respiratory failure with hypoxia (HCC)       Past Medical History:   Diagnosis Date   • Coronary artery disease    • Diabetes mellitus (HCC)    • Elevated cholesterol    • GERD (gastroesophageal reflux disease)    • History of transfusion    • Hypertension        Past Surgical History:   Procedure Laterality Date   • ABDOMINAL SURGERY      gallbladder removal   • CARDIAC CATHETERIZATION     • CARDIAC SURGERY      stent   • COLONOSCOPY     • FRACTURE SURGERY     • TOE AMPUTATION Right     3rd toe right foot             IRF OT ASSESSMENT FLOWSHEET (last 12 hours)     IRF OT Evaluation and Treatment     Row Name 22 1405          OT Time and Intention    Document Type daily treatment  -     Mode of Treatment individual therapy;occupational therapy  -     Patient Effort good  -     Row Name 22 1405          General Information    Patient/Family/Caregiver Comments/Observations patient agreeable to therapy. patient tolerated well with no complaints.  -     Existing Precautions/Restrictions fall;sternal;cardiac  -     Row Name 22 1405          Cognition/Psychosocial    Affect/Mental Status (Cognition) WFL  -     Row Name 22 1405          Transfers    Chair-Bed [place] (Transfers) contact guard;verbal cues  -     Row Name 22 1405          Motor Skills    Motor Skills coordination;functional endurance  -     Therapeutic Exercise --  BUE ROM, gmc,fmc,functional endurance, reaching,  strength  -           User Key  (r) = Recorded By, (t) = Taken By, (c) = Cosigned By    Initials Name Effective Dates     Rukhsana Olivo OT 06/16/21 -                  Occupational Therapy Education                 Title: PT OT SLP Therapies (In Progress)     Topic: Occupational Therapy (In Progress)     Point: ADL training (Done)     Description:   Instruct learner(s) on proper safety adaptation and remediation techniques during self care or transfers.   Instruct in proper use of assistive devices.              Learning Progress Summary           Patient Acceptance, E, VU,NR by BF at 6/11/2022 1201      Show all documentation for this point (1)                 Point: Home exercise program (Done)     Description:   Instruct learner(s) on appropriate technique for monitoring, assisting and/or progressing therapeutic exercises/activities.              Learning Progress Summary           Patient Acceptance, E,TB, VU by DL at 6/8/2022 1959                   Point: Precautions (Done)     Description:   Instruct learner(s) on prescribed precautions during self-care and functional transfers.              Learning Progress Summary           Patient Acceptance, E, VU,NR by BF at 6/11/2022 1201      Show all documentation for this point (1)                 Point: Body mechanics (Not Started)     Description:   Instruct learner(s) on proper positioning and spine alignment during self-care, functional mobility activities and/or exercises.              Learner Progress:  Not documented in this visit.                      User Key     Initials Effective Dates Name Provider Type Discipline     06/16/21 -  Araceli Lua OT Occupational Therapist OT    DL 03/16/22 -  Ashley Mccord, RN Registered Nurse Nurse                    OT Recommendation and Plan    Planned Therapy Interventions (OT): activity tolerance training, adaptive equipment training, BADL retraining, patient/caregiver education/training, ROM/therapeutic exercise, strengthening exercise, transfer/mobility retraining                    Time Calculation:      Time  Calculation- OT     Row Name 06/13/22 1406             Time Calculation- OT    OT Start Time 1000  -      OT Stop Time 1130  -      OT Time Calculation (min) 90 min  -            User Key  (r) = Recorded By, (t) = Taken By, (c) = Cosigned By    Initials Name Provider Type     Rukhsana Dixon OT Occupational Therapist              Therapy Charges for Today     Code Description Service Date Service Provider Modifiers Qty    20249273264  OT THERAPEUTIC ACT EA 15 MIN 6/13/2022 Rukhsana Dixon, OT GO 2    [id number]  OT SELF CARE/MGMT/TRAIN EA 15 MIN 6/13/2022 Rukhsana Dixon, OT GO 1    [id number]  OT THER PROC EA 15 MIN 6/13/2022 Rukhsana Dixon OT GO 3                   Rukhsana Dixon OT  6/13/2022

## 2022-06-13 NOTE — THERAPY TREATMENT NOTE
Inpatient Rehabilitation - Physical Therapy Treatment Note       [facility]     Patient Name: Lisa Velazquez  : 1965  MRN: [id number]    Today's Date: 2022                    Admit Date: 2022      Visit Dx:     ICD-10-CM ICD-9-CM   1. Acute respiratory failure with hypoxia (HCC)  J96.01 518.81       Patient Active Problem List   Diagnosis   • S/P right hip fracture   • Acute respiratory failure with hypoxia (HCC)       Past Medical History:   Diagnosis Date   • Coronary artery disease    • Diabetes mellitus (HCC)    • Elevated cholesterol    • GERD (gastroesophageal reflux disease)    • History of transfusion    • Hypertension        Past Surgical History:   Procedure Laterality Date   • ABDOMINAL SURGERY      gallbladder removal   • CARDIAC CATHETERIZATION     • CARDIAC SURGERY      stent   • COLONOSCOPY     • FRACTURE SURGERY     • TOE AMPUTATION Right     3rd toe right foot       PT ASSESSMENT (last 12 hours)     IRF PT Evaluation and Treatment     Row Name 22 1031          PT Time and Intention    Document Type daily treatment  BID treatment session  -RG     Mode of Treatment individual therapy;physical therapy  -RG     Patient/Family/Caregiver Comments/Observations Pt and nursing in agreement for skilled PT on this date. Pt c/o dizziness.  -RG     Row Name 22 1031          General Information    Existing Precautions/Restrictions fall;sternal;cardiac  -RG     Row Name 22 1031          Pain Scale: FACES Pre/Post-Treatment    Pain: FACES Scale, Pretreatment 6-->hurts even more  -RG     Posttreatment Pain Rating 6-->hurts even more  -RG     Row Name 22 1031          Cognition/Psychosocial    Affect/Mental Status (Cognition) WFL  -RG     Orientation Status (Cognition) oriented x 3  -RG     Follows Commands (Cognition) WFL  -RG     Personal Safety Interventions gait belt;nonskid shoes/slippers when out of bed;fall prevention program maintained  -RG     Cognitive Function WFL   -RG     Row Name 06/13/22 1031          Bed Mobility    Supine-Sit-Supine [place] (Bed Mobility) minimum assist (75% patient effort);verbal cues;nonverbal cues (demo/gesture)  -RG     Row Name 06/13/22 1031          Transfers    Chair-Bed [place] (Transfers) contact guard  -RG     Sit-Stand [place] (Transfers) verbal cues;nonverbal cues (demo/gesture);contact guard  -RG     Stand-Sit [place] (Transfers) verbal cues;nonverbal cues (demo/gesture);contact guard  -RG     Row Name 06/13/22 1031          Chair-Bed Transfer    Assistive Device (Chair-Bed Transfers) wheelchair  -RG     Row Name 06/13/22 1031          Sit-Stand Transfer    Assistive Device (Sit-Stand Transfers) walker, front-wheeled  -RG     Row Name 06/13/22 1031          Stand-Sit Transfer    Assistive Device (Stand-Sit Transfers) walker, front-wheeled  -RG     Row Name 06/13/22 1031          Gait/Stairs (Locomotion)    Comment, (Gait/Stairs) pt stated that she had just walked with PT the previous session.  -RG     Row Name 06/13/22 1031          Hip (Therapeutic Exercise)    Hip Strengthening (Therapeutic Exercise) bilateral;flexion;aBduction;aDduction;marching while seated;marching while standing;sitting;standing;resistance band;green;10 repetitions;2 sets  -RG     Row Name 06/13/22 1031          Knee (Therapeutic Exercise)    Knee Strengthening (Therapeutic Exercise) bilateral;flexion;extension;marching while seated;marching while standing;LAQ (long arc quad);standing;sitting;resistance band;green;10 repetitions;2 sets  -RG     Row Name 06/13/22 1031          Ankle (Therapeutic Exercise)    Ankle Strengthening (Therapeutic Exercise) bilateral;dorsiflexion;plantarflexion;sitting;20 repititions  -     Row Name 06/13/22 1031          Positioning and Restraints    Pre-Treatment Position sitting in chair/recliner  -RG     Post Treatment Position wheelchair  -RG     In Wheelchair notified nsg;sitting;with OT;legs elevated  -     Row  Name 06/13/22 1031          Therapy Assessment/Plan (PT)    Patient's Goals For Discharge return home  -RG     Row Name 06/13/22 1031          Therapy Assessment/Plan (PT)    Rehab Potential/Prognosis (PT) adequate, monitor progress closely  -RG     Frequency of Treatment (PT) 5 times per week  -RG     Estimated Duration of Therapy (PT) 2 weeks  -RG     Problem List (PT) balance;mobility;strength;pain;postural control  -RG     Activity Limitations Related to Problem List (PT) unable to ambulate safely;unable to transfer safely  -RG     Row Name 06/13/22 1031          Therapy Plan Review/Discharge Plan (PT)    Anticipated Equipment Needs at Discharge (PT Eval) --  tbd  -RG     Anticipated Discharge Disposition (PT) home with home health  -RG     Row Name 06/13/22 1031          IRF PT Goals    Bed Mobility Goal Selection (PT-IRF) bed mobility, PT goal 1  -RG     Transfer Goal Selection (PT-IRF) transfers, PT goal 1  -RG     Gait (Walking Locomotion) Goal Selection (PT-IRF) gait, PT goal 1  -RG     Row Name 06/13/22 1031          Bed Mobility Goal 1 (PT-IRF)    Activity/Assistive Device (Bed Mobility Goal 1, PT-IRF) sit to supine/supine to sit  -RG     [place] Level (Bed Mobility Goal 1, PT-IRF) modified independence  -RG     Time Frame (Bed Mobility Goal 1, PT-IRF) by discharge  -RG     Row Name 06/13/22 1031          Transfer Goal 1 (PT-IRF)    Activity/Assistive Device (Transfer Goal 1, PT-IRF) sit-to-stand/stand-to-sit;bed-to-chair/chair-to-bed  -RG     [place] Level (Transfer Goal 1, PT-IRF) modified independence  -RG     Time Frame (Transfer Goal 1, PT-IRF) by discharge  -RG     Row Name 06/13/22 1031          Gait/Walking Locomotion Goal 1 (PT-IRF)    Activity/Assistive Device (Gait/Walking Locomotion Goal 1, PT-IRF) --  RW or AAD  -RG     Gait/Walking Locomotion Distance Goal 1 (PT-IRF) 300'  -RG     [place] Level (Gait/Walking Locomotion Goal 1, PT-IRF) modified independence  -RG     Time Frame  (Gait/Walking Locomotion Goal 1, PT-IRF) by discharge  -           User Key  (r) = Recorded By, (t) = Taken By, (c) = Cosigned By    Initials Name Provider Type    RG Chidi Harris PTA Physical Therapist Assistant              Wound Other (See comments) midline sternal Incision (Active)   Dressing Appearance dry;intact;open to air 06/13/22 0200   Closure Liquid skin adhesive 06/13/22 0200   Drainage Amount none 06/13/22 0200       Wound Right distal thigh Incision (Active)   Dressing Appearance open to air 06/13/22 0200   [place] Temperature warm 06/12/22 1300   Drainage Amount none 06/13/22 0200       Wound Right proximal leg Incision (Active)   Dressing Appearance open to air 06/13/22 0200   [place] Temperature warm 06/12/22 1300   [place] Skin Turgor soft 06/12/22 1300   Drainage Amount none 06/13/22 0200     Physical Therapy Education                 Title: PT OT SLP Therapies (In Progress)     Topic: Physical Therapy (Done)     Point: Mobility training (Done)     Learning Progress Summary           Patient Acceptance, E,D, VU,NR by  at 6/13/2022 1036      Show all documentation for this point (4)                 Point: Home exercise program (Done)     Learning Progress Summary           Patient Acceptance, E,D, VU,NR by  at 6/13/2022 1036      Show all documentation for this point (4)                 Point: Body mechanics (Done)     Learning Progress Summary           Patient Acceptance, E,D, VU,NR by  at 6/13/2022 1036      Show all documentation for this point (4)                 Point: Precautions (Done)     Learning Progress Summary           Patient Acceptance, E,D, VU,NR by  at 6/13/2022 1036      Show all documentation for this point (4)                             User Key     Initials Effective Dates Name Provider Type Discipline     06/16/21 -  Chidi Harris PTA Physical Therapist Assistant PT                PT Recommendation and Plan    Frequency of Treatment (PT): 5 times per  week  Anticipated Equipment Needs at Discharge (PT Eval):  (tbd)                  Time Calculation:      PT Charges     Row Name 06/13/22 1036             Time Calculation    Start Time 0915  -RG      Stop Time 1000  -RG      Time Calculation (min) 45 min  -RG      PT Received On 06/13/22  -RG              Time Calculation- PT    Total Timed Code Minutes- PT 45 minute(s)  -RG            User Key  (r) = Recorded By, (t) = Taken By, (c) = Cosigned By    Initials Name Provider Type     Chidi Harris PTA Physical Therapist Assistant                Therapy Charges for Today     Code Description Service Date Service Provider Modifiers Qty    06563130748 HC PT THERAPEUTIC ACT EA 15 MIN 6/13/2022 Chidi Harris PTA GP, CQ 1    05587503767 HC PT THER PROC EA 15 MIN 6/13/2022 Chidi Harris PTA GP, CQ 2                   Chidi Harris PTA  6/13/2022

## 2022-06-13 NOTE — PROGRESS NOTES
Rehabilitation Nursing  Inpatient Rehabilitation Plan of Care Note    Plan of Care  Body Function Structure    Skin Integrity (Active)  Current Status (2/3/2022 12:00:00 AM): free from skin breakdown this shift  Weekly Goal: free from skin breakdown this stay  Discharge Goal: home free of skin breakdown    Safety    Potential for Injury (Active)  Current Status (2/3/2022 12:00:00 AM): free from injury this shift  Weekly Goal: free from injury this stay  Discharge Goal: home free of injury    Signed by: Kelly Morfin RN

## 2022-06-13 NOTE — PROGRESS NOTES
[facility] Coordinator  Inpatient Rehabilitation Admission    Ethnic Group:  Marital Status:    IRF Admission Date:  06/08/2022  Admission Class:  Admit From:    Pre-Hospital Living:    Payment Sources:  Impairment Group:  Date of Onset of Impairment:    Etiologic Diagnosis Code(s):  Rank Code      Description  1    I25.10    Atherosclerotic heart disease of native                 coronary artery without angina pectoris    Comorbidities:  Rank Code      Description    1    E11.9     Type 2 diabetes mellitus without complications  2    E78.00    Pure hypercholesterolemia, unspecified  3    E78.5     Hyperlipidemia, unspecified  4    G47.00    Insomnia, unspecified  5    I10       Essential (primary) hypertension  6    I25.82    Chronic total occlusion of coronary artery  7    I70.201   Unspecified atherosclerosis of native arteries                 of extremities, right leg  8    K59.00    Constipation, unspecified  9    Z79.4     Long term (current) use of insulin  10   Z79.83    Long term (current) use of bisphosphonates  11   Z80.9     Family history of malignant neoplasm,                 unspecified  12   Z82.49    Family history of ischemic heart disease and                 other diseases of the circulatory system  13   Z83.3     Family history of diabetes mellitus  14   Z87.891   Personal history of nicotine dependence  15   Z95.1     Presence of aortocoronary bypass graft    Height on Admission:  Weight on Admission:    Are there any arthritis conditions recorded for Impairment Group, Etiologic  Diagnosis, or Comorbid Conditions that meet all of the regulatory requirements  for IRF classification (in 42 .29(b)(2)(x), (xi), and xii))?  No    KIRAN Bladder Accidents:   - Accidents.    KIRAN Bowel Accident:  -Accidents.    Signed by: Nurse Giovanni

## 2022-06-14 LAB
GLUCOSE BLDC GLUCOMTR-MCNC: 111 MG/DL (ref 70–130)
GLUCOSE BLDC GLUCOMTR-MCNC: 119 MG/DL (ref 70–130)
GLUCOSE BLDC GLUCOMTR-MCNC: 98 MG/DL (ref 70–130)

## 2022-06-14 PROCEDURE — 63710000001 INSULIN DETEMIR PER 5 UNITS: Performed by: INTERNAL MEDICINE

## 2022-06-14 PROCEDURE — 97535 SELF CARE MNGMENT TRAINING: CPT | Performed by: OCCUPATIONAL THERAPIST

## 2022-06-14 PROCEDURE — 25010000002 ENOXAPARIN PER 10 MG: Performed by: INTERNAL MEDICINE

## 2022-06-14 PROCEDURE — 97530 THERAPEUTIC ACTIVITIES: CPT

## 2022-06-14 PROCEDURE — 97530 THERAPEUTIC ACTIVITIES: CPT | Performed by: OCCUPATIONAL THERAPIST

## 2022-06-14 PROCEDURE — 82962 GLUCOSE BLOOD TEST: CPT

## 2022-06-14 PROCEDURE — 97116 GAIT TRAINING THERAPY: CPT

## 2022-06-14 PROCEDURE — 99231 SBSQ HOSP IP/OBS SF/LOW 25: CPT | Performed by: FAMILY MEDICINE

## 2022-06-14 PROCEDURE — 97110 THERAPEUTIC EXERCISES: CPT

## 2022-06-14 PROCEDURE — 97110 THERAPEUTIC EXERCISES: CPT | Performed by: OCCUPATIONAL THERAPIST

## 2022-06-14 RX ADMIN — METHOCARBAMOL 500 MG: 500 TABLET, FILM COATED ORAL at 21:25

## 2022-06-14 RX ADMIN — Medication 5000 UNITS: at 08:35

## 2022-06-14 RX ADMIN — DOCUSATE SODIUM 100 MG: 100 CAPSULE ORAL at 21:25

## 2022-06-14 RX ADMIN — CLOPIDOGREL BISULFATE 75 MG: 75 TABLET, FILM COATED ORAL at 08:35

## 2022-06-14 RX ADMIN — ROSUVASTATIN CALCIUM 40 MG: 20 TABLET, FILM COATED ORAL at 21:25

## 2022-06-14 RX ADMIN — DOCUSATE SODIUM 100 MG: 100 CAPSULE ORAL at 08:35

## 2022-06-14 RX ADMIN — METOPROLOL TARTRATE 37.5 MG: 25 TABLET, FILM COATED ORAL at 21:25

## 2022-06-14 RX ADMIN — Medication 1 TABLET: at 08:35

## 2022-06-14 RX ADMIN — OXYCODONE 5 MG: 5 TABLET ORAL at 21:25

## 2022-06-14 RX ADMIN — INSULIN DETEMIR 15 UNITS: 100 INJECTION, SOLUTION SUBCUTANEOUS at 08:52

## 2022-06-14 RX ADMIN — GABAPENTIN 100 MG: 100 CAPSULE ORAL at 21:25

## 2022-06-14 RX ADMIN — Medication 5 MG: at 21:25

## 2022-06-14 RX ADMIN — OXYCODONE HYDROCHLORIDE AND ACETAMINOPHEN 1000 MG: 500 TABLET ORAL at 12:12

## 2022-06-14 RX ADMIN — INSULIN DETEMIR 15 UNITS: 100 INJECTION, SOLUTION SUBCUTANEOUS at 21:27

## 2022-06-14 RX ADMIN — ENOXAPARIN SODIUM 40 MG: 40 INJECTION SUBCUTANEOUS at 12:11

## 2022-06-14 RX ADMIN — ASPIRIN 81 MG: 81 TABLET, CHEWABLE ORAL at 08:35

## 2022-06-14 NOTE — PROGRESS NOTES
[facility]  PROGRESS NOTE     Patient Identification:  Name:  Lisa Velazquez  Age:  56 y.o.  Sex:  female  :  1965  MRN:  [id number]  Visit Number:  [id number]  ROOM: 110/2S     Primary Care Provider:  Patricia Skelton APRN    Length of stay in inpatient status:  6    Subjective     Chief Compliant:  No chief complaint on file.      History of Presenting Illness: 56-year-old female who is status post multivessel CAD with CABG x2.  Patient doing well at this time has no new complaints.    Objective     Current Hospital Meds:ascorbic acid, 1,000 mg, Oral, Daily  aspirin, 81 mg, Oral, Daily  clopidogrel, 75 mg, Oral, Daily  docusate sodium, 100 mg, Oral, BID  enoxaparin, 40 mg, Subcutaneous, Q24H  gabapentin, 100 mg, Oral, Nightly  Insulin Aspart, 0-7 Units, Subcutaneous, TID AC  insulin detemir, 15 Units, Subcutaneous, Q12H  melatonin, 5 mg, Oral, Nightly  metoprolol tartrate, 37.5 mg, Oral, Q12H  multivitamin with minerals, 1 tablet, Oral, Daily  rosuvastatin, 40 mg, Oral, Nightly  vitamin D3, 5,000 Units, Oral, Daily       ----------------------------------------------------------------------------------------------------------------------  Vital Signs:  Temp:  [97.8 °F (36.6 °C)-98.3 °F (36.8 °C)] 97.8 °F (36.6 °C)  Heart Rate:  [62-68] 62  Resp:  [18] 18  BP: (110-117)/(58-71) 110/58  SpO2:  [94 %-95 %] 94 %  on   ;   Device (Oxygen Therapy): room air  Body mass index is 25.2 kg/m².    Wt Readings from Last 3 Encounters:   22 73 kg (160 lb 15 oz)   22 73 kg (161 lb)     Intake & Output (last 3 days)        07 0701  06/15 0700    P.O. 960 1200 1320 120    Total Intake(mL/kg) 960 (13.2) 1200 (16.4) 1320 (18.1) 120 (1.6)    Net +960 +1200 +1320 +120            Urine Unmeasured Occurrence 4 x 3 x 7 x         Diet Regular;  Cardiac  ----------------------------------------------------------------------------------------------------------------------  Physical exam:  Constitutional:   No acute distress  HEENT: Normocephalic atraumatic  Neck:    Supple  Cardiovascular: Regular rate and rhythm  Pulmonary/Chest: Clear to auscultation  Abdominal: Positive bowel sounds soft.   Musculoskeletal: No arthropathy  Neurological: No focal deficits  Skin: No rash  Peripheral vascular:  Genitourinary:  ----------------------------------------------------------------------------------------------------------------------    Last echocardiogram:    ----------------------------------------------------------------------------------------------------------------------  Results from last 7 days   Lab Units 06/09/22  0148 06/08/22  0428   WBC 10*3/mm3 8.31 6.46   HEMOGLOBIN g/dL 9.8* 9.8*   HEMATOCRIT % 31.0* 30.3*   MCV fL 91.7 90   MCHC g/dL 31.6 32.3   PLATELETS 10*3/mm3 233 216         Results from last 7 days   Lab Units 06/09/22  0148 06/08/22  0428   SODIUM mmol/L 141 141   POTASSIUM mmol/L 4.1 3.9   MAGNESIUM mg/dL 1.9 2.0   CHLORIDE mmol/L 106 103   TOTAL CO2 mmol/L  --  28   CO2 mmol/L 25.1  --    BUN mg/dL 16 14   CREATININE mg/dL 0.80 0.69   EGFR IF NONAFRICN AM mL/min/1.73m*2  --  >60   EGFR IF AFRICN AM mL/min/1.73m*2  --  >60   CALCIUM mg/dL 9.4 9.6   IONIZED CALCIUM mg/dL  --  5.1   GLUCOSE mg/dL 131*  --    ALBUMIN g/dL 2.95*  --    BILIRUBIN mg/dL 0.8  --    ALK PHOS U/L 90  --    AST (SGOT) U/L 12  --    ALT (SGPT) U/L 16  --    Estimated Creatinine Clearance: 90.5 mL/min (by C-G formula based on SCr of 0.8 mg/dL).  No results found for: AMMONIA              Glucose   Date/Time Value Ref Range Status   06/14/2022 0845 119 70 - 130 mg/dL Final     Comment:     Meter: [id number] : [id number] Narcisa Flores   06/14/2022 0553 98 70 - 130 mg/dL Final     Comment:     Meter: [id number] : [id number] Jeannine Crystal   06/13/2022 1619 105 70  - 130 mg/dL Final     Comment:     Meter: [id number] : [id number] Hanh John   06/13/2022 1125 162 (H) 70 - 130 mg/dL Final     Comment:     Meter: [id number] : [id number] Hanh John   06/13/2022 0855 183 (H) 70 - 130 mg/dL Final     Comment:     Meter: [id number] : [id number] Narcisa Flores   06/13/2022 0613 117 70 - 130 mg/dL Final     Comment:     Meter: [id number] : [id number] [place] Crystal   06/12/2022 1605 183 (H) 70 - 130 mg/dL Final     Comment:     RN Notified Meter: [id number] : [id number] ROBERT MELVIN   06/12/2022 1108 119 70 - 130 mg/dL Final     Comment:     Meter: [id number] : [id number] Narcisa Flores     No results found for: TSH, FREET4  No results found for: PREGTESTUR, PREGSERUM, HCG, HCGQUANT  Pain Management Panel    There is no flowsheet data to display.       Brief Urine Lab Results  (Last result in the past 365 days)      Color   Clarity   Blood   Leuk Est   Nitrite   Protein   CREAT   Urine HCG        06/06/22 1657 Yellow   Cloudy     Moderate                   No results found for: BLOODCX      No results found for: URINECX  No results found for: WOUNDCX  No results found for: STOOLCX        I have personally looked at the labs and they are summarized above.  ----------------------------------------------------------------------------------------------------------------------  Detailed radiology reports for the last 24 hours:    Imaging Results (Last 24 Hours)     ** No results found for the last 24 hours. **        Final impressions for the last 30 days of radiology reports:    XR Spine Lumbar Complete With Flex & Ext    Result Date: 6/12/2022  Unremarkable appearance of the lumbar spine.  This report was finalized on 6/12/2022 11:21 AM by Dr. Mario Alberto Griggs MD.      US Renal Limited    Result Date: 6/11/2022  Both kidneys are normal in size and ultrasound appearance. Signer Name: Corby Roa MD  Signed: 6/11/2022 8:40 PM  Workstation Name:  [facility]-  [facility]    XR Chest 1 View    Result Date: 6/7/2022  Stable aeration. CRITICAL RESULT:   No. COMMUNICATION: Per this written report. Dictated by Jorge Azevedo on 6/7/2022 9:45 AM Signed by Jorge Azevedo on 6/7/2022 9:46 AM    XR Chest 1 View    Result Date: 6/5/2022  Stable aeration with decreased lung volumes, bibasilar atelectasis, and small left-sided pleural effusion. CRITICAL RESULT:   No. COMMUNICATION: Per this written report. Approved by Jose Manuel Lackey D.O. on 6/5/2022 9:56 AM By electronically signing this report, I, the attending physician, attest that I have personally reviewed the images/data for the above examination(s) and agree with the final edited report. Dictated by Jose Manuel Lackey D.O. on 6/5/2022 9:56 AM Signed by Corby Sow on 6/5/2022 11:39 AM    XR Chest 1 View    Result Date: 6/3/2022  Mild increase in the basilar atelectasis and small left effusion. CRITICAL RESULT:   No. COMMUNICATION: Per this written report. Dictated by Zamzam Kennedy on 6/3/2022 11:04 AM Signed by Zamzam Kennedy on 6/3/2022 11:05 AM    XR Chest 1 View    Result Date: 6/2/2022  Interval removal of right IJ [place]avio catheter with introducer sheath remaining in place. Otherwise, stable chest exam. CRITICAL RESULT:   No. COMMUNICATION: Per this written report. Approved by Annmarie Alcaraz on 6/2/2022 10:13 AM By electronically signing this report, I, the attending physician, attest that I have personally reviewed the images/data for the above examination(s) and agree with the final edited report. Dictated by Annmarie Alcaraz on 6/2/2022 10:13 AM Signed by Kahlil Sebastian on 6/2/2022 10:42 AM    XR Chest 1 View    Result Date: 6/1/2022  Increased left basilar atelectasis status post extubation. CRITICAL RESULT:   No. COMMUNICATION: Per this written report. Dictated by Ralph Roman on 6/1/2022 9:34 AM Signed by Ralph Roman on 6/1/2022 9:34 AM    XR Chest 1  View    Result Date: 5/31/2022  Expected postoperative appearance of the chest. CRITICAL RESULT:   No. COMMUNICATION: Per this written report. Dictated by Ralph Roman on 5/31/2022 4:15 PM Signed by Ralph Roman on 5/31/2022 4:16 PM    XR Hip With or Without Pelvis 1 View Left    Result Date: 6/12/2022    No acute findings in the left hip.  This report was finalized on 6/12/2022 11:22 AM by Dr. Mario Alberto Griggs MD.      I have personally looked at the radiology images and read the final radiology report.    Assessment & Plan    Debility--requiring contact-guard for help with transfers; ambulated 320 feet yesterday with front wheel walker and standby assist.  Continues to work on motor skills to improve functional mobility and ADLs.    CAD with recent CABG--continue aspirin, Plavix, metoprolol and Crestor.    CHF continue Lasix 40 mg a day.  Patient currently compensated    Diabetes mellitus Levemir and sliding scale coverage reasonably well controlled.  Hypertension controlled    Insomnia continue melatonin.      VTE Prophylaxis:   Mechanical Order History:     None      Pharmalogical Order History:      Ordered     Dose Route Frequency Stop    06/08/22 1953  Enoxaparin Sodium (LOVENOX) syringe 40 mg         40 mg SC Every 24 Hours --    06/08/22 1947  Enoxaparin Sodium (LOVENOX) syringe 40 mg  Status:  Discontinued         40 mg SC Every 24 Hours 06/08/22 1953                    Rob Camarillo MD  [facility]  06/14/22  12:28 EDT

## 2022-06-14 NOTE — THERAPY TREATMENT NOTE
Inpatient Rehabilitation - Physical Therapy Treatment Note        [place]     Patient Name: Lisa Velazquez  : 1965  MRN: [id number]    Today's Date: 2022                    Admit Date: 2022      Visit Dx:     ICD-10-CM ICD-9-CM   1. Acute respiratory failure with hypoxia (HCC)  J96.01 518.81       Patient Active Problem List   Diagnosis   • S/P right hip fracture   • Acute respiratory failure with hypoxia (HCC)       Past Medical History:   Diagnosis Date   • Coronary artery disease    • Diabetes mellitus (HCC)    • Elevated cholesterol    • GERD (gastroesophageal reflux disease)    • History of transfusion    • Hypertension        Past Surgical History:   Procedure Laterality Date   • ABDOMINAL SURGERY      gallbladder removal   • CARDIAC CATHETERIZATION     • CARDIAC SURGERY      stent   • COLONOSCOPY     • FRACTURE SURGERY     • TOE AMPUTATION Right     3rd toe right foot       PT ASSESSMENT (last 12 hours)     IRF PT Evaluation and Treatment     Row Name 22 1427          PT Time and Intention    Document Type daily treatment  -RG     Mode of Treatment individual therapy;physical therapy  -RG     Patient/Family/Caregiver Comments/Observations Pt and nursing in agreement for skilled PT on this date.  -RG     Row Name 22 1427          General Information    Existing Precautions/Restrictions fall;sternal  -RG     Row Name 22 1427          Pain Scale: FACES Pre/Post-Treatment    Pain: FACES Scale, Pretreatment 4-->hurts little more  -RG     Posttreatment Pain Rating 4-->hurts little more  -RG     Row Name 22 1427          Cognition/Psychosocial    Affect/Mental Status (Cognition) WFL  -RG     Follows Commands (Cognition) WFL  -RG     Personal Safety Interventions gait belt;fall prevention program maintained;nonskid shoes/slippers when out of bed  -RG     Row Name 22 1427          Transfers    Bed-Chair [place] (Transfers) verbal cues;nonverbal cues  (demo/gesture);standby assist  -RG     Chair-Bed [place] (Transfers) verbal cues;nonverbal cues (demo/gesture);standby assist  -RG     Assistive Device (Bed-Chair Transfers) wheelchair  -RG     Sit-Stand [place] (Transfers) verbal cues;nonverbal cues (demo/gesture);standby assist  -RG     Stand-Sit [place] (Transfers) verbal cues;nonverbal cues (demo/gesture);standby assist  -RG     [place] Level (Toilet Transfer) supervision  -RG     Assistive Device (Toilet Transfer) grab bars/safety frame;walker, front-wheeled  -RG     Row Name 06/14/22 1427          Chair-Bed Transfer    Assistive Device (Chair-Bed Transfers) wheelchair  -RG     Row Name 06/14/22 1427          Sit-Stand Transfer    Assistive Device (Sit-Stand Transfers) walker, front-wheeled  -RG     Row Name 06/14/22 1427          Stand-Sit Transfer    Assistive Device (Stand-Sit Transfers) walker, front-wheeled  -RG     Row Name 06/14/22 1427          Toilet Transfer    Type (Toilet Transfer) stand pivot/stand step  -RG     Row Name 06/14/22 1427          Gait/Stairs (Locomotion)    [place] Level (Gait) verbal cues;nonverbal cues (demo/gesture);standby assist  -RG     Assistive Device (Gait) walker, front-wheeled  -RG     Distance in Feet (Gait) 320  -RG     Deviations/Abnormal Patterns (Gait) bc decreased;gait speed decreased;stride length decreased  -RG     Bilateral Gait Deviations forward flexed posture  -RG     [place] Level (Stairs) contact guard  -RG     Handrail Location (Stairs) both sides  -RG     Number of Steps (Stairs) 5  -RG     Ascending Technique (Stairs) step-over-step  -RG     Descending Technique (Stairs) step-over-step  -RG     Stairs, Safety Issues balance decreased during turns  -RG     Row Name 06/14/22 1427          Hip (Therapeutic Exercise)    Hip Strengthening (Therapeutic Exercise) bilateral;flexion;aBduction;aDduction;marching while seated;marching while standing;sitting;standing;resistance  band;green;10 repetitions;2 sets  -RG     Row Name 06/14/22 1427          Knee (Therapeutic Exercise)    Knee Strengthening (Therapeutic Exercise) bilateral;flexion;extension;marching while seated;marching while standing;LAQ (long arc quad);hamstring curls;sitting;standing;resistance band;green;10 repetitions;2 sets  -RG     Row Name 06/14/22 1427          Ankle (Therapeutic Exercise)    Ankle Strengthening (Therapeutic Exercise) bilateral;dorsiflexion;plantarflexion;sitting;standing;20 repititions  -     Row Name 06/14/22 1427          Positioning and Restraints    Pre-Treatment Position sitting in chair/recliner  -RG     Post Treatment Position bed  -RG     In Bed notified nsg;supine;call light within reach;encouraged to call for assist;legs elevated  -     Row Name 06/14/22 1427          Therapy Assessment/Plan (PT)    Patient's Goals For Discharge return home  -     Row Name 06/14/22 1427          Therapy Assessment/Plan (PT)    Rehab Potential/Prognosis (PT) adequate, monitor progress closely  -RG     Frequency of Treatment (PT) 5 times per week  -RG     Estimated Duration of Therapy (PT) 2 weeks  -RG     Problem List (PT) balance;mobility;strength;pain;postural control  -RG     Activity Limitations Related to Problem List (PT) unable to ambulate safely;unable to transfer safely  -     Row Name 06/14/22 1427          Therapy Plan Review/Discharge Plan (PT)    Anticipated Equipment Needs at Discharge (PT Eval) --  tbd  -RG     Anticipated Discharge Disposition (PT) home with home health  -     Row Name 06/14/22 1427          IRF PT Goals    Bed Mobility Goal Selection (PT-IRF) bed mobility, PT goal 1  -RG     Transfer Goal Selection (PT-IRF) transfers, PT goal 1  -RG     Gait (Walking Locomotion) Goal Selection (PT-IRF) gait, PT goal 1  -     Row Name 06/14/22 1427          Bed Mobility Goal 1 (PT-IRF)    Activity/Assistive Device (Bed Mobility Goal 1, PT-IRF) sit to supine/supine to sit  -RG      [place] (Bed Mobility Goal 1, PT-IRF) modified independence  -RG     Time Frame (Bed Mobility Goal 1, PT-IRF) by discharge  -RG     Row Name 06/14/22 1427          Transfer Goal 1 (PT-IRF)    Activity/Assistive Device (Transfer Goal 1, PT-IRF) sit-to-stand/stand-to-sit;bed-to-chair/chair-to-bed  -RG     [place] (Transfer Goal 1, PT-IRF) modified independence  -RG     Time Frame (Transfer Goal 1, PT-IRF) by discharge  -RG     Row Name 06/14/22 1427          Gait/Walking Locomotion Goal 1 (PT-IRF)    Activity/Assistive Device (Gait/Walking Locomotion Goal 1, PT-IRF) --  RW or AAD  -RG     Gait/Walking Locomotion Distance Goal 1 (PT-IRF) 300'  -RG     [place] (Gait/Walking Locomotion Goal 1, PT-IRF) modified independence  -RG     Time Frame (Gait/Walking Locomotion Goal 1, PT-IRF) by discharge  -RG           User Key  (r) = Recorded By, (t) = Taken By, (c) = Cosigned By    Initials Name Provider Type    RG Chidi Harris PTA Physical Therapist Assistant              Wound Other (See comments) midline sternal Incision (Active)   Dressing Appearance dry;intact 06/13/22 1935   Closure Liquid skin adhesive 06/14/22 1220   Periwound Temperature warm 06/14/22 1220   Periwound Skin Turgor soft 06/14/22 1220   Drainage Amount none 06/14/22 1220       Wound Right distal thigh Incision (Active)   Dressing Appearance open to air 06/14/22 1220   Closure Open to air 06/13/22 1935   Periwound Temperature warm 06/14/22 1220   Drainage Amount none 06/14/22 1220       Wound Right proximal leg Incision (Active)   Dressing Appearance open to air 06/14/22 1220   Periwound Temperature warm 06/14/22 1220   Periwound Skin Turgor soft 06/14/22 1220   Drainage Amount none 06/14/22 1220     Physical Therapy Education                 Title: PT OT SLP Therapies (In Progress)     Topic: Physical Therapy (Done)     Point: Mobility training (Done)     Learning Progress Summary           Patient Acceptance, E,D,  VU,NR by RG at 6/14/2022 1430      Show all documentation for this point (6)                 Point: Home exercise program (Done)     Learning Progress Summary           Patient Acceptance, E,D, VU,NR by RG at 6/14/2022 1430      Show all documentation for this point (6)                 Point: Body mechanics (Done)     Learning Progress Summary           Patient Acceptance, E,D, VU,NR by RG at 6/14/2022 1430      Show all documentation for this point (6)                 Point: Precautions (Done)     Learning Progress Summary           Patient Acceptance, E,D, VU,NR by RG at 6/14/2022 1430      Show all documentation for this point (6)                             User Key     Initials Effective Dates Name Provider Type [facility] 06/16/21 -  Chidi Harris PTA Physical Therapist Assistant PT                PT Recommendation and Plan    Frequency of Treatment (PT): 5 times per week  Anticipated Equipment Needs at Discharge (PT Eval):  (tbd)                  Time Calculation:      PT Charges     Row Name 06/14/22 1432 06/14/22 1431          Time Calculation    Start Time 1330  -RG 1045  -RG     Stop Time 1415  -RG 1130  -RG     Time Calculation (min) 45 min  -RG 45 min  -RG     PT Received On 06/14/22  -RG 06/14/22  -RG            Time Calculation- PT    Total Timed Code Minutes- PT 45 minute(s)  -RG 45 minute(s)  -RG           User Key  (r) = Recorded By, (t) = Taken By, (c) = Cosigned By    Initials Name Provider Type    RG Chidi Harris PTA Physical Therapist Assistant                Therapy Charges for Today     Code Description Service Date Service Provider Modifiers Qty    84554371607 HC PT THERAPEUTIC ACT EA 15 MIN 6/13/2022 Chidi Harris PTA GP, CQ 1    40752944637 HC PT THER PROC EA 15 MIN 6/13/2022 Chidi Harris PTA GP, CQ 2    59664534730 HC GAIT TRAINING EA 15 MIN 6/14/2022 Chidi Harris PTA GP, CQ 1    45847893743 HC PT THERAPEUTIC ACT EA 15 MIN 6/14/2022 Chidi Harris PTA GP, CQ 2     [id number]  PT THER PROC EA 15 MIN 6/14/2022 Chidi Harris, JESSICA GP, CQ 3                   Chidi Harris, JESSICA  6/14/2022

## 2022-06-14 NOTE — THERAPY TREATMENT NOTE
Inpatient Rehabilitation - Occupational Therapy Treatment Note     [place]     Patient Name: Lisa Velazquez  : 1965  MRN: [id number]    Today's Date: 2022                 Admit Date: 2022         ICD-10-CM ICD-9-CM   1. Acute respiratory failure with hypoxia (HCC)  J96.01 518.81       Patient Active Problem List   Diagnosis   • S/P right hip fracture   • Acute respiratory failure with hypoxia (HCC)       Past Medical History:   Diagnosis Date   • Coronary artery disease    • Diabetes mellitus (HCC)    • Elevated cholesterol    • GERD (gastroesophageal reflux disease)    • History of transfusion    • Hypertension        Past Surgical History:   Procedure Laterality Date   • ABDOMINAL SURGERY      gallbladder removal   • CARDIAC CATHETERIZATION     • CARDIAC SURGERY      stent   • COLONOSCOPY     • FRACTURE SURGERY     • TOE AMPUTATION Right     3rd toe right foot             IRF OT ASSESSMENT FLOWSHEET (last 12 hours)     IRF OT Evaluation and Treatment     Row Name 22 1438          OT Time and Intention    Document Type daily treatment  -     Mode of Treatment individual therapy;occupational therapy  -     Patient Effort good  -     Row Name 22 1438          General Information    Patient/Family/Caregiver Comments/Observations patient agreeable to therapy, patient tolerated well with no complaints.  -     Existing Precautions/Restrictions fall;sternal;cardiac  -     Row Name 22 1438          Cognition/Psychosocial    Affect/Mental Status (Cognition) WFL  -     Row Name 22 1438          Transfers    Bed-Chair [place] (Transfers) contact guard  -     Row Name 22 1438          Motor Skills    Motor Skills coordination;functional endurance  -     Therapeutic Exercise --  BUE ROM, gmc,fmc,reaching,  strength  -           User Key  (r) = Recorded By, (t) = Taken By, (c) = Cosigned By    Initials Name Effective Dates     Rukhsana Dixon, OT  06/16/21 -                  Occupational Therapy Education                 Title: PT OT SLP Therapies (In Progress)     Topic: Occupational Therapy (In Progress)     Point: ADL training (Done)     Description:   Instruct learner(s) on proper safety adaptation and remediation techniques during self care or transfers.   Instruct in proper use of assistive devices.              Learning Progress Summary           Patient Acceptance, E, VU,NR by  at 6/11/2022 1201      Show all documentation for this point (1)                 Point: Home exercise program (Done)     Description:   Instruct learner(s) on appropriate technique for monitoring, assisting and/or progressing therapeutic exercises/activities.              Learning Progress Summary           Patient Acceptance, E,TB, VU by DL at 6/8/2022 1959                   Point: Precautions (Done)     Description:   Instruct learner(s) on prescribed precautions during self-care and functional transfers.              Learning Progress Summary           Patient Acceptance, E, VU,NR by  at 6/11/2022 1201      Show all documentation for this point (1)                 Point: Body mechanics (Not Started)     Description:   Instruct learner(s) on proper positioning and spine alignment during self-care, functional mobility activities and/or exercises.              Learner Progress:  Not documented in this visit.                      User Key     Initials Effective Dates Name Provider Type Discipline     06/16/21 -  Araceli Lua OT Occupational Therapist OT    DL 03/16/22 -  Ashley Mccord RN Registered Nurse Nurse                    OT Recommendation and Plan    Planned Therapy Interventions (OT): activity tolerance training, adaptive equipment training, BADL retraining, patient/caregiver education/training, ROM/therapeutic exercise, strengthening exercise, transfer/mobility retraining                    Time Calculation:      Time Calculation- OT     Row Name  06/14/22 1440             Time Calculation- OT    OT Start Time 0815  -      OT Stop Time 0945  -      OT Time Calculation (min) 90 min  -            User Key  (r) = Recorded By, (t) = Taken By, (c) = Cosigned By    Initials Name Provider Type     Rukhsana Dixon OT Occupational Therapist              Therapy Charges for Today     Code Description Service Date Service Provider Modifiers Qty    07834062345 HC OT THERAPEUTIC ACT EA 15 MIN 6/13/2022 Rukhsana Dixon, OT GO 2    43957623711 HC OT SELF CARE/MGMT/TRAIN EA 15 MIN 6/13/2022 Rukhsana Dixon, OT GO 1    95505474785 HC OT THER PROC EA 15 MIN 6/13/2022 Rukhsana Dixon, OT GO 3    01992175690 HC OT THERAPEUTIC ACT EA 15 MIN 6/14/2022 Rukhsana Dixon, OT GO 2    89734145252 HC OT SELF CARE/MGMT/TRAIN EA 15 MIN 6/14/2022 Rukhsana Dixon, OT GO 1    38609613560 HC OT THER PROC EA 15 MIN 6/14/2022 Rukhsana Dixon OT GO 3                   Rukhsana Dixon OT  6/14/2022

## 2022-06-15 LAB
GLUCOSE BLDC GLUCOMTR-MCNC: 115 MG/DL (ref 70–130)
GLUCOSE BLDC GLUCOMTR-MCNC: 145 MG/DL (ref 70–130)
GLUCOSE BLDC GLUCOMTR-MCNC: 95 MG/DL (ref 70–130)

## 2022-06-15 PROCEDURE — 97530 THERAPEUTIC ACTIVITIES: CPT | Performed by: OCCUPATIONAL THERAPIST

## 2022-06-15 PROCEDURE — 99231 SBSQ HOSP IP/OBS SF/LOW 25: CPT | Performed by: FAMILY MEDICINE

## 2022-06-15 PROCEDURE — 63710000001 INSULIN DETEMIR PER 5 UNITS: Performed by: INTERNAL MEDICINE

## 2022-06-15 PROCEDURE — 97530 THERAPEUTIC ACTIVITIES: CPT

## 2022-06-15 PROCEDURE — 97110 THERAPEUTIC EXERCISES: CPT | Performed by: OCCUPATIONAL THERAPIST

## 2022-06-15 PROCEDURE — 25010000002 ENOXAPARIN PER 10 MG: Performed by: INTERNAL MEDICINE

## 2022-06-15 PROCEDURE — 82962 GLUCOSE BLOOD TEST: CPT

## 2022-06-15 PROCEDURE — 97110 THERAPEUTIC EXERCISES: CPT

## 2022-06-15 PROCEDURE — 97116 GAIT TRAINING THERAPY: CPT

## 2022-06-15 PROCEDURE — 97535 SELF CARE MNGMENT TRAINING: CPT | Performed by: OCCUPATIONAL THERAPIST

## 2022-06-15 RX ADMIN — INSULIN DETEMIR 15 UNITS: 100 INJECTION, SOLUTION SUBCUTANEOUS at 21:37

## 2022-06-15 RX ADMIN — Medication 1 TABLET: at 09:30

## 2022-06-15 RX ADMIN — Medication 5 MG: at 21:36

## 2022-06-15 RX ADMIN — Medication 5000 UNITS: at 09:29

## 2022-06-15 RX ADMIN — DOCUSATE SODIUM 100 MG: 100 CAPSULE ORAL at 21:36

## 2022-06-15 RX ADMIN — OXYCODONE HYDROCHLORIDE AND ACETAMINOPHEN 1000 MG: 500 TABLET ORAL at 09:29

## 2022-06-15 RX ADMIN — ENOXAPARIN SODIUM 40 MG: 40 INJECTION SUBCUTANEOUS at 09:30

## 2022-06-15 RX ADMIN — ASPIRIN 81 MG: 81 TABLET, CHEWABLE ORAL at 09:30

## 2022-06-15 RX ADMIN — ROSUVASTATIN CALCIUM 40 MG: 20 TABLET, FILM COATED ORAL at 21:36

## 2022-06-15 RX ADMIN — INSULIN DETEMIR 15 UNITS: 100 INJECTION, SOLUTION SUBCUTANEOUS at 09:30

## 2022-06-15 RX ADMIN — CLOPIDOGREL BISULFATE 75 MG: 75 TABLET, FILM COATED ORAL at 09:30

## 2022-06-15 RX ADMIN — DOCUSATE SODIUM 100 MG: 100 CAPSULE ORAL at 09:30

## 2022-06-15 RX ADMIN — METHOCARBAMOL 500 MG: 500 TABLET, FILM COATED ORAL at 21:36

## 2022-06-15 RX ADMIN — GABAPENTIN 100 MG: 100 CAPSULE ORAL at 21:36

## 2022-06-15 NOTE — SIGNIFICANT NOTE
06/14/22 5858   Plan   Plan Team conference held today.  Spoke to pt about how she is doing in therapy and plans for discharge on 6-22-22.  Pt says she will return to her home at discharge and sister Denia will stay with her.  Pt says she will call sister about discharge date.  Will follow.

## 2022-06-15 NOTE — PROGRESS NOTES
[facility]  PROGRESS NOTE     Patient Identification:  Name:  Lisa Velazquez  Age:  56 y.o.  Sex:  female  :  1965  MRN:  [id number]  Visit Number:  [id number]  ROOM: 110/2S     Primary Care Provider:  Patricia Skelton APRN    Length of stay in inpatient status:  7    Subjective     Chief Compliant:  No chief complaint on file.      History of Presenting Illness: 56-year-old female who is status post multivessel CAD with CABG x2.  Patient states she is making slow but certain improvement with her therapy goals.  Has no new complaints    Objective     Current Hospital Meds:ascorbic acid, 1,000 mg, Oral, Daily  aspirin, 81 mg, Oral, Daily  clopidogrel, 75 mg, Oral, Daily  docusate sodium, 100 mg, Oral, BID  enoxaparin, 40 mg, Subcutaneous, Q24H  gabapentin, 100 mg, Oral, Nightly  Insulin Aspart, 0-7 Units, Subcutaneous, TID AC  insulin detemir, 15 Units, Subcutaneous, Q12H  melatonin, 5 mg, Oral, Nightly  metoprolol tartrate, 37.5 mg, Oral, Q12H  multivitamin with minerals, 1 tablet, Oral, Daily  rosuvastatin, 40 mg, Oral, Nightly  vitamin D3, 5,000 Units, Oral, Daily       ----------------------------------------------------------------------------------------------------------------------  Vital Signs:  Temp:  [97.5 °F (36.4 °C)-98.2 °F (36.8 °C)] 97.5 °F (36.4 °C)  Heart Rate:  [64-72] 65  Resp:  [18] 18  BP: ()/(48-61) 96/60  SpO2:  [93 %-95 %] 95 %  on   ;   Device (Oxygen Therapy): room air  Body mass index is 25.2 kg/m².    Wt Readings from Last 3 Encounters:   22 73 kg (160 lb 15 oz)   22 73 kg (161 lb)     Intake & Output (last 3 days)        07 0701  06/15 0700 06/15 07 07    P.O. 1200 1320 1320 240    Total Intake(mL/kg) 1200 (16.4) 1320 (18.1) 1320 (18.1) 240 (3.3)    Net +1200 +1320 +1320 +240            Urine Unmeasured Occurrence 3 x 7 x 6 x 1 x        Diet Regular;  Cardiac  ----------------------------------------------------------------------------------------------------------------------  Physical exam:  Constitutional:   No acute distress  HEENT: Normocephalic atraumatic  Neck: Supple   Cardiovascular: Regular rate and rhythm  Pulmonary/Chest: Clear to auscultation  Abdominal: Positive bowel sounds soft.   Musculoskeletal: No arthropathy  Neurological: No focal deficits  Skin: No rash   peripheral vascular:  Genitourinary:  ----------------------------------------------------------------------------------------------------------------------    Last echocardiogram:    ----------------------------------------------------------------------------------------------------------------------  Results from last 7 days   Lab Units 06/09/22  0148   WBC 10*3/mm3 8.31   HEMOGLOBIN g/dL 9.8*   HEMATOCRIT % 31.0*   MCV fL 91.7   MCHC g/dL 31.6   PLATELETS 10*3/mm3 233         Results from last 7 days   Lab Units 06/09/22  0148   SODIUM mmol/L 141   POTASSIUM mmol/L 4.1   MAGNESIUM mg/dL 1.9   CHLORIDE mmol/L 106   CO2 mmol/L 25.1   BUN mg/dL 16   CREATININE mg/dL 0.80   CALCIUM mg/dL 9.4   GLUCOSE mg/dL 131*   ALBUMIN g/dL 2.95*   BILIRUBIN mg/dL 0.8   ALK PHOS U/L 90   AST (SGOT) U/L 12   ALT (SGPT) U/L 16   Estimated Creatinine Clearance: 90.5 mL/min (by C-G formula based on SCr of 0.8 mg/dL).  No results found for: AMMONIA              Glucose   Date/Time Value Ref Range Status   06/15/2022 1122 145 (H) 70 - 130 mg/dL Final     Comment:     Meter: [id number] : [id number] Shivam Chandler   06/15/2022 0602 95 70 - 130 mg/dL Final     Comment:     Meter: [id number] : [id number] Jeannine Crystal   06/14/2022 1634 111 70 - 130 mg/dL Final     Comment:     Meter: [id number] : [id number] crystal sarkar   06/14/2022 0845 119 70 - 130 mg/dL Final     Comment:     Meter: [id number] : [id number] Narcisa Flores   06/14/2022 0553 98 70 - 130 mg/dL Final     Comment:     Meter:  [id number] : [id number] Denver Crystal   06/13/2022 1619 105 70 - 130 mg/dL Final     Comment:     Meter: [id number] : [id number] Hanh John   06/13/2022 1125 162 (H) 70 - 130 mg/dL Final     Comment:     Meter: [id number] : [id number] Hanh John   06/13/2022 0855 183 (H) 70 - 130 mg/dL Final     Comment:     Meter: [id number] : [id number] Narcisa Flores     No results found for: TSH, FREET4  No results found for: PREGTESTUR, PREGSERUM, HCG, HCGQUANT  Pain Management Panel    There is no flowsheet data to display.       Brief Urine Lab Results  (Last result in the past 365 days)      Color   Clarity   Blood   Leuk Est   Nitrite   Protein   CREAT   Urine HCG        06/06/22 1657 Yellow   Cloudy     Moderate                   No results found for: BLOODCX      No results found for: URINECX  No results found for: WOUNDCX  No results found for: STOOLCX        I have personally looked at the labs and they are summarized above.  ----------------------------------------------------------------------------------------------------------------------  Detailed radiology reports for the last 24 hours:    Imaging Results (Last 24 Hours)     ** No results found for the last 24 hours. **        Final impressions for the last 30 days of radiology reports:    XR Spine Lumbar Complete With Flex & Ext    Result Date: 6/12/2022  Unremarkable appearance of the lumbar spine.  This report was finalized on 6/12/2022 11:21 AM by Dr. Mario Alberto Griggs MD.      US Renal Limited    Result Date: 6/11/2022  Both kidneys are normal in size and ultrasound appearance. Signer Name: Corby Roa MD  Signed: 6/11/2022 8:40 PM  Workstation Name: [id number]  [facility]    XR Chest 1 View    Result Date: 6/7/2022  Stable aeration. CRITICAL RESULT:   No. COMMUNICATION: Per this written report. Dictated by Jorge Azevedo on 6/7/2022 9:45 AM Signed by Jorge Azevedo on 6/7/2022 9:46 AM    XR Chest 1  View    Result Date: 6/5/2022  Stable aeration with decreased lung volumes, bibasilar atelectasis, and small left-sided pleural effusion. CRITICAL RESULT:   No. COMMUNICATION: Per this written report. Approved by Jose Manuel Lackey D.O. on 6/5/2022 9:56 AM By electronically signing this report, I, the attending physician, attest that I have personally reviewed the images/data for the above examination(s) and agree with the final edited report. Dictated by Jose Manuel Lackey D.O. on 6/5/2022 9:56 AM Signed by Corby Sow on 6/5/2022 11:39 AM    XR Chest 1 View    Result Date: 6/3/2022  Mild increase in the basilar atelectasis and small left effusion. CRITICAL RESULT:   No. COMMUNICATION: Per this written report. Dictated by Zamzam Kennedy on 6/3/2022 11:04 AM Signed by Zamzam Kennedy on 6/3/2022 11:05 AM    XR Chest 1 View    Result Date: 6/2/2022  Interval removal of right IJ [place]-Octavio catheter with introducer sheath remaining in place. Otherwise, stable chest exam. CRITICAL RESULT:   No. COMMUNICATION: Per this written report. Approved by Annmarie Alcaraz on 6/2/2022 10:13 AM By electronically signing this report, I, the attending physician, attest that I have personally reviewed the images/data for the above examination(s) and agree with the final edited report. Dictated by Annmarie Alcaraz on 6/2/2022 10:13 AM Signed by Kahlil Sebastian on 6/2/2022 10:42 AM    XR Chest 1 View    Result Date: 6/1/2022  Increased left basilar atelectasis status post extubation. CRITICAL RESULT:   No. COMMUNICATION: Per this written report. Dictated by Ralph Roman on 6/1/2022 9:34 AM Signed by Ralph Roman on 6/1/2022 9:34 AM    XR Chest 1 View    Result Date: 5/31/2022  Expected postoperative appearance of the chest. CRITICAL RESULT:   No. COMMUNICATION: Per this written report. Dictated by Ralph Roman on 5/31/2022 4:15 PM Signed by Ralph Roman on 5/31/2022 4:16 PM    XR Hip With or Without Pelvis 1 View  Left    Result Date: 6/12/2022    No acute findings in the left hip.  This report was finalized on 6/12/2022 11:22 AM by Dr. Mario Alberto Griggs MD.      I have personally looked at the radiology images and read the final radiology report.    Assessment & Plan    Debility--worked with occupational therapy yesterday and is requiring contact-guard for help with transfers did work on motor skills to improve ADLs and functional mobility.  Requiring standby assistance for up with transfers; ambulated 320 feet with front wheel walker and standby assist yesterday    CAD with history of recent CABG x2--continue medical management.  Patient stable for now    CHF compensated.  Currently on Lasix 40 mg daily    Diabetes mellitus continue current treatment well-controlled    Hypertension controlled    Insomnia melatonin    VTE Prophylaxis:   Mechanical Order History:     None      Pharmalogical Order History:      Ordered     Dose Route Frequency Stop    06/08/22 1953  Enoxaparin Sodium (LOVENOX) syringe 40 mg         40 mg SC Every 24 Hours --    06/08/22 1947  Enoxaparin Sodium (LOVENOX) syringe 40 mg  Status:  Discontinued         40 mg SC Every 24 Hours 06/08/22 1953                    Rob Camarillo MD  [facility]  06/15/22  13:05 EDT

## 2022-06-15 NOTE — PROGRESS NOTES
Rehabilitation Nursing  Inpatient Rehabilitation Plan of Care Note    Plan of Care  Copy from POCBody Function Structure    Skin Integrity (Active)  Current Status (2/3/2022 12:00:00 AM): free from skin breakdown this shift  Weekly Goal: free from skin breakdown this stay  Discharge Goal: home free of skin breakdown    Safety    Potential for Injury (Active)  Current Status (2/3/2022 12:00:00 AM): free from injury this shift  Weekly Goal: free from injury this stay  Discharge Goal: home free of injury    Signed by: Jane Dxion, Nurse

## 2022-06-15 NOTE — THERAPY TREATMENT NOTE
Inpatient Rehabilitation - Physical Therapy Treatment Note       TONI Wild     Patient Name: Lisa Velazquez  : 1965  MRN: [id number]    Today's Date: 6/15/2022                    Admit Date: 2022      Visit Dx:     ICD-10-CM ICD-9-CM   1. Acute respiratory failure with hypoxia (HCC)  J96.01 518.81       Patient Active Problem List   Diagnosis   • S/P right hip fracture   • Acute respiratory failure with hypoxia (HCC)       Past Medical History:   Diagnosis Date   • Coronary artery disease    • Diabetes mellitus (HCC)    • Elevated cholesterol    • GERD (gastroesophageal reflux disease)    • History of transfusion    • Hypertension        Past Surgical History:   Procedure Laterality Date   • ABDOMINAL SURGERY      gallbladder removal   • CARDIAC CATHETERIZATION     • CARDIAC SURGERY      stent   • COLONOSCOPY     • FRACTURE SURGERY     • TOE AMPUTATION Right     3rd toe right foot       PT ASSESSMENT (last 12 hours)     IRF PT Evaluation and Treatment     Row Name 06/15/22 1351          PT Time and Intention    Document Type daily treatment  -RG     Mode of Treatment individual therapy;physical therapy  -RG     Patient/Family/Caregiver Comments/Observations Pt and nursing in agreement for skilled PT on this date. BP low today, Pt stated that she wasn't feeling well.  -RG     Row Name 06/15/22 1351          General Information    Existing Precautions/Restrictions fall;sternal  -RG     Row Name 06/15/22 1351          Pain Scale: FACES Pre/Post-Treatment    Pain: FACES Scale, Pretreatment 4-->hurts little more  -RG     Posttreatment Pain Rating 4-->hurts little more  -RG     Row Name 06/15/22 1351          Cognition/Psychosocial    Affect/Mental Status (Cognition) WFL  -RG     Follows Commands (Cognition) WFL  -RG     Personal Safety Interventions gait belt;nonskid shoes/slippers when out of bed;fall prevention program maintained  -RG     Row Name 06/15/22 1351          Transfers    Bed-Chair [place]  (Transfers) verbal cues;nonverbal cues (demo/gesture);standby assist  -RG     Chair-Bed [place] (Transfers) verbal cues;nonverbal cues (demo/gesture);standby assist  -RG     Assistive Device (Bed-Chair Transfers) wheelchair  -RG     Sit-Stand [place] (Transfers) verbal cues;nonverbal cues (demo/gesture);standby assist  -RG     Stand-Sit [place] (Transfers) verbal cues;nonverbal cues (demo/gesture);standby assist  -RG     [place] Level (Toilet Transfer) supervision  -RG     Assistive Device (Toilet Transfer) grab bars/safety frame;walker, front-wheeled  -RG     Row Name 06/15/22 1351          Chair-Bed Transfer    Assistive Device (Chair-Bed Transfers) wheelchair  -RG     Row Name 06/15/22 1351          Sit-Stand Transfer    Assistive Device (Sit-Stand Transfers) walker, front-wheeled  -RG     Row Name 06/15/22 1351          Stand-Sit Transfer    Assistive Device (Stand-Sit Transfers) walker, front-wheeled  -RG     Row Name 06/15/22 1351          Toilet Transfer    Type (Toilet Transfer) stand pivot/stand step  -RG     Row Name 06/15/22 1351          Gait/Stairs (Locomotion)    [place] Level (Gait) verbal cues;nonverbal cues (demo/gesture);standby assist  -RG     Assistive Device (Gait) walker, front-wheeled  -RG     Distance in Feet (Gait) 320  -RG     Pattern (Gait) step-through  -RG     Deviations/Abnormal Patterns (Gait) bc decreased;gait speed decreased;stride length decreased  -RG     Bilateral Gait Deviations forward flexed posture  -RG     Comment, (Gait/Stairs) Pt c/o fatigue.  -RG     Row Name 06/15/22 1351          Hip (Therapeutic Exercise)    Hip Strengthening (Therapeutic Exercise) bilateral;flexion;aBduction;aDduction;marching while seated;marching while standing;supine;heel slides;internal rotation;external rotation;sitting;standing;2 lb free weight;resistance band;green;10 repetitions;2 sets  -RG     Row Name 06/15/22 1351          Knee (Therapeutic Exercise)    Knee  Strengthening (Therapeutic Exercise) bilateral;flexion;extension;heel slides;marching while seated;marching while standing;SAQ (short arc quad);LAQ (long arc quad);hamstring curls;sitting;standing;supine;resistance band;green;10 repetitions;2 sets  -     Row Name 06/15/22 [facility]          Ankle (Therapeutic Exercise)    Ankle Strengthening (Therapeutic Exercise) bilateral;dorsiflexion;sitting;supine;20 repititions  -     Row Name 06/15/22 [facility]          Positioning and Restraints    Pre-Treatment Position sitting in chair/recliner  -RG     Post Treatment Position wheelchair  -RG     In Wheelchair notified nsg;sitting;with OT  -RG     Row Name 06/15/22 [facility]          Therapy Assessment/Plan (PT)    Patient's Goals For Discharge return home  -     Row Name 06/15/22 [facility]          Therapy Assessment/Plan (PT)    Rehab Potential/Prognosis (PT) adequate, monitor progress closely  -RG     Frequency of Treatment (PT) 5 times per week  -RG     Estimated Duration of Therapy (PT) 2 weeks  -RG     Problem List (PT) balance;mobility;strength;pain;postural control  -RG     Activity Limitations Related to Problem List (PT) unable to ambulate safely;unable to transfer safely  -     Row Name 06/15/22 [facility]          Therapy Plan Review/Discharge Plan (PT)    Anticipated Equipment Needs at Discharge (PT Eval) --  tbd  -RG     Anticipated Discharge Disposition (PT) home with home health  -     Row Name 06/15/22 [facility]          IRF PT Goals    Bed Mobility Goal Selection (PT-IRF) bed mobility, PT goal 1  -RG     Transfer Goal Selection (PT-IRF) transfers, PT goal 1  -RG     Gait (Walking Locomotion) Goal Selection (PT-IRF) gait, PT goal 1  -     Row Name 06/15/22 [facility]          Bed Mobility Goal 1 (PT-IRF)    Activity/Assistive Device (Bed Mobility Goal 1, PT-IRF) sit to supine/supine to sit  -RG     [place] Level (Bed Mobility Goal 1, PT-IRF) modified independence  -RG     Time Frame (Bed Mobility Goal 1, PT-IRF) by discharge   -RG     Row Name 06/15/22 1351          Transfer Goal 1 (PT-IRF)    Activity/Assistive Device (Transfer Goal 1, PT-IRF) sit-to-stand/stand-to-sit;bed-to-chair/chair-to-bed  -RG     [place] Level (Transfer Goal 1, PT-IRF) modified independence  -RG     Time Frame (Transfer Goal 1, PT-IRF) by discharge  -RG     Row Name 06/15/22 1351          Gait/Walking Locomotion Goal 1 (PT-IRF)    Activity/Assistive Device (Gait/Walking Locomotion Goal 1, PT-IRF) --  RW or AAD  -RG     Gait/Walking Locomotion Distance Goal 1 (PT-IRF) 300'  -RG     [place] Level (Gait/Walking Locomotion Goal 1, PT-IRF) modified independence  -RG     Time Frame (Gait/Walking Locomotion Goal 1, PT-IRF) by discharge  -RG           User Key  (r) = Recorded By, (t) = Taken By, (c) = Cosigned By    Initials Name Provider Type    RG Chidi Harris PTA Physical Therapist Assistant              Wound Other (See comments) midline sternal Incision (Active)   Dressing Appearance dry;intact 06/14/22 1925   Closure Liquid skin adhesive 06/14/22 1925       Wound Right distal thigh Incision (Active)   Closure Open to air 06/14/22 1925       Wound Right proximal leg Incision (Active)   Dressing Appearance open to air 06/14/22 1925     Physical Therapy Education                 Title: PT OT SLP Therapies (In Progress)     Topic: Physical Therapy (Done)     Point: Mobility training (Done)     Learning Progress Summary           Patient Acceptance, E,D, VU,NR by  at 6/15/2022 1357      Show all documentation for this point (7)                 Point: Home exercise program (Done)     Learning Progress Summary           Patient Acceptance, E,D, VU,NR by  at 6/15/2022 1357      Show all documentation for this point (7)                 Point: Body mechanics (Done)     Learning Progress Summary           Patient Acceptance, E,D, VU,NR by  at 6/15/2022 1357      Show all documentation for this point (7)                 Point: Precautions (Done)     Learning  Progress Summary           Patient Acceptance, E,D, VU,NR by  at 6/15/2022 1357      Show all documentation for this point (7)                             User Key     Initials Effective Dates Name Provider Type Discipline     06/16/21 -  Chidi Harris PTA Physical Therapist Assistant PT                PT Recommendation and Plan    Frequency of Treatment (PT): 5 times per week  Anticipated Equipment Needs at Discharge (PT Eval):  (tbd)                  Time Calculation:      PT Charges     Row Name 06/15/22 1359 06/15/22 1357          Time Calculation    Start Time 1245  -RG 1045  -RG     Stop Time 1330  -RG 1130  -RG     Time Calculation (min) 45 min  -RG 45 min  -RG     PT Received On 06/15/22  -RG 06/15/22  -RG            Time Calculation- PT    Total Timed Code Minutes- PT 45 minute(s)  -RG 45 minute(s)  -RG           User Key  (r) = Recorded By, (t) = Taken By, (c) = Cosigned By    Initials Name Provider Type    RG Chidi Harris PTA Physical Therapist Assistant                Therapy Charges for Today     Code Description Service Date Service Provider Modifiers Qty    72730192052 HC GAIT TRAINING EA 15 MIN 6/14/2022 Chidi Harris PTA GP, CQ 1    72673848873 HC PT THERAPEUTIC ACT EA 15 MIN 6/14/2022 Chidi Harris PTA GP, CQ 2    82595989673 HC PT THER PROC EA 15 MIN 6/14/2022 Chidi Harris PTA GP, CQ 3    96675099101 HC GAIT TRAINING EA 15 MIN 6/15/2022 Chidi Harris PTA GP, CQ 1    50173626512 HC PT THERAPEUTIC ACT EA 15 MIN 6/15/2022 Chidi Harris PTA GP, CQ 2    76419999135 HC PT THER PROC EA 15 MIN 6/15/2022 Chidi Harris PTA GP, CQ 3                   Cihdi Harris PTA  6/15/2022

## 2022-06-15 NOTE — PLAN OF CARE
Goal Outcome Evaluation:  Plan of Care Reviewed With: patient        Progress: improving  Outcome Evaluation: patient up with therapies this shift. continue plan of care.

## 2022-06-15 NOTE — PROGRESS NOTES
Case Management  Inpatient Rehabilitation Team Conference    Conference Date/Time: 6/14/2022 8:00:25 AM    Team Conference Attendees:  MD Carrie Price SW Jessica Bill, RN,   SUBHA Patterson, PT  Candie Dixon OT    Demographics            Age: 56Y            Gender: Female    Admission Date: 6/8/2022 6:28:00 PM  Rehabilitation Diagnosis:  debility  Comorbidities: E11.9 Type 2 diabetes mellitus without complications  E78.00 Pure hypercholesterolemia, unspecified  E78.5 Hyperlipidemia, unspecified  G47.00 Insomnia, unspecified  I10 Essential (primary) hypertension  I25.82 Chronic total occlusion of coronary artery  I70.201 Unspecified atherosclerosis of native arteries of extremities, right leg  K59.00 Constipation, unspecified  Z79.4 Long term (current) use of insulin  Z79.83 Long term (current) use of bisphosphonates  Z80.9 Family history of malignant neoplasm, unspecified  Z82.49 Family history of ischemic heart disease and other diseases of the  circulatory system  Z83.3 Family history of diabetes mellitus  Z87.891 Personal history of nicotine dependence  Z95.1 Presence of aortocoronary bypass graft      Plan of Care  Anticipated Discharge Date/Estimated Length of Stay: 14 days  Anticipated Discharge Destination: Community discharge with assistance  Discharge Plan : Pt plans to return home with sister staying with her if needed  or she can go to niece's home at discharge.  Medical Necessity Expected Level Rationale: good  Intensity and Duration: an average of 3 hours/5 days per week  Medical Supervision and 24 Hour Rehab Nursing: x  Physical Therapy: x  PT Intensity/Duration: PT 1.5 hours per day/5 days per week  Occupational Therapy: x  OT Intensity/Duration: OT 1.5 hours per day/5 days per week  Social Work: x  Therapeutic Recreation: x  Updated (if changes indicated)    Anticipated Discharge Date/Estimated Length of Stay:   6-22-22      Discharge Plan of  Care:    Based on the patient's medical and functional status, their prognosis and  expected level of functional improvement is: good      Interdisciplinary Problem/Goals/Status  Body Function Structure    [RN] Skin Integrity(Active)  Current Status(02/03/2022): free from skin breakdown this shift  Weekly Goal(07/23/2022): free from skin breakdown this stay  Discharge Goal: home free of skin breakdown        Mobility    [PT] Bed/Chair/Wheelchair(Active)  Current Status(06/09/2022): CGA-min A  Weekly Goal(06/16/2022): MI  Discharge Goal: MI    [PT] Walk(Active)  Current Status(06/09/2022): amb 320' RW CGA  Weekly Goal(06/16/2022): amb 300' AAD MI  Discharge Goal: amb 300' AAD MI        Safety    [RN] Potential for Injury(Active)  Current Status(02/03/2022): free from injury this shift  Weekly Goal(07/23/2022): free from injury this stay  Discharge Goal: home free of injury        Self Care    [OT] Toileting(Active)  Current Status(06/13/2022): min  Weekly Goal(06/23/2022): sup  Discharge Goal: set up    Comments: Pt plans to go home at discharge with sister staying with her or she  may go to niece's home in [place], [place].    Signed by: RASTA Landry    Physician CoSigned By: Rob Camarillo 06/15/2022 13:27:40

## 2022-06-15 NOTE — PAYOR COMM NOTE
"Felicia Lundberg RN   Utilization Review Nurse for Inpatient Rehab   Phone: [phone]  Fax: [phone]  Email: [email or web address]  [facility] NPI: [id number]    CLINICAL REVIEW FOR CONTINUED REHAB STAY  Member:  Lisa Velazquez  :  65  ID# [id number]  Auth# [id number]  Admission Date:  22  Planning for Discharge home on 22  *Requesting for additional 7 days    Lisa Velazquez (56 y.o. Female)             Date of Birth   1965    Social Security Number       Address   [address] [place], [place] [address]    Home Phone   [phone]    MRN   [id number]       [facility]    Marital Status                               Admission Date   22    Admission Type   Elective    Admitting Provider   Alejandrina Ann MD    Attending Provider   Rob Camarillo MD    Department, Room/Bed   [facility] REHABILITATION, 110/2S       Discharge Date       Discharge Disposition       Discharge Destination                               Attending Provider: Rob Camarillo MD    Allergies: No Known Allergies    Isolation: None   Infection: None   Code Status: CPR   Advance Care Planning Activity    Ht: 170.2 cm (67.01\")   Wt: 73 kg (160 lb 15 oz)    Admission Cmt: None   Principal Problem: None              Case Management  Inpatient Rehabilitation Team Conference    Conference Date/Time: 2022 8:00:25 AM    Team Conference Attendees:  MD Carrie Price SW Jessica Bill, RN,   SUBHA Patterson, PT  Candie Dixon OT    Demographics            Age: 56Y             Gender: Female       Admission Date: 2022 6:28:00 PM  Rehabilitation Diagnosis:  debility   Comorbidities: E11.9 Type 2 diabetes mellitus without complications  E78.00 Pure hypercholesterolemia, unspecified  E78.5 Hyperlipidemia, unspecified  G47.00 Insomnia, unspecified  I10 Essential (primary) hypertension  I25.82 Chronic total occlusion of coronary " artery  I70.201 Unspecified atherosclerosis of native arteries of extremities, right leg  K59.00 Constipation, unspecified  Z79.4 Long term (current) use of insulin  Z79.83 Long term (current) use of bisphosphonates  Z80.9 Family history of malignant neoplasm, unspecified  Z82.49 Family history of ischemic heart disease and other diseases of the circulatory system  Z83.3 Family history of diabetes mellitus  Z87.891 Personal history of nicotine dependence  Z95.1 Presence of aortocoronary bypass graft      Plan of Care  Anticipated Discharge Date/Estimated Length of Stay: 14 days  Anticipated Discharge Destination: Community discharge with assistance  Discharge Plan : Pt plans to return home with sister staying with her if needed or she can go to niece's home at discharge.  Medical Necessity Expected Level Rationale: good  Intensity and Duration: an average of 3 hours/5 days per week  Medical Supervision and 24 Hour Rehab Nursing: x  Physical Therapy: x  PT Intensity/Duration: PT 1.5 hours per day/5 days per week  Occupational Therapy: x  OT Intensity/Duration: OT 1.5 hours per day/5 days per week  Social Work: x  Therapeutic Recreation: x  Updated (if changes indicated)    Anticipated Discharge Date/Estimated Length of Stay:   6-22-22      Discharge Plan of Care: Branch    Based on the patient's medical and functional status, their prognosis and expected level of functional improvement is: good      Interdisciplinary Problem/Goals/Status  Body Function Structure    [RN] Skin Integrity(Active)  Current Status(02/03/2022): free from skin breakdown this shift  Weekly Goal(07/23/2022): free from skin breakdown this stay  Discharge Goal: home free of skin breakdown        Mobility    [PT] Bed/Chair/Wheelchair(Active)  Current Status(06/09/2022): CGA-min A  Weekly Goal(06/16/2022): MI  Discharge Goal: MI    [PT] Walk(Active)  Current Status(06/09/2022): amb 320' RW CGA  Weekly Goal(06/16/2022): amb 300' AAD MI  Discharge  Goal: amb 300' AAD MI        Safety    [RN] Potential for Injury(Active)  Current Status(2022): free from injury this shift  Weekly Goal(2022): free from injury this stay  Discharge Goal: home free of injury         Self Care    [OT] Toileting(Active)  Current Status(2022): min  Weekly Goal(2022): sup  Discharge Goal: set up    Comments: Pt plans to go home at discharge with sister staying with her or she may go to niece's home in [place], [place].     Rob Camarillo MD    Physician   Medicine   Progress Notes       Signed   Date of Service:  22   Creation Time:  22              Signed        Expand All Collapse [facility]  PROGRESS NOTE     Patient Identification:  Name:  Lisa Velazquez  Age:  56 y.o.  Sex:  female  :  1965  MRN:  [id number]  Visit Number:  [id number]  ROOM: 110/      Primary Care Provider:  Patricia Skelton APRN     Length of stay in inpatient status:  6     Subjective      Chief Compliant:  No chief complaint on file.        History of Presenting Illness: 56-year-old female who is status post multivessel CAD with CABG x2.  Patient doing well at this time has no new complaints.     Objective      Current Hospital Meds:ascorbic acid, 1,000 mg, Oral, Daily  aspirin, 81 mg, Oral, Daily  clopidogrel, 75 mg, Oral, Daily  docusate sodium, 100 mg, Oral, BID  enoxaparin, 40 mg, Subcutaneous, Q24H  gabapentin, 100 mg, Oral, Nightly  Insulin Aspart, 0-7 Units, Subcutaneous, TID AC  insulin detemir, 15 Units, Subcutaneous, Q12H  melatonin, 5 mg, Oral, Nightly  metoprolol tartrate, 37.5 mg, Oral, Q12H  multivitamin with minerals, 1 tablet, Oral, Daily  rosuvastatin, 40 mg, Oral, Nightly  vitamin D3, 5,000 Units, Oral, Daily     ----------------------------------------------------------------------------------------------------------------------  Vital Signs:  Temp:  [97.8 °F (36.6 °C)-98.3 °F (36.8 °C)] 97.8 °F (36.6 °C)  Heart  Rate:  [62-68] 62  Resp:  [18] 18  BP: (110-117)/(58-71) 110/58  SpO2:  [94 %-95 %] 94 %  on   ;   Device (Oxygen Therapy): room air  Body mass index is 25.2 kg/m².         Wt Readings from Last 3 Encounters:   06/08/22 73 kg (160 lb 15 oz)   02/01/22 73 kg (161 lb)               Intake & Output (last 3 days)        06/11 0701 06/12 0700 06/12 0701 06/13 0700 06/13 0701 06/14 0700 06/14 0701  06/15 0700     P.O. 960 1200 1320 120     Total Intake(mL/kg) 960 (13.2) 1200 (16.4) 1320 (18.1) 120 (1.6)     Net +960 +1200 +1320 +120                   Urine Unmeasured Occurrence 4 x 3 x 7 x            Diet Regular; Cardiac  ----------------------------------------------------------------------------------------------------------------------  Physical exam:  Constitutional:   No acute distress  HEENT: Normocephalic atraumatic  Neck:    Supple  Cardiovascular: Regular rate and rhythm  Pulmonary/Chest: Clear to auscultation  Abdominal: Positive bowel sounds soft.   Musculoskeletal: No arthropathy  Neurological: No focal deficits  Skin: No rash  Peripheral vascular:  Genitourinary:  ----------------------------------------------------------------------------------------------------------------------     Last echocardiogram:  ----------------------------------------------------------------------------------------------------------------------        Results from last 7 days   Lab Units 06/09/22  0148 06/08/22 0428   WBC 10*3/mm3 8.31 6.46   HEMOGLOBIN g/dL 9.8* 9.8*   HEMATOCRIT % 31.0* 30.3*   MCV fL 91.7 90   MCHC g/dL 31.6 32.3   PLATELETS 10*3/mm3 233 216                Results from last 7 days   Lab Units 06/09/22 0148 06/08/22 0428   SODIUM mmol/L 141 141   POTASSIUM mmol/L 4.1 3.9   MAGNESIUM mg/dL 1.9 2.0   CHLORIDE mmol/L 106 103   TOTAL CO2 mmol/L  --  28   CO2 mmol/L 25.1  --    BUN mg/dL 16 14   CREATININE mg/dL 0.80 0.69   EGFR IF NONAFRICN AM mL/min/1.73m*2  --  >60   EGFR IF AFRICN AM mL/min/1.73m*2  --   >60   CALCIUM mg/dL 9.4 9.6   IONIZED CALCIUM mg/dL  --  5.1   GLUCOSE mg/dL 131*  --    ALBUMIN g/dL 2.95*  --    BILIRUBIN mg/dL 0.8  --    ALK PHOS U/L 90  --    AST (SGOT) U/L 12  --    ALT (SGPT) U/L 16  --    Estimated Creatinine Clearance: 90.5 mL/min (by C-G formula based on SCr of 0.8 mg/dL).  No results found for: AMMONIA              Glucose   Date/Time Value Ref Range Status   06/14/2022 0845 119 70 - 130 mg/dL Final       Comment:       Meter: [id number] : [id number] Narcisa Flores   06/14/2022 0553 98 70 - 130 mg/dL Final       Comment:       Meter: [id number] : [id number] Jeannine Crystal   06/13/2022 1619 105 70 - 130 mg/dL Final       Comment:       Meter: [id number] : [id number] Schafer Dora   06/13/2022 1125 162 (H) 70 - 130 mg/dL Final       Comment:       Meter: [id number] : [id number] Schafer Dora   06/13/2022 0855 183 (H) 70 - 130 mg/dL Final       Comment:       Meter: [id number] : [id number] Narcisa Flores   06/13/2022 0613 117 70 - 130 mg/dL Final       Comment:       Meter: [id number] : [id number] [place] Crystal   06/12/2022 1605 183 (H) 70 - 130 mg/dL Final       Comment:       RN Notified Meter: [id number] : [id number] ROBERT MELVIN   06/12/2022 1108 119 70 - 130 mg/dL Final       Comment:       Meter: [id number] : [id number] Narcisa Flores      No results found for: TSH, FREET4  No results found for: PREGTESTUR, PREGSERUM, HCG, HCGQUANT  Pain Management Panel       There is no flowsheet data to display.                                  Brief Urine Lab Results  (Last result in the past 365 days)       Color   Clarity   Blood   Leuk Est   Nitrite   Protein   CREAT   Urine HCG         06/06/22 1657 Yellow    Cloudy        Moderate                               No results found for: BLOODCX      No results found for: URINECX  No results found for: WOUNDCX  No results found for: STOOLCX         I have personally looked at the labs and they are summarized  above.  ----------------------------------------------------------------------------------------------------------------------  Detailed radiology reports for the last 24 hours:         Imaging Results (Last 24 Hours)      ** No results found for the last 24 hours. **          Final impressions for the last 30 days of radiology reports:     XR Spine Lumbar Complete With Flex & Ext     Result Date: 6/12/2022  Unremarkable appearance of the lumbar spine.  This report was finalized on 6/12/2022 11:21 AM by Dr. Mario Alberto Griggs MD.       US Renal Limited     Result Date: 6/11/2022  Both kidneys are normal in size and ultrasound appearance. Signer Name: Corby Roa MD  Signed: 6/11/2022 8:40 PM  Workstation Name: [id number]  [facility]     XR Chest 1 View     Result Date: 6/7/2022  Stable aeration. CRITICAL RESULT:   No. COMMUNICATION: Per this written report. Dictated by Jorge Azevedo on 6/7/2022 9:45 AM Signed by Jorge Azevedo on 6/7/2022 9:46 AM     XR Chest 1 View     Result Date: 6/5/2022  Stable aeration with decreased lung volumes, bibasilar atelectasis, and small left-sided pleural effusion. CRITICAL RESULT:   No. COMMUNICATION: Per this written report. Approved by Jose Manuel Lackey D.O. on 6/5/2022 9:56 AM By electronically signing this report, I, the attending physician, attest that I have personally reviewed the images/data for the above examination(s) and agree with the final edited report. Dictated by Jose Manuel Lackey D.O. on 6/5/2022 9:56 AM Signed by Corby Sow on 6/5/2022 11:39 AM     XR Chest 1 View     Result Date: 6/3/2022  Mild increase in the basilar atelectasis and small left effusion. CRITICAL RESULT:   No. COMMUNICATION: Per this written report. Dictated by Zamzam Kennedy on 6/3/2022 11:04 AM Signed by Zamzam Kennedy on 6/3/2022 11:05 AM     XR Chest 1 View     Result Date: 6/2/2022  Interval removal of right IJ [place] catheter with introducer  sheath remaining in place. Otherwise, stable chest exam. CRITICAL RESULT:   No. COMMUNICATION: Per this written report. Approved by Annmarie Alcaraz on 6/2/2022 10:13 AM By electronically signing this report, I, the attending physician, attest that I have personally reviewed the images/data for the above examination(s) and agree with the final edited report. Dictated by Annmarie Alcaraz on 6/2/2022 10:13 AM Signed by Kahlil Sebastian on 6/2/2022 10:42 AM     XR Chest 1 View     Result Date: 6/1/2022  Increased left basilar atelectasis status post extubation. CRITICAL RESULT:   No. COMMUNICATION: Per this written report. Dictated by Ralph Roman on 6/1/2022 9:34 AM Signed by Ralph Roman on 6/1/2022 9:34 AM     XR Chest 1 View     Result Date: 5/31/2022  Expected postoperative appearance of the chest. CRITICAL RESULT:   No. COMMUNICATION: Per this written report. Dictated by Ralph Roman on 5/31/2022 4:15 PM Signed by Ralph Roman on 5/31/2022 4:16 PM     XR Hip With or Without Pelvis 1 View Left     Result Date: 6/12/2022    No acute findings in the left hip.  This report was finalized on 6/12/2022 11:22 AM by Dr. Mario Alberto Griggs MD.       I have personally looked at the radiology images and read the final radiology report.     Assessment & Plan    Debility--requiring contact-guard for help with transfers; ambulated 320 feet yesterday with front wheel walker and standby assist.  Continues to work on motor skills to improve functional mobility and ADLs.     CAD with recent CABG--continue aspirin, Plavix, metoprolol and Crestor.     CHF continue Lasix 40 mg a day.  Patient currently compensated     Diabetes mellitus Levemir and sliding scale coverage reasonably well controlled.  Hypertension controlled     Insomnia continue melatonin.        VTE Prophylaxis:       Mechanical Order History:      None               Pharmalogical Order History:                   Ordered     Dose Route Frequency Stop      06/08/22     Enoxaparin Sodium (LOVENOX) syringe 40 mg         40 mg SC Every 24 Hours --      22   Enoxaparin Sodium (LOVENOX) syringe 40 mg  Status:  Discontinued         40 mg SC Every 24 Hours 22                           Rob Camarillo MD  [facility] Hospitalist  22  12:28 EDT                  Chidi Harris PTA    Physical Therapist Assistant   Physical Therapy   Therapy Treatment Note       Signed   Date of Service:  22   Creation Time:  22              Signed        Expand All Collapse All        Inpatient Rehabilitation - Physical Therapy Treatment Note                                                               [facility]     Patient Name: Lisa Velazquez                       : 1965                      MRN: [id number]     Today's Date: 2022                                                                                            Admit Date: 2022                 Visit Dx:   Visit Diagnosis       ICD-10-CM ICD-9-CM   1. Acute respiratory failure with hypoxia (HCC)  J96.01 518.81            Problem List       Patient Active Problem List   Diagnosis   • S/P right hip fracture   • Acute respiratory failure with hypoxia (HCC)            Medical History        Past Medical History:   Diagnosis Date   • Coronary artery disease     • Diabetes mellitus (HCC)     • Elevated cholesterol     • GERD (gastroesophageal reflux disease)     • History of transfusion     • Hypertension              Surgical History         Past Surgical History:   Procedure Laterality Date   • ABDOMINAL SURGERY         gallbladder removal   • CARDIAC CATHETERIZATION       • CARDIAC SURGERY         stent   • COLONOSCOPY       • FRACTURE SURGERY       • TOE AMPUTATION Right       3rd toe right foot                PT ASSESSMENT (last 12 hours)                IRF PT Evaluation and Treatment             Row Name 22 1427                   PT Time and Intention       Document Type daily treatment  -RG        Mode of Treatment individual therapy;physical therapy  -RG        Patient/Family/Caregiver Comments/Observations Pt and nursing in agreement for skilled PT on this date.  -RG               Row Name 06/14/22 1427                   General Information      Existing Precautions/Restrictions fall;sternal  -RG               Row Name 06/14/22 1427                   Pain Scale: FACES Pre/Post-Treatment      Pain: FACES Scale, Pretreatment 4-->hurts little more  -RG        Posttreatment Pain Rating 4-->hurts little more  -RG               Row Name 06/14/22 1427                   Cognition/Psychosocial      Affect/Mental Status (Cognition) WFL  -RG        Follows Commands (Cognition) WFL  -RG        Personal Safety Interventions gait belt;fall prevention program maintained;nonskid shoes/slippers when out of bed  -RG               Row Name 06/14/22 1427                   Transfers      Bed-Chair [place] (Transfers) verbal cues;nonverbal cues (demo/gesture);standby assist  -RG        Chair-Bed [place] (Transfers) verbal cues;nonverbal cues (demo/gesture);standby assist  -RG        Assistive Device (Bed-Chair Transfers) wheelchair  -RG        Sit-Stand [place] (Transfers) verbal cues;nonverbal cues (demo/gesture);standby assist  -RG        Stand-Sit [place] (Transfers) verbal cues;nonverbal cues (demo/gesture);standby assist  -RG        [place] Level (Toilet Transfer) supervision  -RG        Assistive Device (Toilet Transfer) grab bars/safety frame;walker, front-wheeled  -RG               Row Name 06/14/22 1427                   Chair-Bed Transfer      Assistive Device (Chair-Bed Transfers) wheelchair  -RG               Row Name 06/14/22 1427                   Sit-Stand Transfer      Assistive Device (Sit-Stand Transfers) walker, front-wheeled  -RG               Row Name 06/14/22 1427                   Stand-Sit Transfer      Assistive Device (Stand-Sit  Transfers) walker, front-wheeled  -RG               Row Name 06/14/22 1427                   Toilet Transfer      Type (Toilet Transfer) stand pivot/stand step  -RG               Row Name 06/14/22 1427                   Gait/Stairs (Locomotion)      [place] Level (Gait) verbal cues;nonverbal cues (demo/gesture);standby assist  -RG        Assistive Device (Gait) walker, front-wheeled  -RG        Distance in Feet (Gait) 320  -RG        Deviations/Abnormal Patterns (Gait) bc decreased;gait speed decreased;stride length decreased  -RG        Bilateral Gait Deviations forward flexed posture  -RG        [place] Level (Stairs) contact guard  -RG        Handrail Location (Stairs) both sides  -RG        Number of Steps (Stairs) 5  -RG        Ascending Technique (Stairs) step-over-step  -RG        Descending Technique (Stairs) step-over-step  -RG        Stairs, Safety Issues balance decreased during turns  -RG               Row Name 06/14/22 1427                   Hip (Therapeutic Exercise)      Hip Strengthening (Therapeutic Exercise) bilateral;flexion;aBduction;aDduction;marching while seated;marching while standing;sitting;standing;resistance band;green;10 repetitions;2 sets  -RG               Row Name 06/14/22 1427                   Knee (Therapeutic Exercise)      Knee Strengthening (Therapeutic Exercise) bilateral;flexion;extension;marching while seated;marching while standing;LAQ (long arc quad);hamstring curls;sitting;standing;resistance band;green;10 repetitions;2 sets  -RG               Row Name 06/14/22 1427                   Ankle (Therapeutic Exercise)      Ankle Strengthening (Therapeutic Exercise) bilateral;dorsiflexion;plantarflexion;sitting;standing;20 repititions  -RG               Row Name 06/14/22 1427                   Positioning and Restraints      Pre-Treatment Position sitting in chair/recliner  -RG        Post Treatment Position bed  -RG        In Bed notified nsg;supine;call light  within reach;encouraged to call for assist;legs elevated  -RG               Row Name 06/14/22 1427                   Therapy Assessment/Plan (PT)      Patient's Goals For Discharge return home  -RG               Row Name 06/14/22 1427                   Therapy Assessment/Plan (PT)      Rehab Potential/Prognosis (PT) adequate, monitor progress closely  -RG        Frequency of Treatment (PT) 5 times per week  -RG        Estimated Duration of Therapy (PT) 2 weeks  -RG        Problem List (PT) balance;mobility;strength;pain;postural control  -RG        Activity Limitations Related to Problem List (PT) unable to ambulate safely;unable to transfer safely  -RG               Row Name 06/14/22 1427                   Therapy Plan Review/Discharge Plan (PT)      Anticipated Equipment Needs at Discharge (PT Eval) --  tbd  -RG        Anticipated Discharge Disposition (PT) home with home health  -RG               Row Name 06/14/22 1427                   IRF PT Goals      Bed Mobility Goal Selection (PT-IRF) bed mobility, PT goal 1  -RG        Transfer Goal Selection (PT-IRF) transfers, PT goal 1  -RG        Gait (Walking Locomotion) Goal Selection (PT-IRF) gait, PT goal 1  -RG               Row Name 06/14/22 1427                   Bed Mobility Goal 1 (PT-IRF)      Activity/Assistive Device (Bed Mobility Goal 1, PT-IRF) sit to supine/supine to sit  -RG        [place] Level (Bed Mobility Goal 1, PT-IRF) modified independence  -RG        Time Frame (Bed Mobility Goal 1, PT-IRF) by discharge  -RG               Row Name 06/14/22 1427                   Transfer Goal 1 (PT-IRF)      Activity/Assistive Device (Transfer Goal 1, PT-IRF) sit-to-stand/stand-to-sit;bed-to-chair/chair-to-bed  -RG        [place] Level (Transfer Goal 1, PT-IRF) modified independence  -RG        Time Frame (Transfer Goal 1, PT-IRF) by discharge  -RG               Row Name 06/14/22 1427                   Gait/Walking Locomotion Goal 1 (PT-IRF)       Activity/Assistive Device (Gait/Walking Locomotion Goal 1, PT-IRF) --  RW or AAD  -RG        Gait/Walking Locomotion Distance Goal 1 (PT-IRF) 300'  -RG        [place] Level (Gait/Walking Locomotion Goal 1, PT-IRF) modified independence  -RG        Time Frame (Gait/Walking Locomotion Goal 1, PT-IRF) by discharge  -RG                        User Key  (r) = Recorded By, (t) = Taken By, (c) = Cosigned By             Initials Name Provider Type      RG Chidi Harris PTA Physical Therapist Assistant                          Wound Other (See comments) midline sternal Incision (Active)   Dressing Appearance dry;intact 06/13/22 1935   Closure Liquid skin adhesive 06/14/22 1220   Periwound Temperature warm 06/14/22 1220   Periwound Skin Turgor soft 06/14/22 1220   Drainage Amount none 06/14/22 1220       Wound Right distal thigh Incision (Active)   Dressing Appearance open to air 06/14/22 1220   Closure Open to air 06/13/22 1935   Periwound Temperature warm 06/14/22 1220   Drainage Amount none 06/14/22 1220       Wound Right proximal leg Incision (Active)   Dressing Appearance open to air 06/14/22 1220   Periwound Temperature warm 06/14/22 1220   Periwound Skin Turgor soft 06/14/22 1220   Drainage Amount none 06/14/22 1220                  PT Recommendation and Plan     Frequency of Treatment (PT): 5 times per week  Anticipated Equipment Needs at Discharge (PT Eval):  (tbd)                           Time Calculation:                PT Charges              Row Name 06/14/22 1432 06/14/22 1431                    Time Calculation      Start Time 1330  -RG 1045  -RG        Stop Time 1415  -RG 1130  -RG        Time Calculation (min) 45 min  -RG 45 min  -RG        PT Received On 06/14/22  -RG 06/14/22  -RG                           Time Calculation- PT      Total Timed Code Minutes- PT 45 minute(s)  -RG 45 minute(s)  -RG                      Rukhsana Dixon, OT    Occupational Therapist   Occupational Therapy   Therapy  Treatment Note       Signed   Date of Service:  06/10/22 1449   Creation Time:  06/10/22 1449              Signed        Expand All Collapse All        Inpatient Rehabilitation - Occupational Therapy Treatment Note     TONI [facility]     Patient Name: Lisa Velazquez                       : 1965                      MRN: [id number]     Today's Date: 6/10/2022                                                                             Admit Date: 2022        Visit Diagnosis       ICD-10-CM ICD-9-CM   1. Acute respiratory failure with hypoxia (HCC)  J96.01 518.81            Problem List       Patient Active Problem List   Diagnosis   • S/P right hip fracture   • Acute respiratory failure with hypoxia (HCC)            Medical History        Past Medical History:   Diagnosis Date   • Coronary artery disease     • Diabetes mellitus (HCC)     • Elevated cholesterol     • GERD (gastroesophageal reflux disease)     • History of transfusion     • Hypertension              Surgical History         Past Surgical History:   Procedure Laterality Date   • ABDOMINAL SURGERY         gallbladder removal   • CARDIAC CATHETERIZATION       • CARDIAC SURGERY         stent   • COLONOSCOPY       • FRACTURE SURGERY       • TOE AMPUTATION Right       3rd toe right foot                               IRF OT ASSESSMENT FLOWSHEET (last 12 hours)                IRF OT Evaluation and Treatment             Row Name 06/10/22 1447                   OT Time and Intention      Document Type daily treatment  -        Mode of Treatment individual therapy;occupational therapy  -        Patient Effort good  -               Row Name 06/10/22 1447                   General Information      Patient/Family/Caregiver Comments/Observations patient agreeable to therapy. patient tolerated well with no complaints.  -        Existing Precautions/Restrictions fall;cardiac;sternal  -               Row Name 06/10/22 1447                    Cognition/Psychosocial      Affect/Mental Status (Cognition) WFL  -[facility] Name 06/10/22 1447                   Bathing      [place] Level (Bathing) minimum assist (75% patient effort);contact guard assist  -[facility] Name 06/10/22 1447                   Upper Body Dressing      [place] Level (Upper Body Dressing) supervision;set up assistance  -[facility] Name 06/10/22 1447                   Lower Body Dressing      [place] Level (Lower Body Dressing) minimum assist (75% patient effort);contact guard assist  -[facility] Name 06/10/22 1447                   Grooming      [place] Level (Grooming) supervision;set up  -[facility] Name 06/10/22 1447                   Toileting      [place] Level (Toileting) minimum assist (75% patient effort);contact guard assist  -[facility] Name 06/10/22 1447                   Transfers      Bed-Chair [place] (Transfers) contact guard  -        [place] Level (Toilet Transfer) minimum assist (75% patient effort);contact guard  -[facility] Name 06/10/22 1447                   Toilet Transfer      Type (Toilet Transfer) stand pivot/stand step  -[facility] Name 06/10/22 1447                   Motor Skills      Motor Skills coordination;functional endurance  -        Therapeutic Exercise --  BUE ROM, gmc,fmc,reaching,  strength  -                        User Key  (r) = Recorded By, (t) = Taken By, (c) = Cosigned By             Initials Name Effective Dates      Rukhsana Olivo, OT 06/16/21 -                                           OT Recommendation and Plan     Planned Therapy Interventions (OT): activity tolerance training, adaptive equipment training, BADL retraining, patient/caregiver education/training, ROM/therapeutic exercise, strengthening exercise, transfer/mobility retraining                          Time Calculation:                Time  Calculation- OT              Row Name 06/10/22 1449                         Time Calculation- OT      OT Start Time 0745  -          OT Stop Time 0915  -          OT Time Calculation (min) 90 min  -Rukhsana Olivo, OT    Occupational Therapist   Occupational Therapy   Therapy Treatment Note       Signed   Date of Service:  22   Creation Time:  22              Signed        Expand All Collapse All        Inpatient Rehabilitation - Occupational Therapy Treatment Note     TONI Junito     Patient Name: Lisa Velazquez                       : 1965                      MRN: [id number]     Today's Date: 2022                                                                             Admit Date: 2022        Visit Diagnosis       ICD-10-CM ICD-9-CM   1. Acute respiratory failure with hypoxia (HCC)  J96.01 518.81            Problem List       Patient Active Problem List   Diagnosis   • S/P right hip fracture   • Acute respiratory failure with hypoxia (HCC)            Medical History        Past Medical History:   Diagnosis Date   • Coronary artery disease     • Diabetes mellitus (HCC)     • Elevated cholesterol     • GERD (gastroesophageal reflux disease)     • History of transfusion     • Hypertension              Surgical History         Past Surgical History:   Procedure Laterality Date   • ABDOMINAL SURGERY         gallbladder removal   • CARDIAC CATHETERIZATION       • CARDIAC SURGERY         stent   • COLONOSCOPY       • FRACTURE SURGERY       • TOE AMPUTATION Right       3rd toe right foot                               IRF OT ASSESSMENT FLOWSHEET (last 12 hours)                IRF OT Evaluation and Treatment             Row Name 22 1405                   OT Time and Intention      Document Type daily treatment  -        Mode of Treatment individual therapy;occupational therapy  -        Patient Effort good  -               Row Name  22 140                   General Information      Patient/Family/Caregiver Comments/Observations patient agreeable to therapy. patient tolerated well with no complaints.  -        Existing Precautions/Restrictions fall;sternal;cardiac  -[facility] Name 22 140                   Cognition/Psychosocial      Affect/Mental Status (Cognition) WFL  -[facility] Name 22 140                   Transfers      Chair-Bed [place] (Transfers) contact guard;verbal cues  -[facility] Name 22 140                   Motor Skills      Motor Skills coordination;functional endurance  -        Therapeutic Exercise --  BUE ROM, gmc,fmc,functional endurance, reaching,  strength  -                        User Key  (r) = Recorded By, (t) = Taken By, (c) = Cosigned By             Initials Name Effective Dates       Rukhsana Dixon, OT 21 -                                           OT Recommendation and Plan     Planned Therapy Interventions (OT): activity tolerance training, adaptive equipment training, BADL retraining, patient/caregiver education/training, ROM/therapeutic exercise, strengthening exercise, transfer/mobility retraining                          Time Calculation:                Time Calculation- [facility] Name 22 1406                         Time Calculation-       OT Start Time 1000  -          OT Stop Time 1130  -          OT Time Calculation (min) 90 min  -                        Rukhsana Dixon, OT    Occupational Therapist   Occupational Therapy   Therapy Treatment Note       Signed   Date of Service:  22   Creation Time:  22              Signed        Expand All Collapse All        Inpatient Rehabilitation - Occupational Therapy Treatment Note     TONI Wild     Patient Name: Lisa Velazquez                       : 1965                      MRN: [id number]     Today's Date: 2022                                                                              Admit Date: 6/8/2022        Visit Diagnosis       ICD-10-CM ICD-9-CM   1. Acute respiratory failure with hypoxia (HCC)  J96.01 518.81            Problem List       Patient Active Problem List   Diagnosis   • S/P right hip fracture   • Acute respiratory failure with hypoxia (HCC)            Medical History        Past Medical History:   Diagnosis Date   • Coronary artery disease     • Diabetes mellitus (HCC)     • Elevated cholesterol     • GERD (gastroesophageal reflux disease)     • History of transfusion     • Hypertension              Surgical History         Past Surgical History:   Procedure Laterality Date   • ABDOMINAL SURGERY         gallbladder removal   • CARDIAC CATHETERIZATION       • CARDIAC SURGERY         stent   • COLONOSCOPY       • FRACTURE SURGERY       • TOE AMPUTATION Right       3rd toe right foot                               IRF OT ASSESSMENT FLOWSHEET (last 12 hours)                IRF OT Evaluation and Treatment             Row Name 06/14/22 1438                   OT Time and Intention      Document Type daily treatment  -        Mode of Treatment individual therapy;occupational therapy  -        Patient Effort good  -               Row Name 06/14/22 1438                   General Information      Patient/Family/Caregiver Comments/Observations patient agreeable to therapy, patient tolerated well with no complaints.  -        Existing Precautions/Restrictions fall;sternal;cardiac  -               Row Name 06/14/22 1438                   Cognition/Psychosocial      Affect/Mental Status (Cognition) WFL  -               Row Name 06/14/22 1438                   Transfers      Bed-Chair [place] (Transfers) contact guard  -               Row Name 06/14/22 1438                   Motor Skills      Motor Skills coordination;functional endurance  -        Therapeutic Exercise --  BUE ROM, gmc,fmc,reaching,   strength  -                        User Key  (r) = Recorded By, (t) = Taken By, (c) = Cosigned By             Initials Name Effective Dates       Rukhsana Dixon, OT 06/16/21 -                                  OT Recommendation and Plan     Planned Therapy Interventions (OT): activity tolerance training, adaptive equipment training, BADL retraining, patient/caregiver education/training, ROM/therapeutic exercise, strengthening exercise, transfer/mobility retraining                          Time Calculation:                Time Calculation- OT              Row Name 06/14/22 1440                         Time Calculation-       OT Start Time 0815  -          OT Stop Time 0945  -          OT Time Calculation (min) 90 min  [facility]

## 2022-06-15 NOTE — THERAPY TREATMENT NOTE
Inpatient Rehabilitation - Occupational Therapy Treatment Note     Junito     Patient Name: Lisa Velazquez  : 1965  MRN: [id number]    Today's Date: 6/15/2022                 Admit Date: 2022         ICD-10-CM ICD-9-CM   1. Acute respiratory failure with hypoxia (HCC)  J96.01 518.81       Patient Active Problem List   Diagnosis   • S/P right hip fracture   • Acute respiratory failure with hypoxia (HCC)       Past Medical History:   Diagnosis Date   • Coronary artery disease    • Diabetes mellitus (HCC)    • Elevated cholesterol    • GERD (gastroesophageal reflux disease)    • History of transfusion    • Hypertension        Past Surgical History:   Procedure Laterality Date   • ABDOMINAL SURGERY      gallbladder removal   • CARDIAC CATHETERIZATION     • CARDIAC SURGERY      stent   • COLONOSCOPY     • FRACTURE SURGERY     • TOE AMPUTATION Right     3rd toe right foot             IRF OT ASSESSMENT FLOWSHEET (last 12 hours)     IRF OT Evaluation and Treatment     Row Name 06/15/22 1437 06/15/22 1400       OT Time and Intention    Document Type daily treatment  - daily treatment  -    Mode of Treatment individual therapy;occupational therapy  - --    Patient Effort good  - --    Row Name 06/15/22 143          General Information    Patient/Family/Caregiver Comments/Observations patient agreeable to therapy. patient reports BP little low this am. patient tolerated therapy well with no complaints. nursing aware  -     Existing Precautions/Restrictions fall;sternal  -[facility] Name 06/15/22 1437          Cognition/Psychosocial    Affect/Mental Status (Cognition) WFL  -[facility] Name 06/15/22 143          Toileting    [place] Level (Toileting) supervision;verbal cues;contact guard assist  -[facility] Name 06/15/22 1437          Transfers    Bed-Chair [place] (Transfers) verbal cues;supervision  -     Chair-Bed [place] (Transfers) supervision;verbal cues  -     [place] Level  (Toilet Transfer) contact guard;supervision;verbal cues  -     Row Name 06/15/22 1437          Toilet Transfer    Type (Toilet Transfer) stand pivot/stand step  -     Row Name 06/15/22 1437          Motor Skills    Motor Skills coordination;functional endurance  -     Therapeutic Exercise --  BUE ROM, gmc,fmc,functional endurance,  strength  -           User Key  (r) = Recorded By, (t) = Taken By, (c) = Cosigned By    Initials Name Effective Dates     Rukhsana Dixon, OT 06/16/21 -                  Occupational Therapy Education                 Title: PT OT SLP Therapies (In Progress)     Topic: Occupational Therapy (In Progress)     Point: ADL training (Done)     Description:   Instruct learner(s) on proper safety adaptation and remediation techniques during self care or transfers.   Instruct in proper use of assistive devices.              Learning Progress Summary           Patient Acceptance, E, VU,NR by BF at 6/11/2022 1201      Show all documentation for this point (1)                 Point: Home exercise program (Done)     Description:   Instruct learner(s) on appropriate technique for monitoring, assisting and/or progressing therapeutic exercises/activities.              Learning Progress Summary           Patient Acceptance, E,TB, VU by DL at 6/8/2022 1959                   Point: Precautions (Done)     Description:   Instruct learner(s) on prescribed precautions during self-care and functional transfers.              Learning Progress Summary           Patient Acceptance, E, VU,NR by BF at 6/11/2022 1201      Show all documentation for this point (1)                 Point: Body mechanics (Not Started)     Description:   Instruct learner(s) on proper positioning and spine alignment during self-care, functional mobility activities and/or exercises.              Learner Progress:  Not documented in this visit.                      User Key     Initials Effective Dates Name Provider Type  Discipline    BF 06/16/21 -  Araceli Lua OT Occupational Therapist OT    DL 03/16/22 -  Ashley Mccord RN Registered Nurse Nurse                    OT Recommendation and Plan    Planned Therapy Interventions (OT): activity tolerance training, adaptive equipment training, BADL retraining, patient/caregiver education/training, ROM/therapeutic exercise, strengthening exercise, transfer/mobility retraining                    Time Calculation:      Time Calculation- OT     Row Name 06/15/22 1441 06/15/22 1440          Time Calculation- OT    OT Start Time 1330  - 0830  -     OT Stop Time 1415  - 0915  -     OT Time Calculation (min) 45 min  - 45 min  -           User Key  (r) = Recorded By, (t) = Taken By, (c) = Cosigned By    Initials Name Provider Type     Rukhsana Dixon OT Occupational Therapist              Therapy Charges for Today     Code Description Service Date Service Provider Modifiers Qty    36730461747 HC OT THERAPEUTIC ACT EA 15 MIN 6/14/2022 Rukhsana Dixon, OT GO 2    33483069009 HC OT SELF CARE/MGMT/TRAIN EA 15 MIN 6/14/2022 Rukhsana Dixon OT GO 1    47272555793 HC OT THER PROC EA 15 MIN 6/14/2022 Rukhsana Dixon OT GO 3    60371857479 HC OT THERAPEUTIC ACT EA 15 MIN 6/15/2022 Rukhsana Dixon OT GO 2    30840121665 HC OT SELF CARE/MGMT/TRAIN EA 15 MIN 6/15/2022 Rukhsana Dixon OT GO 2    76786807675 HC OT THER PROC EA 15 MIN 6/15/2022 Rukhsana Dixon OT GO 2                   Rukhsana Dixon OT  6/15/2022

## 2022-06-16 LAB
GLUCOSE BLDC GLUCOMTR-MCNC: 105 MG/DL (ref 70–130)
GLUCOSE BLDC GLUCOMTR-MCNC: 115 MG/DL (ref 70–130)
GLUCOSE BLDC GLUCOMTR-MCNC: 143 MG/DL (ref 70–130)
GLUCOSE BLDC GLUCOMTR-MCNC: 87 MG/DL (ref 70–130)

## 2022-06-16 PROCEDURE — 97530 THERAPEUTIC ACTIVITIES: CPT

## 2022-06-16 PROCEDURE — 97116 GAIT TRAINING THERAPY: CPT

## 2022-06-16 PROCEDURE — 63710000001 INSULIN DETEMIR PER 5 UNITS: Performed by: INTERNAL MEDICINE

## 2022-06-16 PROCEDURE — 97110 THERAPEUTIC EXERCISES: CPT | Performed by: OCCUPATIONAL THERAPIST

## 2022-06-16 PROCEDURE — 97530 THERAPEUTIC ACTIVITIES: CPT | Performed by: OCCUPATIONAL THERAPIST

## 2022-06-16 PROCEDURE — 82962 GLUCOSE BLOOD TEST: CPT

## 2022-06-16 PROCEDURE — 25010000002 ENOXAPARIN PER 10 MG: Performed by: INTERNAL MEDICINE

## 2022-06-16 PROCEDURE — 97110 THERAPEUTIC EXERCISES: CPT

## 2022-06-16 PROCEDURE — 97535 SELF CARE MNGMENT TRAINING: CPT | Performed by: OCCUPATIONAL THERAPIST

## 2022-06-16 RX ADMIN — ENOXAPARIN SODIUM 40 MG: 40 INJECTION SUBCUTANEOUS at 09:19

## 2022-06-16 RX ADMIN — ROSUVASTATIN CALCIUM 40 MG: 20 TABLET, FILM COATED ORAL at 22:13

## 2022-06-16 RX ADMIN — INSULIN DETEMIR 15 UNITS: 100 INJECTION, SOLUTION SUBCUTANEOUS at 09:19

## 2022-06-16 RX ADMIN — DOCUSATE SODIUM 100 MG: 100 CAPSULE ORAL at 09:23

## 2022-06-16 RX ADMIN — Medication 1 TABLET: at 09:24

## 2022-06-16 RX ADMIN — ASPIRIN 81 MG: 81 TABLET, CHEWABLE ORAL at 09:22

## 2022-06-16 RX ADMIN — OXYCODONE HYDROCHLORIDE AND ACETAMINOPHEN 1000 MG: 500 TABLET ORAL at 09:22

## 2022-06-16 RX ADMIN — GABAPENTIN 100 MG: 100 CAPSULE ORAL at 22:23

## 2022-06-16 RX ADMIN — Medication 5000 UNITS: at 09:24

## 2022-06-16 RX ADMIN — INSULIN DETEMIR 15 UNITS: 100 INJECTION, SOLUTION SUBCUTANEOUS at 22:10

## 2022-06-16 RX ADMIN — Medication 5 MG: at 22:23

## 2022-06-16 RX ADMIN — METHOCARBAMOL 500 MG: 500 TABLET, FILM COATED ORAL at 22:22

## 2022-06-16 RX ADMIN — CLOPIDOGREL BISULFATE 75 MG: 75 TABLET, FILM COATED ORAL at 09:23

## 2022-06-16 RX ADMIN — DOCUSATE SODIUM 100 MG: 100 CAPSULE ORAL at 22:13

## 2022-06-16 NOTE — THERAPY TREATMENT NOTE
Inpatient Rehabilitation - Occupational Therapy Treatment Note     Junito     Patient Name: Lisa Velazquez  : 1965  MRN: [id number]    Today's Date: 2022                 Admit Date: 2022         ICD-10-CM ICD-9-CM   1. Acute respiratory failure with hypoxia (HCC)  J96.01 518.81       Patient Active Problem List   Diagnosis   • S/P right hip fracture   • Acute respiratory failure with hypoxia (HCC)       Past Medical History:   Diagnosis Date   • Coronary artery disease    • Diabetes mellitus (HCC)    • Elevated cholesterol    • GERD (gastroesophageal reflux disease)    • History of transfusion    • Hypertension        Past Surgical History:   Procedure Laterality Date   • ABDOMINAL SURGERY      gallbladder removal   • CARDIAC CATHETERIZATION     • CARDIAC SURGERY      stent   • COLONOSCOPY     • FRACTURE SURGERY     • TOE AMPUTATION Right     3rd toe right foot             IRF OT ASSESSMENT FLOWSHEET (last 12 hours)     IRF OT Evaluation and Treatment     Row Name 22 1400          OT Time and Intention    Document Type daily treatment  -     Mode of Treatment individual therapy;occupational therapy  -     Row Name 22 1400          General Information    Patient/Family/Caregiver Comments/Observations patient agreeable to therapy. patieant stating  she feels a little better today but BP still little low.  -     Existing Precautions/Restrictions fall;sternal  -     Row Name 22 1400          Cognition/Psychosocial    Affect/Mental Status (Cognition) WFL  -     Row Name 22 1400          Bathing    [place] Level (Bathing) set up  -     Row Name 22 1400          Upper Body Dressing    [place] Level (Upper Body Dressing) set up assistance  -     Row Name 22 1400          Lower Body Dressing    [place] Level (Lower Body Dressing) set up  -     Row Name 22 1400          Grooming    [place] Level (Grooming) set up;modified  independence  -     Row Name 06/16/22 1400          Toileting    [place] Level (Toileting) supervision;verbal cues  -     Row Name 06/16/22 1400          Self-Feeding    [place] Level (Self-Feeding) set up;modified independence  -     Row Name 06/16/22 1400          Transfers    [place] Level (Toilet Transfer) supervision  -     Assistive Device (Toilet Transfer) grab bars/safety frame  -     Row Name 06/16/22 1400          Toilet Transfer    Type (Toilet Transfer) stand pivot/stand step  -     Row Name 06/16/22 1400          Motor Skills    Motor Skills coordination;functional endurance  -     Therapeutic Exercise --  BUE ROM, gmc,fmc,functional endurance, reaching  -           User Key  (r) = Recorded By, (t) = Taken By, (c) = Cosigned By    Initials Name Effective Dates    Rukhsana Olivo, OT 06/16/21 -                  Occupational Therapy Education                 Title: PT OT SLP Therapies (In Progress)     Topic: Occupational Therapy (In Progress)     Point: ADL training (Done)     Description:   Instruct learner(s) on proper safety adaptation and remediation techniques during self care or transfers.   Instruct in proper use of assistive devices.              Learning Progress Summary           Patient Acceptance, E, VU,NR by BF at 6/11/2022 1201      Show all documentation for this point (1)                 Point: Home exercise program (Done)     Description:   Instruct learner(s) on appropriate technique for monitoring, assisting and/or progressing therapeutic exercises/activities.              Learning Progress Summary           Patient Acceptance, E,TB, VU by DL at 6/8/2022 1959                   Point: Precautions (Done)     Description:   Instruct learner(s) on prescribed precautions during self-care and functional transfers.              Learning Progress Summary           Patient Acceptance, E, VU,NR by BF at 6/11/2022 1201      Show all documentation for this  point (1)                 Point: Body mechanics (Not Started)     Description:   Instruct learner(s) on proper positioning and spine alignment during self-care, functional mobility activities and/or exercises.              Learner Progress:  Not documented in this visit.                      User Key     Initials Effective Dates Name Provider Type Discipline    BF 06/16/21 -  Araceli Lua OT Occupational Therapist OT    DL 03/16/22 -  Ashley Mccord, RN Registered Nurse Nurse                    OT Recommendation and Plan    Planned Therapy Interventions (OT): activity tolerance training, adaptive equipment training, BADL retraining, patient/caregiver education/training, ROM/therapeutic exercise, strengthening exercise, transfer/mobility retraining                    Time Calculation:      Time Calculation- OT     Row Name 06/16/22 1437             Time Calculation-     OT Start Time 0745  -      OT Stop Time 0915  -      OT Time Calculation (min) 90 min  -            User Key  (r) = Recorded By, (t) = Taken By, (c) = Cosigned By    Initials Name Provider Type     Rukhsana Dixon, OT Occupational Therapist              Therapy Charges for Today     Code Description Service Date Service Provider Modifiers Qty    38028445986 HC OT THERAPEUTIC ACT EA 15 MIN 6/15/2022 Rukhsana Dixon, OT GO 2    63784345603 HC OT SELF CARE/MGMT/TRAIN EA 15 MIN 6/15/2022 Rukhsana Dixon, OT GO 2    56069227922 HC OT THER PROC EA 15 MIN 6/15/2022 Rukhsana Dixon, OT GO 2    71581242547 HC OT THERAPEUTIC ACT EA 15 MIN 6/16/2022 Rukhsana Dixon OT GO 2    65044682424 HC OT SELF CARE/MGMT/TRAIN EA 15 MIN 6/16/2022 Rukhsana Dixon, OT GO 2    22714167923 HC OT THER PROC EA 15 MIN 6/16/2022 Rukhsana Dixon, OT GO 2                   Rukhsana Dixon OT  6/16/2022

## 2022-06-16 NOTE — THERAPY PROGRESS REPORT/RE-CERT
Inpatient Rehabilitation - Physical Therapy Progress Note        [facility]     Patient Name: Lisa Velazquez  : 1965  MRN: [id number]    Today's Date: 2022                    Admit Date: 2022      Visit Dx:     ICD-10-CM ICD-9-CM   1. Acute respiratory failure with hypoxia (HCC)  J96.01 518.81       Patient Active Problem List   Diagnosis   • S/P right hip fracture   • Acute respiratory failure with hypoxia (HCC)       Past Medical History:   Diagnosis Date   • Coronary artery disease    • Diabetes mellitus (HCC)    • Elevated cholesterol    • GERD (gastroesophageal reflux disease)    • History of transfusion    • Hypertension        Past Surgical History:   Procedure Laterality Date   • ABDOMINAL SURGERY      gallbladder removal   • CARDIAC CATHETERIZATION     • CARDIAC SURGERY      stent   • COLONOSCOPY     • FRACTURE SURGERY     • TOE AMPUTATION Right     3rd toe right foot       PT ASSESSMENT (last 12 hours)     IRF PT Evaluation and Treatment     Row Name 22 1133          PT Time and Intention    Document Type daily treatment;progress note  -RG     Mode of Treatment individual therapy;physical therapy  -RG     Patient/Family/Caregiver Comments/Observations Pt and nursing in agreement for skilled PT on this date.  BP monitored throughout therapy.  -RG     Row Name 22 1133          General Information    Existing Precautions/Restrictions fall;sternal  -RG     Row Name 22 1133          Pain Scale: FACES Pre/Post-Treatment    Pain: FACES Scale, Pretreatment 4-->hurts little more  -RG     Posttreatment Pain Rating 4-->hurts little more  -RG     Row Name 22 1133          Cognition/Psychosocial    Affect/Mental Status (Cognition) WFL  -RG     Follows Commands (Cognition) WFL  -RG     Personal Safety Interventions gait belt;nonskid shoes/slippers when out of bed;fall prevention program maintained  -RG     Row Name 22 1133          Transfers    Bed-Chair [place]  (Transfers) verbal cues;nonverbal cues (demo/gesture);standby assist  -RG     Chair-Bed [place] (Transfers) verbal cues;nonverbal cues (demo/gesture);standby assist  -RG     Assistive Device (Bed-Chair Transfers) wheelchair  -RG     Sit-Stand [place] (Transfers) verbal cues;nonverbal cues (demo/gesture);standby assist  -RG     Stand-Sit [place] (Transfers) verbal cues;nonverbal cues (demo/gesture);standby assist  -RG     [place] Level (Toilet Transfer) supervision  -RG     Assistive Device (Toilet Transfer) grab bars/safety frame;walker, front-wheeled  -RG     Row Name 06/16/22 1133          Chair-Bed Transfer    Assistive Device (Chair-Bed Transfers) wheelchair  -RG     Row Name 06/16/22 1133          Sit-Stand Transfer    Assistive Device (Sit-Stand Transfers) walker, front-wheeled  -RG     Row Name 06/16/22 1133          Stand-Sit Transfer    Assistive Device (Stand-Sit Transfers) walker, front-wheeled  -RG     Row Name 06/16/22 1133          Toilet Transfer    Type (Toilet Transfer) stand pivot/stand step  -RG     Row Name 06/16/22 1133          Gait/Stairs (Locomotion)    [place] Level (Gait) verbal cues;nonverbal cues (demo/gesture);standby assist  -RG     Assistive Device (Gait) walker, front-wheeled  -RG     Distance in Feet (Gait) 320 x 2  -RG     Pattern (Gait) step-through  -RG     Deviations/Abnormal Patterns (Gait) bc decreased;gait speed decreased;stride length decreased  -RG     Bilateral Gait Deviations forward flexed posture  -RG     Comment, (Gait/Stairs) Pt c/o fatigue.  -RG     Row Name 06/16/22 1133          Motor Skills    Therapeutic Exercise aerobic  -RG     Row Name 06/16/22 1133          Hip (Therapeutic Exercise)    Hip Strengthening (Therapeutic Exercise) bilateral;flexion;aBduction;aDduction;marching while seated;sitting;resistance band;green;10 repetitions;2 sets  -RG     Row Name 06/16/22 1133          Knee (Therapeutic Exercise)    Knee Strengthening  (Therapeutic Exercise) bilateral;flexion;extension;marching while seated;LAQ (long arc quad);sitting;resistance band;green;10 repetitions;2 sets  -RG     Row Name 06/16/22 1133          Ankle (Therapeutic Exercise)    Ankle Strengthening (Therapeutic Exercise) bilateral;dorsiflexion;plantarflexion;sitting;20 repititions  -RG     Row Name 06/16/22 1133          Aerobic Exercise    Type (Aerobic Exercise) recumbent stationary bike  -RG     Time Performed (Aerobic Exercise) 5  -RG     Comment, Aerobic Exercise (Therapeutic Exercise) Pt c/o fatigue with rest break and no resistance.  -RG     Row Name 06/16/22 1133          Positioning and Restraints    Pre-Treatment Position sitting in chair/recliner  -RG     Post Treatment Position wheelchair  -RG     In Wheelchair notified nsg;sitting;call light within reach;encouraged to call for assist;legs elevated  -RG     Row Name 06/16/22 1133          Vital Signs    Pre Systolic BP Rehab 97  -RG     Pre Treatment Diastolic BP 60  -RG     Post Systolic BP Rehab 132  -RG     Post Treatment Diastolic BP 81  -RG     Pre Patient Position Standing  -RG     Post Patient Position Sitting  -RG     Row Name 06/16/22 1133          Therapy Assessment/Plan (PT)    Patient's Goals For Discharge return home  -     Row Name 06/16/22 1133          Therapy Assessment/Plan (PT)    Rehab Potential/Prognosis (PT) adequate, monitor progress closely  -RG     Frequency of Treatment (PT) 5 times per week  -RG     Estimated Duration of Therapy (PT) 2 weeks  -RG     Problem List (PT) balance;mobility;strength;pain;postural control  -RG     Activity Limitations Related to Problem List (PT) unable to ambulate safely;unable to transfer safely  -RG     Row Name 06/16/22 1133          Therapy Plan Review/Discharge Plan (PT)    Anticipated Equipment Needs at Discharge (PT Eval) --  tbd  -RG     Anticipated Discharge Disposition (PT) home with home health  -     Row Name 06/16/22 1133          IRF PT Goals     Bed Mobility Goal Selection (PT-IRF) bed mobility, PT goal 1  -RG     Transfer Goal Selection (PT-IRF) transfers, PT goal 1  -RG     Gait (Walking Locomotion) Goal Selection (PT-IRF) gait, PT goal 1  -RG     Row Name 06/16/22 1133          Bed Mobility Goal 1 (PT-IRF)    Activity/Assistive Device (Bed Mobility Goal 1, PT-IRF) sit to supine/supine to sit  -RG     [place] Level (Bed Mobility Goal 1, PT-IRF) modified independence  -RG     Time Frame (Bed Mobility Goal 1, PT-IRF) by discharge  -RG     Progress/Outcomes (Bed Mobility Goal 1, PT-IRF) goal ongoing  -RG     Row Name 06/16/22 1133          Transfer Goal 1 (PT-IRF)    Activity/Assistive Device (Transfer Goal 1, PT-IRF) sit-to-stand/stand-to-sit;bed-to-chair/chair-to-bed  -RG     [place] Level (Transfer Goal 1, PT-IRF) modified independence  -RG     Time Frame (Transfer Goal 1, PT-IRF) by discharge  -RG     Progress/Outcomes (Transfer Goal 1, PT-IRF) goal ongoing  -RG     Row Name 06/16/22 1133          Gait/Walking Locomotion Goal 1 (PT-IRF)    Activity/Assistive Device (Gait/Walking Locomotion Goal 1, PT-IRF) --  RW or AAD  -RG     Gait/Walking Locomotion Distance Goal 1 (PT-IRF) 300'  -RG     [place] Level (Gait/Walking Locomotion Goal 1, PT-IRF) modified independence  -RG     Time Frame (Gait/Walking Locomotion Goal 1, PT-IRF) by discharge  -RG     Progress/Outcomes (Gait/Walking Locomotion Goal 1, PT-IRF) goal ongoing  -RG           User Key  (r) = Recorded By, (t) = Taken By, (c) = Cosigned By    Initials Name Provider Type    RG Chidi Harris PTA Physical Therapist Assistant              Wound Other (See comments) midline sternal Incision (Active)   Dressing Appearance dry;intact 06/16/22 0755   Closure Liquid skin adhesive 06/15/22 1935   Drainage Amount none 06/16/22 0755       Wound Right distal thigh Incision (Active)   Dressing Appearance open to air 06/16/22 0755   Closure Open to air;Liquid skin adhesive 06/16/22 0755    Drainage Amount none 06/16/22 0755       Wound Right proximal leg Incision (Active)   Dressing Appearance open to air 06/16/22 0755   Closure Open to air 06/16/22 0755   Drainage Amount none 06/16/22 0755     Physical Therapy Education                 Title: PT OT SLP Therapies (In Progress)     Topic: Physical Therapy (Done)     Point: Mobility training (Done)     Learning Progress Summary           Patient Acceptance, E,D, VU,NR by  at 6/16/2022 1139      Show all documentation for this point (8)                 Point: Home exercise program (Done)     Learning Progress Summary           Patient Acceptance, E,D, VU,NR by RG at 6/16/2022 1139      Show all documentation for this point (8)                 Point: Body mechanics (Done)     Learning Progress Summary           Patient Acceptance, E,D, VU,NR by  at 6/16/2022 1139      Show all documentation for this point (8)                 Point: Precautions (Done)     Learning Progress Summary           Patient Acceptance, E,D, VU,NR by  at 6/16/2022 1139      Show all documentation for this point (8)                             User Key     Initials Effective Dates Name Provider Type Discipline     06/16/21 -  Chidi Harris PTA Physical Therapist Assistant PT                PT Recommendation and Plan    Frequency of Treatment (PT): 5 times per week  Anticipated Equipment Needs at Discharge (PT Eval):  (tbd)                  Time Calculation:      PT Charges     Row Name 06/16/22 1139             Time Calculation    Start Time 0915  -RG      Stop Time 1045  -      Time Calculation (min) 90 min  -      PT Received On 06/16/22  -              Time Calculation- PT    Total Timed Code Minutes- PT 90 minute(s)  -            User Key  (r) = Recorded By, (t) = Taken By, (c) = Cosigned By    Initials Name Provider Type     Chidi Harris PTA Physical Therapist Assistant                Therapy Charges for Today     Code Description Service Date Service  Provider Modifiers Qty    57953050097 HC GAIT TRAINING EA 15 MIN 6/15/2022 Chidi Harris, PTA GP, CQ 1    41311824028 HC PT THERAPEUTIC ACT EA 15 MIN 6/15/2022 Chidi Harris, PTA GP, CQ 2    41365025883 HC PT THER PROC EA 15 MIN 6/15/2022 Chidi Harris, JESSICA GP, CQ 3    42768277580 HC GAIT TRAINING EA 15 MIN 6/16/2022 Chidi Harris, JESSICA GP, CQ 1    12967252944 HC PT THERAPEUTIC ACT EA 15 MIN 6/16/2022 Chidi Harris PTA GP, CQ 2    65403630554 HC PT THER PROC EA 15 MIN 6/16/2022 Chidi Harris PTA GP, CQ 3                   Chidi Harris PTA  6/16/2022

## 2022-06-16 NOTE — PROGRESS NOTES
Rehabilitation Nursing  Inpatient Rehabilitation Plan of Care Note    Plan of Care  Copy from POC    Signed by: Lolita Hernandez, Nurse

## 2022-06-17 LAB
ALBUMIN SERPL-MCNC: 3.18 G/DL (ref 3.5–5.2)
ALBUMIN/GLOB SERPL: 1 G/DL
ALP SERPL-CCNC: 100 U/L (ref 39–117)
ALT SERPL W P-5'-P-CCNC: 12 U/L (ref 1–33)
ANION GAP SERPL CALCULATED.3IONS-SCNC: 11.1 MMOL/L (ref 5–15)
AST SERPL-CCNC: 19 U/L (ref 1–32)
BASOPHILS # BLD AUTO: 0.04 10*3/MM3 (ref 0–0.2)
BASOPHILS NFR BLD AUTO: 0.6 % (ref 0–1.5)
BILIRUB SERPL-MCNC: 0.8 MG/DL (ref 0–1.2)
BUN SERPL-MCNC: 12 MG/DL (ref 6–20)
BUN/CREAT SERPL: 18.5 (ref 7–25)
CALCIUM SPEC-SCNC: 9.5 MG/DL (ref 8.6–10.5)
CHLORIDE SERPL-SCNC: 106 MMOL/L (ref 98–107)
CO2 SERPL-SCNC: 21.9 MMOL/L (ref 22–29)
CREAT SERPL-MCNC: 0.65 MG/DL (ref 0.57–1)
D-LACTATE SERPL-SCNC: 1 MMOL/L (ref 0.5–2)
DEPRECATED RDW RBC AUTO: 52.8 FL (ref 37–54)
EGFRCR SERPLBLD CKD-EPI 2021: 103.5 ML/MIN/1.73
EOSINOPHIL # BLD AUTO: 0.17 10*3/MM3 (ref 0–0.4)
EOSINOPHIL NFR BLD AUTO: 2.4 % (ref 0.3–6.2)
ERYTHROCYTE [DISTWIDTH] IN BLOOD BY AUTOMATED COUNT: 15.3 % (ref 12.3–15.4)
GLOBULIN UR ELPH-MCNC: 3.3 GM/DL
GLUCOSE BLDC GLUCOMTR-MCNC: 104 MG/DL (ref 70–130)
GLUCOSE BLDC GLUCOMTR-MCNC: 107 MG/DL (ref 70–130)
GLUCOSE BLDC GLUCOMTR-MCNC: 111 MG/DL (ref 70–130)
GLUCOSE BLDC GLUCOMTR-MCNC: 93 MG/DL (ref 70–130)
GLUCOSE SERPL-MCNC: 106 MG/DL (ref 65–99)
HCT VFR BLD AUTO: 35.9 % (ref 34–46.6)
HGB BLD-MCNC: 11 G/DL (ref 12–15.9)
IMM GRANULOCYTES # BLD AUTO: 0.03 10*3/MM3 (ref 0–0.05)
IMM GRANULOCYTES NFR BLD AUTO: 0.4 % (ref 0–0.5)
LYMPHOCYTES # BLD AUTO: 1.75 10*3/MM3 (ref 0.7–3.1)
LYMPHOCYTES NFR BLD AUTO: 25 % (ref 19.6–45.3)
MCH RBC QN AUTO: 29.4 PG (ref 26.6–33)
MCHC RBC AUTO-ENTMCNC: 30.6 G/DL (ref 31.5–35.7)
MCV RBC AUTO: 96 FL (ref 79–97)
MONOCYTES # BLD AUTO: 0.65 10*3/MM3 (ref 0.1–0.9)
MONOCYTES NFR BLD AUTO: 9.3 % (ref 5–12)
NEUTROPHILS NFR BLD AUTO: 4.35 10*3/MM3 (ref 1.7–7)
NEUTROPHILS NFR BLD AUTO: 62.3 % (ref 42.7–76)
NRBC BLD AUTO-RTO: 0 /100 WBC (ref 0–0.2)
PLATELET # BLD AUTO: 305 10*3/MM3 (ref 140–450)
PMV BLD AUTO: 9.6 FL (ref 6–12)
POTASSIUM SERPL-SCNC: 4.2 MMOL/L (ref 3.5–5.2)
PROT SERPL-MCNC: 6.5 G/DL (ref 6–8.5)
RBC # BLD AUTO: 3.74 10*6/MM3 (ref 3.77–5.28)
SODIUM SERPL-SCNC: 139 MMOL/L (ref 136–145)
WBC NRBC COR # BLD: 6.99 10*3/MM3 (ref 3.4–10.8)

## 2022-06-17 PROCEDURE — 97530 THERAPEUTIC ACTIVITIES: CPT

## 2022-06-17 PROCEDURE — 99231 SBSQ HOSP IP/OBS SF/LOW 25: CPT | Performed by: FAMILY MEDICINE

## 2022-06-17 PROCEDURE — 97110 THERAPEUTIC EXERCISES: CPT | Performed by: OCCUPATIONAL THERAPIST

## 2022-06-17 PROCEDURE — 80053 COMPREHEN METABOLIC PANEL: CPT | Performed by: FAMILY MEDICINE

## 2022-06-17 PROCEDURE — 97116 GAIT TRAINING THERAPY: CPT

## 2022-06-17 PROCEDURE — 97110 THERAPEUTIC EXERCISES: CPT

## 2022-06-17 PROCEDURE — 63710000001 INSULIN DETEMIR PER 5 UNITS: Performed by: INTERNAL MEDICINE

## 2022-06-17 PROCEDURE — 97530 THERAPEUTIC ACTIVITIES: CPT | Performed by: OCCUPATIONAL THERAPIST

## 2022-06-17 PROCEDURE — 83605 ASSAY OF LACTIC ACID: CPT | Performed by: FAMILY MEDICINE

## 2022-06-17 PROCEDURE — 82962 GLUCOSE BLOOD TEST: CPT

## 2022-06-17 PROCEDURE — 85025 COMPLETE CBC W/AUTO DIFF WBC: CPT | Performed by: FAMILY MEDICINE

## 2022-06-17 PROCEDURE — 25010000002 ENOXAPARIN PER 10 MG: Performed by: INTERNAL MEDICINE

## 2022-06-17 PROCEDURE — 97535 SELF CARE MNGMENT TRAINING: CPT | Performed by: OCCUPATIONAL THERAPIST

## 2022-06-17 RX ADMIN — Medication 5000 UNITS: at 09:37

## 2022-06-17 RX ADMIN — METHOCARBAMOL 500 MG: 500 TABLET, FILM COATED ORAL at 20:30

## 2022-06-17 RX ADMIN — DOCUSATE SODIUM 100 MG: 100 CAPSULE ORAL at 09:34

## 2022-06-17 RX ADMIN — CLOPIDOGREL BISULFATE 75 MG: 75 TABLET, FILM COATED ORAL at 09:34

## 2022-06-17 RX ADMIN — INSULIN DETEMIR 15 UNITS: 100 INJECTION, SOLUTION SUBCUTANEOUS at 21:06

## 2022-06-17 RX ADMIN — Medication 1 TABLET: at 09:34

## 2022-06-17 RX ADMIN — Medication 5 MG: at 20:35

## 2022-06-17 RX ADMIN — OXYCODONE HYDROCHLORIDE AND ACETAMINOPHEN 1000 MG: 500 TABLET ORAL at 09:34

## 2022-06-17 RX ADMIN — DOCUSATE SODIUM 100 MG: 100 CAPSULE ORAL at 20:36

## 2022-06-17 RX ADMIN — ASPIRIN 81 MG: 81 TABLET, CHEWABLE ORAL at 09:34

## 2022-06-17 RX ADMIN — ENOXAPARIN SODIUM 40 MG: 40 INJECTION SUBCUTANEOUS at 09:34

## 2022-06-17 RX ADMIN — ROSUVASTATIN CALCIUM 40 MG: 20 TABLET, FILM COATED ORAL at 20:31

## 2022-06-17 RX ADMIN — INSULIN DETEMIR 15 UNITS: 100 INJECTION, SOLUTION SUBCUTANEOUS at 09:37

## 2022-06-17 RX ADMIN — GABAPENTIN 100 MG: 100 CAPSULE ORAL at 21:07

## 2022-06-17 NOTE — THERAPY PROGRESS REPORT/RE-CERT
Inpatient Rehabilitation - Occupational Therapy Progress Note     [facility]     Patient Name: Lisa Velazquez  : 1965  MRN: [id number]    Today's Date: 2022                 Admit Date: 2022         ICD-10-CM ICD-9-CM   1. Acute respiratory failure with hypoxia (HCC)  J96.01 518.81       Patient Active Problem List   Diagnosis   • S/P right hip fracture   • Acute respiratory failure with hypoxia (HCC)       Past Medical History:   Diagnosis Date   • Coronary artery disease    • Diabetes mellitus (HCC)    • Elevated cholesterol    • GERD (gastroesophageal reflux disease)    • History of transfusion    • Hypertension        Past Surgical History:   Procedure Laterality Date   • ABDOMINAL SURGERY      gallbladder removal   • CARDIAC CATHETERIZATION     • CARDIAC SURGERY      stent   • COLONOSCOPY     • FRACTURE SURGERY     • TOE AMPUTATION Right     3rd toe right foot             IRF OT ASSESSMENT FLOWSHEET (last 12 hours)     IRF OT Evaluation and Treatment     Row Name 22 1000          OT Time and Intention    Document Type progress note;daily treatment  -     Mode of Treatment individual therapy;occupational therapy  -     Patient Effort good  -     Row Name 22 1000          General Information    Patient/Family/Caregiver Comments/Observations patient agreeable to therapy. patient tolerated well. patient demonstrating increasing endurance, strength and  self care.  -     Existing Precautions/Restrictions fall;sternal  -     Row Name 22 1000          Cognition/Psychosocial    Affect/Mental Status (Cognition) WFL  -     Row Name 22 1000          Bathing    [place] Level (Bathing) set up  -     Row Name 22 1000          Upper Body Dressing    [place] Level (Upper Body Dressing) set up assistance;supervision  -     Row Name 22 1000          Lower Body Dressing    [place] Level (Lower Body Dressing) set up  -     Row Name 22 1000           Grooming    [place] Level (Grooming) set up;modified independence  -     Row Name 06/17/22 1000          Toileting    [place] Level (Toileting) supervision  -     Row Name 06/17/22 1000          Self-Feeding    [place] Level (Self-Feeding) modified independence;set up  -     Row Name 06/17/22 1000          Transfers    Bed-Chair [place] (Transfers) supervision;verbal cues  -     [place] Level (Toilet Transfer) supervision;verbal cues  -     Row Name 06/17/22 1000          Toilet Transfer    Type (Toilet Transfer) stand pivot/stand step  -     Row Name 06/17/22 1000          Motor Skills    Motor Skills coordination;functional endurance  -     Therapeutic Exercise --  BUE ROM, gmc,fmc,reaching  strengthening,  -           User Key  (r) = Recorded By, (t) = Taken By, (c) = Cosigned By    Initials Name Effective Dates    Rukhsana Olivo, OT 06/16/21 -                  Occupational Therapy Education                 Title: PT OT SLP Therapies (Done)     Topic: Occupational Therapy (Done)     Point: ADL training (Done)     Description:   Instruct learner(s) on proper safety adaptation and remediation techniques during self care or transfers.   Instruct in proper use of assistive devices.              Learning Progress Summary           Patient Acceptance, E,TB, VU by DL at 6/16/2022 2326      Show all documentation for this point (2)                 Point: Home exercise program (Done)     Description:   Instruct learner(s) on appropriate technique for monitoring, assisting and/or progressing therapeutic exercises/activities.              Learning Progress Summary           Patient Acceptance, E,TB, VU by DL at 6/16/2022 2326      Show all documentation for this point (1)                 Point: Precautions (Done)     Description:   Instruct learner(s) on prescribed precautions during self-care and functional transfers.              Learning Progress Summary            Patient Acceptance, E,TB, VU by DL at 6/16/2022 2326      Show all documentation for this point (2)                 Point: Body mechanics (Done)     Description:   Instruct learner(s) on proper positioning and spine alignment during self-care, functional mobility activities and/or exercises.              Learning Progress Summary           Patient Acceptance, E,TB, VU by DANTE at 6/16/2022 2326                               User Key     Initials Effective Dates Name Provider Type Discipline     03/16/22 -  Ashley Mccord, RN Registered Nurse Nurse                    OT Recommendation and Plan    Planned Therapy Interventions (OT): activity tolerance training, adaptive equipment training, BADL retraining, patient/caregiver education/training, ROM/therapeutic exercise, strengthening exercise, transfer/mobility retraining                    Time Calculation:      Time Calculation- OT     Row Name 06/17/22 1040 06/17/22 1039          Time Calculation- OT    OT Start Time 1005  -AH 0730  -     OT Stop Time 1035  -AH 0830  -     OT Time Calculation (min) 30 min  -AH 60 min  -           User Key  (r) = Recorded By, (t) = Taken By, (c) = Cosigned By    Initials Name Provider Type     Rukhsana Dixon, OT Occupational Therapist              Therapy Charges for Today     Code Description Service Date Service Provider Modifiers Qty    76759631990 HC OT THERAPEUTIC ACT EA 15 MIN 6/16/2022 Rukhsana Dixon, OT GO 2    02862688502 HC OT SELF CARE/MGMT/TRAIN EA 15 MIN 6/16/2022 Rukhsana Dixon, OT GO 2    10418613996 HC OT THER PROC EA 15 MIN 6/16/2022 Rukhsana Dixon, OT GO 2    90270620447 HC OT THERAPEUTIC ACT EA 15 MIN 6/17/2022 Rukhsana Dixon, OT GO 2    03663711337 HC OT SELF CARE/MGMT/TRAIN EA 15 MIN 6/17/2022 Rukhsana Dixon, OT GO 1    92644398171 HC OT THER PROC EA 15 MIN 6/17/2022 Rukhsana Dixon, OT GO 3                   Rukhsana Dixon OT  6/17/2022

## 2022-06-17 NOTE — THERAPY TREATMENT NOTE
Inpatient Rehabilitation - Physical Therapy Treatment Note       TONI Wild     Patient Name: Lisa Velazquez  : 1965  MRN: [id number]    Today's Date: 2022                    Admit Date: 2022      Visit Dx:     ICD-10-CM ICD-9-CM   1. Acute respiratory failure with hypoxia (HCC)  J96.01 518.81       Patient Active Problem List   Diagnosis   • S/P right hip fracture   • Acute respiratory failure with hypoxia (HCC)       Past Medical History:   Diagnosis Date   • Coronary artery disease    • Diabetes mellitus (HCC)    • Elevated cholesterol    • GERD (gastroesophageal reflux disease)    • History of transfusion    • Hypertension        Past Surgical History:   Procedure Laterality Date   • ABDOMINAL SURGERY      gallbladder removal   • CARDIAC CATHETERIZATION     • CARDIAC SURGERY      stent   • COLONOSCOPY     • FRACTURE SURGERY     • TOE AMPUTATION Right     3rd toe right foot       PT ASSESSMENT (last 12 hours)     IRF PT Evaluation and Treatment     Row Name 22 165          PT Time and Intention    Document Type daily treatment  -RF     Mode of Treatment individual therapy;physical therapy  -RF     Patient/Family/Caregiver Comments/Observations Pt c/o fatigue with increased activity BID.  -RF     Row Name 22 165          General Information    Existing Precautions/Restrictions fall;sternal  -RF     Row Name 22 165          Pain Scale: FACES Pre/Post-Treatment    Pain: FACES Scale, Pretreatment 4-->hurts little more  -RF     Posttreatment Pain Rating 4-->hurts little more  -RF     Row Name 22 165          Cognition/Psychosocial    Affect/Mental Status (Cognition) WFL  -RF     Follows Commands (Cognition) WFL  -RF     Row Name 22 165          Bed Mobility    Sit-Supine [place] (Bed Mobility) supervision;modified independence  -RF     Assistive Device (Bed Mobility) bed rails  -RF     Row Name 22 165          Transfers    Bed-Chair [place] (Transfers)  verbal cues;nonverbal cues (demo/gesture);supervision;modified independence  -RF     Chair-Bed [place] (Transfers) verbal cues;nonverbal cues (demo/gesture);supervision;modified independence  -RF     Assistive Device (Bed-Chair Transfers) wheelchair  -RF     Sit-Stand [place] (Transfers) verbal cues;nonverbal cues (demo/gesture);supervision;modified independence  -RF     Stand-Sit [place] (Transfers) verbal cues;nonverbal cues (demo/gesture);supervision;modified independence  -RF     [place] Level (Toilet Transfer) supervision  -RF     Assistive Device (Toilet Transfer) grab bars/safety frame;walker, front-wheeled  -RF     Row Name 06/17/22 1651          Chair-Bed Transfer    Assistive Device (Chair-Bed Transfers) wheelchair  -RF     Row Name 06/17/22 1651          Sit-Stand Transfer    Assistive Device (Sit-Stand Transfers) wheelchair  -RF     Row Name 06/17/22 1651          Stand-Sit Transfer    Assistive Device (Stand-Sit Transfers) wheelchair  -RF     Row Name 06/17/22 1651          Toilet Transfer    Type (Toilet Transfer) stand pivot/stand step  -RF     Row Name 06/17/22 1651          Gait/Stairs (Locomotion)    [place] Level (Gait) verbal cues;nonverbal cues (demo/gesture);supervision  -RF     Assistive Device (Gait) walker, front-wheeled  -RF     Distance in Feet (Gait) 320X2 BID  -RF     Pattern (Gait) step-through  -RF     Deviations/Abnormal Patterns (Gait) bc decreased;gait speed decreased;stride length decreased  -RF     Bilateral Gait Deviations forward flexed posture  -RF     Comment, (Gait/Stairs) Minimal unsteadiness noted with no significant LOB observed.  -RF     Row Name 06/17/22 1651          Hip (Therapeutic Exercise)    Hip Strengthening (Therapeutic Exercise) bilateral;flexion;extension;aBduction;aDduction;heel slides;marching while seated;marching while standing;mini squats;sitting;standing;2 lb free weight;resistance band;green;2 sets;10 repetitions;other (see  comments)  #2 in sitting  -RF     Row Name 06/17/22 1651          Knee (Therapeutic Exercise)    Knee Strengthening (Therapeutic Exercise) bilateral;flexion;extension;heel slides;marching while seated;marching while standing;LAQ (long arc quad);hamstring curls;sitting;standing;2 lb free weight;resistance band;green;2 sets;10 repetitions;other (see comments)  #2 in sitting  -RF     Row Name 06/17/22 1651          Ankle (Therapeutic Exercise)    Ankle Strengthening (Therapeutic Exercise) bilateral;dorsiflexion;plantarflexion;sitting;2 lb free weight;2 sets;10 repetitions;standing  #2 in sitting  -RF     Row Name 06/17/22 1651          Aerobic Exercise    Type (Aerobic Exercise) recumbent stationary bike  -RF     Time Performed (Aerobic Exercise) 12  -RF     Comment, Aerobic Exercise (Therapeutic Exercise) LVL 2.0; frequent rest breaks required due to c/o LE fatigue  -RF     Row Name 06/17/22 1651          Positioning and Restraints    Pre-Treatment Position sitting in chair/recliner  BID  -RF     In Bed supine;call light within reach;encouraged to call for assist  PM  -RF     In Wheelchair sitting;call light within reach;encouraged to call for assist  AM  -RF     Row Name 06/17/22 1651          Therapy Assessment/Plan (PT)    Patient's Goals For Discharge return home  -     Row Name 06/17/22 1651          Therapy Assessment/Plan (PT)    Rehab Potential/Prognosis (PT) adequate, monitor progress closely  -RF     Frequency of Treatment (PT) 5 times per week  -RF     Estimated Duration of Therapy (PT) 2 weeks  -RF     Problem List (PT) balance;mobility;strength;pain;postural control  -RF     Activity Limitations Related to Problem List (PT) unable to ambulate safely;unable to transfer safely  -RF     Row Name 06/17/22 1651          Daily Progress Summary (PT)    Functional Goal Overall Progress (PT) progressing toward functional goals as expected  -RF     Daily Progress Summary (PT) Pt demonstrates good functional  mobility and ambulation quality this date. COnitnued skilled care required for further LE strengthening to ensure maixmum safe function upon D/C.  -RF     Impairments Still Limiting Function (PT) functional activity tolerance impairment;pain;strength deficit  -RF     Recommendations (PT) Continue per current POC  -RF     Row Name 06/17/22 1651          Therapy Plan Review/Discharge Plan (PT)    Anticipated Equipment Needs at Discharge (PT Eval) --  tbd  -RF     Anticipated Discharge Disposition (PT) home with home health  -RF     Row Name 06/17/22 1651          IRF PT Goals    Bed Mobility Goal Selection (PT-IRF) bed mobility, PT goal 1  -RF     Transfer Goal Selection (PT-IRF) transfers, PT goal 1  -RF     Gait (Walking Locomotion) Goal Selection (PT-IRF) gait, PT goal 1  -RF     Row Name 06/17/22 1651          Bed Mobility Goal 1 (PT-IRF)    Activity/Assistive Device (Bed Mobility Goal 1, PT-IRF) sit to supine/supine to sit  -RF     [place] Level (Bed Mobility Goal 1, PT-IRF) modified independence  -RF     Time Frame (Bed Mobility Goal 1, PT-IRF) by discharge  -RF     Progress/Outcomes (Bed Mobility Goal 1, PT-IRF) goal ongoing  -RF     Row Name 06/17/22 1651          Transfer Goal 1 (PT-IRF)    Activity/Assistive Device (Transfer Goal 1, PT-IRF) sit-to-stand/stand-to-sit;bed-to-chair/chair-to-bed  -RF     [place] Level (Transfer Goal 1, PT-IRF) modified independence  -RF     Time Frame (Transfer Goal 1, PT-IRF) by discharge  -RF     Progress/Outcomes (Transfer Goal 1, PT-IRF) goal ongoing  -RF     Row Name 06/17/22 1651          Gait/Walking Locomotion Goal 1 (PT-IRF)    Activity/Assistive Device (Gait/Walking Locomotion Goal 1, PT-IRF) --  RW or AAD  -RF     Gait/Walking Locomotion Distance Goal 1 (PT-IRF) 300'  -RF     [place] Level (Gait/Walking Locomotion Goal 1, PT-IRF) modified independence  -RF     Time Frame (Gait/Walking Locomotion Goal 1, PT-IRF) by discharge  -RF     Progress/Outcomes  (Gait/Walking Locomotion Goal 1, PT-IRF) goal ongoing  -RF           User Key  (r) = Recorded By, (t) = Taken By, (c) = Cosigned By    Initials Name Provider Type    Julianna Gambino PTA Physical Therapist Assistant              Wound Other (See comments) midline sternal Incision (Active)   Dressing Appearance dry;intact 06/17/22 0750   Closure Liquid skin adhesive 06/16/22 2213   Periwound Temperature warm 06/16/22 2213   Periwound Skin Turgor soft 06/16/22 2213   Drainage Amount none 06/16/22 2213       Wound Right distal thigh Incision (Active)   Dressing Appearance open to air 06/17/22 0750   Closure Open to air;Liquid skin adhesive 06/17/22 0750   Periwound Temperature warm 06/16/22 2213   Drainage Amount none 06/17/22 0750       Wound Right proximal leg Incision (Active)   Dressing Appearance open to air 06/17/22 0750   Closure Open to air 06/17/22 0750   Periwound Temperature warm 06/16/22 2213   Periwound Skin Turgor soft 06/16/22 2213   Drainage Amount none 06/17/22 0750     Physical Therapy Education                 Title: PT OT SLP Therapies (Done)     Topic: Physical Therapy (Done)     Point: Mobility training (Done)     Learning Progress Summary           Patient Acceptance, E,TB, VU by RF at 6/17/2022 1657      Show all documentation for this point (10)                 Point: Home exercise program (Done)     Learning Progress Summary           Patient Acceptance, E,TB, VU by RF at 6/17/2022 1657      Show all documentation for this point (10)                 Point: Body mechanics (Done)     Learning Progress Summary           Patient Acceptance, E,TB, VU by RF at 6/17/2022 1657      Show all documentation for this point (10)                 Point: Precautions (Done)     Learning Progress Summary           Patient Acceptance, E,TB, VU by RF at 6/17/2022 1657      Show all documentation for this point (10)                             User Key     Initials Effective Dates Name Provider Type  Discipline    RF 06/16/21 -  Julianna Ann PTA Physical Therapist Assistant PT                PT Recommendation and Plan    Frequency of Treatment (PT): 5 times per week  Anticipated Equipment Needs at Discharge (PT Eval):  (tbd)  Daily Progress Summary (PT)  Functional Goal Overall Progress (PT): progressing toward functional goals as expected  Daily Progress Summary (PT): Pt demonstrates good functional mobility and ambulation quality this date. COnitnued skilled care required for further LE strengthening to ensure maixmum safe function upon D/C.  Impairments Still Limiting Function (PT): functional activity tolerance impairment, pain, strength deficit  Recommendations (PT): Continue per current POC               Time Calculation:      PT Charges     Row Name 06/17/22 1658 06/17/22 1657          Time Calculation    Start Time 1245  -RF 0915  -RF     Stop Time 1330  -RF 1000  -RF     Time Calculation (min) 45 min  -RF 45 min  -RF     PT Received On 06/17/22  -RF 06/17/22  -RF     PT - Next Appointment 06/20/22  -RF 06/17/22  -RF     PT Goal Re-Cert Due Date 06/23/22  -RF 06/23/22  -RF            Time Calculation- PT    Total Timed Code Minutes- PT 45 minute(s)  -RF 45 minute(s)  -RF           User Key  (r) = Recorded By, (t) = Taken By, (c) = Cosigned By    Initials Name Provider Type    RF Julianna Ann PTA Physical Therapist Assistant                Therapy Charges for Today     Code Description Service Date Service Provider Modifiers Qty    89878872043 HC GAIT TRAINING EA 15 MIN 6/17/2022 Julianna Ann PTA GP, CQ 2    31468129048 HC PT THER PROC EA 15 MIN 6/17/2022 Julianna Ann, PTA GP, CQ 3    95706374537 HC PT THERAPEUTIC ACT EA 15 MIN 6/17/2022 Julianna Ann, JESSICA GP, CQ 1                   Julianna Ann PTA  6/17/2022

## 2022-06-17 NOTE — PROGRESS NOTES
[facility]  PROGRESS NOTE     Patient Identification:  Name:  Lisa Velazquez  Age:  56 y.o.  Sex:  female  :  1965  MRN:  [id number]  Visit Number:  [id number]  ROOM: 110/2S     Primary Care Provider:  Patricia Skelton APRN    Length of stay in inpatient status:  9    Subjective     Chief Compliant:  No chief complaint on file.      History of Presenting Illness: 56-year-old female who is status post multivessel CAD with recent CABG x2.  Patient making improvements but has been slightly hypotensive over the last couple of days.  We have held her beta-blocker during that time and she was still with a systolic pressure in the mid 90s today.  Has had no fevers.  But states she did have chills last evening.  No cough or other difficulties    Objective     Current Hospital Meds:ascorbic acid, 1,000 mg, Oral, Daily  aspirin, 81 mg, Oral, Daily  clopidogrel, 75 mg, Oral, Daily  docusate sodium, 100 mg, Oral, BID  enoxaparin, 40 mg, Subcutaneous, Q24H  gabapentin, 100 mg, Oral, Nightly  Insulin Aspart, 0-7 Units, Subcutaneous, TID AC  insulin detemir, 15 Units, Subcutaneous, Q12H  melatonin, 5 mg, Oral, Nightly  metoprolol tartrate, 37.5 mg, Oral, Q12H  multivitamin with minerals, 1 tablet, Oral, Daily  rosuvastatin, 40 mg, Oral, Nightly  vitamin D3, 5,000 Units, Oral, Daily       ----------------------------------------------------------------------------------------------------------------------  Vital Signs:  Temp:  [97.9 °F (36.6 °C)] 97.9 °F (36.6 °C)  Heart Rate:  [89] 89  Resp:  [20] 20  BP: (139)/(76) 139/76  SpO2:  [95 %] 95 %  on   ;   Device (Oxygen Therapy): room air  Body mass index is 25.2 kg/m².    Wt Readings from Last 3 Encounters:   22 73 kg (160 lb 15 oz)   22 73 kg (161 lb)     Intake & Output (last 3 days)        0701  06/15 0700 06/15 0701   0700  0701   0700  0701   0700    P.O. 1320 1200 1200 360    Total Intake(mL/kg) 1320 (18.1)  1200 (16.4) 1200 (16.4) 360 (4.9)    Net +1320 +1200 +1200 +360            Urine Unmeasured Occurrence 6 x 5 x 5 x 1 x        Diet Regular; Cardiac  ----------------------------------------------------------------------------------------------------------------------  Physical exam:  Constitutional:   No acute distress  HEENT: Normocephalic atraumatic  Neck: Supple  Cardiovascular: Regular rate and rhythm  Pulmonary/Chest: Clear to auscultation  Abdominal: Positive bowel sounds soft.   Musculoskeletal: No obvious arthropathy  Neurological: No focal deficits  Skin: Chest incision site appears clean at this time do not see any erythema noted  Peripheral vascular:  Genitourinary:  ----------------------------------------------------------------------------------------------------------------------    Last echocardiogram:    ----------------------------------------------------------------------------------------------------------------------                Invalid input(s): PROTEstimated Creatinine Clearance: 90.5 mL/min (by C-G formula based on SCr of 0.8 mg/dL).  No results found for: AMMONIA              Glucose   Date/Time Value Ref Range Status   06/17/2022 1121 104 70 - 130 mg/dL Final     Comment:     Meter: [id number] : [id number] Sivakumar BERGER   06/17/2022 0627 93 70 - 130 mg/dL Final     Comment:     Meter: [id number] : [id number] Shelbie STILL   06/16/2022 2018 143 (H) 70 - 130 mg/dL Final     Comment:     Meter: [id number] : [id number] Cecily Crystal   06/16/2022 1555 115 70 - 130 mg/dL Final     Comment:     Meter: [id number] : [id number] david mejias   06/16/2022 1104 105 70 - 130 mg/dL Final     Comment:     Meter: [id number] : [id number] Shivam Chandler   06/16/2022 0655 87 70 - 130 mg/dL Final     Comment:     Meter: [id number] : [id number] Cecily Crystal   06/15/2022 1603 115 70 - 130 mg/dL Final     Comment:     Meter: [id number] : [id number] david mejias    06/15/2022 1122 145 (H) 70 - 130 mg/dL Final     Comment:     Meter: [id number] : [id number] Shivam Chandler     No results found for: TSH, FREET4  No results found for: PREGTESTUR, PREGSERUM, HCG, HCGQUANT  Pain Management Panel    There is no flowsheet data to display.       Brief Urine Lab Results  (Last result in the past 365 days)      Color   Clarity   Blood   Leuk Est   Nitrite   Protein   CREAT   Urine HCG        06/06/22 1657 Yellow   Cloudy     Moderate                   No results found for: BLOODCX      No results found for: URINECX  No results found for: WOUNDCX  No results found for: STOOLCX        I have personally looked at the labs and they are summarized above.  ----------------------------------------------------------------------------------------------------------------------  Detailed radiology reports for the last 24 hours:    Imaging Results (Last 24 Hours)     ** No results found for the last 24 hours. **        Final impressions for the last 30 days of radiology reports:    XR Spine Lumbar Complete With Flex & Ext    Result Date: 6/12/2022  Unremarkable appearance of the lumbar spine.  This report was finalized on 6/12/2022 11:21 AM by Dr. Mario Alberto Griggs MD.      US Renal Limited    Result Date: 6/11/2022  Both kidneys are normal in size and ultrasound appearance. Signer Name: Corby Roa MD  Signed: 6/11/2022 8:40 PM  Workstation Name: [id number]  [facility]    XR Chest 1 View    Result Date: 6/7/2022  Stable aeration. CRITICAL RESULT:   No. COMMUNICATION: Per this written report. Dictated by Jorge Azevedo on 6/7/2022 9:45 AM Signed by Jorge Azevedo on 6/7/2022 9:46 AM    XR Chest 1 View    Result Date: 6/5/2022  Stable aeration with decreased lung volumes, bibasilar atelectasis, and small left-sided pleural effusion. CRITICAL RESULT:   No. COMMUNICATION: Per this written report. Approved by Jose Manuel Lackey D.O. on 6/5/2022 9:56 AM By  electronically signing this report, I, the attending physician, attest that I have personally reviewed the images/data for the above examination(s) and agree with the final edited report. Dictated by Jose Manuel Lackey D.O. on 6/5/2022 9:56 AM Signed by Corby Sow on 6/5/2022 11:39 AM    XR Chest 1 View    Result Date: 6/3/2022  Mild increase in the basilar atelectasis and small left effusion. CRITICAL RESULT:   No. COMMUNICATION: Per this written report. Dictated by Zamzam Kennedy on 6/3/2022 11:04 AM Signed by Zamzam Kennedy on 6/3/2022 11:05 AM    XR Chest 1 View    Result Date: 6/2/2022  Interval removal of right IJ [place]-Octavio catheter with introducer sheath remaining in place. Otherwise, stable chest exam. CRITICAL RESULT:   No. COMMUNICATION: Per this written report. Approved by Annmarie Alcaraz on 6/2/2022 10:13 AM By electronically signing this report, I, the attending physician, attest that I have personally reviewed the images/data for the above examination(s) and agree with the final edited report. Dictated by Annmarie Alcaraz on 6/2/2022 10:13 AM Signed by Kahlil Sebastian on 6/2/2022 10:42 AM    XR Chest 1 View    Result Date: 6/1/2022  Increased left basilar atelectasis status post extubation. CRITICAL RESULT:   No. COMMUNICATION: Per this written report. Dictated by Ralph Roman on 6/1/2022 9:34 AM Signed by Ralph Roman on 6/1/2022 9:34 AM    XR Chest 1 View    Result Date: 5/31/2022  Expected postoperative appearance of the chest. CRITICAL RESULT:   No. COMMUNICATION: Per this written report. Dictated by Ralph Roman on 5/31/2022 4:15 PM Signed by Ralph Roman on 5/31/2022 4:16 PM    XR Hip With or Without Pelvis 1 View Left    Result Date: 6/12/2022    No acute findings in the left hip.  This report was finalized on 6/12/2022 11:22 AM by Dr. Mario Alberto Griggs MD.      I have personally looked at the radiology images and read the final radiology report.    Assessment & Plan     Debility--patient requiring set up for bathing today, set up for upper body dressing; set up for lower body dressing; set up for grooming; supervision for toileting.  Yesterday, required standby assistance and verbal cues for transfers, ambulated 320 feet x 2 with front wheel walker.    CAD with history of CABG x2 continue medical management.    CHF--compensated.  Hold Lasix    Hypertension controlled    Insomnia melatonin    Hypotension have held patient's beta-blocker over the last couple days.  We will hold Lasix for now.  We will check CBC, BMP, urinalysis today    VTE Prophylaxis:   Mechanical Order History:     None      Pharmalogical Order History:      Ordered     Dose Route Frequency Stop    06/08/22 1953  Enoxaparin Sodium (LOVENOX) syringe 40 mg         40 mg SC Every 24 Hours --    06/08/22 1947  Enoxaparin Sodium (LOVENOX) syringe 40 mg  Status:  Discontinued         40 mg SC Every 24 Hours 06/08/22 1953                    Rob Camarillo MD  [facility]  06/17/22  11:45 EDT

## 2022-06-18 LAB
BACTERIA UR QL AUTO: ABNORMAL /HPF
BILIRUB UR QL STRIP: NEGATIVE
CLARITY UR: CLEAR
COLOR UR: YELLOW
GLUCOSE BLDC GLUCOMTR-MCNC: 112 MG/DL (ref 70–130)
GLUCOSE BLDC GLUCOMTR-MCNC: 121 MG/DL (ref 70–130)
GLUCOSE BLDC GLUCOMTR-MCNC: 75 MG/DL (ref 70–130)
GLUCOSE UR STRIP-MCNC: NEGATIVE MG/DL
HGB UR QL STRIP.AUTO: NEGATIVE
HYALINE CASTS UR QL AUTO: ABNORMAL /LPF
KETONES UR QL STRIP: NEGATIVE
LEUKOCYTE ESTERASE UR QL STRIP.AUTO: ABNORMAL
NITRITE UR QL STRIP: NEGATIVE
PH UR STRIP.AUTO: 6 [PH] (ref 5–8)
PROT UR QL STRIP: NEGATIVE
RBC # UR STRIP: ABNORMAL /HPF
REF LAB TEST METHOD: ABNORMAL
SP GR UR STRIP: 1.01 (ref 1–1.03)
SQUAMOUS #/AREA URNS HPF: ABNORMAL /HPF
TSH SERPL DL<=0.05 MIU/L-ACNC: 2.09 UIU/ML (ref 0.27–4.2)
UROBILINOGEN UR QL STRIP: ABNORMAL
WBC # UR STRIP: ABNORMAL /HPF

## 2022-06-18 PROCEDURE — 99231 SBSQ HOSP IP/OBS SF/LOW 25: CPT | Performed by: FAMILY MEDICINE

## 2022-06-18 PROCEDURE — 81001 URINALYSIS AUTO W/SCOPE: CPT | Performed by: FAMILY MEDICINE

## 2022-06-18 PROCEDURE — 82962 GLUCOSE BLOOD TEST: CPT

## 2022-06-18 PROCEDURE — 87086 URINE CULTURE/COLONY COUNT: CPT | Performed by: FAMILY MEDICINE

## 2022-06-18 PROCEDURE — 63710000001 INSULIN DETEMIR PER 5 UNITS: Performed by: INTERNAL MEDICINE

## 2022-06-18 PROCEDURE — 25010000002 ENOXAPARIN PER 10 MG: Performed by: INTERNAL MEDICINE

## 2022-06-18 PROCEDURE — 84443 ASSAY THYROID STIM HORMONE: CPT | Performed by: FAMILY MEDICINE

## 2022-06-18 RX ADMIN — DOCUSATE SODIUM 100 MG: 100 CAPSULE ORAL at 20:39

## 2022-06-18 RX ADMIN — ROSUVASTATIN CALCIUM 40 MG: 20 TABLET, FILM COATED ORAL at 20:39

## 2022-06-18 RX ADMIN — ENOXAPARIN SODIUM 40 MG: 40 INJECTION SUBCUTANEOUS at 08:58

## 2022-06-18 RX ADMIN — OXYCODONE HYDROCHLORIDE AND ACETAMINOPHEN 1000 MG: 500 TABLET ORAL at 08:59

## 2022-06-18 RX ADMIN — GABAPENTIN 100 MG: 100 CAPSULE ORAL at 20:59

## 2022-06-18 RX ADMIN — DOCUSATE SODIUM 100 MG: 100 CAPSULE ORAL at 08:59

## 2022-06-18 RX ADMIN — INSULIN DETEMIR 15 UNITS: 100 INJECTION, SOLUTION SUBCUTANEOUS at 20:54

## 2022-06-18 RX ADMIN — INSULIN DETEMIR 15 UNITS: 100 INJECTION, SOLUTION SUBCUTANEOUS at 09:00

## 2022-06-18 RX ADMIN — ASPIRIN 81 MG: 81 TABLET, CHEWABLE ORAL at 08:59

## 2022-06-18 RX ADMIN — CLOPIDOGREL BISULFATE 75 MG: 75 TABLET, FILM COATED ORAL at 09:00

## 2022-06-18 RX ADMIN — Medication 5000 UNITS: at 09:00

## 2022-06-18 RX ADMIN — METHOCARBAMOL 500 MG: 500 TABLET, FILM COATED ORAL at 20:49

## 2022-06-18 RX ADMIN — Medication 5 MG: at 20:39

## 2022-06-18 RX ADMIN — Medication 1 TABLET: at 09:00

## 2022-06-18 NOTE — PROGRESS NOTES
[facility]  PROGRESS NOTE     Patient Identification:  Name:  Lisa Velazquez  Age:  56 y.o.  Sex:  female  :  1965  MRN:  [id number]  Visit Number:  [id number]  ROOM: 110/2S     Primary Care Provider:  Patricia Skelton APRN    Length of stay in inpatient status:  10    Subjective     Chief Compliant:  No chief complaint on file.      History of Presenting Illness: 56-year-old female who is status post multivessel CAD with recent CABG x2.  Patient has been hypotensive over the last couple of days and we have been holding beta-blockers.  Patient asymptomatic and has had no lightheadedness or other difficulties.  No chest pain.  Patient did states she had some cold intolerance but work-up to this point has been negative.  Did do some lab work-up yesterday but has no signs or symptoms otherwise consistent with sepsis.  No fever no chills normal white count, normal lactic acid level and a normal renal function.  Patient amenable to holding metoprolol at this time    Objective     Current Hospital Meds:ascorbic acid, 1,000 mg, Oral, Daily  aspirin, 81 mg, Oral, Daily  clopidogrel, 75 mg, Oral, Daily  docusate sodium, 100 mg, Oral, BID  enoxaparin, 40 mg, Subcutaneous, Q24H  gabapentin, 100 mg, Oral, Nightly  Insulin Aspart, 0-7 Units, Subcutaneous, TID AC  insulin detemir, 15 Units, Subcutaneous, Q12H  melatonin, 5 mg, Oral, Nightly  multivitamin with minerals, 1 tablet, Oral, Daily  rosuvastatin, 40 mg, Oral, Nightly  vitamin D3, 5,000 Units, Oral, Daily       ----------------------------------------------------------------------------------------------------------------------  Vital Signs:  Temp:  [98 °F (36.7 °C)] 98 °F (36.7 °C)  Heart Rate:  [78] 78  Resp:  [20] 20  BP: (125)/(77) 125/77  SpO2:  [96 %] 96 %  on   ;   Device (Oxygen Therapy): room air  Body mass index is 25.2 kg/m².    Wt Readings from Last 3 Encounters:   22 73 kg (160 lb 15 oz)   22 73 kg (161 lb)     Intake &  Output (last 3 days)       06/15 0701  06/16 0700 06/16 0701  06/17 0700 06/17 0701  06/18 0700 06/18 0701  06/19 0700    P.O. 1200 1200 1080 240    Total Intake(mL/kg) 1200 (16.4) 1200 (16.4) 1080 (14.8) 240 (3.3)    Urine (mL/kg/hr)   500 (0.3)     Total Output   500     Net +1200 +1200 +580 +240            Urine Unmeasured Occurrence 5 x 5 x 2 x         Diet Regular; Cardiac  ----------------------------------------------------------------------------------------------------------------------  Physical exam:  Constitutional:   No acute distress  HEENT: Normocephalic atraumatic  Neck: Supple   Cardiovascular: Regular rate and rhythm  Pulmonary/Chest: Clear to auscultation  Abdominal: Positive bowel sounds soft.   Musculoskeletal: No arthropathy  Neurological: No focal deficits  Skin: Incision site appears to be healing well, no evidence of infection peripheral vascular:  Genitourinary:  ----------------------------------------------------------------------------------------------------------------------    Last echocardiogram:    ----------------------------------------------------------------------------------------------------------------------  Results from last 7 days   Lab Units 06/17/22  1536 06/17/22  1219   LACTATE mmol/L 1.0  --    WBC 10*3/mm3  --  6.99   HEMOGLOBIN g/dL  --  11.0*   HEMATOCRIT %  --  35.9   MCV fL  --  96.0   MCHC g/dL  --  30.6*   PLATELETS 10*3/mm3  --  305         Results from last 7 days   Lab Units 06/17/22  1536   SODIUM mmol/L 139   POTASSIUM mmol/L 4.2   CHLORIDE mmol/L 106   CO2 mmol/L 21.9*   BUN mg/dL 12   CREATININE mg/dL 0.65   CALCIUM mg/dL 9.5   GLUCOSE mg/dL 106*   ALBUMIN g/dL 3.18*   BILIRUBIN mg/dL 0.8   ALK PHOS U/L 100   AST (SGOT) U/L 19   ALT (SGPT) U/L 12   Estimated Creatinine Clearance: 111.4 mL/min (by C-G formula based on SCr of 0.65 mg/dL).  No results found for: AMMONIA              Glucose   Date/Time Value Ref Range Status   06/18/2022 0631 75 70 - 130  mg/dL Final     Comment:     Meter: [id number] : [id number] Tj Milian   06/17/2022 2110 111 70 - 130 mg/dL Final     Comment:     Meter: [id number] : [id number] Cecily Crystal   06/17/2022 1633 107 70 - 130 mg/dL Final     Comment:     Meter: [id number] : [id number] Sivakumar Martinez C   06/17/2022 1121 104 70 - 130 mg/dL Final     Comment:     Meter: [id number] : [id number] Sivakumar Martinez C   06/17/2022 0627 93 70 - 130 mg/dL Final     Comment:     Meter: [id number] : [id number] Shelbie STILL   06/16/2022 2018 143 (H) 70 - 130 mg/dL Final     Comment:     Meter: [id number] : [id number] Cecily Crystal   06/16/2022 1555 115 70 - 130 mg/dL Final     Comment:     Meter: [id number] : [id number] david mejias   06/16/2022 1104 105 70 - 130 mg/dL Final     Comment:     Meter: [id number] : [id number] Shivam Chandler     Lab Results   Component Value Date    TSH 2.090 06/18/2022     No results found for: PREGTESTUR, PREGSERUM, HCG, HCGQUANT  Pain Management Panel    There is no flowsheet data to display.       Brief Urine Lab Results  (Last result in the past 365 days)      Color   Clarity   Blood   Leuk Est   Nitrite   Protein   CREAT   Urine HCG        06/18/22 0539 Yellow   Clear   Negative   Trace   Negative   Negative               No results found for: BLOODCX  Results from last 7 days   Lab Units 06/18/22  0539   NITRITE UA  Negative   WBC UA /HPF 6-12*   BACTERIA UA /HPF None Seen   SQUAM EPITHEL UA /HPF 3-6*     No results found for: URINECX  No results found for: WOUNDCX  No results found for: STOOLCX  Results from last 7 days   Lab Units 06/17/22  1536   LACTATE mmol/L 1.0       I have personally looked at the labs and they are summarized above.  ----------------------------------------------------------------------------------------------------------------------  Detailed radiology reports for the last 24 hours:    Imaging Results (Last 24 Hours)     ** No results found for  the last 24 hours. **        Final impressions for the last 30 days of radiology reports:    XR Spine Lumbar Complete With Flex & Ext    Result Date: 6/12/2022  Unremarkable appearance of the lumbar spine.  This report was finalized on 6/12/2022 11:21 AM by Dr. Mario Alberto Griggs MD.      US Renal Limited    Result Date: 6/11/2022  Both kidneys are normal in size and ultrasound appearance. Signer Name: Corby Roa MD  Signed: 6/11/2022 8:40 PM  Workstation [email or web address]  [facility]    XR Chest 1 View    Result Date: 6/7/2022  Stable aeration. CRITICAL RESULT:   No. COMMUNICATION: Per this written report. Dictated by Jorge Azevedo on 6/7/2022 9:45 AM Signed by Jorge Azevedo on 6/7/2022 9:46 AM    XR Chest 1 View    Result Date: 6/5/2022  Stable aeration with decreased lung volumes, bibasilar atelectasis, and small left-sided pleural effusion. CRITICAL RESULT:   No. COMMUNICATION: Per this written report. Approved by Jose Manuel Lackey D.O. on 6/5/2022 9:56 AM By electronically signing this report, I, the attending physician, attest that I have personally reviewed the images/data for the above examination(s) and agree with the final edited report. Dictated by Jose Manuel Lackey D.O. on 6/5/2022 9:56 AM Signed by Corby Sow on 6/5/2022 11:39 AM    XR Chest 1 View    Result Date: 6/3/2022  Mild increase in the basilar atelectasis and small left effusion. CRITICAL RESULT:   No. COMMUNICATION: Per this written report. Dictated by Zamzam Kennedy on 6/3/2022 11:04 AM Signed by Zamzam Kennedy on 6/3/2022 11:05 AM    XR Chest 1 View    Result Date: 6/2/2022  Interval removal of right IJ [place]-Octavio catheter with introducer sheath remaining in place. Otherwise, stable chest exam. CRITICAL RESULT:   No. COMMUNICATION: Per this written report. Approved by Annmarie Alcaraz on 6/2/2022 10:13 AM By electronically signing this report, I, the attending physician, attest that I have personally  reviewed the images/data for the above examination(s) and agree with the final edited report. Dictated by Annmarie Alcaraz on 6/2/2022 10:13 AM Signed by Kahlil Sebastian on 6/2/2022 10:42 AM    XR Chest 1 View    Result Date: 6/1/2022  Increased left basilar atelectasis status post extubation. CRITICAL RESULT:   No. COMMUNICATION: Per this written report. Dictated by Ralph Roman on 6/1/2022 9:34 AM Signed by Ralph Roman on 6/1/2022 9:34 AM    XR Chest 1 View    Result Date: 5/31/2022  Expected postoperative appearance of the chest. CRITICAL RESULT:   No. COMMUNICATION: Per this written report. Dictated by Ralph Roman on 5/31/2022 4:15 PM Signed by Ralph Roman on 5/31/2022 4:16 PM    XR Hip With or Without Pelvis 1 View Left    Result Date: 6/12/2022    No acute findings in the left hip.  This report was finalized on 6/12/2022 11:22 AM by Dr. Mario Alberto Griggs MD.      I have personally looked at the radiology images and read the final radiology report.    Assessment & Plan    Debility--patient requiring supervision/modified independent for bed mobility; modified independent for transfers; ambulated 320 feet x 2 twice yesterday with front wheel walker with supervision.  Patient requiring set up assistance for bathing; set up for upper body dressing; set up for lower body dressing; set up for grooming; supervision for toileting.    CAD--status post CABG--patient doing well at this time.  Has been slightly hypotensive at times.  Will discontinue metoprolol and will follow patient closely.  Continue aspirin, Plavix, Crestor.    CHF--compensated.  Have held Lasix secondary to hypotension    Hypertension patient had has had some systolic pressures in the 90s.        VTE Prophylaxis:   Mechanical Order History:     None      Pharmalogical Order History:      Ordered     Dose Route Frequency Stop    06/08/22 1953  Enoxaparin Sodium (LOVENOX) syringe 40 mg         40 mg SC Every 24 Hours --    06/08/22 1947   Enoxaparin Sodium (LOVENOX) syringe 40 mg  Status:  Discontinued         40 mg SC Every 24 Hours 06/08/22 1953                Rob Camarillo MD  [facility] Center[facility]  06/18/22  10:47 EDT

## 2022-06-18 NOTE — NURSING NOTE
Blood Sugar was in the 70's this AM, refused and snacks, requested to wait on breakfast. Denies any s/sx of hypoglycemia at present. Educated her on s/sx to report to nurse, understanding voiced.

## 2022-06-18 NOTE — PROGRESS NOTES
Rehabilitation Nursing  Inpatient Rehabilitation Plan of Care Note    Plan of Care  Body Function Structure    Skin Integrity (Active)  Current Status (2/3/2022 12:00:00 AM): free from skin breakdown this shift  Weekly Goal: free from skin breakdown this stay  Discharge Goal: home free of skin breakdown    Safety    Potential for Injury (Active)  Current Status (2/3/2022 12:00:00 AM): free from injury this shift  Weekly Goal: free from injury this stay  Discharge Goal: home free of injury    Signed by: Ashley Mccord RN

## 2022-06-18 NOTE — PLAN OF CARE
Goal Outcome Eval  Patient resting comfortably in bed, reports, did well in therapy today will    Continue to monitor.

## 2022-06-19 LAB
BACTERIA SPEC AEROBE CULT: NORMAL
GLUCOSE BLDC GLUCOMTR-MCNC: 101 MG/DL (ref 70–130)
GLUCOSE BLDC GLUCOMTR-MCNC: 134 MG/DL (ref 70–130)
GLUCOSE BLDC GLUCOMTR-MCNC: 93 MG/DL (ref 70–130)

## 2022-06-19 PROCEDURE — 63710000001 INSULIN DETEMIR PER 5 UNITS: Performed by: INTERNAL MEDICINE

## 2022-06-19 PROCEDURE — 25010000002 ENOXAPARIN PER 10 MG: Performed by: INTERNAL MEDICINE

## 2022-06-19 PROCEDURE — 82962 GLUCOSE BLOOD TEST: CPT

## 2022-06-19 RX ADMIN — GABAPENTIN 100 MG: 100 CAPSULE ORAL at 20:27

## 2022-06-19 RX ADMIN — CLOPIDOGREL BISULFATE 75 MG: 75 TABLET, FILM COATED ORAL at 08:11

## 2022-06-19 RX ADMIN — Medication 1 TABLET: at 08:11

## 2022-06-19 RX ADMIN — ROSUVASTATIN CALCIUM 40 MG: 20 TABLET, FILM COATED ORAL at 20:27

## 2022-06-19 RX ADMIN — DOCUSATE SODIUM 100 MG: 100 CAPSULE ORAL at 08:11

## 2022-06-19 RX ADMIN — ENOXAPARIN SODIUM 40 MG: 40 INJECTION SUBCUTANEOUS at 08:11

## 2022-06-19 RX ADMIN — DOCUSATE SODIUM 100 MG: 100 CAPSULE ORAL at 20:27

## 2022-06-19 RX ADMIN — ASPIRIN 81 MG: 81 TABLET, CHEWABLE ORAL at 08:11

## 2022-06-19 RX ADMIN — Medication 5000 UNITS: at 08:11

## 2022-06-19 RX ADMIN — INSULIN DETEMIR 15 UNITS: 100 INJECTION, SOLUTION SUBCUTANEOUS at 20:28

## 2022-06-19 RX ADMIN — OXYCODONE HYDROCHLORIDE AND ACETAMINOPHEN 1000 MG: 500 TABLET ORAL at 08:11

## 2022-06-19 RX ADMIN — Medication 5 MG: at 20:27

## 2022-06-19 RX ADMIN — INSULIN DETEMIR 15 UNITS: 100 INJECTION, SOLUTION SUBCUTANEOUS at 08:17

## 2022-06-19 RX ADMIN — METHOCARBAMOL 500 MG: 500 TABLET, FILM COATED ORAL at 20:30

## 2022-06-19 NOTE — PLAN OF CARE
Goal Outcome Evaluation:  Plan of Care Reviewed With: patient        Progress: improving  Outcome Evaluation: patient resting in bed this shift. no complaints at this time. continue plan of care.

## 2022-06-19 NOTE — PLAN OF CARE
Goal Outcome Evaluation:      Patient progressing well in therapy, will continue to monitor.

## 2022-06-20 LAB
GLUCOSE BLDC GLUCOMTR-MCNC: 103 MG/DL (ref 70–130)
GLUCOSE BLDC GLUCOMTR-MCNC: 121 MG/DL (ref 70–130)
GLUCOSE BLDC GLUCOMTR-MCNC: 123 MG/DL (ref 70–130)

## 2022-06-20 PROCEDURE — 97116 GAIT TRAINING THERAPY: CPT

## 2022-06-20 PROCEDURE — 63710000001 INSULIN DETEMIR PER 5 UNITS: Performed by: INTERNAL MEDICINE

## 2022-06-20 PROCEDURE — 97530 THERAPEUTIC ACTIVITIES: CPT

## 2022-06-20 PROCEDURE — 97110 THERAPEUTIC EXERCISES: CPT

## 2022-06-20 PROCEDURE — 97110 THERAPEUTIC EXERCISES: CPT | Performed by: OCCUPATIONAL THERAPIST

## 2022-06-20 PROCEDURE — 82962 GLUCOSE BLOOD TEST: CPT

## 2022-06-20 PROCEDURE — 97535 SELF CARE MNGMENT TRAINING: CPT | Performed by: OCCUPATIONAL THERAPIST

## 2022-06-20 PROCEDURE — 97530 THERAPEUTIC ACTIVITIES: CPT | Performed by: OCCUPATIONAL THERAPIST

## 2022-06-20 PROCEDURE — 25010000002 ENOXAPARIN PER 10 MG: Performed by: INTERNAL MEDICINE

## 2022-06-20 RX ADMIN — INSULIN DETEMIR 15 UNITS: 100 INJECTION, SOLUTION SUBCUTANEOUS at 20:48

## 2022-06-20 RX ADMIN — METHOCARBAMOL 500 MG: 500 TABLET, FILM COATED ORAL at 22:35

## 2022-06-20 RX ADMIN — Medication 5000 UNITS: at 08:14

## 2022-06-20 RX ADMIN — GABAPENTIN 100 MG: 100 CAPSULE ORAL at 20:48

## 2022-06-20 RX ADMIN — INSULIN DETEMIR 15 UNITS: 100 INJECTION, SOLUTION SUBCUTANEOUS at 08:22

## 2022-06-20 RX ADMIN — CLOPIDOGREL BISULFATE 75 MG: 75 TABLET, FILM COATED ORAL at 08:13

## 2022-06-20 RX ADMIN — Medication 5 MG: at 20:48

## 2022-06-20 RX ADMIN — DOCUSATE SODIUM 100 MG: 100 CAPSULE ORAL at 20:48

## 2022-06-20 RX ADMIN — ROSUVASTATIN CALCIUM 40 MG: 20 TABLET, FILM COATED ORAL at 20:48

## 2022-06-20 RX ADMIN — ENOXAPARIN SODIUM 40 MG: 40 INJECTION SUBCUTANEOUS at 08:14

## 2022-06-20 RX ADMIN — DOCUSATE SODIUM 100 MG: 100 CAPSULE ORAL at 08:14

## 2022-06-20 RX ADMIN — ASPIRIN 81 MG: 81 TABLET, CHEWABLE ORAL at 08:13

## 2022-06-20 RX ADMIN — OXYCODONE HYDROCHLORIDE AND ACETAMINOPHEN 1000 MG: 500 TABLET ORAL at 08:13

## 2022-06-20 RX ADMIN — Medication 1 TABLET: at 08:14

## 2022-06-20 NOTE — PROGRESS NOTES
[facility]  PROGRESS NOTE     Patient Identification:  Name:  Lisa Velazquez  Age:  56 y.o.  Sex:  female  :  1965  MRN:  [id number]  Visit Number:  [id number]  ROOM: 110/2S     Primary Care Provider:  Patricia Skelton APRN    Length of stay in inpatient status:  12    Subjective     Chief Compliant: Debility    History of Presenting Illness: 56-year-old female who is status post multivessel CAD with recent CABG x2.  Patient has been hypotensive over the last couple of days and we have been holding beta-blockers.      Patient is stable on room air without any acute distress noted.  Blood glucose is well controlled this morning.  No complaints or issues noted.    Objective     Current Hospital Meds:ascorbic acid, 1,000 mg, Oral, Daily  aspirin, 81 mg, Oral, Daily  clopidogrel, 75 mg, Oral, Daily  docusate sodium, 100 mg, Oral, BID  enoxaparin, 40 mg, Subcutaneous, Q24H  gabapentin, 100 mg, Oral, Nightly  Insulin Aspart, 0-7 Units, Subcutaneous, TID AC  insulin detemir, 15 Units, Subcutaneous, Q12H  melatonin, 5 mg, Oral, Nightly  multivitamin with minerals, 1 tablet, Oral, Daily  rosuvastatin, 40 mg, Oral, Nightly  vitamin D3, 5,000 Units, Oral, Daily       ----------------------------------------------------------------------------------------------------------------------  Vital Signs:  Temp:  [97.6 °F (36.4 °C)-98 °F (36.7 °C)] 97.6 °F (36.4 °C)  Heart Rate:  [72] 72  Resp:  [18] 18  BP: ()/(69-83) 98/69  SpO2:  [91 %-98 %] 98 %  on   ;   Device (Oxygen Therapy): room air  Body mass index is 25.2 kg/m².    Wt Readings from Last 3 Encounters:   22 73 kg (160 lb 15 oz)   22 73 kg (161 lb)     Intake & Output (last 3 days)        07 07 07 07 0700    P.O. 5551 848 4627 240    Total Intake(mL/kg) 1080 (14.8) 960 (13.2) 1320 (18.1) 240 (3.3)    Urine (mL/kg/hr) 500 (0.3)       Total Output 500       Net  +580 +960 +1320 +240            Urine Unmeasured Occurrence 2 x 4 x 6 x 1 x        Diet Regular; Cardiac  ----------------------------------------------------------------------------------------------------------------------  Physical exam:  Constitutional:   No acute distress, no complaints  HEENT: Normocephalic atraumatic  Neck: Supple   Cardiovascular: Regular rate and rhythm  Pulmonary/Chest: Clear to auscultation  Abdominal: Positive bowel sounds soft.   Musculoskeletal: No arthropathy  Neurological: No focal deficits  Skin: Incision site appears to be healing well, no evidence of infection peripheral   ----------------------------------------------------------------------------------------------------------------------    Last echocardiogram:    ----------------------------------------------------------------------------------------------------------------------  Results from last 7 days   Lab Units 06/17/22  1536 06/17/22  1219   LACTATE mmol/L 1.0  --    WBC 10*3/mm3  --  6.99   HEMOGLOBIN g/dL  --  11.0*   HEMATOCRIT %  --  35.9   MCV fL  --  96.0   MCHC g/dL  --  30.6*   PLATELETS 10*3/mm3  --  305         Results from last 7 days   Lab Units 06/17/22  1536   SODIUM mmol/L 139   POTASSIUM mmol/L 4.2   CHLORIDE mmol/L 106   CO2 mmol/L 21.9*   BUN mg/dL 12   CREATININE mg/dL 0.65   CALCIUM mg/dL 9.5   GLUCOSE mg/dL 106*   ALBUMIN g/dL 3.18*   BILIRUBIN mg/dL 0.8   ALK PHOS U/L 100   AST (SGOT) U/L 19   ALT (SGPT) U/L 12   Estimated Creatinine Clearance: 111.4 mL/min (by C-G formula based on SCr of 0.65 mg/dL).  No results found for: AMMONIA              Glucose   Date/Time Value Ref Range Status   06/20/2022 0615 103 70 - 130 mg/dL Final     Comment:     Meter: [id number] : [id number] Tj Milian   06/19/2022 1600 101 70 - 130 mg/dL Final     Comment:     Meter: [id number] : [id number] david mejias   06/19/2022 1141 134 (H) 70 - 130 mg/dL Final     Comment:     Meter: [id number] :  [id number] Hanh John   06/19/2022 0600 93 70 - 130 mg/dL Final     Comment:     Meter: [id number] : [id number] Jeannine Crystal   06/18/2022 1614 112 70 - 130 mg/dL Final     Comment:     Meter: [id number] : [id number] stefan trejo   06/18/2022 1129 121 70 - 130 mg/dL Final     Comment:     Meter: [id number] : [id number] domenic gaticabard   06/18/2022 0631 75 70 - 130 mg/dL Final     Comment:     Meter: [id number] : [id number] Tj Milian   06/17/2022 2110 111 70 - 130 mg/dL Final     Comment:     Meter: [id number] : [id number] Cecily Kelley     Lab Results   Component Value Date    TSH 2.090 06/18/2022     No results found for: PREGTESTUR, PREGSERUM, HCG, HCGQUANT  Pain Management Panel    There is no flowsheet data to display.       Brief Urine Lab Results  (Last result in the past 365 days)      Color   Clarity   Blood   Leuk Est   Nitrite   Protein   CREAT   Urine HCG        06/18/22 0539 Yellow   Clear   Negative   Trace   Negative   Negative               No results found for: BLOODCX  Results from last 7 days   Lab Units 06/18/22  0539   NITRITE UA  Negative   WBC UA /HPF 6-12*   BACTERIA UA /HPF None Seen   SQUAM EPITHEL UA /HPF 3-6*   URINECX  <10,000 CFU/mL Mixed Judy Isolated     No results found for: URINECX  No results found for: WOUNDCX  No results found for: STOOLCX  Results from last 7 days   Lab Units 06/17/22  1536   LACTATE mmol/L 1.0       I have personally looked at the labs and they are summarized above.  ----------------------------------------------------------------------------------------------------------------------  Detailed radiology reports for the last 24 hours:    Imaging Results (Last 24 Hours)     ** No results found for the last 24 hours. **        Final impressions for the last 30 days of radiology reports:    XR Spine Lumbar Complete With Flex & Ext    Result Date: 6/12/2022  Unremarkable appearance of the lumbar spine.  This report was finalized on  6/12/2022 11:21 AM by Dr. Mario Alberto Griggs MD.      US Renal Limited    Result Date: 6/11/2022  Both kidneys are normal in size and ultrasound appearance. Signer Name: Corby Roa MD  Signed: 6/11/2022 8:40 PM  Workstation Name: [id number]-  [facility]    XR Chest 1 View    Result Date: 6/7/2022  Stable aeration. CRITICAL RESULT:   No. COMMUNICATION: Per this written report. Dictated by Jorge Azevedo on 6/7/2022 9:45 AM Signed by Jorge Azevedo on 6/7/2022 9:46 AM    XR Chest 1 View    Result Date: 6/5/2022  Stable aeration with decreased lung volumes, bibasilar atelectasis, and small left-sided pleural effusion. CRITICAL RESULT:   No. COMMUNICATION: Per this written report. Approved by Jose Manuel Lackey D.O. on 6/5/2022 9:56 AM By electronically signing this report, I, the attending physician, attest that I have personally reviewed the images/data for the above examination(s) and agree with the final edited report. Dictated by Jose Manuel Lackey D.O. on 6/5/2022 9:56 AM Signed by Corby Sow on 6/5/2022 11:39 AM    XR Chest 1 View    Result Date: 6/3/2022  Mild increase in the basilar atelectasis and small left effusion. CRITICAL RESULT:   No. COMMUNICATION: Per this written report. Dictated by Zamzam Kennedy on 6/3/2022 11:04 AM Signed by Zamzam Kennedy on 6/3/2022 11:05 AM    XR Chest 1 View    Result Date: 6/2/2022  Interval removal of right IJ [place]-Octavio catheter with introducer sheath remaining in place. Otherwise, stable chest exam. CRITICAL RESULT:   No. COMMUNICATION: Per this written report. Approved by Annmarie Alcaraz on 6/2/2022 10:13 AM By electronically signing this report, I, the attending physician, attest that I have personally reviewed the images/data for the above examination(s) and agree with the final edited report. Dictated by Annmarie Alcaraz on 6/2/2022 10:13 AM Signed by Kahlil Sebastian on 6/2/2022 10:42 AM    XR Chest 1 View    Result Date:  6/1/2022  Increased left basilar atelectasis status post extubation. CRITICAL RESULT:   No. COMMUNICATION: Per this written report. Dictated by Ralph Roman on 6/1/2022 9:34 AM Signed by Ralph Roman on 6/1/2022 9:34 AM    XR Chest 1 View    Result Date: 5/31/2022  Expected postoperative appearance of the chest. CRITICAL RESULT:   No. COMMUNICATION: Per this written report. Dictated by Ralph Roman on 5/31/2022 4:15 PM Signed by Ralph Roman on 5/31/2022 4:16 PM    XR Hip With or Without Pelvis 1 View Left    Result Date: 6/12/2022    No acute findings in the left hip.  This report was finalized on 6/12/2022 11:22 AM by Dr. Mario Alberto Griggs MD.      I have personally looked at the radiology images and read the final radiology report.    Assessment & Plan    Debility--participated with physical and Occupational Therapy on 6/17/2022: Performs bed mobility activities with supervision to modified dependent; performs transfer activities with supervision to modified independence in room for nonverbal cues; utilizes wheelchair for chair to bed, sit to stand/transfer transfer; ambulated 320 feet x 2 twice daily with front wheeled walker and supervision with verbal/nonverbal cues; required set up assist for bathing activity; set up assist supervision for upper body dressing; set up assist for lower body dressing; set up assist to modified independence for grooming activity; supervision for toileting; modified independence for self-feeding    CAD--status post CABG--patient doing well at this time.  Has been slightly hypotensive at times.  Will discontinue metoprolol and will follow patient closely.  Continue aspirin, Plavix, Crestor.    CHF--compensated.  Have held Lasix secondary to hypotension    Hypertension patient had has had some systolic pressures in the 90s.        VTE Prophylaxis:   Mechanical Order History:     None      Pharmalogical Order History:      Ordered     Dose Route Frequency Stop    06/08/22  1953  Enoxaparin Sodium (LOVENOX) syringe 40 mg         40 mg SC Every 24 Hours --    06/08/22 1947  Enoxaparin Sodium (LOVENOX) syringe 40 mg  Status:  Discontinued         40 mg SC Every 24 Hours 06/08/22 1953              Alejandrina Ann MD  06/20/22  12:07 EDT

## 2022-06-20 NOTE — SIGNIFICANT NOTE
06/20/22 6488   Plan   Plan Spoke to pt about recommendation for outpatient PT 3 x wk x 4 wks.  Pt prefers to receive home health PT from [facility].  Pt says sister Denia Flores will be staying with her at discharge until she goes to MD follow-up appointment.  Contacted [facility]  [phone] per preference per Emigdio who states they do not accept .  Contacted [facility] [phone] per Digna who says they do not accept .  SS reviewed this information with pt who says she is agreeable to go to outpatient PT if her sister Denia is agreeable to transport.  Pt says she does not qualify for insurance to pay for [facility] due to having a vehicle.  Pt attempted to contact sister without success.  SS attempted to call sister [phone] without success; voicemail is full.  Pt says sister is suppose to visit her this pm and she will ask her about transportation to outpatient PT and inform SS tomorrow.  Pt says PCP sent order to [facility] for rollator.   will come to rehab for discharge and transportation home around 11:00 am on 6-22-22.   Patient/Family in Agreement with Plan yes

## 2022-06-20 NOTE — PROGRESS NOTES
Inpatient Rehabilitation Functional Measures Assessment and Plan of Care    Plan of Care  Self Care    [OT] Toileting(Resolved)  Current Status(06/20/2022): set up  Weekly Goal(06/22/2022): set up  Discharge Goal: set up    Functional Measures  KIRAN Eating:  KIRAN Grooming:  KIRAN Bathing:  KIRAN Upper Body Dressing:  KIRAN Lower Body Dressing:  KIRAN Toileting:    KIRAN Bladder Management  Level of Assistance:  Frequency/Number of Accidents this Shift:    KIRAN Bowel Management  Level of Assistance:  Frequency/Number of Accidents this Shift:    KIRAN Bed/Chair/Wheelchair Transfer:  KIRAN Toilet Transfer:  KIRAN Tub/Shower Transfer:    Previously Documented Mode of Locomotion at Discharge:  [facility] Expected Mode of Locomotion at Discharge:  KIRAN Walk/Wheelchair:  KIRAN Stairs:    [facility] Comprehension:  [facility] Expression:  [facility] Social Interaction:  [facility] Problem Solving:  KIRAN Memory:    Therapy Mode Minutes  Occupational Therapy: Individual: 120 minutes.  Physical Therapy:  Speech Language Pathology:    Discharge Functional Goals:    Signed by: Candie Dixon, Occupational Therapist

## 2022-06-20 NOTE — PLAN OF CARE
Goal Outcome Evaluation:      Patient visiting with son, denies any problems at this time. Will continue to monitor.

## 2022-06-20 NOTE — THERAPY TREATMENT NOTE
Inpatient Rehabilitation - Occupational Therapy Treatment Note     [facility]     Patient Name: Lisa Velazquez  : 1965  MRN: [id number]    Today's Date: 2022                 Admit Date: 2022         ICD-10-CM ICD-9-CM   1. Acute respiratory failure with hypoxia (HCC)  J96.01 518.81       Patient Active Problem List   Diagnosis   • S/P right hip fracture   • Acute respiratory failure with hypoxia (HCC)       Past Medical History:   Diagnosis Date   • Coronary artery disease    • Diabetes mellitus (HCC)    • Elevated cholesterol    • GERD (gastroesophageal reflux disease)    • History of transfusion    • Hypertension        Past Surgical History:   Procedure Laterality Date   • ABDOMINAL SURGERY      gallbladder removal   • CARDIAC CATHETERIZATION     • CARDIAC SURGERY      stent   • COLONOSCOPY     • FRACTURE SURGERY     • TOE AMPUTATION Right     3rd toe right foot             IRF OT ASSESSMENT FLOWSHEET (last 12 hours)     IRF OT Evaluation and Treatment     Row Name 22 1400          OT Time and Intention    Document Type daily treatment  -     Mode of Treatment individual therapy;occupational therapy  -     Patient Effort good  -     Row Name 22 1400          General Information    Patient/Family/Caregiver Comments/Observations patient agreeable to therapy. patient tolerated with no complaints.  -     Existing Precautions/Restrictions fall;sternal  -     Row Name 22 1400          Cognition/Psychosocial    Affect/Mental Status (Cognition) WFL  -     Row Name 22 1400          Upper Body Dressing    [place] Level (Upper Body Dressing) don;front opening garment;supervision  -     Row Name 22 1400          Lower Body Dressing    [place] Level (Lower Body Dressing) --  -     Row Name 22 1400          Grooming    [place] Level (Grooming) set up  -     Row Name 22 1400          Transfers    Chair-Bed [place] (Transfers)  supervision;verbal cues  -     Row Name 06/20/22 1400          Motor Skills    Motor Skills coordination;functional endurance  -     Therapeutic Exercise --  BUE ROM,fmc,gmc,reaching, dowel ex,  strengthening, functional endurance  -           User Key  (r) = Recorded By, (t) = Taken By, (c) = Cosigned By    Initials Name Effective Dates     Rukhsana Dixon, OT 06/16/21 -                  Occupational Therapy Education                 Title: PT OT SLP Therapies (Done)     Topic: Occupational Therapy (Done)     Point: ADL training (Done)     Description:   Instruct learner(s) on proper safety adaptation and remediation techniques during self care or transfers.   Instruct in proper use of assistive devices.              Learning Progress Summary           Patient Acceptance, E,TB, VU by DL at 6/16/2022 2326      Show all documentation for this point (2)                 Point: Home exercise program (Done)     Description:   Instruct learner(s) on appropriate technique for monitoring, assisting and/or progressing therapeutic exercises/activities.              Learning Progress Summary           Patient Acceptance, E,TB, VU by DL at 6/16/2022 2326      Show all documentation for this point (1)                 Point: Precautions (Done)     Description:   Instruct learner(s) on prescribed precautions during self-care and functional transfers.              Learning Progress Summary           Patient Acceptance, E,TB, VU by DL at 6/16/2022 2326      Show all documentation for this point (2)                 Point: Body mechanics (Done)     Description:   Instruct learner(s) on proper positioning and spine alignment during self-care, functional mobility activities and/or exercises.              Learning Progress Summary           Patient Acceptance, E,TB, VU by DL at 6/16/2022 2326                               User Key     Initials Effective Dates Name Provider Type Discipline    DL 03/16/22 -  Flex  Ashley, RN Registered Nurse Nurse                    OT Recommendation and Plan    Planned Therapy Interventions (OT): activity tolerance training, adaptive equipment training, BADL retraining, patient/caregiver education/training, ROM/therapeutic exercise, strengthening exercise, transfer/mobility retraining                    Time Calculation:      Time Calculation- OT     Row Name 06/20/22 1423 06/20/22 1422          Time Calculation- OT    OT Start Time 1245  - 0800  -     OT Stop Time 1330  - 0915  -     OT Time Calculation (min) 45 min  - 75 min  -           User Key  (r) = Recorded By, (t) = Taken By, (c) = Cosigned By    Initials Name Provider Type     Rukhsana Dixon OT Occupational Therapist              Therapy Charges for Today     Code Description Service Date Service Provider Modifiers Qty    07406862866  OT THERAPEUTIC ACT EA 15 MIN 6/20/2022 Rukhsana Dixon, ABELARDO GO 2    [id number]  OT SELF CARE/MGMT/TRAIN EA 15 MIN 6/20/2022 Rukhsana Dixon OT GO 2    [id number]  OT THER PROC EA 15 MIN 6/20/2022 Rukhsana Dixon OT GO 4                   Rukhsana Dixon OT  6/20/2022

## 2022-06-20 NOTE — PLAN OF CARE
Goal Outcome Evaluation:  Plan of Care Reviewed With: patient        Progress: improving  Outcome Evaluation: patient up with therapies this am. no complaints at this time. continue plan of care.

## 2022-06-20 NOTE — THERAPY TREATMENT NOTE
"Inpatient Rehabilitation - Physical Therapy Treatment Note        [place]     Patient Name: Lisa Velazquez  : 1965  MRN: [id number]    Today's Date: 2022                    Admit Date: 2022      Visit Dx:     ICD-10-CM ICD-9-CM   1. Acute respiratory failure with hypoxia (HCC)  J96.01 518.81       Patient Active Problem List   Diagnosis   • S/P right hip fracture   • Acute respiratory failure with hypoxia (HCC)       Past Medical History:   Diagnosis Date   • Coronary artery disease    • Diabetes mellitus (HCC)    • Elevated cholesterol    • GERD (gastroesophageal reflux disease)    • History of transfusion    • Hypertension        Past Surgical History:   Procedure Laterality Date   • ABDOMINAL SURGERY      gallbladder removal   • CARDIAC CATHETERIZATION     • CARDIAC SURGERY      stent   • COLONOSCOPY     • FRACTURE SURGERY     • TOE AMPUTATION Right     3rd toe right foot       PT ASSESSMENT (last 12 hours)     IRF PT Evaluation and Treatment     Row Name 22 1614          PT Time and Intention    Document Type daily treatment  -RF     Mode of Treatment individual therapy;physical therapy  -RF     Patient/Family/Caregiver Comments/Observations Pt c/o \"burning\" in BLEs with increased activity.  -RF     Row Name 22 1614          General Information    Existing Precautions/Restrictions fall;sternal  -RF     Row Name 22 1614          Pain Scale: FACES Pre/Post-Treatment    Pain: FACES Scale, Pretreatment 4-->hurts little more  -RF     Posttreatment Pain Rating 4-->hurts little more  -RF     Row Name 22 1614          Cognition/Psychosocial    Affect/Mental Status (Cognition) WFL  -RF     Follows Commands (Cognition) WFL  -RF     Row Name 22 1614          Bed Mobility    Assistive Device (Bed Mobility) bed rails  -RF     Comment, (Bed Mobility) Patient in W/C BID  -RF     Row Name 22 1614          Transfers    Sit-Stand [place] (Transfers) verbal cues;nonverbal " cues (demo/gesture);supervision;modified independence  -RF     Stand-Sit [place] (Transfers) verbal cues;nonverbal cues (demo/gesture);supervision;modified independence  -RF     Row Name 06/20/22 1614          Sit-Stand Transfer    Assistive Device (Sit-Stand Transfers) wheelchair  -RF     Row Name 06/20/22 1614          Stand-Sit Transfer    Assistive Device (Stand-Sit Transfers) wheelchair  -RF     Row Name 06/20/22 1614          Gait/Stairs (Locomotion)    [place] Level (Gait) verbal cues;nonverbal cues (demo/gesture);contact guard;standby assist  -RF     Assistive Device (Gait) walker, front-wheeled  -RF     Distance in Feet (Gait) 600  -RF     Pattern (Gait) step-through  -RF     Deviations/Abnormal Patterns (Gait) bc decreased;gait speed decreased;stride length decreased;antalgic  -RF     Bilateral Gait Deviations forward flexed posture  -RF     Comment, (Gait/Stairs) BLE IR noted with ambulation; minimal unsteadiness observed with CGA required for safety. Antalgic pattern observed.  -RF     Row Name 06/20/22 1614          Balance    Balance Assessment standing dynamic balance  -RF     Dynamic Standing Balance standby assist;supervision  bag toss in unilateral stance with reaching outside JOHAN  -RF     Position/Device Used, Standing Balance parallel bars  -RF     Balance Interventions other (see comments)  weave cones frwd/side 0 AD  -RF     Comment, Balance No significant LOB noted; CGA/SBA required for safety.  -RF     Row Name 06/20/22 1614          Hip (Therapeutic Exercise)    Hip Strengthening (Therapeutic Exercise) bilateral;flexion;extension;aBduction;aDduction;heel slides;marching while seated;sitting;2 lb free weight;resistance band;green;2 sets;10 repetitions  -RF     Row Name 06/20/22 1614          Knee (Therapeutic Exercise)    Knee Strengthening (Therapeutic Exercise) bilateral;flexion;extension;heel slides;marching while seated;LAQ (long arc quad);hamstring curls;sitting;2 lb free  weight;resistance band;green;2 sets;10 repetitions  -RF     Row Name 06/20/22 1614          Ankle (Therapeutic Exercise)    Ankle Strengthening (Therapeutic Exercise) bilateral;dorsiflexion;plantarflexion;sitting;2 lb free weight;2 sets;10 repetitions  -RF     Row Name 06/20/22 1614          Aerobic Exercise    Type (Aerobic Exercise) recumbent stationary bike  -RF     Time Performed (Aerobic Exercise) 10  -RF     Comment, Aerobic Exercise (Therapeutic Exercise) LVL 2.0; frequent rest breaks required throughout.  -RF     Row Name 06/20/22 1614          Therapy Assessment/Plan (PT)    Patient's Goals For Discharge return home  -RF     Row Name 06/20/22 1614          Therapy Assessment/Plan (PT)    Rehab Potential/Prognosis (PT) adequate, monitor progress closely  -RF     Frequency of Treatment (PT) 5 times per week  -RF     Estimated Duration of Therapy (PT) 2 weeks  -RF     Problem List (PT) balance;mobility;strength;pain;postural control  -RF     Activity Limitations Related to Problem List (PT) unable to ambulate safely;unable to transfer safely  -RF     Row Name 06/20/22 1614          Daily Progress Summary (PT)    Functional Goal Overall Progress (PT) progressing toward functional goals as expected  -RF     Daily Progress Summary (PT) Good functional mobility and ambulation quality noted this date. Decreased activity tolerance and LE weakness continue to hinder patient at this time. Continued skilled care required for further improvements to ensure maximum safe function upon D/C.  -RF     Impairments Still Limiting Function (PT) functional activity tolerance impairment;pain;strength deficit  -RF     Recommendations (PT) Continue per current POC  -RF     Row Name 06/20/22 1614          Therapy Plan Review/Discharge Plan (PT)    Anticipated Equipment Needs at Discharge (PT Eval) --  tbd  -RF     Anticipated Discharge Disposition (PT) home with home health  -RF     Row Name 06/20/22 1614          IRF PT Goals     Bed Mobility Goal Selection (PT-IRF) bed mobility, PT goal 1  -RF     Transfer Goal Selection (PT-IRF) transfers, PT goal 1  -RF     Gait (Walking Locomotion) Goal Selection (PT-IRF) gait, PT goal 1  -RF     Row Name 06/20/22 1614          Bed Mobility Goal 1 (PT-IRF)    Activity/Assistive Device (Bed Mobility Goal 1, PT-IRF) sit to supine/supine to sit  -RF     [place] Level (Bed Mobility Goal 1, PT-IRF) modified independence  -RF     Time Frame (Bed Mobility Goal 1, PT-IRF) by discharge  -RF     Progress/Outcomes (Bed Mobility Goal 1, PT-IRF) goal ongoing  -RF     Row Name 06/20/22 1614          Transfer Goal 1 (PT-IRF)    Activity/Assistive Device (Transfer Goal 1, PT-IRF) sit-to-stand/stand-to-sit;bed-to-chair/chair-to-bed  -RF     [place] Level (Transfer Goal 1, PT-IRF) modified independence  -RF     Time Frame (Transfer Goal 1, PT-IRF) by discharge  -RF     Progress/Outcomes (Transfer Goal 1, PT-IRF) goal ongoing  -RF     Row Name 06/20/22 1614          Gait/Walking Locomotion Goal 1 (PT-IRF)    Activity/Assistive Device (Gait/Walking Locomotion Goal 1, PT-IRF) --  RW or AAD  -RF     Gait/Walking Locomotion Distance Goal 1 (PT-IRF) 300'  -RF     [place] Level (Gait/Walking Locomotion Goal 1, PT-IRF) modified independence  -RF     Time Frame (Gait/Walking Locomotion Goal 1, PT-IRF) by discharge  -RF     Progress/Outcomes (Gait/Walking Locomotion Goal 1, PT-IRF) goal ongoing  -RF           User Key  (r) = Recorded By, (t) = Taken By, (c) = Cosigned By    Initials Name Provider Type    RF Julianna Ann, PTA Physical Therapist Assistant              Wound Other (See comments) midline sternal Incision (Active)   Closure Liquid skin adhesive 06/20/22 0705   Base dry;clean 06/20/22 0705   Periwound Temperature warm 06/19/22 2017   Periwound Skin Turgor soft 06/19/22 2017   Drainage Amount none 06/20/22 0705   Dressing Care open to air 06/20/22 0705       Wound Right distal thigh Incision (Active)    Closure Open to air;Liquid skin adhesive 06/20/22 0705   Base dry 06/20/22 0705   Periwound Temperature warm 06/19/22 2017   Drainage Amount none 06/20/22 0705   Dressing Care open to air 06/20/22 0705       Wound Right proximal leg Incision (Active)   Closure Open to air;Liquid skin adhesive 06/20/22 0705   Periwound Temperature warm 06/19/22 2017   Periwound Skin Turgor soft 06/19/22 2017   Drainage Amount none 06/20/22 0705   Dressing Care open to air 06/20/22 0705     Physical Therapy Education                 Title: PT OT SLP Therapies (Done)     Topic: Physical Therapy (Done)     Point: Mobility training (Done)     Learning Progress Summary           Patient Acceptance, E,TB, VU by RF at 6/20/2022 1620      Show all documentation for this point (11)                 Point: Home exercise program (Done)     Learning Progress Summary           Patient Acceptance, E,TB, VU by RF at 6/20/2022 1620      Show all documentation for this point (11)                 Point: Body mechanics (Done)     Learning Progress Summary           Patient Acceptance, E,TB, VU by RF at 6/20/2022 1620      Show all documentation for this point (11)                 Point: Precautions (Done)     Learning Progress Summary           Patient Acceptance, E,TB, VU by RF at 6/20/2022 1620      Show all documentation for this point (11)                             User Key     Initials Effective Dates Name Provider Type Discipline     06/16/21 -  Julianna Ann, PTA Physical Therapist Assistant PT                PT Recommendation and Plan    Frequency of Treatment (PT): 5 times per week  Anticipated Equipment Needs at Discharge (PT Eval):  (tbd)  Daily Progress Summary (PT)  Functional Goal Overall Progress (PT): progressing toward functional goals as expected  Daily Progress Summary (PT): Good functional mobility and ambulation quality noted this date. Decreased activity tolerance and LE weakness continue to hinder patient at this time.  Continued skilled care required for further improvements to ensure maximum safe function upon D/C.  Impairments Still Limiting Function (PT): functional activity tolerance impairment, pain, strength deficit  Recommendations (PT): Continue per current POC               Time Calculation:      PT Charges     Row Name 06/20/22 1621             Time Calculation    Start Time 0915  -RF      Stop Time 1045  -RF      Time Calculation (min) 90 min  -RF      PT Received On 06/20/22  -RF      PT - Next Appointment 06/21/22  -RF      PT Goal Re-Cert Due Date 06/23/22  -RF              Time Calculation- PT    Total Timed Code Minutes- PT 90 minute(s)  -RF            User Key  (r) = Recorded By, (t) = Taken By, (c) = Cosigned By    Initials Name Provider Type    RF Julianna Ann PTA Physical Therapist Assistant                Therapy Charges for Today     Code Description Service Date Service Provider Modifiers Qty    89517816532 HC GAIT TRAINING EA 15 MIN 6/20/2022 Julianna Ann PTA GP, CQ 2    95177843105 HC PT THER PROC EA 15 MIN 6/20/2022 Julianna Ann PTA GP, CQ 2    [id number] HC PT THERAPEUTIC ACT EA 15 MIN 6/20/2022 uJlianna Ann PTA GP, CQ 2                   Julianna Ann PTA  6/20/2022

## 2022-06-21 LAB
GLUCOSE BLDC GLUCOMTR-MCNC: 105 MG/DL (ref 70–130)
GLUCOSE BLDC GLUCOMTR-MCNC: 130 MG/DL (ref 70–130)
GLUCOSE BLDC GLUCOMTR-MCNC: 82 MG/DL (ref 70–130)

## 2022-06-21 PROCEDURE — 97535 SELF CARE MNGMENT TRAINING: CPT | Performed by: OCCUPATIONAL THERAPIST

## 2022-06-21 PROCEDURE — 97530 THERAPEUTIC ACTIVITIES: CPT

## 2022-06-21 PROCEDURE — 63710000001 INSULIN DETEMIR PER 5 UNITS: Performed by: INTERNAL MEDICINE

## 2022-06-21 PROCEDURE — 82962 GLUCOSE BLOOD TEST: CPT

## 2022-06-21 PROCEDURE — 97530 THERAPEUTIC ACTIVITIES: CPT | Performed by: OCCUPATIONAL THERAPIST

## 2022-06-21 PROCEDURE — 97110 THERAPEUTIC EXERCISES: CPT | Performed by: OCCUPATIONAL THERAPIST

## 2022-06-21 PROCEDURE — 25010000002 ENOXAPARIN PER 10 MG: Performed by: INTERNAL MEDICINE

## 2022-06-21 PROCEDURE — 97110 THERAPEUTIC EXERCISES: CPT

## 2022-06-21 PROCEDURE — 97112 NEUROMUSCULAR REEDUCATION: CPT

## 2022-06-21 PROCEDURE — 97116 GAIT TRAINING THERAPY: CPT

## 2022-06-21 RX ADMIN — INSULIN DETEMIR 15 UNITS: 100 INJECTION, SOLUTION SUBCUTANEOUS at 09:18

## 2022-06-21 RX ADMIN — Medication 1 TABLET: at 09:18

## 2022-06-21 RX ADMIN — CLOPIDOGREL BISULFATE 75 MG: 75 TABLET, FILM COATED ORAL at 09:18

## 2022-06-21 RX ADMIN — GABAPENTIN 100 MG: 100 CAPSULE ORAL at 21:17

## 2022-06-21 RX ADMIN — ENOXAPARIN SODIUM 40 MG: 40 INJECTION SUBCUTANEOUS at 09:18

## 2022-06-21 RX ADMIN — Medication 5000 UNITS: at 09:17

## 2022-06-21 RX ADMIN — INSULIN DETEMIR 15 UNITS: 100 INJECTION, SOLUTION SUBCUTANEOUS at 21:18

## 2022-06-21 RX ADMIN — OXYCODONE HYDROCHLORIDE AND ACETAMINOPHEN 1000 MG: 500 TABLET ORAL at 09:17

## 2022-06-21 RX ADMIN — ASPIRIN 81 MG: 81 TABLET, CHEWABLE ORAL at 09:18

## 2022-06-21 RX ADMIN — Medication 5 MG: at 21:17

## 2022-06-21 RX ADMIN — METHOCARBAMOL 500 MG: 500 TABLET, FILM COATED ORAL at 21:17

## 2022-06-21 RX ADMIN — ROSUVASTATIN CALCIUM 40 MG: 20 TABLET, FILM COATED ORAL at 21:17

## 2022-06-21 NOTE — THERAPY TREATMENT NOTE
Inpatient Rehabilitation - Physical Therapy Treatment Note       [facility]     Patient Name: Lisa Velazquez  : 1965  MRN: [id number]    Today's Date: 2022                    Admit Date: 2022      Visit Dx:     ICD-10-CM ICD-9-CM   1. Acute respiratory failure with hypoxia (HCC)  J96.01 518.81       Patient Active Problem List   Diagnosis   • S/P right hip fracture   • Acute respiratory failure with hypoxia (HCC)       Past Medical History:   Diagnosis Date   • Coronary artery disease    • Diabetes mellitus (HCC)    • Elevated cholesterol    • GERD (gastroesophageal reflux disease)    • History of transfusion    • Hypertension        Past Surgical History:   Procedure Laterality Date   • ABDOMINAL SURGERY      gallbladder removal   • CARDIAC CATHETERIZATION     • CARDIAC SURGERY      stent   • COLONOSCOPY     • FRACTURE SURGERY     • TOE AMPUTATION Right     3rd toe right foot       PT ASSESSMENT (last 12 hours)     IRF PT Evaluation and Treatment     Row Name 22 1618          PT Time and Intention    Document Type daily treatment  -RF     Mode of Treatment individual therapy;physical therapy  -RF     Patient/Family/Caregiver Comments/Observations Pt c/o fatigue and burning in BLEs with increased activity.  -RF     Row Name 22 1618          General Information    Existing Precautions/Restrictions fall;sternal  -RF     Row Name 22 1618          Cognition/Psychosocial    Affect/Mental Status (Cognition) WFL  -RF     Follows Commands (Cognition) WFL  -RF     Row Name 22 1618          Bed Mobility    Sit-Supine [place] (Bed Mobility) modified independence  -RF     Assistive Device (Bed Mobility) bed rails  -RF     Row Name 22 1618          Transfer Assessment/Treatment    Transfers car transfer  -RF     Row Name 22 1618          Transfers    Chair-Bed [place] (Transfers) modified independence  -RF     Sit-Stand [place] (Transfers) verbal cues;nonverbal  cues (demo/gesture);supervision;modified independence  -RF     Stand-Sit [place] (Transfers) verbal cues;nonverbal cues (demo/gesture);supervision;modified independence  -RF     Row Name 06/21/22 1618          Chair-Bed Transfer    Assistive Device (Chair-Bed Transfers) other (see comments)  none  -RF     Row Name 06/21/22 1618          Car Transfer    Type (Car Transfer) stand pivot/stand step  -RF     [place] Level (Car Transfer) supervision;modified independence  -RF     Assistive Device (Car Transfer) other (see comments)  none  -RF     Row Name 06/21/22 1618          Gait/Stairs (Locomotion)    [place] Level (Gait) verbal cues;nonverbal cues (demo/gesture);supervision  -RF     Assistive Device (Gait) other (see comments)  no AD  -RF     Distance in Feet (Gait) 400 in AM, 200, 160 in PM  -RF     Pattern (Gait) step-through  -RF     Deviations/Abnormal Patterns (Gait) bc decreased;gait speed decreased;stride length decreased;antalgic  -RF     Bilateral Gait Deviations forward flexed posture  -RF     [place] Level (Stairs) contact guard;stand by assist  -RF     Handrail Location (Stairs) right side (ascending)  -RF     Number of Steps (Stairs) 10  -RF     Ascending Technique (Stairs) step-over-step  -RF     Descending Technique (Stairs) step-over-step  -RF     Comment, (Gait/Stairs) Minimal unsteadiness noted; pt able to self correct without LOB.  -RF     Row Name 06/21/22 1618          Balance    Dynamic Standing Balance contact guard;supervision  bag toss on foam with reach outside JOHAN  -RF     Position/Device Used, Standing Balance parallel bars  -RF     Balance Interventions other (see comments)  H-T walk, retro H-T walk, side stepping X20' for 4 trials each  -RF     Row Name 06/21/22 1618          Hip (Therapeutic Exercise)    Hip Strengthening (Therapeutic Exercise) bilateral;flexion;extension;aBduction;aDduction;marching while standing;standing;2 sets;10 repetitions  -RF     Row  Name 06/21/22 1618          Knee (Therapeutic Exercise)    Knee Strengthening (Therapeutic Exercise) bilateral;flexion;extension;marching while standing;hamstring curls;standing;2 sets;10 repetitions  -RF     Row Name 06/21/22 1618          Ankle (Therapeutic Exercise)    Ankle Strengthening (Therapeutic Exercise) bilateral;dorsiflexion;plantarflexion;standing;2 sets;10 repetitions  -RF     Row Name 06/21/22 1618          Positioning and Restraints    Pre-Treatment Position sitting in chair/recliner  BID  -RF     In Bed supine;call light within reach;encouraged to call for assist  PM  -RF     In Wheelchair sitting;call light within reach;encouraged to call for assist  AM  -RF     Row Name 06/21/22 1618          Therapy Assessment/Plan (PT)    Patient's Goals For Discharge return home  -RF     Row Name 06/21/22 1618          Therapy Assessment/Plan (PT)    Rehab Potential/Prognosis (PT) adequate, monitor progress closely  -RF     Frequency of Treatment (PT) 5 times per week  -RF     Estimated Duration of Therapy (PT) 2 weeks  -RF     Problem List (PT) balance;mobility;strength;pain;postural control  -RF     Activity Limitations Related to Problem List (PT) unable to ambulate safely;unable to transfer safely  -RF     Row Name 06/21/22 1618          Daily Progress Summary (PT)    Functional Goal Overall Progress (PT) progressing toward functional goals as expected  -RF     Daily Progress Summary (PT) Pt demonstrates increasing independence with functional mobility and ambulation this date. Slight dynamic balance deficits continually noted. Continued skilled care required for further improvements to ensure maximum safe function upon D/C.  -RF     Impairments Still Limiting Function (PT) functional activity tolerance impairment;pain;strength deficit  -RF     Recommendations (PT) Continue per current POC  -RF     Row Name 06/21/22 1618          Therapy Plan Review/Discharge Plan (PT)    Anticipated Equipment Needs at  Discharge (PT Eval) --  tbd  -RF     Anticipated Discharge Disposition (PT) home with home health  -RF     Row Name 06/21/22 1618          IRF PT Goals    Bed Mobility Goal Selection (PT-IRF) bed mobility, PT goal 1  -RF     Transfer Goal Selection (PT-IRF) transfers, PT goal 1  -RF     Gait (Walking Locomotion) Goal Selection (PT-IRF) gait, PT goal 1  -RF     Row Name 06/21/22 1618          Bed Mobility Goal 1 (PT-IRF)    Activity/Assistive Device (Bed Mobility Goal 1, PT-IRF) sit to supine/supine to sit  -RF     [place] Level (Bed Mobility Goal 1, PT-IRF) modified independence  -RF     Time Frame (Bed Mobility Goal 1, PT-IRF) by discharge  -RF     Progress/Outcomes (Bed Mobility Goal 1, PT-IRF) goal ongoing  -RF     Row Name 06/21/22 1618          Transfer Goal 1 (PT-IRF)    Activity/Assistive Device (Transfer Goal 1, PT-IRF) sit-to-stand/stand-to-sit;bed-to-chair/chair-to-bed  -RF     [place] Level (Transfer Goal 1, PT-IRF) modified independence  -RF     Time Frame (Transfer Goal 1, PT-IRF) by discharge  -RF     Progress/Outcomes (Transfer Goal 1, PT-IRF) goal ongoing  -RF     Row Name 06/21/22 1618          Gait/Walking Locomotion Goal 1 (PT-IRF)    Activity/Assistive Device (Gait/Walking Locomotion Goal 1, PT-IRF) --  RW or AAD  -RF     Gait/Walking Locomotion Distance Goal 1 (PT-IRF) 300'  -RF     [place] Level (Gait/Walking Locomotion Goal 1, PT-IRF) modified independence  -RF     Time Frame (Gait/Walking Locomotion Goal 1, PT-IRF) by discharge  -RF     Progress/Outcomes (Gait/Walking Locomotion Goal 1, PT-IRF) goal ongoing  -RF           User Key  (r) = Recorded By, (t) = Taken By, (c) = Cosigned By    Initials Name Provider Type    RF Julianna Ann, PTA Physical Therapist Assistant              Wound Other (See comments) midline sternal Incision (Active)   Dressing Appearance dry;intact 06/20/22 1925   Closure Liquid skin adhesive 06/21/22 0912   Base dry;clean 06/21/22 0912   Dressing  Care open to air 06/21/22 0912       Wound Right distal thigh Incision (Active)   Dressing Appearance open to air 06/21/22 0912   Closure Open to air;Liquid skin adhesive 06/21/22 0912   Base dry 06/21/22 0912   Dressing Care open to air 06/21/22 0912       Wound Right proximal leg Incision (Active)   Dressing Appearance open to air 06/20/22 1925   Closure Open to air;Liquid skin adhesive 06/21/22 0912   Dressing Care open to air 06/21/22 0912     Physical Therapy Education                 Title: PT OT SLP Therapies (Done)     Topic: Physical Therapy (Done)     Point: Mobility training (Done)     Learning Progress Summary           Patient Acceptance, E,TB, VU by RF at 6/21/2022 1634      Show all documentation for this point (12)                 Point: Home exercise program (Done)     Learning Progress Summary           Patient Acceptance, E,TB, VU by RF at 6/21/2022 1634      Show all documentation for this point (12)                 Point: Body mechanics (Done)     Learning Progress Summary           Patient Acceptance, E,TB, VU by RF at 6/21/2022 1634      Show all documentation for this point (12)                 Point: Precautions (Done)     Learning Progress Summary           Patient Acceptance, E,TB, VU by RF at 6/21/2022 1634      Show all documentation for this point (12)                             User Key     Initials Effective Dates Name Provider Type Discipline     06/16/21 -  Julianna Ann, PTA Physical Therapist Assistant PT                PT Recommendation and Plan    Frequency of Treatment (PT): 5 times per week  Anticipated Equipment Needs at Discharge (PT Eval):  (tbd)  Daily Progress Summary (PT)  Functional Goal Overall Progress (PT): progressing toward functional goals as expected  Daily Progress Summary (PT): Pt demonstrates increasing independence with functional mobility and ambulation this date. Slight dynamic balance deficits continually noted. Continued skilled care required for  further improvements to ensure maximum safe function upon D/C.  Impairments Still Limiting Function (PT): functional activity tolerance impairment, pain, strength deficit  Recommendations (PT): Continue per current POC               Time Calculation:      PT Charges     Row Name 06/21/22 1636 06/21/22 1635          Time Calculation    Start Time 1415  -RF 1015  -RF     Stop Time 1500  -RF 1100  -RF     Time Calculation (min) 45 min  -RF 45 min  -RF     PT Received On 06/21/22  -RF 06/21/22  -RF     PT - Next Appointment 06/22/22  -RF 06/21/22  -RF     PT Goal Re-Cert Due Date 06/23/22  -RF 06/23/22  -RF            Time Calculation- PT    Total Timed Code Minutes- PT 45 minute(s)  -RF 45 minute(s)  -RF           User Key  (r) = Recorded By, (t) = Taken By, (c) = Cosigned By    Initials Name Provider Type    RF Julianna Ann, PTA Physical Therapist Assistant                Therapy Charges for Today     Code Description Service Date Service Provider Modifiers Qty    74978977541 HC GAIT TRAINING EA 15 MIN 6/20/2022 Julianna Ann, PTA GP, CQ 2    53701261692 HC PT THER PROC EA 15 MIN 6/20/2022 Julianna Ann, PTA GP, CQ 2    25164545709 HC PT THERAPEUTIC ACT EA 15 MIN 6/20/2022 Julianna Ann, PTA GP, CQ 2    24945100424 HC GAIT TRAINING EA 15 MIN 6/21/2022 Julianna Ann, PTA GP, CQ 2    24796413232 HC PT NEUROMUSC RE EDUCATION EA 15 MIN 6/21/2022 Julianna Ann, PTA GP, CQ 2    26253845616 HC PT THER PROC EA 15 MIN 6/21/2022 Julianna Ann, PTA GP, CQ 1    07231060981 HC PT THERAPEUTIC ACT EA 15 MIN 6/21/2022 Julianna Ann, PTA GP, CQ 1                   Juliannadave Ann, PTA  6/21/2022

## 2022-06-21 NOTE — SIGNIFICANT NOTE
06/21/22 1100   Plan   Plan Spoke to Perri with [facility] ext. [phone] who says pt's MD will need to send them a referral when she goes to follow-up appointment on 6-30-22.

## 2022-06-21 NOTE — THERAPY TREATMENT NOTE
Inpatient Rehabilitation - Occupational Therapy Treatment Note     [place]     Patient Name: Lisa Velazquez  : 1965  MRN: [id number]    Today's Date: 2022                 Admit Date: 2022         ICD-10-CM ICD-9-CM   1. Acute respiratory failure with hypoxia (HCC)  J96.01 518.81       Patient Active Problem List   Diagnosis   • S/P right hip fracture   • Acute respiratory failure with hypoxia (HCC)       Past Medical History:   Diagnosis Date   • Coronary artery disease    • Diabetes mellitus (HCC)    • Elevated cholesterol    • GERD (gastroesophageal reflux disease)    • History of transfusion    • Hypertension        Past Surgical History:   Procedure Laterality Date   • ABDOMINAL SURGERY      gallbladder removal   • CARDIAC CATHETERIZATION     • CARDIAC SURGERY      stent   • COLONOSCOPY     • FRACTURE SURGERY     • TOE AMPUTATION Right     3rd toe right foot             IRF OT ASSESSMENT FLOWSHEET (last 12 hours)     IRF OT Evaluation and Treatment     Row Name 22 1500          OT Time and Intention    Document Type daily treatment  -     Mode of Treatment individual therapy;occupational therapy  -     Patient Effort good  -     Row Name 22 1500          General Information    Patient/Family/Caregiver Comments/Observations patient agreeable to therapy. patient tolerated session with no complaints  -     Existing Precautions/Restrictions fall;sternal  -     Row Name 22 1500          Cognition/Psychosocial    Affect/Mental Status (Cognition) WFL  -     Row Name 22 1500          Transfers    Bed-Chair [place] (Transfers) supervision;verbal cues  -     Row Name 22 1500          Motor Skills    Motor Skills functional endurance;coordination  -     Therapeutic Exercise --  BUe ROM, gmc,fmc,reaching,  strength, functional tabletop act.  -           User Key  (r) = Recorded By, (t) = Taken By, (c) = Cosigned By    Initials Name Effective Dates     Rukhsana Olivo, OT 06/16/21 -                  Occupational Therapy Education                 Title: PT OT SLP Therapies (Done)     Topic: Occupational Therapy (Done)     Point: ADL training (Done)     Description:   Instruct learner(s) on proper safety adaptation and remediation techniques during self care or transfers.   Instruct in proper use of assistive devices.              Learning Progress Summary           Patient Acceptance, E,TB, VU by DL at 6/16/2022 2326      Show all documentation for this point (2)                 Point: Home exercise program (Done)     Description:   Instruct learner(s) on appropriate technique for monitoring, assisting and/or progressing therapeutic exercises/activities.              Learning Progress Summary           Patient Acceptance, E,TB, VU by DL at 6/16/2022 2326      Show all documentation for this point (1)                 Point: Precautions (Done)     Description:   Instruct learner(s) on prescribed precautions during self-care and functional transfers.              Learning Progress Summary           Patient Acceptance, E,TB, VU by DL at 6/16/2022 2326      Show all documentation for this point (2)                 Point: Body mechanics (Done)     Description:   Instruct learner(s) on proper positioning and spine alignment during self-care, functional mobility activities and/or exercises.              Learning Progress Summary           Patient Acceptance, E,TB, VU by DL at 6/16/2022 2326                               User Key     Initials Effective Dates Name Provider Type Discipline    DL 03/16/22 -  Ashley Mccord, RN Registered Nurse Nurse                    OT Recommendation and Plan    Planned Therapy Interventions (OT): activity tolerance training, adaptive equipment training, BADL retraining, patient/caregiver education/training, ROM/therapeutic exercise, strengthening exercise, transfer/mobility retraining                    Time Calculation:      Time  Calculation- OT     Row Name 06/21/22 1510             Time Calculation- OT    OT Start Time 0745  -      OT Stop Time 0915  -      OT Time Calculation (min) 90 min  -            User Key  (r) = Recorded By, (t) = Taken By, (c) = Cosigned By    Initials Name Provider Type     Rukhsana Dixon OT Occupational Therapist              Therapy Charges for Today     Code Description Service Date Service Provider Modifiers Qty    94028245220 HC OT THERAPEUTIC ACT EA 15 MIN 6/20/2022 Rukhsana Dixon, OT GO 2    18262106657 HC OT SELF CARE/MGMT/TRAIN EA 15 MIN 6/20/2022 Rukhsana Dixon, OT GO 2    22722693636 HC OT THER PROC EA 15 MIN 6/20/2022 Rukhsana Dixon, OT GO 4    57004252194 HC OT THERAPEUTIC ACT EA 15 MIN 6/21/2022 Rukhsana Dixon, OT GO 2    32424160612 HC OT SELF CARE/MGMT/TRAIN EA 15 MIN 6/21/2022 Rukhsana Dixon, OT GO 1    38896417794 HC OT THER PROC EA 15 MIN 6/21/2022 Rukhsana Dixon, OT GO 3                   Rukhsana Dixon OT  6/21/2022

## 2022-06-21 NOTE — SIGNIFICANT NOTE
06/21/22 1030   Plan   Plan Team conference held today.  Spoke to pt about plans for discharge on 6-22-22 and outpatient PT.  Pt prefers to receive home exercise programs from PT/OT at discharge.  Pt says she would have to pay someone to transport her to outpatient therapy which may be a financial hardship.  Discussed outpatient cardiac rehab at [facility].  Pt is interested in going to cardiac rehab after she is released by MD to drive which she expects 8 weeks post-surgery. Pt is returning home at discharge and sister Denia will be staying with her.  Sister will have her grandchildren and will be pick-up pt when she is ready to leave on 6-22-22.   Patient/Family in Agreement with Plan yes

## 2022-06-21 NOTE — PLAN OF CARE
Goal Outcome Evaluation:  Plan of Care Reviewed With: patient        Progress: improving  Outcome Evaluation: Patient up doing scheduled therapies. No complaints voiced and no s/s of distress noted. VSS. Will continue to monitor and continue POC.

## 2022-06-21 NOTE — PROGRESS NOTES
[facility]  PROGRESS NOTE     Patient Identification:  Name:  Lisa Velazquez  Age:  56 y.o.  Sex:  female  :  1965  MRN:  [id number]  Visit Number:  [id number]  ROOM: 110/2S     Primary Care Provider:  Patricia Skelton APRN    Length of stay in inpatient status:  13    Subjective     Chief Compliant: Debility    History of Presenting Illness: 56-year-old female who is status post multivessel CAD with recent CABG x2.  Patient has been hypotensive over the last couple of days and we have been holding beta-blockers.      Patient is experiencing borderline low blood pressure, discussed with patient the option of initiating midodrine however she preferred to wait until her follow-up appointment with  cardiology on 2022.  We will continue to monitor.  No acute distress noted.  No complaints noted.    Objective     Current Hospital Meds:ascorbic acid, 1,000 mg, Oral, Daily  aspirin, 81 mg, Oral, Daily  clopidogrel, 75 mg, Oral, Daily  docusate sodium, 100 mg, Oral, BID  enoxaparin, 40 mg, Subcutaneous, Q24H  gabapentin, 100 mg, Oral, Nightly  Insulin Aspart, 0-7 Units, Subcutaneous, TID AC  insulin detemir, 15 Units, Subcutaneous, Q12H  melatonin, 5 mg, Oral, Nightly  multivitamin with minerals, 1 tablet, Oral, Daily  rosuvastatin, 40 mg, Oral, Nightly  vitamin D3, 5,000 Units, Oral, Daily       ----------------------------------------------------------------------------------------------------------------------  Vital Signs:  Temp:  [97.9 °F (36.6 °C)-98.5 °F (36.9 °C)] 97.9 °F (36.6 °C)  Heart Rate:  [78-80] 80  Resp:  [16-18] 16  BP: (114-148)/(64-86) 114/64  SpO2:  [97 %] 97 %  on   ;   Device (Oxygen Therapy): room air  Body mass index is 25.2 kg/m².    Wt Readings from Last 3 Encounters:   22 73 kg (160 lb 15 oz)   22 73 kg (161 lb)     Intake & Output (last 3 days)        0701   0700  0701   07 0701   07 0701   0700    P.O. 960  1320 1440 360    Total Intake(mL/kg) 960 (13.2) 1320 (18.1) 1440 (19.7) 360 (4.9)    Urine (mL/kg/hr)        Total Output        Net +960 +1320 +1440 +360            Urine Unmeasured Occurrence 4 x 6 x 5 x 1 x    Stool Unmeasured Occurrence   1 x         Diet Regular; Cardiac  ----------------------------------------------------------------------------------------------------------------------  Physical exam:  Constitutional:   No acute distress, no complaints, sitting up feels great, eating fruit for breakfast  HEENT: Normocephalic atraumatic  Neck: Supple   Cardiovascular: Regular rate and rhythm  Pulmonary/Chest: Clear to auscultation  Abdominal: Positive bowel sounds soft.   Musculoskeletal: No arthropathy  Neurological: No focal deficits  Skin: Incision site appears to be healing well, no evidence of infection peripheral   ----------------------------------------------------------------------------------------------------------------------    Last echocardiogram:    ----------------------------------------------------------------------------------------------------------------------  Results from last 7 days   Lab Units 06/17/22  1536 06/17/22  1219   LACTATE mmol/L 1.0  --    WBC 10*3/mm3  --  6.99   HEMOGLOBIN g/dL  --  11.0*   HEMATOCRIT %  --  35.9   MCV fL  --  96.0   MCHC g/dL  --  30.6*   PLATELETS 10*3/mm3  --  305         Results from last 7 days   Lab Units 06/17/22  1536   SODIUM mmol/L 139   POTASSIUM mmol/L 4.2   CHLORIDE mmol/L 106   CO2 mmol/L 21.9*   BUN mg/dL 12   CREATININE mg/dL 0.65   CALCIUM mg/dL 9.5   GLUCOSE mg/dL 106*   ALBUMIN g/dL 3.18*   BILIRUBIN mg/dL 0.8   ALK PHOS U/L 100   AST (SGOT) U/L 19   ALT (SGPT) U/L 12   Estimated Creatinine Clearance: 111.4 mL/min (by C-G formula based on SCr of 0.65 mg/dL).  No results found for: AMMONIA              Glucose   Date/Time Value Ref Range Status   06/21/2022 0601 82 70 - 130 mg/dL Final     Comment:     Meter: [id number] : [id number]  Jeannine Crystal   06/20/2022 1611 123 70 - 130 mg/dL Final     Comment:     Meter: [id number] : [id number] Shivam Da Silvayla   06/20/2022 1133 121 70 - 130 mg/dL Final     Comment:     Meter: [id number] : [id number] Shivam Da Silvayla   06/20/2022 0615 103 70 - 130 mg/dL Final     Comment:     Meter: [id number] : [id number] Tj Milian   06/19/2022 1600 101 70 - 130 mg/dL Final     Comment:     Meter: [id number] : [id number] david mejias   06/19/2022 1141 134 (H) 70 - 130 mg/dL Final     Comment:     Meter: [id number] : [id number] Hanh Dora   06/19/2022 0600 93 70 - 130 mg/dL Final     Comment:     Meter: [id number] : [id number] Jeannine Crystal   06/18/2022 1614 112 70 - 130 mg/dL Final     Comment:     Meter: [id number] : [id number] stefan trejo     Lab Results   Component Value Date    TSH 2.090 06/18/2022     No results found for: PREGTESTUR, PREGSERUM, HCG, HCGQUANT  Pain Management Panel    There is no flowsheet data to display.       Brief Urine Lab Results  (Last result in the past 365 days)      Color   Clarity   Blood   Leuk Est   Nitrite   Protein   CREAT   Urine HCG        06/18/22 0539 Yellow   Clear   Negative   Trace   Negative   Negative               No results found for: BLOODCX  Results from last 7 days   Lab Units 06/18/22  0539   NITRITE UA  Negative   WBC UA /HPF 6-12*   BACTERIA UA /HPF None Seen   SQUAM EPITHEL UA /HPF 3-6*   URINECX  <10,000 CFU/mL Mixed Judy Isolated     No results found for: URINECX  No results found for: WOUNDCX  No results found for: STOOLCX  Results from last 7 days   Lab Units 06/17/22  1536   LACTATE mmol/L 1.0       I have personally looked at the labs and they are summarized above.  ----------------------------------------------------------------------------------------------------------------------  Detailed radiology reports for the last 24 hours:    Imaging Results (Last 24 Hours)     ** No results found for the last 24 hours. **         Final impressions for the last 30 days of radiology reports:    XR Spine Lumbar Complete With Flex & Ext    Result Date: 6/12/2022  Unremarkable appearance of the lumbar spine.  This report was finalized on 6/12/2022 11:21 AM by Dr. Mario Alberto Griggs MD.      US Renal Limited    Result Date: 6/11/2022  Both kidneys are normal in size and ultrasound appearance. Signer Name: Corby Roa MD  Signed: 6/11/2022 8:40 PM  Workstation Name: [facility]  [facility]    XR Chest 1 View    Result Date: 6/7/2022  Stable aeration. CRITICAL RESULT:   No. COMMUNICATION: Per this written report. Dictated by Jorge Azevedo on 6/7/2022 9:45 AM Signed by Jorge Azevedo on 6/7/2022 9:46 AM    XR Chest 1 View    Result Date: 6/5/2022  Stable aeration with decreased lung volumes, bibasilar atelectasis, and small left-sided pleural effusion. CRITICAL RESULT:   No. COMMUNICATION: Per this written report. Approved by Jose Manuel Lackey D.O. on 6/5/2022 9:56 AM By electronically signing this report, I, the attending physician, attest that I have personally reviewed the images/data for the above examination(s) and agree with the final edited report. Dictated by Jose Manuel Lackey D.O. on 6/5/2022 9:56 AM Signed by Corby Sow on 6/5/2022 11:39 AM    XR Chest 1 View    Result Date: 6/3/2022  Mild increase in the basilar atelectasis and small left effusion. CRITICAL RESULT:   No. COMMUNICATION: Per this written report. Dictated by Zamzam Kennedy on 6/3/2022 11:04 AM Signed by Zamzam Kennedy on 6/3/2022 11:05 AM    XR Chest 1 View    Result Date: 6/2/2022  Interval removal of right IJ [place] catheter with introducer sheath remaining in place. Otherwise, stable chest exam. CRITICAL RESULT:   No. COMMUNICATION: Per this written report. Approved by Annmarie Alcaraz on 6/2/2022 10:13 AM By electronically signing this report, I, the attending physician, attest that I have personally reviewed the images/data  for the above examination(s) and agree with the final edited report. Dictated by Annmarie Alcaraz on 6/2/2022 10:13 AM Signed by Kahlil Sebastian on 6/2/2022 10:42 AM    XR Chest 1 View    Result Date: 6/1/2022  Increased left basilar atelectasis status post extubation. CRITICAL RESULT:   No. COMMUNICATION: Per this written report. Dictated by Ralph Roman on 6/1/2022 9:34 AM Signed by Ralph Roman on 6/1/2022 9:34 AM    XR Chest 1 View    Result Date: 5/31/2022  Expected postoperative appearance of the chest. CRITICAL RESULT:   No. COMMUNICATION: Per this written report. Dictated by Ralph Roman on 5/31/2022 4:15 PM Signed by Ralph Roman on 5/31/2022 4:16 PM    XR Hip With or Without Pelvis 1 View Left    Result Date: 6/12/2022    No acute findings in the left hip.  This report was finalized on 6/12/2022 11:22 AM by Dr. Mario Alberto Griggs MD.      I have personally looked at the radiology images and read the final radiology report.    Assessment & Plan    Debility--participated with physical and Occupational Therapy on 6/20/2022: Performs bed mobility activities independently; performs transfer activities with supervision to modified independence and verbal/nonverbal cues; utilizes wheelchair for sit stand/stand to sit transfer; ambulated 600 feet with front wheeled walker and contact-guard to standby assist with verbal/nonverbal cues; required supervision for upper body dressing; set up assist for grooming activities; participated with coordination and functional endurance therapeutic exercise    CAD--status post CABG--patient doing well at this time.  Has been slightly hypotensive at times.  Will discontinue metoprolol and will follow patient closely.  Continue aspirin, Plavix, Crestor.    CHF--compensated.  Have held Lasix secondary to hypotension    Hypotension--patient had has had some systolic pressures in the 90s.  Discussed with her the option of initiating midodrine, she prefers to wait until her  cardiology follow-up at  on 6/30/2022.  will continue to monitor        VTE Prophylaxis:   Mechanical Order History:     None      Pharmalogical Order History:      Ordered     Dose Route Frequency Stop    06/08/22 1953  Enoxaparin Sodium (LOVENOX) syringe 40 mg         40 mg SC Every 24 Hours --    06/08/22 1947  Enoxaparin Sodium (LOVENOX) syringe 40 mg  Status:  Discontinued         40 mg SC Every 24 Hours 06/08/22 1953              Alejandrina Ann MD  06/21/22  11:13 EDT

## 2022-06-22 VITALS
BODY MASS INDEX: 25.26 KG/M2 | WEIGHT: 160.94 LBS | HEART RATE: 74 BPM | RESPIRATION RATE: 18 BRPM | TEMPERATURE: 98.4 F | HEIGHT: 67 IN | SYSTOLIC BLOOD PRESSURE: 108 MMHG | OXYGEN SATURATION: 96 % | DIASTOLIC BLOOD PRESSURE: 69 MMHG

## 2022-06-22 LAB
GLUCOSE BLDC GLUCOMTR-MCNC: 112 MG/DL (ref 70–130)
GLUCOSE BLDC GLUCOMTR-MCNC: 78 MG/DL (ref 70–130)

## 2022-06-22 PROCEDURE — 82962 GLUCOSE BLOOD TEST: CPT

## 2022-06-22 PROCEDURE — 97535 SELF CARE MNGMENT TRAINING: CPT | Performed by: OCCUPATIONAL THERAPIST

## 2022-06-22 PROCEDURE — 97530 THERAPEUTIC ACTIVITIES: CPT

## 2022-06-22 PROCEDURE — 25010000002 ENOXAPARIN PER 10 MG: Performed by: INTERNAL MEDICINE

## 2022-06-22 PROCEDURE — 63710000001 INSULIN DETEMIR PER 5 UNITS: Performed by: INTERNAL MEDICINE

## 2022-06-22 RX ORDER — CHOLECALCIFEROL (VITAMIN D3) 125 MCG
5 CAPSULE ORAL NIGHTLY
Qty: 10 TABLET | Refills: 0 | Status: SHIPPED | OUTPATIENT
Start: 2022-06-22 | End: 2022-07-02

## 2022-06-22 RX ORDER — DOCUSATE SODIUM 100 MG/1
100 CAPSULE, LIQUID FILLED ORAL 2 TIMES DAILY
Qty: 20 CAPSULE | Refills: 0 | Status: SHIPPED | OUTPATIENT
Start: 2022-06-22 | End: 2022-07-02

## 2022-06-22 RX ADMIN — ASPIRIN 81 MG: 81 TABLET, CHEWABLE ORAL at 08:31

## 2022-06-22 RX ADMIN — OXYCODONE HYDROCHLORIDE AND ACETAMINOPHEN 1000 MG: 500 TABLET ORAL at 08:31

## 2022-06-22 RX ADMIN — Medication 1 TABLET: at 08:31

## 2022-06-22 RX ADMIN — ENOXAPARIN SODIUM 40 MG: 40 INJECTION SUBCUTANEOUS at 08:31

## 2022-06-22 RX ADMIN — Medication 5000 UNITS: at 08:31

## 2022-06-22 RX ADMIN — INSULIN DETEMIR 15 UNITS: 100 INJECTION, SOLUTION SUBCUTANEOUS at 08:37

## 2022-06-22 RX ADMIN — CLOPIDOGREL BISULFATE 75 MG: 75 TABLET, FILM COATED ORAL at 08:31

## 2022-06-22 NOTE — PROGRESS NOTES
Patient Assessment Instrument  Quality Indicators - Discharge    Section GG. Self-Care Performance      Section GG. Mobility Performance     Roll Left and Right: Patient completed the activities by him/herself with no  assistance from a helper.   Sit to Lying: Patient completed the activities by him/herself with no  assistance from a helper.   Lying to Sitting on Side of Bed: Patient completed the activities by  him/herself with no assistance from a helper.   Sit to Stand: Patient completed the activities by him/herself with no  assistance from a helper.   Chair/Bed to Chair Transfer: Patient completed the activities by him/herself  with no assistance from a helper.   Toilet Transfer Patient completed the activities by him/herself with no  assistance from a helper.   Car Transfer: Patient completed the activities by him/herself with no  assistance from a helper.   Walk 10 Feet:   [place]od provides verbal cues and/or touching/steadying and/or  contact guard assistance as patient completes activity. Assistance may be  provided throughout the activity or intermittently.  Walk 50 Feet with 2 Turns:   Mc[place]od provides verbal cues and/or  touching/steadying and/or contact guard assistance as patient completes  activity. Assistance may be provided throughout the activity or intermittently.  Walk 150 Feet:   Mc[place]od provides verbal cues and/or touching/steadying and/or  contact guard assistance as patient completes activity. Assistance may be  provided throughout the activity or intermittently.  Walking 10 Feet on Uneven Surfaces:   Not attempted due to medical or safety  concerns.  1 Step Over Curb or Up/Down Stair:   Mc[place]od provides verbal cues and/or  touching/steadying and/or contact guard assistance as patient completes  activity. Assistance may be provided throughout the activity or intermittently.  4 Steps Up and Down, With/Without Rail:   Mc[place]od provides verbal cues and/or  touching/steadying and/or contact guard  assistance as patient completes  activity. Assistance may be provided throughout the activity or intermittently.  12 Steps Up and Down, With/Without Rail:   Not attempted due to medical or  safety concerns.  Picking up an Object:   Not attempted due to medical or safety concerns. Uses  Wheelchair and/or Scooter: Yes  Wheel 50 Feet with Two Turns: Patient completed the activities by him/herself  with no assistance from a helper.  Type of Wheelchair or Scooter Used: Manual  Wheel 150 Feet: Patient completed the activities by him/herself with no  assistance from a helper.  Type of Wheelchair or Scooter Used: Manual    Section J. Health Conditions Discharge      Section M. Skin Conditions Discharge      . Current Number of Unhealed Pressure Ulcers      Section N. Medication    Signed by: Julianna Ann PTA

## 2022-06-22 NOTE — PROGRESS NOTES
Occupational Therapy:    Physical Therapy: Individual: 15 minutes.    Speech Language Pathology:    Signed by: Julianna Ann PTA

## 2022-06-22 NOTE — THERAPY DISCHARGE NOTE
Inpatient Rehabilitation - Physical Therapy Treatment Note/Discharge   [facility]     Patient Name: Lisa Velazquez  : 1965  MRN: [id number]  Today's Date: 2022                Admit Date: 2022    Visit Dx:    ICD-10-CM ICD-9-CM   1. Acute respiratory failure with hypoxia (HCC)  J96.01 518.81     Patient Active Problem List   Diagnosis   • S/P right hip fracture   • Acute respiratory failure with hypoxia (HCC)     Past Medical History:   Diagnosis Date   • Coronary artery disease    • Diabetes mellitus (HCC)    • Elevated cholesterol    • GERD (gastroesophageal reflux disease)    • History of transfusion    • Hypertension      Past Surgical History:   Procedure Laterality Date   • ABDOMINAL SURGERY      gallbladder removal   • CARDIAC CATHETERIZATION     • CARDIAC SURGERY      stent   • COLONOSCOPY     • FRACTURE SURGERY     • TOE AMPUTATION Right     3rd toe right foot       PT ASSESSMENT (last 12 hours)     IRF PT Evaluation and Treatment     Row Name 22 1500          PT Time and Intention    Document Type other (see comments)  Discharge summary  -RF     Mode of Treatment individual therapy  -RF     Patient/Family/Caregiver Comments/Observations D/C planning performed with pt this date. Education provided on techniques for home safety and importance of compliance with HEP for continued LE strengthening; HEP pamphlet and GTB provided. Pt verbalized understanding of all TE.  -RF     Row Name 22 1500          Positioning and Restraints    Pre-Treatment Position in bed  -RF     In Bed sitting EOB;call light within reach;encouraged to call for assist  -RF     Row Name 22 1500          Bed Mobility Goal 1 (PT-IRF)    Progress/Outcomes (Bed Mobility Goal 1, PT-IRF) goal met  -RF     Row Name 22 1500          Transfer Goal 1 (PT-IRF)    Progress/Outcomes (Transfer Goal 1, PT-IRF) goal met  -RF     Row Name 22 1500          Gait/Walking Locomotion Goal 1 (PT-IRF)     Progress/Outcomes (Gait/Walking Locomotion Goal 1, PT-IRF) goal partially met  -RF     Row Name 06/22/22 1500          Discharge Summary (PT)    Outcomes Achieved/Progress Made Upon Discharge (PT) goals partially achieved prior to discharge  -RF     Discharge Summary Statement (PT) Pt D/C home with HEP. Pt able to make progress towards functional goals at this time, however, supervision is required for ambulation as minimal unsteadiness is continually noted.  -RF           User Key  (r) = Recorded By, (t) = Taken By, (c) = Cosigned By    Initials Name Provider Type    RF Julianna Ann PTA Physical Therapist Assistant                Physical Therapy Education                 Title: PT OT SLP Therapies (Done)     Topic: Physical Therapy (Resolved)     Point: Mobility training (Resolved)     Learning Progress Summary           Patient Acceptance, E,TB, VU by RF at 6/21/2022 1634      Show all documentation for this point (12)                 Point: Home exercise program (Resolved)     Learning Progress Summary           Patient Acceptance, E,TB, VU by RF at 6/21/2022 1634      Show all documentation for this point (12)                 Point: Body mechanics (Resolved)     Learning Progress Summary           Patient Acceptance, E,TB, VU by RF at 6/21/2022 1634      Show all documentation for this point (12)                 Point: Precautions (Resolved)     Learning Progress Summary           Patient Acceptance, E,TB, VU by RF at 6/21/2022 1634      Show all documentation for this point (12)                             User Key     Initials Effective Dates Name Provider Type Discipline    RF 06/16/21 -  Julianna Ann PTA Physical Therapist Assistant PT                PT Recommendation and Plan        Daily Progress Summary (PT)  Functional Goal Overall Progress (PT): progressing toward functional goals as expected  Daily Progress Summary (PT): Pt demonstrates increasing independence with functional mobility and  ambulation this date. Slight dynamic balance deficits continually noted. Continued skilled care required for further improvements to ensure maximum safe function upon D/C.  Impairments Still Limiting Function (PT): functional activity tolerance impairment, pain, strength deficit  Recommendations (PT): Continue per current POC         Time Calculation:    PT Charges     Row Name 06/22/22 1550             Time Calculation    PT Received On 06/22/22  -RF              Time Calculation- PT    Total Timed Code Minutes- PT 15 minute(s)  -RF            User Key  (r) = Recorded By, (t) = Taken By, (c) = Cosigned By    Initials Name Provider Type    RF Julianna Ann, JESSICA Physical Therapist Assistant                Therapy Charges for Today     Code Description Service Date Service Provider Modifiers Qty    15693958361 HC GAIT TRAINING EA 15 MIN 6/21/2022 Julianna Ann, PTA GP, CQ 2    28242216322 HC PT NEUROMUSC RE EDUCATION EA 15 MIN 6/21/2022 Julianna Ann, PTA GP, CQ 2    96851204197 HC PT THER PROC EA 15 MIN 6/21/2022 Julianna Ann, PTA GP, CQ 1    37279935419 HC PT THERAPEUTIC ACT EA 15 MIN 6/21/2022 Julianna Ann, PTA GP, CQ 1    95302348133 HC PT THERAPEUTIC ACT EA 15 MIN 6/22/2022 Julianna Ann, PTA GP, CQ 1                    Julianna Ann, JESSICA  6/22/2022

## 2022-06-22 NOTE — THERAPY DISCHARGE NOTE
Inpatient Rehabilitation - IRF Occupational Therapy Treatment Note/Discharge   [place]     Patient Name: Lisa Velazquez  : 1965  MRN: [id number]  Today's Date: 2022               Admit Date: 2022       ICD-10-CM ICD-9-CM   1. Acute respiratory failure with hypoxia (HCC)  J96.01 518.81     Patient Active Problem List   Diagnosis   • S/P right hip fracture   • Acute respiratory failure with hypoxia (HCC)     Past Medical History:   Diagnosis Date   • Coronary artery disease    • Diabetes mellitus (HCC)    • Elevated cholesterol    • GERD (gastroesophageal reflux disease)    • History of transfusion    • Hypertension      Past Surgical History:   Procedure Laterality Date   • ABDOMINAL SURGERY      gallbladder removal   • CARDIAC CATHETERIZATION     • CARDIAC SURGERY      stent   • COLONOSCOPY     • FRACTURE SURGERY     • TOE AMPUTATION Right     3rd toe right foot       IRF OT ASSESSMENT FLOWSHEET (last 12 hours)     IRF OT Evaluation and Treatment     Row Name 22 1400          OT Time and Intention    Document Type discharge evaluation  -     Mode of Treatment individual therapy;occupational therapy  -     Row Name 22 1400          General Information    Patient/Family/Caregiver Comments/Observations patient discharged home today with assist of family. patient currently voiced no concerns regarding self care and functional status at home. patient has appropriate AE as needed and instructed in safety with ADL.  -     Row Name 22 1400          Bathing    [place] Level (Bathing) set up  -     Row Name 22 1400          Upper Body Dressing    [place] Level (Upper Body Dressing) supervision;modified independence  -     Row Name 22 1400          Lower Body Dressing    [place] Level (Lower Body Dressing) supervision;set up  -     Row Name 22 1400          Grooming    [place] Level (Grooming) modified independence  -     Row Name 22  1400          Toileting    [place] Level (Toileting) supervision;set up assistance;verbal cues  -     Row Name 06/22/22 1400          Self-Feeding    [place] Level (Self-Feeding) modified independence  -           User Key  (r) = Recorded By, (t) = Taken By, (c) = Cosigned By    Initials Name Effective Dates    Rukhsana Olivo, OT 06/16/21 -               Wound Other (See comments) midline sternal Incision (Active)   Dressing Appearance open to air 06/22/22 0941   Closure Liquid skin adhesive 06/22/22 0941   Base dry;clean 06/22/22 0941       Wound Right distal thigh Incision (Active)   Dressing Appearance open to air 06/22/22 0941   Closure Open to air;Liquid skin adhesive 06/22/22 0941   Base dry 06/21/22 1930       Wound Right proximal leg Incision (Active)   Dressing Appearance open to air 06/22/22 0941   Closure Open to air;Liquid skin adhesive 06/22/22 0941       Occupational Therapy Education                 Title: PT OT SLP Therapies (Done)     Topic: Occupational Therapy (Done)     Point: ADL training (Done)     Description:   Instruct learner(s) on proper safety adaptation and remediation techniques during self care or transfers.   Instruct in proper use of assistive devices.              Learning Progress Summary           Patient Acceptance, E,TB, VU by DL at 6/16/2022 2326      Show all documentation for this point (2)                 Point: Home exercise program (Done)     Description:   Instruct learner(s) on appropriate technique for monitoring, assisting and/or progressing therapeutic exercises/activities.              Learning Progress Summary           Patient Acceptance, E,TB, VU by DL at 6/16/2022 2326      Show all documentation for this point (1)                 Point: Precautions (Done)     Description:   Instruct learner(s) on prescribed precautions during self-care and functional transfers.              Learning Progress Summary           Patient Acceptance, E,TB, VU  by DANTE at 6/16/2022 2326      Show all documentation for this point (2)                 Point: Body mechanics (Done)     Description:   Instruct learner(s) on proper positioning and spine alignment during self-care, functional mobility activities and/or exercises.              Learning Progress Summary           Patient Acceptance, E,TB, VU by DANTE at 6/16/2022 2326                               User Key     Initials Effective Dates Name Provider Type Discipline    DL 03/16/22 -  Ashley Mccord, RN Registered Nurse Nurse                OT Recommendation and Plan  Anticipated Discharge Disposition (OT): home with assist  Planned Therapy Interventions (OT): activity tolerance training, adaptive equipment training, BADL retraining, patient/caregiver education/training, ROM/therapeutic exercise, strengthening exercise, transfer/mobility retraining           OT IRF GOALS     Row Name 06/22/22 1400 06/17/22 1000 06/09/22 1500       Bathing Goal 1 (OT-IRF)    Activity/Device (Bathing Goal 1, OT-IRF) -- -- bathing skills, all  -AH    [place] Level (Bathing Goal 1, OT-IRF) -- -- minimum assist (75% or more patient effort)  -AH    Progress/Outcomes (Bathing Goal 1, OT-IRF) goal met  -AH goal met  -AH --       Bathing Goal 2 (OT-IRF)    Activity/Device (Bathing Goal 2, OT-IRF) -- -- bathing skills, all  -AH    [place] Level (Bathing Goal 2, OT-IRF) -- -- set-up required  -AH    Progress/Outcomes (Bathing Goal 2, OT-IRF) goal met  -AH goal met  -AH --       LB Dressing Goal 1 (OT-IRF)    Activity/Device (LB Dressing Goal 1, OT-IRF) -- -- lower body dressing  -AH    [place] (LB Dressing Goal 1, OT-IRF) -- -- minimum assist (75% or more patient effort);moderate assist (50-74% patient effort)  -AH    Progress/Outcomes (LB Dressing Goal 1, OT-IRF) goal met  -AH goal met  -AH --       LB Dressing Goal 2 (OT-IRF)    Activity/Device (LB Dressing Goal 2, OT-IRF) -- -- lower body dressing  -AH    [place] (LB Dressing  Goal 2, OT-IRF) -- -- set-up required  -AH    Progress/Outcomes (LB Dressing Goal 2, OT-IRF) goal met  -AH goal met  -AH --       Toileting Goal 1 (OT-IRF)    Activity/Device (Toileting Goal 1, OT-IRF) -- -- toileting skills, all  -AH    [place] Level (Toileting Goal 1, OT-IRF) -- -- moderate assist (50-74% patient effort)  -AH    Progress/Outcomes (Toileting Goal 1, OT-IRF) goal met  -AH goal met  -AH --       Toileting Goal 2 (OT-IRF)    Activity/Device (Toileting Goal 2, OT-IRF) -- -- toileting skills, all  -AH    [place] Level (Toileting Goal 2, OT-IRF) -- -- set-up required;supervision required  -AH    Progress/Outcomes (Toileting Goal 2, OT-IRF) goal met  - goal ongoing;goal partially met  -AH --          User Key  (r) = Recorded By, (t) = Taken By, (c) = Cosigned By    Initials Name Provider Type    Rukhsana Olivo OT Occupational Therapist                    Time Calculation:    Time Calculation- OT     Row Name 06/22/22 1500             Time Calculation- OT    OT Start Time 1130  -      OT Stop Time 1145  -      OT Time Calculation (min) 15 min  -            User Key  (r) = Recorded By, (t) = Taken By, (c) = Cosigned By    Initials Name Provider Type     Rukhsana Dixon, OT Occupational Therapist                Therapy Charges for Today     Code Description Service Date Service Provider Modifiers Qty    [id number]  OT THERAPEUTIC ACT EA 15 MIN 6/21/2022 Rukhsana Dixon OT GO 2    [id number]  OT SELF CARE/MGMT/TRAIN EA 15 MIN 6/21/2022 Rukhsana Dixon OT GO 1    [id number]  OT THER PROC EA 15 MIN 6/21/2022 Rukhsana Dixon OT GO 3    [id number]  OT SELF CARE/MGMT/TRAIN EA 15 MIN 6/22/2022 Rukhsana Dixon, OT GO 1               OT Discharge Summary  Anticipated Discharge Disposition (OT): home with assist  Reason for Discharge: Discharge from facility  Discharge Destination: Home with assist    Rukhsana Dixon OT  6/22/2022

## 2022-06-22 NOTE — PROGRESS NOTES
Patient Assessment Instrument  Quality Indicators - Discharge    Section GG. Self-Care Performance      Section GG. Mobility Performance      Section J. Health Conditions Discharge  Fall(s) Since Admission:  No    Section M. Skin Conditions Discharge  Unhealed Pressure Ulcer(s)/Injurie(s) at Stage 1 or Higher:  No    . Current Number of Unhealed Pressure Ulcers    Number of Unhealed Stage 2: 0  Number of Unhealed Stage 3: 0  Number of Unhealed Stage 4: 0    Section N. Medication    Signed by: Hilda Foster Nurse

## 2022-06-22 NOTE — PROGRESS NOTES
Patient Assessment Instrument  Quality Indicators - Discharge    Section GG. Self-Care Performance     Eating: Patient completed the activities by him/herself with no assistance from  a helper.   Oral Hygiene: [place] sets up or cleans up; patient completes activity. [place]  assists only prior to or following the activity.   Toileting Hygiene: : [place] sets up or cleans up; patient completes activity.  [place] assists only prior to or following the activity.   Shower/Bathe Self: [place] sets up or cleans up; patient completes activity.  [place] assists only prior to or following the activity.   Upper Body Dressing: Patient completed the activities by him/herself with no  assistance from a helper.   Lower Body Dressing: [place] sets up or cleans up; patient completes activity.  [place] assists only prior to or following the activity.   Putting On/Taking Off Footwear: [place] provides verbal cues and/or  touching/steadying and/or contact guard assistance as patient completes  activity.    Section GG. Mobility Performance      Section J. Health Conditions Discharge      Section M. Skin Conditions Discharge      . Current Number of Unhealed Pressure Ulcers      Section N. Medication    Signed by: Candie Dixon, Occupational Therapist

## 2022-06-22 NOTE — DISCHARGE SUMMARY
DISCHARGE SUMMARY        Patient Identification:  Name:  Lisa Velazquez  Age:  56 y.o.  Sex:  female  :  1965  MRN:  [id number]  Visit Number:  [id number]    Date of Admission: 2022  Date of Discharge:  2022      PCP: Patricia Skelton APRN    Discharging Provider: Alejandrina Ann MD      Discharge Diagnoses     Debility  CAD--status post CABG  CHF  Hypotension      Consults     Consults     No orders found from 5/10/2022 to 2022.          History of Presenting Illness     57 y/o female admitted for intervention regarding multivessel CAD. Previous workup showed R SFA with 99% stenosis, L SFA stent occlusion, occluded R popliteal artery and PT, and occluded L PT. LHC revealed 70% stenosis in LAD, 70% stenosis in LCx, and total occlusion of RCA with L to R collaterals. ECHO showed an EF of 55-60%, diastolic dysfunction, mild to moderate MR, and mild TR and stress test showed anterior wall with reversible defect. 22 she underwent sternotomy, CABG x 2, endoscopic vein harvesting of RLE greater saphenous vein from the ascending aorta to the 1st obtuse marginal coronary artery, vein from side of the obtuse marginal graft the posterior descending coronary artery, L internal mammary artery the L anterior descending coronary artery with leg closure. During admission she was treated for acute hypoxic respiratory failure with improved with diuresis and was able to wean from HFNC to NC.     Patient continued to progress medically and physical therapy and Occupational Therapy evaluations were completed with recommended acute inpatient rehabilitation referral for continued functional mobility intervention and reeducation.  Acute rehab referral ordered for continued medical monitoring and management post prolonged hospitalization, continued respiratory status monitoring, lab monitoring, pain management needs, bowel and bladder management with new medication reeducation, skin monitoring and  breakdown prevention along with ongoing multiple comorbidities that require intervention for regulation.     The preadmission mini-FIM score as assessed by the referring facility is as follows: Eating 5; grooming 5; bathing 3; dressing-upper body 5; dressing-lower body to; toileting 2; transfers: Bed, chair, wheelchair 4; transfers: Toilet 4; locomotion: Walk, wheelchair 4; bladder management 6; bowel management 6; convergence 7; expression 7; social interaction 7; problem solving 7; memory 7.      Hospital Course     Debility--participated with physical and Occupational Therapy on 6/20/2022: Performs bed mobility activities independently; performs transfer activities with supervision to modified independence and verbal/nonverbal cues; utilizes wheelchair for sit stand/stand to sit transfer; ambulated 600 feet with front wheeled walker and contact-guard to standby assist with verbal/nonverbal cues; required supervision for upper body dressing; set up assist for grooming activities; participated with coordination and functional endurance therapeutic exercise     CAD--status post CABG--patient doing well at this time.  Has been slightly hypotensive at times.  Will discontinue metoprolol and will follow patient closely.  Continue aspirin, Plavix, Crestor.     CHF--compensated.  Have held Lasix secondary to hypotension     Hypotension--resolved.  Discussed with her the option of initiating midodrine, she prefers to wait until her cardiology follow-up at  on 6/30/2022.  will continue to monitor    Patient previously lived at home and plans to return home with nursing assistance, PT/OT, DME.    Patient required intensive inpatient physical therapy for therapeutic exercises, range of motion, endurance, gait training, safety, stairs training, strengthening for at least 1 to 2 hours a day for 5 to 6 days a week as well as occupational therapy for dressing, ADLs, feeding, home skills, safety and toileting techniques for 1  to 2 hours a day for 5 to 6 days a week.      Discharge Vitals/Physical Examination     Vital Signs:  Temp:  [97.9 °F (36.6 °C)-98.4 °F (36.9 °C)] 98.4 °F (36.9 °C)  Heart Rate:  [74-86] 74  Resp:  [18] 18  BP: (108-138)/(66-69) 108/69  No data found.  SpO2 Percentage    06/21/22 0912 06/21/22 1900 06/22/22 0803   SpO2: 97% 95% 96%     SpO2:  [95 %-96 %] 96 %  on   ;   Device (Oxygen Therapy): room air    Body mass index is 25.2 kg/m².  Wt Readings from Last 3 Encounters:   06/08/22 73 kg (160 lb 15 oz)   02/01/22 73 kg (161 lb)         Physical Exam:    Constitutional: No acute distress, sitting up in bed, feels great with no complaints.  HEENT: Normocephalic atraumatic  Neck: Supple   Cardiovascular: Regular rate and rhythm  Pulmonary/Chest: Clear to auscultation  Abdominal: Positive bowel sounds soft.   Musculoskeletal: No arthropathy  Neurological: No focal deficits  Skin: Incision site appears to be healing well          Pertinent Laboratory/Radiology Results     Pertinent Laboratory Results:              Results from last 7 days   Lab Units 06/17/22  1536 06/17/22  1219   LACTATE mmol/L 1.0  --    WBC 10*3/mm3  --  6.99   HEMOGLOBIN g/dL  --  11.0*   HEMATOCRIT %  --  35.9   MCV fL  --  96.0   MCHC g/dL  --  30.6*   PLATELETS 10*3/mm3  --  305     Results from last 7 days   Lab Units 06/17/22  1536   SODIUM mmol/L 139   POTASSIUM mmol/L 4.2   CHLORIDE mmol/L 106   CO2 mmol/L 21.9*   BUN mg/dL 12   CREATININE mg/dL 0.65   CALCIUM mg/dL 9.5   GLUCOSE mg/dL 106*   ALBUMIN g/dL 3.18*   BILIRUBIN mg/dL 0.8   ALK PHOS U/L 100   AST (SGOT) U/L 19   ALT (SGPT) U/L 12   Estimated Creatinine Clearance: 111.4 mL/min (by C-G formula based on SCr of 0.65 mg/dL).  No results found for: AMMONIA    Glucose   Date/Time Value Ref Range Status   06/22/2022 0550 78 70 - 130 mg/dL Final     Comment:     Meter: [id number] : [id number] [place] Crystal   06/21/2022 1605 130 70 - 130 mg/dL Final     Comment:     Meter: [id number]  : [id number] Shivam Chandler   06/21/2022 1126 105 70 - 130 mg/dL Final     Comment:     Meter: [id number] : [id number] Hanh John   06/21/2022 0601 82 70 - 130 mg/dL Final     Comment:     Meter: [id number] : [id number] [place] Crystal   06/20/2022 1611 123 70 - 130 mg/dL Final     Comment:     Meter: [id number] : [id number] Lucianondkamron Geraldine   06/20/2022 1133 121 70 - 130 mg/dL Final     Comment:     Meter: [id number] : [id number] Shivam Da Silvayla   06/20/2022 0615 103 70 - 130 mg/dL Final     Comment:     Meter: [id number] : [id number] Tj Arellano Rukhsana   06/19/2022 1600 101 70 - 130 mg/dL Final     Comment:     Meter: [id number] : [id number] david magalie     Lab Results   Component Value Date    HGBA1C 6.5 (H) 05/12/2022     Lab Results   Component Value Date    TSH 2.090 06/18/2022       No results found for: BLOODCX  Urine Culture   Date Value Ref Range Status   06/18/2022 <10,000 CFU/mL Mixed Judy Isolated  Final     No results found for: WOUNDCX  No results found for: STOOLCX  No results found for: RESPCX  Pain Management Panel    There is no flowsheet data to display.         Pertinent Radiology Results:    Imaging Results (All)     Procedure Component Value Units Date/Time    XR Hip With or Without Pelvis 1 View Left [[id number]] Collected: 06/12/22 1121     Updated: 06/12/22 1124    Narrative:      EXAM:    XR Left Hip With Pelvis When Performed, 1 View     EXAM DATE:    6/11/2022 8:01 PM     CLINICAL HISTORY:    PAIN; J96.01-Acute respiratory failure with hypoxia     TECHNIQUE:    Frontal view of the left hip with pelvis when performed.     COMPARISON:    No relevant prior studies available.     FINDINGS:    Bones/joints:  Unremarkable.  No acute fracture.  No dislocation.    Soft tissues:  Unremarkable.       Impression:        No acute findings in the left hip.     This report was finalized on 6/12/2022 11:22 AM by Dr. Mario Alberto Griggs MD.       XR Spine Lumbar Complete With Flex &  Ext [[id number]] Collected: 06/12/22 1120     Updated: 06/12/22 1123    Narrative:      EXAMINATION: XR SPINE LUMBAR COMPLETE W FLEX EXT-      CLINICAL INDICATION: PAIN; J96.01-Acute respiratory failure with hypoxia        COMPARISON: None available     FINDINGS: 7 images of the lumbar spine are presented     Flexion and extension views are presented     FINDINGS:  The vertebral body heights are maintained     Disc space heights are uniform     Alignment is anatomic. No discrepancy in alignment with flexion or  extension     There is evidence of atherosclerotic vascular disease.       Impression:      Unremarkable appearance of the lumbar spine.      This report was finalized on 6/12/2022 11:21 AM by Dr. Mario Alberto Griggs MD.        Renal Limited [[id number]] Collected: 06/11/22 2040     Updated: 06/11/22 2043    Narrative:      BILATERAL RENAL ULTRASOUND, 6/11/2022    HISTORY:  Back pain.    TECHNIQUE:  Grayscale ultrasound imaging of both kidneys.    FINDINGS:  Both kidneys are normal in size and ultrasound appearance. No evidence of urinary obstruction. No visible nephrolithiasis, renal mass, perinephric fluid collection or renal parenchymal scarring.    The technologist recorded no images of the urinary bladder.    Right renal length: 10.1 cm.  Left renal length: 10.5 cm.      Impression:      Both kidneys are normal in size and ultrasound appearance.    Signer Name: Corby Roa MD   Signed: 6/11/2022 8:40 PM   Workstation Name: [id number]    [facility]          Test Results Pending at Discharge:        Discharge Disposition/Discharge Medications/Discharge Appointments     Discharge Disposition:     Home or Self Care    Code Status While Inpatient:    Code Status and Medical Interventions:   Ordered at: 06/08/22 1947     Code Status (Patient has no pulse and is not breathing):    CPR (Attempt to Resuscitate)     Medical Interventions (Patient has pulse or is breathing):    Full  Support       Discharge Medications:       Discharge Medications      New Medications      Instructions Start Date   docusate sodium 100 MG capsule   100 mg, Oral, 2 Times Daily      melatonin 5 MG tablet tablet   5 mg, Oral, Nightly         Continue These Medications      Instructions Start Date   ascorbic acid 500 MG tablet  Commonly known as: VITAMIN C   1,000 mg, Oral, Daily      aspirin 81 MG EC tablet   81 mg, Oral, Daily      calcium carbonate 600 MG tablet  Commonly known as: OS-BASSEM   600 mg, Oral, Daily      clopidogrel 75 MG tablet  Commonly known as: PLAVIX   75 mg, Oral, Daily      gabapentin 100 MG capsule  Commonly known as: NEURONTIN   100 mg, Oral, Daily      insulin aspart 100 UNIT/ML solution pen-injector sc pen  Commonly known as: novoLOG FLEXPEN   8 Units, Subcutaneous, 3 Times Daily With Meals, Prior to [facility] Admission, Patient was on:  Sliding scale 150-200=2 units, 201-250=4 units, 251-300=6 units, 301-350= 8 units, 351 or higher = 10 units      insulin glargine 100 UNIT/ML injection  Commonly known as: LANTUS, SEMGLEE   15 Units, Subcutaneous, 2 Times Daily      multivitamin with minerals tablet tablet   1 tablet, Oral, Daily      rosuvastatin 40 MG tablet  Commonly known as: CRESTOR   40 mg, Oral, Daily      vitamin D 1.25 MG (68059 UT) capsule capsule  Commonly known as: ERGOCALCIFEROL   50,000 Units, Oral, Weekly             Discharge Appointments:         Follow-up Information     Patricia Skelton APRN. Go on 7/6/2022.    Specialties: Nurse Practitioner, Family Medicine  Why: at 3:15 pm for hospital follow up  Contact information:  [address] [place] [id number] [phone]             Ralph Dumont MD. Go on 6/30/2022.    Specialty: Cardiothoracic Surgery  Why: at 3:00 PM for Cardiothoracic surgery follow up.  Located in [address].  Contact information:  [address]  [facility] [address] [phone]                             Condition at Discharge:    Stable,  much improved with no issues today    Discharge Plan Of Care:    Lisa is going home today with home exercise programs from PT/OT at discharge. Doing very well with follow up with her cardiologist on 6/30 and her PCP.            Please note that this discharge summary required more than 30 minutes to complete.          Alejandrina Ann MD  06/22/22  10:08 EDT

## 2022-06-22 NOTE — SIGNIFICANT NOTE
06/22/22 1120   Plan   Plan Spoke to pt about discharge and reminded pt to ask Cardiothoracic Surgeon to make referral for Cardiac Rehab.  PT/OT provided home exercise programs.  Sister Denia is waiting in parking lot to transport pt home.  Sister will be staying with pt until she no longer needs someone with her.  No other needs voiced at this time.

## 2022-06-23 NOTE — PROGRESS NOTES
Case Management  Inpatient Rehabilitation Team Conference    Conference Date/Time: 6/21/2022 7:41:13 AM    Team Conference Attendees:  MD Carrie Almanza SW Jessica Bill, SUBHA,   Kathie Rose, SUBHA Brothers, PT  Candie Dixon OT    Demographics            Age: 56Y            Gender: Female    Admission Date: 6/8/2022 6:28:00 PM  Rehabilitation Diagnosis:  debility  Comorbidities: E11.9 Type 2 diabetes mellitus without complications  E78.00 Pure hypercholesterolemia, unspecified  E78.5 Hyperlipidemia, unspecified  G47.00 Insomnia, unspecified  I10 Essential (primary) hypertension  I25.82 Chronic total occlusion of coronary artery  I70.201 Unspecified atherosclerosis of native arteries of extremities, right leg  K59.00 Constipation, unspecified  Z79.4 Long term (current) use of insulin  Z79.83 Long term (current) use of bisphosphonates  Z80.9 Family history of malignant neoplasm, unspecified  Z82.49 Family history of ischemic heart disease and other diseases of the  circulatory system  Z83.3 Family history of diabetes mellitus  Z87.891 Personal history of nicotine dependence  Z95.1 Presence of aortocoronary bypass graft      Plan of Care  Anticipated Discharge Date/Estimated Length of Stay: 6-22-22  Anticipated Discharge Destination: Community discharge with assistance  Discharge Plan : Pt plans to return home with sister staying with her if needed  or she can go to niece's home at discharge.  Medical Necessity Expected Level Rationale: good  Intensity and Duration: an average of 3 hours/5 days per week  Medical Supervision and 24 Hour Rehab Nursing: x  Physical Therapy: x  PT Intensity/Duration: PT 1.5 hours per day/5 days per week  Occupational Therapy: x  OT Intensity/Duration: OT 1.5 hours per day/5 days per week  Social Work: x  Therapeutic Recreation: x  Updated (if changes indicated)    Anticipated Discharge Date/Estimated Length of Stay:   6-22-22      Discharge Plan  of Care: Home Program    Based on the patient's medical and functional status, their prognosis and  expected level of functional improvement is: good      Interdisciplinary Problem/Goals/Status  Body Function Structure    [RN] Skin Integrity(Active)  Current Status(02/03/2022): free from skin breakdown this shift  Weekly Goal(07/23/2022): free from skin breakdown this stay  Discharge Goal: home free of skin breakdown        Mobility    [PT] Bed/Chair/Wheelchair(Active)  Current Status(06/09/2022): CGA-min A  Weekly Goal(06/16/2022): MI  Discharge Goal: MI    [PT] Walk(Active)  Current Status(06/09/2022): amb 320' RW CGA  Weekly Goal(06/16/2022): amb 300' AAD MI  Discharge Goal: amb 300' AAD MI        Safety    [RN] Potential for Injury(Active)  Current Status(02/03/2022): free from injury this shift  Weekly Goal(07/23/2022): free from injury this stay  Discharge Goal: home free of injury        Self Care    [OT] Toileting(Resolved)  Current Status(06/20/2022): set up  Weekly Goal(06/22/2022): set up  Discharge Goal: set up    Comments: Pt is going home at discharge and sister Denia will be staying with her  until she no longer someone with her.  Pt's insurance is not accepted by the two  home health agencies in [facility] and she does not want to go to outpatient  PT.  Pt requested home exercise programs from PT/OT.    Signed by: RASTA Landry    Physician CoSigned By: Alejandrina Ann 06/23/2022 07:34:34

## 2022-07-20 ENCOUNTER — HOSPITAL ENCOUNTER (OUTPATIENT)
Dept: HOSPITAL 79 - WCC | Age: 57
Discharge: HOME | End: 2022-07-20
Attending: NURSE PRACTITIONER
Payer: OTHER GOVERNMENT

## 2022-07-20 DIAGNOSIS — Z79.4: ICD-10-CM

## 2022-07-20 DIAGNOSIS — Z87.891: ICD-10-CM

## 2022-07-20 DIAGNOSIS — Z95.1: ICD-10-CM

## 2022-07-20 DIAGNOSIS — E11.622: ICD-10-CM

## 2022-07-20 DIAGNOSIS — E11.51: ICD-10-CM

## 2022-07-20 DIAGNOSIS — T81.31XA: Primary | ICD-10-CM

## 2022-07-20 DIAGNOSIS — I10: ICD-10-CM

## 2022-07-20 DIAGNOSIS — I25.10: ICD-10-CM

## 2022-07-26 ENCOUNTER — HOSPITAL ENCOUNTER (OUTPATIENT)
Dept: HOSPITAL 79 - WCC | Age: 57
Discharge: HOME | End: 2022-07-26
Attending: NURSE PRACTITIONER
Payer: OTHER GOVERNMENT

## 2022-07-26 DIAGNOSIS — E11.622: ICD-10-CM

## 2022-07-26 DIAGNOSIS — I25.10: ICD-10-CM

## 2022-07-26 DIAGNOSIS — Z79.4: ICD-10-CM

## 2022-07-26 DIAGNOSIS — T81.31XA: Primary | ICD-10-CM

## 2022-07-26 DIAGNOSIS — I10: ICD-10-CM

## 2022-07-26 DIAGNOSIS — Z95.1: ICD-10-CM

## 2022-07-26 DIAGNOSIS — E11.51: ICD-10-CM

## 2022-08-03 ENCOUNTER — HOSPITAL ENCOUNTER (OUTPATIENT)
Dept: HOSPITAL 79 - WCC | Age: 57
End: 2022-08-03
Attending: NURSE PRACTITIONER
Payer: OTHER GOVERNMENT

## 2022-08-03 DIAGNOSIS — E11.622: ICD-10-CM

## 2022-08-03 DIAGNOSIS — E11.40: ICD-10-CM

## 2022-08-03 DIAGNOSIS — I73.9: ICD-10-CM
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
## 2022-08-03 DIAGNOSIS — I10: ICD-10-CM

## 2022-08-03 DIAGNOSIS — T81.32XA: Primary | ICD-10-CM

## 2022-08-03 DIAGNOSIS — Z79.4: ICD-10-CM

## 2022-08-03 DIAGNOSIS — I25.10: ICD-10-CM

## 2022-08-11 ENCOUNTER — HOSPITAL ENCOUNTER (OUTPATIENT)
Dept: HOSPITAL 79 - WCC | Age: 57
End: 2022-08-11
Attending: NURSE PRACTITIONER
Payer: OTHER GOVERNMENT
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
## 2022-08-11 DIAGNOSIS — E11.622: ICD-10-CM

## 2022-08-11 DIAGNOSIS — E11.40: ICD-10-CM

## 2022-08-11 DIAGNOSIS — T81.32XA: Primary | ICD-10-CM

## 2022-08-11 DIAGNOSIS — I25.10: ICD-10-CM

## 2022-08-11 DIAGNOSIS — Z79.4: ICD-10-CM

## 2022-08-11 DIAGNOSIS — I10: ICD-10-CM

## 2022-08-11 DIAGNOSIS — I73.9: ICD-10-CM

## 2022-08-18 ENCOUNTER — HOSPITAL ENCOUNTER (OUTPATIENT)
Dept: HOSPITAL 79 - WCC | Age: 57
End: 2022-08-18
Attending: NURSE PRACTITIONER
Payer: OTHER GOVERNMENT
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
## 2022-08-18 DIAGNOSIS — I73.9: ICD-10-CM

## 2022-08-18 DIAGNOSIS — Z95.1: ICD-10-CM

## 2022-08-18 DIAGNOSIS — E11.40: ICD-10-CM

## 2022-08-18 DIAGNOSIS — E11.622: ICD-10-CM

## 2022-08-18 DIAGNOSIS — Z79.4: ICD-10-CM

## 2022-08-18 DIAGNOSIS — T81.32XA: Primary | ICD-10-CM

## 2022-08-18 DIAGNOSIS — I10: ICD-10-CM
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
## 2022-08-18 DIAGNOSIS — I25.10: ICD-10-CM

## 2022-08-25 ENCOUNTER — HOSPITAL ENCOUNTER (OUTPATIENT)
Dept: HOSPITAL 79 - WCC | Age: 57
End: 2022-08-25
Attending: NURSE PRACTITIONER
Payer: OTHER GOVERNMENT

## 2022-08-25 DIAGNOSIS — Z79.4: ICD-10-CM

## 2022-08-25 DIAGNOSIS — L98.499: ICD-10-CM

## 2022-08-25 DIAGNOSIS — B96.4: ICD-10-CM

## 2022-08-25 DIAGNOSIS — E11.622: Primary | ICD-10-CM

## 2022-08-25 DIAGNOSIS — I10: ICD-10-CM

## 2022-08-25 DIAGNOSIS — I73.9: ICD-10-CM

## 2022-08-25 DIAGNOSIS — I25.10: ICD-10-CM

## 2022-08-25 PROCEDURE — G0463 HOSPITAL OUTPT CLINIC VISIT: HCPCS

## 2022-09-01 ENCOUNTER — HOSPITAL ENCOUNTER (OUTPATIENT)
Dept: HOSPITAL 79 - WCC | Age: 57
End: 2022-09-01
Attending: NURSE PRACTITIONER
Payer: OTHER GOVERNMENT

## 2022-09-01 DIAGNOSIS — I25.10: ICD-10-CM

## 2022-09-01 DIAGNOSIS — I10: ICD-10-CM

## 2022-09-01 DIAGNOSIS — B96.4: ICD-10-CM

## 2022-09-01 DIAGNOSIS — Z79.4: ICD-10-CM

## 2022-09-01 DIAGNOSIS — E11.51: ICD-10-CM

## 2022-09-01 DIAGNOSIS — E11.622: Primary | ICD-10-CM

## 2022-09-01 PROCEDURE — G0463 HOSPITAL OUTPT CLINIC VISIT: HCPCS

## 2022-09-16 ENCOUNTER — HOSPITAL ENCOUNTER (OUTPATIENT)
Dept: HOSPITAL 79 - WCC | Age: 57
End: 2022-09-16
Attending: NURSE PRACTITIONER
Payer: OTHER GOVERNMENT

## 2022-09-16 DIAGNOSIS — Z86.31: ICD-10-CM

## 2022-09-16 DIAGNOSIS — I10: ICD-10-CM

## 2022-09-16 DIAGNOSIS — Z09: Primary | ICD-10-CM

## 2022-09-16 DIAGNOSIS — I25.10: ICD-10-CM

## 2022-09-16 DIAGNOSIS — B96.4: ICD-10-CM

## 2022-09-16 DIAGNOSIS — E11.51: ICD-10-CM

## 2022-09-16 DIAGNOSIS — Z79.4: ICD-10-CM

## 2022-09-16 PROCEDURE — G0463 HOSPITAL OUTPT CLINIC VISIT: HCPCS

## 2023-01-13 ENCOUNTER — TRANSCRIBE ORDERS (OUTPATIENT)
Dept: CARDIAC REHAB | Facility: HOSPITAL | Age: 58
End: 2023-01-13
Payer: OTHER GOVERNMENT

## 2023-01-13 DIAGNOSIS — I73.9 INTERMITTENT CLAUDICATION: ICD-10-CM

## 2023-01-13 DIAGNOSIS — I73.9 PVD (PERIPHERAL VASCULAR DISEASE): Primary | ICD-10-CM

## 2023-01-19 ENCOUNTER — TREATMENT (OUTPATIENT)
Dept: CARDIAC REHAB | Facility: HOSPITAL | Age: 58
End: 2023-01-19
Payer: OTHER GOVERNMENT

## 2023-01-19 DIAGNOSIS — I73.9 PVD (PERIPHERAL VASCULAR DISEASE): ICD-10-CM

## 2023-01-19 PROCEDURE — 93668 PERIPHERAL VASCULAR REHAB: CPT

## 2023-01-19 NOTE — PROGRESS NOTES
Pt was seen today in CR for a SETPAD visit. Vital signs and session notes recorded in [facility] and will be scanned into Epic by HIM.

## 2023-01-30 ENCOUNTER — TREATMENT (OUTPATIENT)
Dept: CARDIAC REHAB | Facility: HOSPITAL | Age: 58
End: 2023-01-30
Payer: OTHER GOVERNMENT

## 2023-01-30 DIAGNOSIS — I73.9 PVD (PERIPHERAL VASCULAR DISEASE): Primary | ICD-10-CM

## 2023-01-30 PROCEDURE — 93668 PERIPHERAL VASCULAR REHAB: CPT

## 2023-01-30 NOTE — PROGRESS NOTES
Pt was seen today in CR for a SET-PAD visit. Vital signs and session notes recorded in [employer] and will be scanned into Epic by HIM.

## 2023-01-31 ENCOUNTER — APPOINTMENT (OUTPATIENT)
Dept: CARDIAC REHAB | Facility: HOSPITAL | Age: 58
End: 2023-01-31
Payer: OTHER GOVERNMENT

## 2023-02-03 ENCOUNTER — TREATMENT (OUTPATIENT)
Dept: CARDIAC REHAB | Facility: HOSPITAL | Age: 58
End: 2023-02-03
Payer: OTHER GOVERNMENT

## 2023-02-03 DIAGNOSIS — I73.9 PVD (PERIPHERAL VASCULAR DISEASE): Primary | ICD-10-CM

## 2023-02-03 PROCEDURE — 93668 PERIPHERAL VASCULAR REHAB: CPT

## 2023-02-03 NOTE — PROGRESS NOTES
Pt was seen today in CR for a SET PAD visit.  Vital signs and session notes recorded in [employer] and will be scanned into Epic by HIM.

## 2023-02-06 ENCOUNTER — TREATMENT (OUTPATIENT)
Dept: CARDIAC REHAB | Facility: HOSPITAL | Age: 58
End: 2023-02-06
Payer: OTHER GOVERNMENT

## 2023-02-06 DIAGNOSIS — I73.9 PVD (PERIPHERAL VASCULAR DISEASE): Primary | ICD-10-CM

## 2023-02-06 PROCEDURE — 93668 PERIPHERAL VASCULAR REHAB: CPT

## 2023-02-06 NOTE — PROGRESS NOTES
Pt was seen today in CR for a PAD-SET visit. Vital signs and session notes recorded in [employer] and will be scanned into Epic by HIM.

## 2023-02-10 ENCOUNTER — TREATMENT (OUTPATIENT)
Dept: CARDIAC REHAB | Facility: HOSPITAL | Age: 58
End: 2023-02-10
Payer: OTHER GOVERNMENT

## 2023-02-10 DIAGNOSIS — I73.9 PVD (PERIPHERAL VASCULAR DISEASE): Primary | ICD-10-CM

## 2023-02-10 PROCEDURE — 93668 PERIPHERAL VASCULAR REHAB: CPT

## 2023-02-10 NOTE — PROGRESS NOTES
Pt was seen today in CR for a SET-PAD visit. Vital signs and session notes recorded in [employer] and will be scanned into Epic by HIM.

## 2023-02-13 ENCOUNTER — TREATMENT (OUTPATIENT)
Dept: CARDIAC REHAB | Facility: HOSPITAL | Age: 58
End: 2023-02-13
Payer: OTHER GOVERNMENT

## 2023-02-13 DIAGNOSIS — I73.9 PVD (PERIPHERAL VASCULAR DISEASE): Primary | ICD-10-CM

## 2023-02-13 PROCEDURE — 93668 PERIPHERAL VASCULAR REHAB: CPT

## 2023-02-13 NOTE — PROGRESS NOTES
Pt was seen today in CR for a SET PAD visit.  Vital signs and session notes recorded in [employer] and will be scanned into Epic by HIM.

## 2023-02-17 ENCOUNTER — TREATMENT (OUTPATIENT)
Dept: CARDIAC REHAB | Facility: HOSPITAL | Age: 58
End: 2023-02-17
Payer: OTHER GOVERNMENT

## 2023-02-17 DIAGNOSIS — I73.9 PVD (PERIPHERAL VASCULAR DISEASE): Primary | ICD-10-CM

## 2023-02-17 PROCEDURE — 93668 PERIPHERAL VASCULAR REHAB: CPT

## 2023-02-17 NOTE — PROGRESS NOTES
Pt was seen today in CR for a SET-PAD visit. Vital signs and session notes recorded in [employer] and will be scanned into Epic by HIM.

## 2023-02-20 ENCOUNTER — TREATMENT (OUTPATIENT)
Dept: CARDIAC REHAB | Facility: HOSPITAL | Age: 58
End: 2023-02-20
Payer: OTHER GOVERNMENT

## 2023-02-20 DIAGNOSIS — I73.9 PVD (PERIPHERAL VASCULAR DISEASE): Primary | ICD-10-CM

## 2023-02-20 PROCEDURE — 93668 PERIPHERAL VASCULAR REHAB: CPT

## 2023-02-20 NOTE — PROGRESS NOTES
Pt was seen today in CR for a SET-PAD visit. Vital signs and session notes recorded in [employer] and will be scanned into Epic by HIM.

## 2023-02-24 ENCOUNTER — APPOINTMENT (OUTPATIENT)
Dept: CARDIAC REHAB | Facility: HOSPITAL | Age: 58
End: 2023-02-24
Payer: OTHER GOVERNMENT

## 2023-02-27 ENCOUNTER — TREATMENT (OUTPATIENT)
Dept: CARDIAC REHAB | Facility: HOSPITAL | Age: 58
End: 2023-02-27
Payer: OTHER GOVERNMENT

## 2023-02-27 DIAGNOSIS — I73.9 PVD (PERIPHERAL VASCULAR DISEASE): Primary | ICD-10-CM

## 2023-02-27 PROCEDURE — 93668 PERIPHERAL VASCULAR REHAB: CPT

## 2023-02-27 NOTE — PROGRESS NOTES
Pt was seen today in CR for a SET-PAD visit. Vital signs and session notes recorded in [email or web address] and will be scanned into Epic by HIM.

## 2023-03-03 ENCOUNTER — APPOINTMENT (OUTPATIENT)
Dept: CARDIAC REHAB | Facility: HOSPITAL | Age: 58
End: 2023-03-03
Payer: OTHER GOVERNMENT

## 2023-03-06 ENCOUNTER — TREATMENT (OUTPATIENT)
Dept: CARDIAC REHAB | Facility: HOSPITAL | Age: 58
End: 2023-03-06
Payer: OTHER GOVERNMENT

## 2023-03-06 DIAGNOSIS — I73.9 PVD (PERIPHERAL VASCULAR DISEASE): Primary | ICD-10-CM

## 2023-03-06 PROCEDURE — 93668 PERIPHERAL VASCULAR REHAB: CPT

## 2023-03-10 ENCOUNTER — TREATMENT (OUTPATIENT)
Dept: CARDIAC REHAB | Facility: HOSPITAL | Age: 58
End: 2023-03-10
Payer: OTHER GOVERNMENT

## 2023-03-10 DIAGNOSIS — I73.9 PVD (PERIPHERAL VASCULAR DISEASE): Primary | ICD-10-CM

## 2023-03-10 PROCEDURE — 93668 PERIPHERAL VASCULAR REHAB: CPT

## 2023-03-10 NOTE — PROGRESS NOTES
Pt was seen today in CR for a SET-PAD visit. Vital signs and session notes recorded in [employer] and will be scanned into Epic by HIM.

## 2023-03-13 ENCOUNTER — TREATMENT (OUTPATIENT)
Dept: CARDIAC REHAB | Facility: HOSPITAL | Age: 58
End: 2023-03-13
Payer: OTHER GOVERNMENT

## 2023-03-13 DIAGNOSIS — I73.9 PVD (PERIPHERAL VASCULAR DISEASE): Primary | ICD-10-CM

## 2023-03-13 PROCEDURE — 93668 PERIPHERAL VASCULAR REHAB: CPT

## 2023-03-13 NOTE — PROGRESS NOTES
Pt was seen today in CR for a SET-PAD visit. Vital signs and session notes recorded in [employer] and will be scanned into Epic by HIM.

## 2023-03-17 ENCOUNTER — APPOINTMENT (OUTPATIENT)
Dept: CARDIAC REHAB | Facility: HOSPITAL | Age: 58
End: 2023-03-17
Payer: OTHER GOVERNMENT

## 2023-05-01 ENCOUNTER — TREATMENT (OUTPATIENT)
Dept: CARDIAC REHAB | Facility: HOSPITAL | Age: 58
End: 2023-05-01
Payer: OTHER GOVERNMENT

## 2023-05-01 DIAGNOSIS — I73.9 PVD (PERIPHERAL VASCULAR DISEASE): Primary | ICD-10-CM

## 2023-05-01 PROCEDURE — 93668 PERIPHERAL VASCULAR REHAB: CPT

## 2023-05-01 NOTE — PROGRESS NOTES
Pt was seen today in CR for a SET PAD visit.  Vital signs and session notes recorded in [employer] and will be scanned into Epic by HIM.

## 2023-05-05 ENCOUNTER — TREATMENT (OUTPATIENT)
Dept: CARDIAC REHAB | Facility: HOSPITAL | Age: 58
End: 2023-05-05
Payer: OTHER GOVERNMENT

## 2023-05-05 DIAGNOSIS — I73.9 PVD (PERIPHERAL VASCULAR DISEASE): Primary | ICD-10-CM

## 2023-05-05 PROCEDURE — 93668 PERIPHERAL VASCULAR REHAB: CPT

## 2023-05-05 NOTE — PROGRESS NOTES
Pt was seen today in CR for a SET PAD visit.  Vital signs and session notes recorded in [employer] and will be scanned into Epic by HIM.

## 2023-05-08 ENCOUNTER — APPOINTMENT (OUTPATIENT)
Dept: CARDIAC REHAB | Facility: HOSPITAL | Age: 58
End: 2023-05-08
Payer: OTHER GOVERNMENT

## 2023-05-10 ENCOUNTER — TREATMENT (OUTPATIENT)
Dept: CARDIAC REHAB | Facility: HOSPITAL | Age: 58
End: 2023-05-10
Payer: OTHER GOVERNMENT

## 2023-05-10 DIAGNOSIS — I73.9 PVD (PERIPHERAL VASCULAR DISEASE): Primary | ICD-10-CM

## 2023-05-10 PROCEDURE — 93668 PERIPHERAL VASCULAR REHAB: CPT

## 2023-05-10 NOTE — PROGRESS NOTES
Pt was seen today in CR for a SET PAD visit.  Vital signs and session notes recorded in [employer] and will be scanned into Epic by HIM.

## 2023-05-12 ENCOUNTER — TREATMENT (OUTPATIENT)
Dept: CARDIAC REHAB | Facility: HOSPITAL | Age: 58
End: 2023-05-12
Payer: OTHER GOVERNMENT

## 2023-05-12 DIAGNOSIS — I73.9 PVD (PERIPHERAL VASCULAR DISEASE): Primary | ICD-10-CM

## 2023-05-12 PROCEDURE — 93668 PERIPHERAL VASCULAR REHAB: CPT

## 2023-05-12 NOTE — PROGRESS NOTES
Pt was seen today in CR for a SET-PAD visit. Vital signs and session notes recorded in [employer] and will be scanned into Epic by HIM.

## 2023-07-13 ENCOUNTER — HOSPITAL ENCOUNTER (INPATIENT)
Facility: HOSPITAL | Age: 58
LOS: 19 days | Discharge: HOME HEALTH | DRG: 560 | End: 2023-08-01
Attending: FAMILY MEDICINE | Admitting: FAMILY MEDICINE
Payer: OTHER GOVERNMENT

## 2023-07-13 DIAGNOSIS — S82.144D CLOSED NONDISPLACED BICONDYLAR FRACTURE OF RIGHT TIBIA WITH ROUTINE HEALING, SUBSEQUENT ENCOUNTER: Primary | ICD-10-CM

## 2023-07-13 PROBLEM — S82.201A RIGHT TIBIAL FRACTURE: Status: ACTIVE | Noted: 2023-07-13

## 2023-07-13 LAB
GLUCOSE BLDC GLUCOMTR-MCNC: 134 MG/DL (ref 70–130)
GLUCOSE BLDC GLUCOMTR-MCNC: 224 MG/DL (ref 70–130)

## 2023-07-13 PROCEDURE — 82948 REAGENT STRIP/BLOOD GLUCOSE: CPT

## 2023-07-13 PROCEDURE — 25010000002 ENOXAPARIN PER 10 MG: Performed by: FAMILY MEDICINE

## 2023-07-13 PROCEDURE — 63710000001 INSULIN GLARGINE PER 5 UNITS: Performed by: FAMILY MEDICINE

## 2023-07-13 RX ORDER — ASPIRIN 81 MG/1
81 TABLET ORAL DAILY
Status: DISCONTINUED | OUTPATIENT
Start: 2023-07-14 | End: 2023-08-01 | Stop reason: HOSPADM

## 2023-07-13 RX ORDER — LOSARTAN POTASSIUM 25 MG/1
12.5 TABLET ORAL DAILY
COMMUNITY
End: 2023-08-01 | Stop reason: HOSPADM

## 2023-07-13 RX ORDER — ASCORBIC ACID 500 MG
500 TABLET ORAL DAILY
Status: DISCONTINUED | OUTPATIENT
Start: 2023-07-14 | End: 2023-08-01 | Stop reason: HOSPADM

## 2023-07-13 RX ORDER — CLOPIDOGREL BISULFATE 75 MG/1
75 TABLET ORAL DAILY
Status: DISCONTINUED | OUTPATIENT
Start: 2023-07-14 | End: 2023-08-01 | Stop reason: HOSPADM

## 2023-07-13 RX ORDER — GABAPENTIN 300 MG/1
300 CAPSULE ORAL EVERY 8 HOURS SCHEDULED
Status: DISCONTINUED | OUTPATIENT
Start: 2023-07-13 | End: 2023-07-18

## 2023-07-13 RX ORDER — CLOPIDOGREL BISULFATE 75 MG/1
75 TABLET ORAL DAILY
COMMUNITY

## 2023-07-13 RX ORDER — ONDANSETRON 4 MG/1
4 TABLET, FILM COATED ORAL EVERY 6 HOURS PRN
Status: DISCONTINUED | OUTPATIENT
Start: 2023-07-13 | End: 2023-08-01 | Stop reason: HOSPADM

## 2023-07-13 RX ORDER — POLYETHYLENE GLYCOL 3350 17 G/17G
17 POWDER, FOR SOLUTION ORAL DAILY PRN
Status: DISCONTINUED | OUTPATIENT
Start: 2023-07-13 | End: 2023-07-19

## 2023-07-13 RX ORDER — DIPHENHYDRAMINE HCL 25 MG
25 CAPSULE ORAL EVERY 6 HOURS PRN
Status: DISCONTINUED | OUTPATIENT
Start: 2023-07-13 | End: 2023-08-01 | Stop reason: HOSPADM

## 2023-07-13 RX ORDER — ACETAMINOPHEN 325 MG/1
650 TABLET ORAL EVERY 6 HOURS PRN
Status: DISCONTINUED | OUTPATIENT
Start: 2023-07-13 | End: 2023-08-01 | Stop reason: HOSPADM

## 2023-07-13 RX ORDER — ENOXAPARIN SODIUM 100 MG/ML
40 INJECTION SUBCUTANEOUS EVERY 12 HOURS
Status: COMPLETED | OUTPATIENT
Start: 2023-07-13 | End: 2023-07-31

## 2023-07-13 RX ORDER — CHOLECALCIFEROL (VITAMIN D3) 125 MCG
5 CAPSULE ORAL NIGHTLY PRN
Status: DISCONTINUED | OUTPATIENT
Start: 2023-07-13 | End: 2023-08-01 | Stop reason: HOSPADM

## 2023-07-13 RX ORDER — CALCIUM CARBONATE 500 MG/1
1 TABLET, CHEWABLE ORAL 2 TIMES DAILY
Status: DISCONTINUED | OUTPATIENT
Start: 2023-07-13 | End: 2023-08-01 | Stop reason: HOSPADM

## 2023-07-13 RX ORDER — OXYCODONE HYDROCHLORIDE AND ACETAMINOPHEN 5; 325 MG/1; MG/1
1 TABLET ORAL EVERY 6 HOURS PRN
Status: DISCONTINUED | OUTPATIENT
Start: 2023-07-13 | End: 2023-07-17

## 2023-07-13 RX ORDER — PANTOPRAZOLE SODIUM 40 MG/1
40 TABLET, DELAYED RELEASE ORAL DAILY
COMMUNITY

## 2023-07-13 RX ORDER — IBUPROFEN 400 MG/1
400 TABLET ORAL EVERY 6 HOURS PRN
Status: DISCONTINUED | OUTPATIENT
Start: 2023-07-13 | End: 2023-08-01 | Stop reason: HOSPADM

## 2023-07-13 RX ORDER — PANTOPRAZOLE SODIUM 40 MG/1
40 TABLET, DELAYED RELEASE ORAL
Status: DISCONTINUED | OUTPATIENT
Start: 2023-07-14 | End: 2023-08-01 | Stop reason: HOSPADM

## 2023-07-13 RX ORDER — AMOXICILLIN 250 MG
2 CAPSULE ORAL 2 TIMES DAILY
Status: DISCONTINUED | OUTPATIENT
Start: 2023-07-13 | End: 2023-07-21

## 2023-07-13 RX ORDER — LOSARTAN POTASSIUM 25 MG/1
12.5 TABLET ORAL
Status: DISCONTINUED | OUTPATIENT
Start: 2023-07-14 | End: 2023-07-16

## 2023-07-13 RX ORDER — TRAMADOL HYDROCHLORIDE 50 MG/1
50 TABLET ORAL EVERY 12 HOURS PRN
Status: DISCONTINUED | OUTPATIENT
Start: 2023-07-13 | End: 2023-07-18

## 2023-07-13 RX ORDER — ERGOCALCIFEROL 1.25 MG/1
50000 CAPSULE ORAL WEEKLY
COMMUNITY

## 2023-07-13 RX ORDER — ASPIRIN 81 MG/1
81 TABLET ORAL DAILY
COMMUNITY

## 2023-07-13 RX ORDER — ROSUVASTATIN CALCIUM 20 MG/1
40 TABLET, COATED ORAL NIGHTLY
Status: DISCONTINUED | OUTPATIENT
Start: 2023-07-13 | End: 2023-08-01 | Stop reason: HOSPADM

## 2023-07-13 RX ORDER — GABAPENTIN 300 MG/1
300 CAPSULE ORAL 3 TIMES DAILY PRN
Status: ON HOLD | COMMUNITY
End: 2023-07-31 | Stop reason: SDUPTHER

## 2023-07-13 RX ORDER — ROSUVASTATIN CALCIUM 40 MG/1
40 TABLET, COATED ORAL NIGHTLY
COMMUNITY

## 2023-07-13 RX ORDER — TRAMADOL HYDROCHLORIDE 50 MG/1
50 TABLET ORAL 2 TIMES DAILY PRN
COMMUNITY

## 2023-07-13 RX ORDER — DIPHENOXYLATE HYDROCHLORIDE AND ATROPINE SULFATE 2.5; .025 MG/1; MG/1
1 TABLET ORAL DAILY
Status: DISCONTINUED | OUTPATIENT
Start: 2023-07-14 | End: 2023-08-01 | Stop reason: HOSPADM

## 2023-07-13 RX ADMIN — OXYCODONE AND ACETAMINOPHEN 1 TABLET: 5; 325 TABLET ORAL at 19:48

## 2023-07-13 RX ADMIN — ENOXAPARIN SODIUM 40 MG: 100 INJECTION SUBCUTANEOUS at 20:27

## 2023-07-13 RX ADMIN — ANTACID TABLETS 1 TABLET: 500 TABLET, CHEWABLE ORAL at 20:01

## 2023-07-13 RX ADMIN — Medication 5 MG: at 22:22

## 2023-07-13 RX ADMIN — GABAPENTIN 300 MG: 300 CAPSULE ORAL at 21:00

## 2023-07-13 RX ADMIN — TRAMADOL HYDROCHLORIDE 50 MG: 50 TABLET, COATED ORAL at 22:21

## 2023-07-13 RX ADMIN — INSULIN GLARGINE 10 UNITS: 100 INJECTION, SOLUTION SUBCUTANEOUS at 20:00

## 2023-07-13 RX ADMIN — ROSUVASTATIN CALCIUM 40 MG: 20 TABLET, FILM COATED ORAL at 20:01
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
## 2023-07-13 NOTE — PLAN OF CARE
Goal Outcome Evaluation:  Plan of Care Reviewed With: patient        Progress: improving            Problem: Rehabilitation (IRF) Plan of Care  Goal: Plan of Care Review  Outcome: Ongoing, Progressing  Flowsheets (Taken 7/13/2023 1530)  Progress: improving  Plan of Care Reviewed With: patient  Goal: Patient-Specific Goal (Individualized)  Outcome: Ongoing, Progressing  Goal: Absence of New-Onset Illness or Injury  Outcome: Ongoing, Progressing  Intervention: Prevent Fall and Fall Injury  Recent Flowsheet Documentation  Taken 7/13/2023 1600 by Riley Logan, RN  Safety Promotion/Fall Prevention: safety round/check completed  Taken 7/13/2023 1530 by Riley Logan, RN  Safety Promotion/Fall Prevention:   safety round/check completed   nonskid shoes/slippers when out of bed   gait belt   fall prevention program maintained   clutter free environment maintained   assistive device/personal items within reach  Intervention: Prevent VTE (Venous Thromboembolism)  Recent Flowsheet Documentation  Taken 7/13/2023 1530 by Riley Logan, RN  VTE Prevention/Management: (lovenox SQ) other (see comments)  Goal: Optimal Comfort and Wellbeing  Outcome: Ongoing, Progressing  Goal: Home and Community Transition Plan Established  Outcome: Ongoing, Progressing     Problem: Fall Injury Risk  Goal: Absence of Fall and Fall-Related Injury  Outcome: Ongoing, Progressing  Intervention: Identify and Manage Contributors  Recent Flowsheet Documentation  Taken 7/13/2023 1530 by Riley Logan, RN  Medication Review/Management: medications reviewed  Intervention: Promote Injury-Free Environment  Recent Flowsheet Documentation  Taken 7/13/2023 1600 by Riley Logan, RN  Safety Promotion/Fall Prevention: safety round/check completed  Taken 7/13/2023 1530 by Riley Logan, RN  Safety Promotion/Fall Prevention:   safety round/check completed   nonskid shoes/slippers when out of bed   gait belt   fall prevention program maintained    clutter free environment maintained   assistive device/personal items within reach

## 2023-07-13 NOTE — PROGRESS NOTES
Patient Assessment Instrument  Quality Indicators - Admission FY 2023    Section A. Ethnicity/ Race/Language  Ethnicity:  Race:  Preferred Language:    Section A. Transportation  Issues Due to Lack of Transportation:   No    Section B. Hearing and Vision        Section B. Health Literacy        Section C. Cognitive Patterns      Section C. Signs and Symptoms of Delirium (from CAM)      Section D. Mood      Section D. Social Isolation      Section [id number]. Prior Functioning      Section DR3539. Prior Device Use  Walker    Section [id number]. Self Care Performance      Section DR1083. Self Care Discharge Goals      Section [id number]. Mobility Performance      Section QT6502. Mobility Discharge Goals      Section H. Bladder and Bowel      Section I. Active Diagnosis      Section J. Health Conditions      Section J. Health Conditions (Pain)      Section K. Swallowing/Nutritional Status    Nutritional Approaches on Admission:    Section M. Skin Conditions      Section N. Medication        Section O. Special Treatments, Procedures, and Programs    Signed by: RASTA Landry

## 2023-07-14 LAB
ANION GAP SERPL CALCULATED.3IONS-SCNC: 9.1 MMOL/L (ref 5–15)
BASOPHILS # BLD AUTO: 0.03 10*3/MM3 (ref 0–0.2)
BASOPHILS NFR BLD AUTO: 0.5 % (ref 0–1.5)
BUN SERPL-MCNC: 10 MG/DL (ref 6–20)
BUN/CREAT SERPL: 14.1 (ref 7–25)
CALCIUM SPEC-SCNC: 9.1 MG/DL (ref 8.6–10.5)
CHLORIDE SERPL-SCNC: 108 MMOL/L (ref 98–107)
CO2 SERPL-SCNC: 25.9 MMOL/L (ref 22–29)
CREAT SERPL-MCNC: 0.71 MG/DL (ref 0.57–1)
DEPRECATED RDW RBC AUTO: 47.8 FL (ref 37–54)
EGFRCR SERPLBLD CKD-EPI 2021: 99.3 ML/MIN/1.73
EOSINOPHIL # BLD AUTO: 0.23 10*3/MM3 (ref 0–0.4)
EOSINOPHIL NFR BLD AUTO: 3.9 % (ref 0.3–6.2)
ERYTHROCYTE [DISTWIDTH] IN BLOOD BY AUTOMATED COUNT: 14.8 % (ref 12.3–15.4)
GLUCOSE BLDC GLUCOMTR-MCNC: 110 MG/DL (ref 70–130)
GLUCOSE BLDC GLUCOMTR-MCNC: 151 MG/DL (ref 70–130)
GLUCOSE BLDC GLUCOMTR-MCNC: 164 MG/DL (ref 70–130)
GLUCOSE BLDC GLUCOMTR-MCNC: 191 MG/DL (ref 70–130)
GLUCOSE SERPL-MCNC: 111 MG/DL (ref 65–99)
HCT VFR BLD AUTO: 26.2 % (ref 34–46.6)
HGB BLD-MCNC: 8.1 G/DL (ref 12–15.9)
IMM GRANULOCYTES # BLD AUTO: 0.04 10*3/MM3 (ref 0–0.05)
IMM GRANULOCYTES NFR BLD AUTO: 0.7 % (ref 0–0.5)
LYMPHOCYTES # BLD AUTO: 1.54 10*3/MM3 (ref 0.7–3.1)
LYMPHOCYTES NFR BLD AUTO: 26.4 % (ref 19.6–45.3)
MAGNESIUM SERPL-MCNC: 1.9 MG/DL (ref 1.6–2.6)
MCH RBC QN AUTO: 28.9 PG (ref 26.6–33)
MCHC RBC AUTO-ENTMCNC: 30.9 G/DL (ref 31.5–35.7)
MCV RBC AUTO: 93.6 FL (ref 79–97)
MONOCYTES # BLD AUTO: 0.42 10*3/MM3 (ref 0.1–0.9)
MONOCYTES NFR BLD AUTO: 7.2 % (ref 5–12)
NEUTROPHILS NFR BLD AUTO: 3.57 10*3/MM3 (ref 1.7–7)
NEUTROPHILS NFR BLD AUTO: 61.3 % (ref 42.7–76)
NRBC BLD AUTO-RTO: 0.3 /100 WBC (ref 0–0.2)
PLATELET # BLD AUTO: 221 10*3/MM3 (ref 140–450)
PMV BLD AUTO: 9.2 FL (ref 6–12)
POTASSIUM SERPL-SCNC: 3.9 MMOL/L (ref 3.5–5.2)
RBC # BLD AUTO: 2.8 10*6/MM3 (ref 3.77–5.28)
SODIUM SERPL-SCNC: 143 MMOL/L (ref 136–145)
WBC NRBC COR # BLD: 5.83 10*3/MM3 (ref 3.4–10.8)

## 2023-07-14 PROCEDURE — 97110 THERAPEUTIC EXERCISES: CPT

## 2023-07-14 PROCEDURE — 83735 ASSAY OF MAGNESIUM: CPT | Performed by: FAMILY MEDICINE

## 2023-07-14 PROCEDURE — 97530 THERAPEUTIC ACTIVITIES: CPT

## 2023-07-14 PROCEDURE — 63710000001 INSULIN GLARGINE PER 5 UNITS: Performed by: FAMILY MEDICINE

## 2023-07-14 PROCEDURE — 25010000002 ENOXAPARIN PER 10 MG: Performed by: FAMILY MEDICINE

## 2023-07-14 PROCEDURE — 97167 OT EVAL HIGH COMPLEX 60 MIN: CPT | Performed by: OCCUPATIONAL THERAPIST

## 2023-07-14 PROCEDURE — 97161 PT EVAL LOW COMPLEX 20 MIN: CPT

## 2023-07-14 PROCEDURE — 99223 1ST HOSP IP/OBS HIGH 75: CPT | Performed by: FAMILY MEDICINE

## 2023-07-14 PROCEDURE — 85025 COMPLETE CBC W/AUTO DIFF WBC: CPT | Performed by: FAMILY MEDICINE

## 2023-07-14 PROCEDURE — 97535 SELF CARE MNGMENT TRAINING: CPT | Performed by: OCCUPATIONAL THERAPIST

## 2023-07-14 PROCEDURE — 80048 BASIC METABOLIC PNL TOTAL CA: CPT | Performed by: FAMILY MEDICINE

## 2023-07-14 PROCEDURE — 82948 REAGENT STRIP/BLOOD GLUCOSE: CPT

## 2023-07-14 PROCEDURE — 97110 THERAPEUTIC EXERCISES: CPT | Performed by: OCCUPATIONAL THERAPIST

## 2023-07-14 RX ADMIN — GABAPENTIN 300 MG: 300 CAPSULE ORAL at 21:05

## 2023-07-14 RX ADMIN — LOSARTAN POTASSIUM 12.5 MG: 25 TABLET, FILM COATED ORAL at 08:48

## 2023-07-14 RX ADMIN — PANTOPRAZOLE SODIUM 40 MG: 40 TABLET, DELAYED RELEASE ORAL at 05:06

## 2023-07-14 RX ADMIN — INSULIN GLARGINE 10 UNITS: 100 INJECTION, SOLUTION SUBCUTANEOUS at 08:47

## 2023-07-14 RX ADMIN — ROSUVASTATIN CALCIUM 40 MG: 20 TABLET, FILM COATED ORAL at 20:44

## 2023-07-14 RX ADMIN — OXYCODONE AND ACETAMINOPHEN 1 TABLET: 5; 325 TABLET ORAL at 01:51

## 2023-07-14 RX ADMIN — OXYCODONE HYDROCHLORIDE AND ACETAMINOPHEN 500 MG: 500 TABLET ORAL at 08:48

## 2023-07-14 RX ADMIN — Medication 1 TABLET: at 08:48

## 2023-07-14 RX ADMIN — Medication 5000 UNITS: at 08:49

## 2023-07-14 RX ADMIN — CLOPIDOGREL BISULFATE 75 MG: 75 TABLET, FILM COATED ORAL at 08:48

## 2023-07-14 RX ADMIN — GABAPENTIN 300 MG: 300 CAPSULE ORAL at 14:48

## 2023-07-14 RX ADMIN — INSULIN GLARGINE 10 UNITS: 100 INJECTION, SOLUTION SUBCUTANEOUS at 20:44

## 2023-07-14 RX ADMIN — ANTACID TABLETS 1 TABLET: 500 TABLET, CHEWABLE ORAL at 08:48

## 2023-07-14 RX ADMIN — Medication 5 MG: at 20:47

## 2023-07-14 RX ADMIN — ENOXAPARIN SODIUM 40 MG: 100 INJECTION SUBCUTANEOUS at 20:44

## 2023-07-14 RX ADMIN — ENOXAPARIN SODIUM 40 MG: 100 INJECTION SUBCUTANEOUS at 08:47

## 2023-07-14 RX ADMIN — OXYCODONE AND ACETAMINOPHEN 1 TABLET: 5; 325 TABLET ORAL at 20:43

## 2023-07-14 RX ADMIN — ASPIRIN 81 MG: 81 TABLET, COATED ORAL at 08:48

## 2023-07-14 RX ADMIN — GABAPENTIN 300 MG: 300 CAPSULE ORAL at 05:06
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
## 2023-07-14 NOTE — PROGRESS NOTES
Rehabilitation Nursing  Inpatient Rehabilitation Plan of Care Note    Plan of Care  Body Function Structure    Skin Integrity (Active)  Current Status (7/13/2023 5:38:00 PM): free from skin breakdown this shift  Weekly Goal: free from skin breakdown this stay  Discharge Goal: home free of skin breakdown    Safety    Potential for Injury (Active)  Current Status (7/13/2023 5:38:00 PM): free from injury this shift  Weekly Goal: free from injury this stay  Discharge Goal: home free of injury    Signed by: Geraldine Velazquez RN

## 2023-07-14 NOTE — PROGRESS NOTES
Inpatient Rehabilitation Functional Measures Assessment and Plan of Care    Plan of Care      Functional Measures  KIRAN Eating:  KIRAN Grooming:  KIRAN Bathing:  KIRAN Upper Body Dressing:  KIRAN Lower Body Dressing:  KIRAN Toileting:    KIRAN Bladder Management  Level of Assistance:  Frequency/Number of Accidents this Shift:    KIRAN Bowel Management  Level of Assistance:  Frequency/Number of Accidents this Shift:    KIRAN Bed/Chair/Wheelchair Transfer:  Bed/chair/wheelchair Transfer Score = 2.  Patient performs 25-49% of effort and requires maximal assistance (most of the  lifting) for transferring to and from the bed/chair/wheelchair. Patient requires  the following assistive device(s): Walker. Seating system of wheelchair.  KIRAN Toilet Transfer:  KIRAN Tub/Shower Transfer:    Previously Documented Mode of Locomotion at Discharge:  KIRAN Expected Mode of Locomotion at Discharge: The expected mode of most  frequently used locomotion, at discharge, is expected to be walking.  KIRAN Walk/Wheelchair:  WHEELCHAIR OBSERVATION   Wheelchair did not occur.    WALK OBSERVATION   Walking did not occur.  KIRAN Stairs:  Stairs did not occur.    KIRAN Comprehension:  KIRAN Expression:  KIRAN Social Interaction:  KIRAN Problem Solving:  KIRAN Memory:    Therapy Mode Minutes  Occupational Therapy:  Physical Therapy: Individual: 90 minutes.  Speech Language Pathology:    Discharge Functional Goals:  Bed, Chair, Wheelchair Transfers: 6  Walk: 6    Signed by: Wendi Brothers, Physical Therapist

## 2023-07-14 NOTE — PLAN OF CARE
Goal Outcome Evaluation:  Plan of Care Reviewed With: patient resting in bed no complaints, oriented to the unit,will continue POC.

## 2023-07-14 NOTE — PROGRESS NOTES
Patient Assessment Instrument  Quality Indicators - Admission FY 2023    Section A. Ethnicity/ Race/Language  Ethnicity:  Race:  Preferred Language:    Section A. Transportation      Section B. Hearing and Vision        Section B. Health Literacy        Section C. Cognitive Patterns      Section C. Signs and Symptoms of Delirium (from CAM)      Section D. Mood      Section D. Social Isolation      Section [id number]. Prior Functioning      Section [id number]. Prior Device Use      Section BX5130. Self Care Performance     Eating: [place] sets up or cleans up; patient completes activity. [place] assists  only prior to or following the activity.   Oral Hygiene: [place] provides verbal cues and/or touching/steadying and/or  contact guard assistance as patient completes activity.   Toileting Hygiene: [place] does all of the effort. Patient does none of the  effort to complete the activity. Or, the assistance of 2 or more helpers is  required for the patient to complete the activity.   Shower/Bathe Self: [place] does more than half the effort. [place] lifts or holds  trunk or limbs and provides more than half the effort.   Upper Body Dressing: [place] provides verbal cues and/or touching/steadying  and/or contact guard assistance as patient completes activity.   Lower Body Dressing: [place] does more than half the effort. [place] lifts or  holds trunk or limbs and provides more than half the effort.   Putting On/Taking Off Footwear: [place] does all of the effort. Patient does  none of the effort to complete the activity. Or, the assistance of 2 or more  helpers is required for the patient to complete the activity.    Section [id number]. Self Care Discharge Goals     Eating: Patient completed the activities by themself with no assistance from a  helper.   Oral Hygiene: [place] sets up or cleans up; patient completes activity. [place]  assists only prior to or following the activity.   Toileting Hygiene: [place] does less than half the effort.  [place] lifts, holds  or supports trunk or limbs but provides less than half the effort.   Shower/Bathe Self: [place] does less than half the effort. [place] lifts, holds  or supports trunk or limbs but provides less than half the effort.   Upper Body Dressing: [place] sets up or cleans up; patient completes activity.  [place] assists only prior to or following the activity.   Lower Body Dressing: [place] does less than half the effort. [place] lifts, holds  or supports trunk or limbs but provides less than half the effort.   Putting On/Taking Off Footwear: [place] does less than half the effort. [place]  lifts, holds or supports trunk or limbs but provides less than half the effort.    Section DU7426. Mobility Performance      Section JS3624. Mobility Discharge Goals      Section H. Bladder and Bowel      Section I. Active Diagnosis      Section J. Health Conditions      Section J. Health Conditions (Pain)      Section K. Swallowing/Nutritional Status    Nutritional Approaches on Admission:    Section M. Skin Conditions      Section N. Medication        Section O. Special Treatments, Procedures, and Programs    Signed by: Araceli Lua OT
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
## 2023-07-14 NOTE — PLAN OF CARE
Problem: Rehabilitation (IRF) Plan of Care  Goal: Plan of Care Review  Outcome: Ongoing, Progressing  Flowsheets (Taken 7/14/2023 1405)  Progress: improving  Plan of Care Reviewed With: patient  Outcome Evaluation:   Patient working with therapies   no acute distress noted. Call light and items within reach. Continue current plan of care   willl continue to monitor.  Goal: Patient-Specific Goal (Individualized)  Outcome: Ongoing, Progressing  Goal: Absence of New-Onset Illness or Injury  Outcome: Ongoing, Progressing  Intervention: Prevent Fall and Fall Injury  Recent Flowsheet Documentation  Taken 7/14/2023 1200 by Geraldine Velazquez RN  Safety Promotion/Fall Prevention: safety round/check completed  Taken 7/14/2023 1000 by Geraldine Velazquez RN  Safety Promotion/Fall Prevention: safety round/check completed  Taken 7/14/2023 0905 by Geraldine Velazquez RN  Safety Promotion/Fall Prevention:   safety round/check completed   nonskid shoes/slippers when out of bed   assistive device/personal items within reach  Taken 7/14/2023 0800 by Geraldine Velazquez RN  Safety Promotion/Fall Prevention:   safety round/check completed   nonskid shoes/slippers when out of bed   assistive device/personal items within reach  Intervention: Prevent Infection  Recent Flowsheet Documentation  Taken 7/14/2023 0905 by Geraldine Velazquez RN  Infection Prevention: hand hygiene promoted  Intervention: Prevent VTE (Venous Thromboembolism)  Recent Flowsheet Documentation  Taken 7/14/2023 0905 by Geraldine Velazquez RN  VTE Prevention/Management: (lovenox) other (see comments)  Goal: Optimal Comfort and Wellbeing  Outcome: Ongoing, Progressing  Goal: Home and Community Transition Plan Established  Outcome: Ongoing, Progressing   Goal Outcome Evaluation:  Plan of Care Reviewed With: patient        Progress: improving  Outcome Evaluation: Patient working with therapies;  no acute distress noted. Call light and items within reach. Continue current plan of  care; willl continue to monitor.

## 2023-07-14 NOTE — PROGRESS NOTES
Inpatient Rehabilitation Functional Measures Assessment and Plan of Care    Plan of Care  Self Care    [OT] Dressing (Lower)(Active)  Current Status(07/14/2023): Max A  Weekly Goal(07/18/2023): Mod A  Discharge Goal: Min A    Performed Intervention(s)  ADL retraining/AE education, fxal mobility, BUE ther ex/act, GMC/FMC    Functional Measures  KIRAN Eating:  KIRAN Grooming:  KIRAN Bathing:  KIRAN Upper Body Dressing:  KIRAN Lower Body Dressing:  KIRAN Toileting:    KIRAN Bladder Management  Level of Assistance:  Frequency/Number of Accidents this Shift:    KIRAN Bowel Management  Level of Assistance:  Frequency/Number of Accidents this Shift:    KIRAN Bed/Chair/Wheelchair Transfer:  KIRAN Toilet Transfer:  KIRAN Tub/Shower Transfer:    Previously Documented Mode of Locomotion at Discharge:  [facility] Expected Mode of Locomotion at Discharge:  KIRAN Walk/Wheelchair:  KIRAN Stairs:    KIRAN Comprehension:  KIRAN Expression:  KIRAN Social Interaction:  KIRAN Problem Solving:  KIRAN Memory:    Therapy Mode Minutes  Occupational Therapy: Individual: 90 minutes.  Physical Therapy:  Speech Language Pathology:    Discharge Functional Goals:    Signed by: Araceli Lua OT

## 2023-07-14 NOTE — PROGRESS NOTES
Patient Assessment Instrument  Quality Indicators - Admission FY 2023    Section A. Ethnicity/ Race/Language  Ethnicity:  Race:  Preferred Language:    Section A. Transportation      Section B. Hearing and Vision        Section B. Health Literacy        Section C. Cognitive Patterns      Section C. Signs and Symptoms of Delirium (from CAM)      Section D. Mood      Section D. Social Isolation      Section [id number]. Prior Functioning      Section DQ3362. Prior Device Use      Section [id number]. Self Care Performance      Section HQ8438. Self Care Discharge Goals      Section ON2822. Mobility Performance     Roll Left and Right: [place] does less than half the effort. [place] lifts, holds  or supports trunk or limbs but provides less than half the effort.   Sit to Lying: [place] does less than half the effort. [place] lifts, holds or  supports trunk or limbs but provides less than half the effort.   Lying to Sitting on Side of Bed: [place] does less than half the effort. [place]  lifts, holds or supports trunk or limbs but provides less than half the effort.   Sit to Stand: [place] does more than half the effort. [place] lifts or holds  trunk or limbs and provides more than half the effort.   Chair/Bed to Chair Transfer: [place] does more than half the effort. [place]  lifts or holds trunk or limbs and provides more than half the effort.   Toilet Transfer [place] does less than half the effort. [place] lifts, holds or  supports trunk or limbs but provides less than half the effort.   Car Transfer: Not attempted due to medical or safety concerns.   Walk 10 Feet:   Not attempted due to medical or safety concerns.  1 Step Over Curb or Up/Down Stair:   Not attempted due to medical or safety  concerns.  Picking up an Object:   Not attempted due to medical or safety concerns.  Uses Wheelchair/Scooter: No    Section [id number]. Mobility Discharge Goals     Sit to Stand: Patient completed the activities by themself with no assistance  from a  helper.   Chair/Bed to Chair Transfer: Patient completed the activities by themself with  no assistance from a helper.   Walk Discharge Goals:   Walk 150 Feet: Patient completed the activities by themself with no assistance  from a helper.    Section H. Bladder and Bowel      Section I. Active Diagnosis      Section J. Health Conditions      Section J. Health Conditions (Pain)      Section K. Swallowing/Nutritional Status    Nutritional Approaches on Admission:    Section M. Skin Conditions      Section N. Medication        Section O. Special Treatments, Procedures, and Programs    Signed by: Wendi Brothers, Physical Therapist
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
## 2023-07-15 LAB
GLUCOSE BLDC GLUCOMTR-MCNC: 136 MG/DL (ref 70–130)
GLUCOSE BLDC GLUCOMTR-MCNC: 177 MG/DL (ref 70–130)
GLUCOSE BLDC GLUCOMTR-MCNC: 209 MG/DL (ref 70–130)
GLUCOSE BLDC GLUCOMTR-MCNC: 223 MG/DL (ref 70–130)
GLUCOSE BLDC GLUCOMTR-MCNC: 228 MG/DL (ref 70–130)

## 2023-07-15 PROCEDURE — 82948 REAGENT STRIP/BLOOD GLUCOSE: CPT

## 2023-07-15 PROCEDURE — 97535 SELF CARE MNGMENT TRAINING: CPT

## 2023-07-15 PROCEDURE — 99231 SBSQ HOSP IP/OBS SF/LOW 25: CPT | Performed by: FAMILY MEDICINE

## 2023-07-15 PROCEDURE — 63710000001 INSULIN GLARGINE PER 5 UNITS: Performed by: FAMILY MEDICINE

## 2023-07-15 PROCEDURE — 97110 THERAPEUTIC EXERCISES: CPT

## 2023-07-15 PROCEDURE — 97112 NEUROMUSCULAR REEDUCATION: CPT

## 2023-07-15 PROCEDURE — 25010000002 ENOXAPARIN PER 10 MG: Performed by: FAMILY MEDICINE

## 2023-07-15 PROCEDURE — 97530 THERAPEUTIC ACTIVITIES: CPT

## 2023-07-15 RX ORDER — BISMUTH SUBSALICYLATE 262 MG/1
262 TABLET, CHEWABLE ORAL EVERY 12 HOURS PRN
Status: DISCONTINUED | OUTPATIENT
Start: 2023-07-15 | End: 2023-08-01 | Stop reason: HOSPADM

## 2023-07-15 RX ADMIN — Medication 5 MG: at 20:28

## 2023-07-15 RX ADMIN — GABAPENTIN 300 MG: 300 CAPSULE ORAL at 21:09

## 2023-07-15 RX ADMIN — GABAPENTIN 300 MG: 300 CAPSULE ORAL at 13:53

## 2023-07-15 RX ADMIN — GABAPENTIN 300 MG: 300 CAPSULE ORAL at 05:09

## 2023-07-15 RX ADMIN — INSULIN GLARGINE 10 UNITS: 100 INJECTION, SOLUTION SUBCUTANEOUS at 08:32

## 2023-07-15 RX ADMIN — INSULIN GLARGINE 10 UNITS: 100 INJECTION, SOLUTION SUBCUTANEOUS at 20:29

## 2023-07-15 RX ADMIN — ROSUVASTATIN CALCIUM 40 MG: 20 TABLET, FILM COATED ORAL at 20:29

## 2023-07-15 RX ADMIN — PANTOPRAZOLE SODIUM 40 MG: 40 TABLET, DELAYED RELEASE ORAL at 05:09

## 2023-07-15 RX ADMIN — ENOXAPARIN SODIUM 40 MG: 100 INJECTION SUBCUTANEOUS at 20:29

## 2023-07-15 RX ADMIN — CLOPIDOGREL BISULFATE 75 MG: 75 TABLET, FILM COATED ORAL at 08:33

## 2023-07-15 RX ADMIN — OXYCODONE AND ACETAMINOPHEN 1 TABLET: 5; 325 TABLET ORAL at 20:27

## 2023-07-15 RX ADMIN — Medication 5000 UNITS: at 08:32

## 2023-07-15 RX ADMIN — OXYCODONE AND ACETAMINOPHEN 1 TABLET: 5; 325 TABLET ORAL at 13:52

## 2023-07-15 RX ADMIN — ENOXAPARIN SODIUM 40 MG: 100 INJECTION SUBCUTANEOUS at 08:33

## 2023-07-15 RX ADMIN — ASPIRIN 81 MG: 81 TABLET, COATED ORAL at 08:33

## 2023-07-15 RX ADMIN — OXYCODONE HYDROCHLORIDE AND ACETAMINOPHEN 500 MG: 500 TABLET ORAL at 08:33

## 2023-07-15 RX ADMIN — Medication 1 TABLET: at 08:33

## 2023-07-15 NOTE — PLAN OF CARE
Goal Outcome Evaluation:  Plan of Care Reviewed With: patient        Progress: improving  Outcome Evaluation: CONTINUE PLAN OF CARE; MONITOR

## 2023-07-15 NOTE — THERAPY TREATMENT NOTE
Inpatient Rehabilitation - Physical Therapy Treatment Note        [facility]     Patient Name: Lisa Velazquez  : 1965  MRN: [id number]    Today's Date: 7/15/2023                    Admit Date: 2023      Visit Dx:   No diagnosis found.    Patient Active Problem List   Diagnosis    S/P right hip fracture    Acute respiratory failure with hypoxia    Right tibial fracture       Past Medical History:   Diagnosis Date    Coronary artery disease     Diabetes mellitus     Elevated cholesterol     GERD (gastroesophageal reflux disease)     History of transfusion     Hypertension        Past Surgical History:   Procedure Laterality Date    ABDOMINAL SURGERY      gallbladder removal    CARDIAC CATHETERIZATION      CARDIAC SURGERY      stent    COLONOSCOPY      FRACTURE SURGERY      TOE AMPUTATION Right     3rd toe right foot       PT ASSESSMENT (last 12 hours)       IRF PT Evaluation and Treatment       Row Name 07/15/23 1229          PT Time and Intention    Document Type daily treatment  -RM     Mode of Treatment individual therapy;physical therapy  -RM     Patient/Family/Caregiver Comments/Observations Pt became orthostatic upon OOB in AM; pt returned to bed and BP was noted to reuturn to WNL with RN addressing. C/o dizziness, blurred vision, and lightheadedness repors.  Pt also had frequent episodes of diarrhea and c/o significant fatigue and weakness following. Pt agreeable and participated to tolerance for both treatment sessions.  -RM       Row Name 07/15/23 1229          General Information    Patient Profile Reviewed yes  -RM     Existing Precautions/Restrictions fall  -RM       Row Name 07/15/23 1229          Pain Scale: FACES Pre/Post-Treatment    Pain: FACES Scale, Pretreatment 6-->hurts even more  -RM     Posttreatment Pain Rating 8-->hurts whole lot  -RM       Row Name 07/15/23 1229          Cognition/Psychosocial    Affect/Mental Status (Cognition) WFL  -RM     Orientation Status (Cognition) oriented x  3  -RM     Follows Commands (Cognition) WFL  -RM     Personal Safety Interventions gait belt;nonskid shoes/slippers when out of bed;fall prevention program maintained  -RM       Row Name 07/15/23 1229          Mobility    Left Lower Extremity (Weight-bearing Status) weight-bearing as tolerated (WBAT)  -RM     Right Lower Extremity (Weight-bearing Status) weight-bearing as tolerated (WBAT)  -RM       Row Name 07/15/23 1229          Bed Mobility    Bed Mobility scooting/bridging;rolling left;rolling right  -RM     Rolling Left [place] (Bed Mobility) minimum assist (75% patient effort)  for toileting and dressing  -RM     Rolling Right [place] (Bed Mobility) minimum assist (75% patient effort)  for toileting and dressing  -RM     Scooting/Bridging [place] (Bed Mobility) minimum assist (75% patient effort)  for toileting and dressing  -RM     Sit-Supine [place] (Bed Mobility) minimum assist (75% patient effort)  -RM     Supine-Sit-Supine [place] (Bed Mobility) verbal cues;nonverbal cues (demo/gesture);minimum assist (75% patient effort)  needed assist to lift RLE in/out of  bed  -RM       Row Name 07/15/23 1229          Bed-Chair Transfer    Bed-Chair [place] (Transfers) verbal cues;nonverbal cues (demo/gesture);minimum assist (75% patient effort);moderate assist (50% patient effort)  -RM     Assistive Device (Bed-Chair Transfers) wheelchair  -RM       Row Name 07/15/23 1229          Chair-Bed Transfer    Chair-Bed [place] (Transfers) verbal cues;nonverbal cues (demo/gesture);minimum assist (75% patient effort);moderate assist (50% patient effort)  -RM     Assistive Device (Chair-Bed Transfers) wheelchair  -RM       Row Name 07/15/23 1229          Toilet Transfer    Type (Toilet Transfer) squat pivot  -RM     [place] Level (Toilet Transfer) verbal cues;nonverbal cues (demo/gesture);minimum assist (75% patient effort);moderate assist (50% patient effort)  -RM     Assistive Device  (Toilet Transfer) wheelchair;grab bars/safety frame;raised toilet seat  -RM       Row Name 07/15/23 1229          Safety Issues, Functional Mobility    Impairments Affecting Function (Mobility) balance;endurance/activity tolerance;pain;range of motion (ROM);strength  -RM       Row Name 07/15/23 1229          Motor Skills    Therapeutic Exercise hip;knee;ankle  -RM       Row Name 07/15/23 1229          Hip (Therapeutic Exercise)    Hip (Therapeutic Exercise) strengthening exercise  -RM     Hip Strengthening (Therapeutic Exercise) bilateral;flexion;extension;aBduction;aDduction;heel slides;marching while seated;sitting;supine;2 lb free weight;resistance band;red;2 sets;10 repetitions;other (see comments)  Performed sitting EOB during second session, #2 on LLE  -RM       Row Name 07/15/23 1229          Knee (Therapeutic Exercise)    Knee (Therapeutic Exercise) strengthening exercise  -RM     Knee Strengthening (Therapeutic Exercise) bilateral;flexion;extension;heel slides;marching while seated;SLR (straight leg raise);LAQ (long arc quad);hamstring curls;sitting;supine;2 lb free weight;resistance band;red;2 sets;10 repetitions;other (see comments)  #2 on LLE, decreased knee flexion on RLE, Performed sitting EOB during second session  -RM       Row Name 07/15/23 1229          Ankle (Therapeutic Exercise)    Ankle (Therapeutic Exercise) strengthening exercise  -RM     Ankle Strengthening (Therapeutic Exercise) bilateral;dorsiflexion;plantarflexion;sitting;supine;2 sets;10 repetitions;2 lb free weight  #2 on LLE, Performed sitting EOB during second session\  -RM       Row Name 07/15/23 1229          Positioning and Restraints    Pre-Treatment Position in bed  BID  -RM     Post Treatment Position bed  BID  -RM     In Bed supine;call light within reach;encouraged to call for assist  -RM       Row Name 07/15/23 1229          Vital Signs    Intra Systolic BP Rehab 105  after returning to supine following c/o dizziness, blurred  vision while sitting  -RM     Intra Treatment Diastolic BP 62  -RM     Post Systolic BP Rehab 100  supine  -RM     Post Treatment Diastolic BP 67  -RM       Row Name 07/15/23 1229          Therapy Assessment/Plan (PT)    Patient's Goals For Discharge return home  -RM       Row Name 07/15/23 1229          Therapy Assessment/Plan (PT)    Rehab Potential/Prognosis (PT) adequate, monitor progress closely  -RM     Frequency of Treatment (PT) 5 times per week  -RM     Estimated Duration of Therapy (PT) 2 weeks  -RM     Problem List (PT) balance;mobility;range of motion (ROM);strength;pain  -RM     Activity Limitations Related to Problem List (PT) unable to ambulate safely;unable to transfer safely  -RM       Row Name 07/15/23 1229          Daily Progress Summary (PT)    Functional Goal Overall Progress (PT) progressing toward functional goals as expected  -RM     Daily Progress Summary (PT) Decreased activity tolerance noted this date due to symptoms of orthostatic hypotension when OOB during 1st session; pt able to sit EOB and complete strengthening TE for 2nd session, but eventually c/o dizziness and was returned to supine. Fair LE strength observed; AAROM required for full ROM on RLE. Pt also hindered by frequent diarrhea this date. Increased assistance required for functional mobility at this time. Continued skilled care required for further improvements to ensure maximum safe function upon D/C.  -RM     Impairments Still Limiting Function (PT) functional activity tolerance impairment;pain;strength deficit  -RM     Recommendations (PT) Continue per current POC  -RM       Row Name 07/15/23 1229          Therapy Plan Review/Discharge Plan (PT)    Anticipated Equipment Needs at Discharge (PT Eval) --  tbd  -RM     Anticipated Discharge Disposition (PT) home with assist;home with home health  -RM       Row Name 07/15/23 1229          IRF PT Goals    Bed Mobility Goal Selection (PT-IRF) bed mobility, PT goal 1  -RM      Transfer Goal Selection (PT-IRF) transfers, PT goal 1  -RM     Gait (Walking Locomotion) Goal Selection (PT-IRF) gait, PT goal 1  -RM       Row Name 07/15/23 1229          Bed Mobility Goal 1 (PT-IRF)    Activity/Assistive Device (Bed Mobility Goal 1, PT-IRF) sit to supine/supine to sit  -RM     [place] Level (Bed Mobility Goal 1, PT-IRF) modified independence  -RM     Time Frame (Bed Mobility Goal 1, PT-IRF) by discharge  -RM       Row Name 07/15/23 1229          Transfer Goal 1 (PT-IRF)    Activity/Assistive Device (Transfer Goal 1, PT-IRF) sit-to-stand/stand-to-sit;bed-to-chair/chair-to-bed  -RM     [place] Level (Transfer Goal 1, PT-IRF) modified independence  -RM     Time Frame (Transfer Goal 1, PT-IRF) by discharge  -RM       Row Name 07/15/23 1229          Gait/Walking Locomotion Goal 1 (PT-IRF)    Activity/Assistive Device (Gait/Walking Locomotion Goal 1, PT-IRF) walker, rolling  -RM     Gait/Walking Locomotion Distance Goal 1 (PT-IRF) 300'  -RM     [place] Level (Gait/Walking Locomotion Goal 1, PT-IRF) modified independence  -RM     Time Frame (Gait/Walking Locomotion Goal 1, PT-IRF) by discharge  -RM               User Key  (r) = Recorded By, (t) = Taken By, (c) = Cosigned By      Initials Name Provider Type     Julianna Quach, PTA Physical Therapist Assistant                  Wound 07/14/23 1930 Right lower leg (Active)   Dressing Appearance dressing loose 07/14/23 1930   Closure Sutures 07/14/23 1930   Base clean;dry 07/14/23 1930   Drainage Amount none 07/14/23 1930   Care, Wound cleansed with 07/14/23 1930   Dressing Care dressing applied 07/14/23 1930     Physical Therapy Education       Title: PT OT SLP Therapies (Done)       Topic: Physical Therapy (Done)       Point: Mobility training (Done)       Learning Progress Summary             Patient Acceptance, E,TB, VU by RM at 7/15/2023 1243    Acceptance, E, VU,NR by LB at 7/14/2023 1550                         Point: Home  exercise program (Done)       Learning Progress Summary             Patient Acceptance, E,TB, VU by RM at 7/15/2023 1243    Acceptance, E, VU,NR by LB at 7/14/2023 1550                         Point: Body mechanics (Done)       Learning Progress Summary             Patient Acceptance, E,TB, VU by RM at 7/15/2023 1243    Acceptance, E, VU,NR by LB at 7/14/2023 1550                         Point: Precautions (Done)       Learning Progress Summary             Patient Acceptance, E,TB, VU by RM at 7/15/2023 1243    Acceptance, E, VU,NR by LB at 7/14/2023 1550                                         User Key       Initials Effective Dates Name Provider Type Discipline    LB 06/16/21 -  Wendi Brothers, PT Physical Therapist PT     02/17/23 -  Julianna Quach, PTA Physical Therapist Assistant PT                    PT Recommendation and Plan    Frequency of Treatment (PT): 5 times per week  Anticipated Equipment Needs at Discharge (PT Eval):  (tbd)  Daily Progress Summary (PT)  Functional Goal Overall Progress (PT): progressing toward functional goals as expected  Daily Progress Summary (PT): Decreased activity tolerance noted this date due to symptoms of orthostatic hypotension when OOB during 1st session; pt able to sit EOB and complete strengthening TE for 2nd session, but eventually c/o dizziness and was returned to supine. Fair LE strength observed; AAROM required for full ROM on RLE. Pt also hindered by frequent diarrhea this date. Increased assistance required for functional mobility at this time. Continued skilled care required for further improvements to ensure maximum safe function upon D/C.  Impairments Still Limiting Function (PT): functional activity tolerance impairment, pain, strength deficit  Recommendations (PT): Continue per current POC               Time Calculation:      PT Charges       Row Name 07/15/23 1244 07/15/23 1243          Time Calculation    Start Time 1045  -RM 0745  -RM      Stop Time 1130  -RM 0830  -RM     Time Calculation (min) 45 min  -RM 45 min  -RM     PT Received On 07/15/23  -RM 07/15/23  -RM     PT - Next Appointment 07/17/23  -RM 07/15/23  -RM     PT Goal Re-Cert Due Date 07/21/23  -RM 07/21/23  -RM        Time Calculation- PT    Total Timed Code Minutes- PT 45 minute(s)  -RM 45 minute(s)  -RM               User Key  (r) = Recorded By, (t) = Taken By, (c) = Cosigned By      Initials Name Provider Type    Julianna Johnson PTA Physical Therapist Assistant                    Therapy Charges for Today       Code Description Service Date Service Provider Modifiers Qty    00396459837 HC PT NEUROMUSC RE EDUCATION EA 15 MIN 7/15/2023 Julianna Quach PTA GP, CQ 1    76233032620 HC PT THER PROC EA 15 MIN 7/15/2023 Julianna Quach PTA GP, CQ 2    82429651928 HC PT THERAPEUTIC ACT EA 15 MIN 7/15/2023 Julianna Quahc PTA GP, CQ 3                     Julianna Quach PTA  7/15/2023
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
## 2023-07-15 NOTE — PLAN OF CARE
Goal Outcome Evaluation:   Progressing medically and physically in all therapies.  Continue current POC.

## 2023-07-15 NOTE — PROGRESS NOTES
Rehabilitation Nursing  Inpatient Rehabilitation Plan of Care Note    Plan of Care  Copy from POC    Body Function Structure    Skin Integrity (Active)  Current Status (7/13/2023 5:38:00 PM): free from skin breakdown this shift  Weekly Goal: free from skin breakdown this stay  Discharge Goal: home free of skin breakdown    Safety    Potential for Injury (Active)  Current Status (7/13/2023 5:38:00 PM): free from injury this shift  Weekly Goal: free from injury this stay  Discharge Goal: home free of injury    Signed by: Mesha Shea, Nurse

## 2023-07-15 NOTE — PROGRESS NOTES
Occupational Therapy:    Physical Therapy: Individual: 90 minutes.    Speech Language Pathology:    Signed by: Julianna Quach PTA

## 2023-07-15 NOTE — PROGRESS NOTES
Physical Medicine and Rehabilitation  Inpatient Rehabilitation Interdisciplinary Plan of Care    Demographics            Age: 57Y            Gender: Female    Admission Date: 7/13/2023 3:38:00 PM  Rehabilitation Diagnosis:  debility    Plan of Care  Anticipated Discharge Date/Estimated Length of Stay: 14 days  Anticipated Discharge Destination: Community discharge with assistance  Discharge Plan : Pt plans to return home at discharge.  Pt says her sister may  stay with her during the day.  Medical Necessity Expected Level Rationale: good  Intensity and Duration: an average of 3 hours/5 days per week  Medical Supervision and 24 Hour Rehab Nursing: x  Physical Therapy: x  PT Intensity/Duration: PT 1.5 hours per day/5 days per week  Occupational Therapy: x  OT Intensity/Duration: OT 1.5 hours per day/5 days per week  Social Work: x  Therapeutic Recreation: x  Updated (if changes indicated)  No changes to plan.    Based on the patient's medical and functional status, their prognosis and  expected level of functional improvement is: good    Interdisciplinary Problem/Goals/Status  Copy from POCMobility    [PT] Bed/Chair/Wheelchair (Active)  Current Status (7/14/2023 12:00:00 AM): mod-max A  Weekly Goal: MI  Discharge Goal: MI    [PT] Walk (Active)  Current Status (7/14/2023 12:00:00 AM): unable to amb  Weekly Goal: amb 300' RW MI  Discharge Goal: amb 300' RW MI    Self Care    [OT] Dressing (Lower) (Active)  Current Status (7/14/2023 12:00:00 AM): Max A  Weekly Goal: Mod A  Discharge Goal: Min A    [OT] Toileting (Resolved)  Current Status (6/20/2022 12:00:00 AM): set up  Weekly Goal: set up  Discharge Goal: set up    Body Function Structure    [RN] Skin Integrity (Active)  Current Status (7/13/2023 5:38:00 PM): free from skin breakdown this shift  Weekly Goal: free from skin breakdown this stay  Discharge Goal: home free of skin breakdown    Safety    [RN] Potential for Injury (Active)  Current Status (7/13/2023 5:38:00  PM): free from injury this shift  Weekly Goal: free from injury this stay  Discharge Goal: home free of injury    Medical Problems    Comments:    Signed by: Rob Camarillo, Physician

## 2023-07-16 LAB
ANION GAP SERPL CALCULATED.3IONS-SCNC: 10.8 MMOL/L (ref 5–15)
BASOPHILS # BLD AUTO: 0.03 10*3/MM3 (ref 0–0.2)
BASOPHILS NFR BLD AUTO: 0.5 % (ref 0–1.5)
BUN SERPL-MCNC: 9 MG/DL (ref 6–20)
BUN/CREAT SERPL: 12.9 (ref 7–25)
CALCIUM SPEC-SCNC: 9.3 MG/DL (ref 8.6–10.5)
CHLORIDE SERPL-SCNC: 109 MMOL/L (ref 98–107)
CO2 SERPL-SCNC: 24.2 MMOL/L (ref 22–29)
CREAT SERPL-MCNC: 0.7 MG/DL (ref 0.57–1)
DEPRECATED RDW RBC AUTO: 52.1 FL (ref 37–54)
EGFRCR SERPLBLD CKD-EPI 2021: 101 ML/MIN/1.73
EOSINOPHIL # BLD AUTO: 0.19 10*3/MM3 (ref 0–0.4)
EOSINOPHIL NFR BLD AUTO: 3.2 % (ref 0.3–6.2)
ERYTHROCYTE [DISTWIDTH] IN BLOOD BY AUTOMATED COUNT: 15.8 % (ref 12.3–15.4)
GLUCOSE BLDC GLUCOMTR-MCNC: 150 MG/DL (ref 70–130)
GLUCOSE BLDC GLUCOMTR-MCNC: 153 MG/DL (ref 70–130)
GLUCOSE BLDC GLUCOMTR-MCNC: 157 MG/DL (ref 70–130)
GLUCOSE BLDC GLUCOMTR-MCNC: 233 MG/DL (ref 70–130)
GLUCOSE SERPL-MCNC: 153 MG/DL (ref 65–99)
HCT VFR BLD AUTO: 28.5 % (ref 34–46.6)
HGB BLD-MCNC: 8.7 G/DL (ref 12–15.9)
IMM GRANULOCYTES # BLD AUTO: 0.04 10*3/MM3 (ref 0–0.05)
IMM GRANULOCYTES NFR BLD AUTO: 0.7 % (ref 0–0.5)
LYMPHOCYTES # BLD AUTO: 1.73 10*3/MM3 (ref 0.7–3.1)
LYMPHOCYTES NFR BLD AUTO: 29.6 % (ref 19.6–45.3)
MCH RBC QN AUTO: 29.2 PG (ref 26.6–33)
MCHC RBC AUTO-ENTMCNC: 30.5 G/DL (ref 31.5–35.7)
MCV RBC AUTO: 95.6 FL (ref 79–97)
MONOCYTES # BLD AUTO: 0.44 10*3/MM3 (ref 0.1–0.9)
MONOCYTES NFR BLD AUTO: 7.5 % (ref 5–12)
NEUTROPHILS NFR BLD AUTO: 3.42 10*3/MM3 (ref 1.7–7)
NEUTROPHILS NFR BLD AUTO: 58.5 % (ref 42.7–76)
NRBC BLD AUTO-RTO: 0.7 /100 WBC (ref 0–0.2)
PLATELET # BLD AUTO: 251 10*3/MM3 (ref 140–450)
PMV BLD AUTO: 8.9 FL (ref 6–12)
POTASSIUM SERPL-SCNC: 4.1 MMOL/L (ref 3.5–5.2)
RBC # BLD AUTO: 2.98 10*6/MM3 (ref 3.77–5.28)
SODIUM SERPL-SCNC: 144 MMOL/L (ref 136–145)
WBC NRBC COR # BLD: 5.85 10*3/MM3 (ref 3.4–10.8)

## 2023-07-16 PROCEDURE — 82948 REAGENT STRIP/BLOOD GLUCOSE: CPT

## 2023-07-16 PROCEDURE — 80048 BASIC METABOLIC PNL TOTAL CA: CPT | Performed by: FAMILY MEDICINE

## 2023-07-16 PROCEDURE — 85025 COMPLETE CBC W/AUTO DIFF WBC: CPT | Performed by: FAMILY MEDICINE

## 2023-07-16 PROCEDURE — 63710000001 INSULIN GLARGINE PER 5 UNITS: Performed by: FAMILY MEDICINE

## 2023-07-16 PROCEDURE — 25010000002 ENOXAPARIN PER 10 MG: Performed by: FAMILY MEDICINE

## 2023-07-16 RX ADMIN — Medication 5000 UNITS: at 10:04

## 2023-07-16 RX ADMIN — ASPIRIN 81 MG: 81 TABLET, COATED ORAL at 09:58

## 2023-07-16 RX ADMIN — PANTOPRAZOLE SODIUM 40 MG: 40 TABLET, DELAYED RELEASE ORAL at 05:15

## 2023-07-16 RX ADMIN — OXYCODONE HYDROCHLORIDE AND ACETAMINOPHEN 500 MG: 500 TABLET ORAL at 09:58

## 2023-07-16 RX ADMIN — GABAPENTIN 300 MG: 300 CAPSULE ORAL at 05:15

## 2023-07-16 RX ADMIN — ENOXAPARIN SODIUM 40 MG: 100 INJECTION SUBCUTANEOUS at 20:33

## 2023-07-16 RX ADMIN — Medication 1 TABLET: at 09:59

## 2023-07-16 RX ADMIN — ROSUVASTATIN CALCIUM 40 MG: 20 TABLET, FILM COATED ORAL at 20:32

## 2023-07-16 RX ADMIN — CLOPIDOGREL BISULFATE 75 MG: 75 TABLET, FILM COATED ORAL at 09:58

## 2023-07-16 RX ADMIN — GABAPENTIN 300 MG: 300 CAPSULE ORAL at 22:40

## 2023-07-16 RX ADMIN — ENOXAPARIN SODIUM 40 MG: 100 INJECTION SUBCUTANEOUS at 10:00

## 2023-07-16 RX ADMIN — Medication 5 MG: at 20:40

## 2023-07-16 RX ADMIN — INSULIN GLARGINE 10 UNITS: 100 INJECTION, SOLUTION SUBCUTANEOUS at 10:35

## 2023-07-16 RX ADMIN — OXYCODONE AND ACETAMINOPHEN 1 TABLET: 5; 325 TABLET ORAL at 20:40

## 2023-07-16 RX ADMIN — INSULIN GLARGINE 10 UNITS: 100 INJECTION, SOLUTION SUBCUTANEOUS at 20:32

## 2023-07-16 RX ADMIN — GABAPENTIN 300 MG: 300 CAPSULE ORAL at 16:20

## 2023-07-16 NOTE — PROGRESS NOTES
Rehabilitation Nursing  Inpatient Rehabilitation Plan of Care Note    Plan of Care  Copy from POCBody Function Structure    Skin Integrity (Active)  Current Status (7/13/2023 5:38:00 PM): free from skin breakdown this shift  Weekly Goal: free from skin breakdown this stay  Discharge Goal: home free of skin breakdown    Safety    Potential for Injury (Active)  Current Status (7/13/2023 5:38:00 PM): free from injury this shift  Weekly Goal: free from injury this stay  Discharge Goal: home free of injury    Signed by: Sandra Brewster, Nurse

## 2023-07-17 ENCOUNTER — APPOINTMENT (OUTPATIENT)
Dept: GENERAL RADIOLOGY | Facility: HOSPITAL | Age: 58
DRG: 560 | End: 2023-07-17
Payer: MEDICARE

## 2023-07-17 ENCOUNTER — APPOINTMENT (OUTPATIENT)
Dept: CARDIOLOGY | Facility: HOSPITAL | Age: 58
DRG: 560 | End: 2023-07-17
Payer: MEDICARE

## 2023-07-17 LAB
ANION GAP SERPL CALCULATED.3IONS-SCNC: 9.6 MMOL/L (ref 5–15)
BASOPHILS # BLD AUTO: 0.02 10*3/MM3 (ref 0–0.2)
BASOPHILS NFR BLD AUTO: 0.3 % (ref 0–1.5)
BH CV ECHO MEAS - ACS: 2 CM
BH CV ECHO MEAS - AO MAX PG: 9.1 MMHG
BH CV ECHO MEAS - AO MEAN PG: 5 MMHG
BH CV ECHO MEAS - AO ROOT DIAM: 3.2 CM
BH CV ECHO MEAS - AO V2 MAX: 151 CM/SEC
BH CV ECHO MEAS - AO V2 VTI: 27.1 CM
BH CV ECHO MEAS - EDV(CUBED): 88.1 ML
BH CV ECHO MEAS - EDV(MOD-SP4): 131 ML
BH CV ECHO MEAS - EF(MOD-SP4): 63.9 %
BH CV ECHO MEAS - ESV(CUBED): 20.8 ML
BH CV ECHO MEAS - ESV(MOD-SP4): 47.3 ML
BH CV ECHO MEAS - FS: 38.2 %
BH CV ECHO MEAS - IVS/LVPW: 1.11 CM
BH CV ECHO MEAS - IVSD: 1.12 CM
BH CV ECHO MEAS - LA DIMENSION: 3.2 CM
BH CV ECHO MEAS - LAT PEAK E' VEL: 8.4 CM/SEC
BH CV ECHO MEAS - LV DIASTOLIC VOL/BSA (35-75): 65.6 CM2
BH CV ECHO MEAS - LV MASS(C)D: 164.3 GRAMS
BH CV ECHO MEAS - LV SYSTOLIC VOL/BSA (12-30): 23.7 CM2
BH CV ECHO MEAS - LVIDD: 4.5 CM
BH CV ECHO MEAS - LVIDS: 2.8 CM
BH CV ECHO MEAS - LVOT AREA: 2.5 CM2
BH CV ECHO MEAS - LVOT DIAM: 1.8 CM
BH CV ECHO MEAS - LVPWD: 1.01 CM
BH CV ECHO MEAS - MED PEAK E' VEL: 6.1 CM/SEC
BH CV ECHO MEAS - MV A MAX VEL: 96.4 CM/SEC
BH CV ECHO MEAS - MV E MAX VEL: 63.4 CM/SEC
BH CV ECHO MEAS - MV E/A: 0.66
BH CV ECHO MEAS - PA ACC TIME: 0.08 SEC
BH CV ECHO MEAS - SI(MOD-SP4): 41.9 ML/M2
BH CV ECHO MEAS - SV(MOD-SP4): 83.7 ML
BH CV ECHO MEAS - TAPSE (>1.6): 1.55 CM
BH CV ECHO MEASUREMENTS AVERAGE E/E' RATIO: 8.74
BUN SERPL-MCNC: 12 MG/DL (ref 6–20)
BUN/CREAT SERPL: 16 (ref 7–25)
CALCIUM SPEC-SCNC: 9.2 MG/DL (ref 8.6–10.5)
CHLORIDE SERPL-SCNC: 106 MMOL/L (ref 98–107)
CO2 SERPL-SCNC: 24.4 MMOL/L (ref 22–29)
CREAT SERPL-MCNC: 0.75 MG/DL (ref 0.57–1)
DEPRECATED RDW RBC AUTO: 53.2 FL (ref 37–54)
EGFRCR SERPLBLD CKD-EPI 2021: 93 ML/MIN/1.73
EOSINOPHIL # BLD AUTO: 0.23 10*3/MM3 (ref 0–0.4)
EOSINOPHIL NFR BLD AUTO: 3.8 % (ref 0.3–6.2)
ERYTHROCYTE [DISTWIDTH] IN BLOOD BY AUTOMATED COUNT: 16.2 % (ref 12.3–15.4)
GLUCOSE BLDC GLUCOMTR-MCNC: 120 MG/DL (ref 70–130)
GLUCOSE BLDC GLUCOMTR-MCNC: 165 MG/DL (ref 70–130)
GLUCOSE BLDC GLUCOMTR-MCNC: 211 MG/DL (ref 70–130)
GLUCOSE BLDC GLUCOMTR-MCNC: 263 MG/DL (ref 70–130)
GLUCOSE SERPL-MCNC: 207 MG/DL (ref 65–99)
HCT VFR BLD AUTO: 30.9 % (ref 34–46.6)
HGB BLD-MCNC: 9.3 G/DL (ref 12–15.9)
IMM GRANULOCYTES # BLD AUTO: 0.02 10*3/MM3 (ref 0–0.05)
IMM GRANULOCYTES NFR BLD AUTO: 0.3 % (ref 0–0.5)
LEFT ATRIUM VOLUME INDEX: 12.6 ML/M2
LYMPHOCYTES # BLD AUTO: 1.21 10*3/MM3 (ref 0.7–3.1)
LYMPHOCYTES NFR BLD AUTO: 20.1 % (ref 19.6–45.3)
MCH RBC QN AUTO: 28.7 PG (ref 26.6–33)
MCHC RBC AUTO-ENTMCNC: 30.1 G/DL (ref 31.5–35.7)
MCV RBC AUTO: 95.4 FL (ref 79–97)
MONOCYTES # BLD AUTO: 0.46 10*3/MM3 (ref 0.1–0.9)
MONOCYTES NFR BLD AUTO: 7.7 % (ref 5–12)
NEUTROPHILS NFR BLD AUTO: 4.07 10*3/MM3 (ref 1.7–7)
NEUTROPHILS NFR BLD AUTO: 67.8 % (ref 42.7–76)
NRBC BLD AUTO-RTO: 0 /100 WBC (ref 0–0.2)
PLATELET # BLD AUTO: 239 10*3/MM3 (ref 140–450)
PMV BLD AUTO: 9 FL (ref 6–12)
POTASSIUM SERPL-SCNC: 4.1 MMOL/L (ref 3.5–5.2)
RBC # BLD AUTO: 3.24 10*6/MM3 (ref 3.77–5.28)
SODIUM SERPL-SCNC: 140 MMOL/L (ref 136–145)
WBC NRBC COR # BLD: 6.01 10*3/MM3 (ref 3.4–10.8)

## 2023-07-17 PROCEDURE — 73030 X-RAY EXAM OF SHOULDER: CPT

## 2023-07-17 PROCEDURE — 93306 TTE W/DOPPLER COMPLETE: CPT | Performed by: INTERNAL MEDICINE

## 2023-07-17 PROCEDURE — 93306 TTE W/DOPPLER COMPLETE: CPT

## 2023-07-17 PROCEDURE — 85025 COMPLETE CBC W/AUTO DIFF WBC: CPT | Performed by: INTERNAL MEDICINE

## 2023-07-17 PROCEDURE — 99232 SBSQ HOSP IP/OBS MODERATE 35: CPT | Performed by: INTERNAL MEDICINE

## 2023-07-17 PROCEDURE — 97110 THERAPEUTIC EXERCISES: CPT | Performed by: OCCUPATIONAL THERAPIST

## 2023-07-17 PROCEDURE — 97530 THERAPEUTIC ACTIVITIES: CPT | Performed by: OCCUPATIONAL THERAPIST

## 2023-07-17 PROCEDURE — 97110 THERAPEUTIC EXERCISES: CPT

## 2023-07-17 PROCEDURE — 97535 SELF CARE MNGMENT TRAINING: CPT | Performed by: OCCUPATIONAL THERAPIST

## 2023-07-17 PROCEDURE — 80048 BASIC METABOLIC PNL TOTAL CA: CPT | Performed by: INTERNAL MEDICINE

## 2023-07-17 PROCEDURE — 63710000001 INSULIN GLARGINE PER 5 UNITS: Performed by: FAMILY MEDICINE

## 2023-07-17 PROCEDURE — 73030 X-RAY EXAM OF SHOULDER: CPT | Performed by: RADIOLOGY

## 2023-07-17 PROCEDURE — 25010000002 ENOXAPARIN PER 10 MG: Performed by: FAMILY MEDICINE

## 2023-07-17 PROCEDURE — 82948 REAGENT STRIP/BLOOD GLUCOSE: CPT

## 2023-07-17 PROCEDURE — 97530 THERAPEUTIC ACTIVITIES: CPT

## 2023-07-17 RX ORDER — MIDODRINE HYDROCHLORIDE 2.5 MG/1
2.5 TABLET ORAL
Status: DISCONTINUED | OUTPATIENT
Start: 2023-07-17 | End: 2023-07-18

## 2023-07-17 RX ADMIN — OXYCODONE AND ACETAMINOPHEN 1 TABLET: 5; 325 TABLET ORAL at 05:23

## 2023-07-17 RX ADMIN — GABAPENTIN 300 MG: 300 CAPSULE ORAL at 05:23

## 2023-07-17 RX ADMIN — GABAPENTIN 300 MG: 300 CAPSULE ORAL at 21:00

## 2023-07-17 RX ADMIN — CARBIDOPA AND LEVODOPA 2.5 MG: 50; 200 TABLET, EXTENDED RELEASE ORAL at 17:17

## 2023-07-17 RX ADMIN — CARBIDOPA AND LEVODOPA 2.5 MG: 50; 200 TABLET, EXTENDED RELEASE ORAL at 12:34

## 2023-07-17 RX ADMIN — DOCUSATE SODIUM 50 MG AND SENNOSIDES 8.6 MG 2 TABLET: 8.6; 5 TABLET, FILM COATED ORAL at 20:45

## 2023-07-17 RX ADMIN — ASPIRIN 81 MG: 81 TABLET, COATED ORAL at 08:44

## 2023-07-17 RX ADMIN — ENOXAPARIN SODIUM 40 MG: 100 INJECTION SUBCUTANEOUS at 20:45

## 2023-07-17 RX ADMIN — ENOXAPARIN SODIUM 40 MG: 100 INJECTION SUBCUTANEOUS at 08:44

## 2023-07-17 RX ADMIN — INSULIN GLARGINE 10 UNITS: 100 INJECTION, SOLUTION SUBCUTANEOUS at 08:44

## 2023-07-17 RX ADMIN — TRAMADOL HYDROCHLORIDE 50 MG: 50 TABLET, COATED ORAL at 20:45

## 2023-07-17 RX ADMIN — GABAPENTIN 300 MG: 300 CAPSULE ORAL at 15:00

## 2023-07-17 RX ADMIN — Medication 1 TABLET: at 08:44

## 2023-07-17 RX ADMIN — Medication 5 MG: at 20:46

## 2023-07-17 RX ADMIN — Medication 5000 UNITS: at 08:44

## 2023-07-17 RX ADMIN — INSULIN GLARGINE 10 UNITS: 100 INJECTION, SOLUTION SUBCUTANEOUS at 22:03

## 2023-07-17 RX ADMIN — ANTACID TABLETS 1 TABLET: 500 TABLET, CHEWABLE ORAL at 20:45

## 2023-07-17 RX ADMIN — DOCUSATE SODIUM 50 MG AND SENNOSIDES 8.6 MG 2 TABLET: 8.6; 5 TABLET, FILM COATED ORAL at 08:44

## 2023-07-17 RX ADMIN — CLOPIDOGREL BISULFATE 75 MG: 75 TABLET, FILM COATED ORAL at 08:44

## 2023-07-17 RX ADMIN — ROSUVASTATIN CALCIUM 40 MG: 20 TABLET, FILM COATED ORAL at 20:45

## 2023-07-17 RX ADMIN — OXYCODONE HYDROCHLORIDE AND ACETAMINOPHEN 500 MG: 500 TABLET ORAL at 08:44

## 2023-07-17 RX ADMIN — PANTOPRAZOLE SODIUM 40 MG: 40 TABLET, DELAYED RELEASE ORAL at 05:22

## 2023-07-17 NOTE — PROGRESS NOTES
Assisted By: Physical therapy    CC: Follow-up on femur fracture as well as intramedullary nail placement and percutaneous screw insertion for bicondylar tibial plateau fracture on the right.    Interview History/HPI: Patient was feeling a little lightheaded while sitting up for physical therapy, blood pressure was taken and was 72 systolic.  We immediately got the patient back to bed, within a short amount of time patient was feeling better her color was better and blood pressure had come up.  Patient states this not the first time she has had trouble with orthostasis but was not having this problem prior to her trauma.          Current Hospital Meds:  ascorbic acid, 500 mg, Oral, Daily  aspirin, 81 mg, Oral, Daily  calcium carbonate, 1 tablet, Oral, BID  clopidogrel, 75 mg, Oral, Daily  enoxaparin, 40 mg, Subcutaneous, Q12H  gabapentin, 300 mg, Oral, Q8H  insulin glargine, 10 Units, Subcutaneous, Q12H  midodrine, 2.5 mg, Oral, TID AC  multivitamin, 1 tablet, Oral, Daily  pantoprazole, 40 mg, Oral, Q AM  rosuvastatin, 40 mg, Oral, Nightly  senna-docusate sodium, 2 tablet, Oral, BID  vitamin D3, 5,000 Units, Oral, Daily         Vitals:    07/17/23 1234   BP: 115/59   Pulse: 76   Resp:    Temp:    SpO2:          Intake/Output Summary (Last 24 hours) at 7/17/2023 1414  Last data filed at 7/17/2023 0700  Gross per 24 hour   Intake 840 ml   Output --   Net 840 ml       EXAM: Saturation 96%, temperature 98.7.  Respiratory rate is 18.  Patient's skin color was initially somewhat pale when she was hypotensive, this pinked up when she got back to bed.  I was going to give her some IV fluids however patient refused stick for IV.  Speech was normal upper extremity strength is symmetric lungs are clear heart regular rate and rhythm      Diet: Diabetic Diets; Consistent Carbohydrate; Texture: Regular Texture (IDDSI 7); Fluid Consistency: Thin (IDDSI 0)        LABS:     Lab Results (last 48 hours)       Procedure Component Value  Units Date/Time    Basic Metabolic Panel [[id number]]  (Abnormal) Collected: 07/17/23 0956    Specimen: Blood Updated: 07/17/23 1036     Glucose 207 mg/dL      BUN 12 mg/dL      Creatinine 0.75 mg/dL      Sodium 140 mmol/L      Potassium 4.1 mmol/L      Chloride 106 mmol/L      CO2 24.4 mmol/L      Calcium 9.2 mg/dL      BUN/Creatinine Ratio 16.0     Anion Gap 9.6 mmol/L      eGFR 93.0 mL/min/1.73     Narrative:      GFR Normal >60  Chronic Kidney Disease <60  Kidney Failure <15      CBC & Differential [[id number]]  (Abnormal) Collected: 07/17/23 0956    Specimen: Blood Updated: 07/17/23 1006    Narrative:      The following orders were created for panel order CBC & Differential.  Procedure                               Abnormality         Status                     ---------                               -----------         ------                     CBC Auto Differential[[id number]]        Abnormal            Final result                 Please view results for these tests on the individual orders.    CBC Auto Differential [[id number]]  (Abnormal) Collected: 07/17/23 0956    Specimen: Blood Updated: 07/17/23 1006     WBC 6.01 10*3/mm3      RBC 3.24 10*6/mm3      Hemoglobin 9.3 g/dL      Hematocrit 30.9 %      MCV 95.4 fL      MCH 28.7 pg      MCHC 30.1 g/dL      RDW 16.2 %      RDW-SD 53.2 fl      MPV 9.0 fL      Platelets 239 10*3/mm3      Neutrophil % 67.8 %      Lymphocyte % 20.1 %      Monocyte % 7.7 %      Eosinophil % 3.8 %      Basophil % 0.3 %      Immature Grans % 0.3 %      Neutrophils, Absolute 4.07 10*3/mm3      Lymphocytes, Absolute 1.21 10*3/mm3      Monocytes, Absolute 0.46 10*3/mm3      Eosinophils, Absolute 0.23 10*3/mm3      Basophils, Absolute 0.02 10*3/mm3      Immature Grans, Absolute 0.02 10*3/mm3      nRBC 0.0 /100 WBC     POC Glucose Once [[id number]]  (Abnormal) Collected: 07/17/23 0940    Specimen: Blood Updated: 07/17/23 0947     Glucose 211 mg/dL      Comment: Meter: [id number] :  [id number] Narcisa Flores       POC Glucose Once [[id number]]  (Normal) Collected: 07/17/23 0612    Specimen: Blood Updated: 07/17/23 0618     Glucose 120 mg/dL      Comment: Meter: [id number] : [id number] BERNADETTE PEREZ       POC Glucose Once [[id number]]  (Abnormal) Collected: 07/16/23 1958    Specimen: Blood Updated: 07/16/23 2005     Glucose 233 mg/dL      Comment: Meter: [id number] : [id number] BERNADETTE PEREZ       POC Glucose Once [[id number]]  (Abnormal) Collected: 07/16/23 1619    Specimen: Blood Updated: 07/16/23 1625     Glucose 157 mg/dL      Comment: Meter: [id number] : [id number] BRAINDO MARTINEZ       POC Glucose Once [[id number]]  (Abnormal) Collected: 07/16/23 1119    Specimen: Blood Updated: 07/16/23 1140     Glucose 150 mg/dL      Comment: Meter: [id number] : [id number] Hanh John       POC Glucose Once [[id number]]  (Abnormal) Collected: 07/16/23 0611    Specimen: Blood Updated: 07/16/23 0618     Glucose 153 mg/dL      Comment: Meter: [id number] : [id number] BERNADETTE PEREZ       Basic Metabolic Panel [[id number]]  (Abnormal) Collected: 07/16/23 0053    Specimen: Blood Updated: 07/16/23 0148     Glucose 153 mg/dL      BUN 9 mg/dL      Creatinine 0.70 mg/dL      Sodium 144 mmol/L      Potassium 4.1 mmol/L      Chloride 109 mmol/L      CO2 24.2 mmol/L      Calcium 9.3 mg/dL      BUN/Creatinine Ratio 12.9     Anion Gap 10.8 mmol/L      eGFR 101.0 mL/min/1.73     Narrative:      GFR Normal >60  Chronic Kidney Disease <60  Kidney Failure <15      CBC & Differential [[id number]]  (Abnormal) Collected: 07/16/23 0053    Specimen: Blood Updated: 07/16/23 0116    Narrative:      The following orders were created for panel order CBC & Differential.  Procedure                               Abnormality         Status                     ---------                               -----------         ------                     CBC Auto Differential[[id number]]        Abnormal            Final result                  Please view results for these tests on the individual orders.    CBC Auto Differential [[id number]]  (Abnormal) Collected: 07/16/23 0053    Specimen: Blood Updated: 07/16/23 0116     WBC 5.85 10*3/mm3      RBC 2.98 10*6/mm3      Hemoglobin 8.7 g/dL      Hematocrit 28.5 %      MCV 95.6 fL      MCH 29.2 pg      MCHC 30.5 g/dL      RDW 15.8 %      RDW-SD 52.1 fl      MPV 8.9 fL      Platelets 251 10*3/mm3      Neutrophil % 58.5 %      Lymphocyte % 29.6 %      Monocyte % 7.5 %      Eosinophil % 3.2 %      Basophil % 0.5 %      Immature Grans % 0.7 %      Neutrophils, Absolute 3.42 10*3/mm3      Lymphocytes, Absolute 1.73 10*3/mm3      Monocytes, Absolute 0.44 10*3/mm3      Eosinophils, Absolute 0.19 10*3/mm3      Basophils, Absolute 0.03 10*3/mm3      Immature Grans, Absolute 0.04 10*3/mm3      nRBC 0.7 /100 WBC     POC Glucose Once [[id number]]  (Abnormal) Collected: 07/15/23 2002    Specimen: Blood Updated: 07/15/23 2008     Glucose 223 mg/dL      Comment: Meter: [id number] : [id number] BERNADETTE PEREZ       POC Glucose Once [[id number]]  (Abnormal) Collected: 07/15/23 1553    Specimen: Blood Updated: 07/15/23 1559     Glucose 209 mg/dL      Comment: Meter: [id number] : [id number] BRAIN MARTINEZ                    Radiology:    Imaging Results (Last 72 Hours)       ** No results found for the last 72 hours. **                Assessment/Plan:   Status post intramedullary callie and screw fixation of comminuted fracture of the proximal tibia.  Patient is nonweightbearing on this leg.  Patient is working with physical and Occupational Therapy.  This was a traumatic fracture.  With PT, patient is getting orthostatic which is limiting her ability to work with physical and Occupational Therapy.  Patient has not on no antihypertensive medications that should lower her blood pressure at this time.  Hemoglobin is overall stable.  Her glucose was over 200.  Previous echocardiogram showed an EF that was acceptable  last year but I am going to recheck this.  I have begun the patient on low-dose midodrine, will use an abdominal binder while up.  OT on Saturday patient tolerated this well without adverse reaction.  He was however treated in bed due to the low blood pressure.  Patient at that point was set up for grooming, set up for self-feeding.  Patient was min assist with them for bed mobility.  Continue to work on ADLs, motor skills, strengthening and range of motion.    Coronary artery disease status post CABG, I am checking echo as above, continue Crestor, aspirin and Plavix.    DVT prophylaxis, subcu Lovenox    Diabetes, patient is on 10 units twice a day of Lantus, will follow on this and make adjustments as needed.    IBS, apparently more predominantly diarrhea type, no bowel movement yesterday, follow        Wilmer Gama MD

## 2023-07-17 NOTE — PROGRESS NOTES
Occupational Therapy:    Physical Therapy: Individual: 60 minutes.    Speech Language Pathology:    Signed by: Wendi Brothers, Physical Therapist

## 2023-07-17 NOTE — NURSING NOTE
Upon patient return from echo she voiced that during transfer so felt like her shoulder was pulled. She stated that she had limited movement and it ached. I notified Dr Gama . Pt denied wanting to speak with HPCM to voice complaint.   See orders for xray. And will continue to monitor patient.

## 2023-07-17 NOTE — THERAPY TREATMENT NOTE
Inpatient Rehabilitation - Occupational Therapy Treatment Note    TONI Wild     Patient Name: Lisa Velazquez  : 1965  MRN: [id number]    Today's Date: 2023                 Admit Date: 2023       No diagnosis found.    Patient Active Problem List   Diagnosis    S/P right hip fracture    Acute respiratory failure with hypoxia    Right tibial fracture       Past Medical History:   Diagnosis Date    Coronary artery disease     Diabetes mellitus     Elevated cholesterol     GERD (gastroesophageal reflux disease)     History of transfusion     Hypertension        Past Surgical History:   Procedure Laterality Date    ABDOMINAL SURGERY      gallbladder removal    CARDIAC CATHETERIZATION      CARDIAC SURGERY      stent    COLONOSCOPY      FRACTURE SURGERY      TOE AMPUTATION Right     3rd toe right foot             IRF OT ASSESSMENT FLOWSHEET (last 12 hours)       IRF OT Evaluation and Treatment       Row Name 23 1536          OT Time and Intention    Document Type daily treatment  -BF     Mode of Treatment occupational therapy  -BF     Patient Effort adequate  -BF     Symptoms Noted During/After Treatment --  pt c/o R shoulder discomfort in PM w/ RN present and addressing situation  -BF       Row Name 23 1536          General Information    Existing Precautions/Restrictions fall  RLE WBAT  -BF       Row Name 23 1536          Cognition/Psychosocial    Orientation Status (Cognition) oriented x 3  -BF     Follows Commands (Cognition) WFL  -BF       Row Name 23 1536          Lower Body Dressing    [place] Level (Lower Body Dressing) maximum assist (25% patient effort);verbal cues;nonverbal cues (demo/gesture)  -BF     Position (Lower Body Dressing) supine  -BF     Comment (Lower Body Dressing) Max A  -BF       Row Name 23 1536          Grooming    [place] Level (Grooming) set up  -BF     Position (Grooming) supported sitting  -BF     Comment (Grooming) Set-up  -BF        Row Name 07/17/23 1536          Bed-Chair Transfer    Bed-Chair [place] (Transfers) moderate assist (50% patient effort);verbal cues;nonverbal cues (demo/gesture)  -BF     Assistive Device (Bed-Chair Transfers) wheelchair  -BF       Row Name 07/17/23 1536          Motor Skills    Motor Control/Coordination Interventions fine motor manipulation/dexterity activities;gross motor coordination activities;therapeutic exercise/ROM  BUE GMC/FMC theract, strengthening w/ theraband  -BF       Row Name 07/17/23 1536          Positioning and Restraints    In Bed supine;call light within reach;encouraged to call for assist  In PM  -BF     In Wheelchair sitting;with PT  In AM  -BF               User Key  (r) = Recorded By, (t) = Taken By, (c) = Cosigned By      Initials Name Effective Dates    Araceli Hammonds, OT 07/11/23 -                      Occupational Therapy Education       Title: PT OT SLP Therapies (Done)       Topic: Occupational Therapy (Done)       Point: ADL training (Done)       Description:   Instruct learner(s) on proper safety adaptation and remediation techniques during self care or transfers.   Instruct in proper use of assistive devices.                  Learning Progress Summary             Patient Acceptance, E, VU,NR by  at 7/17/2023 1536    Acceptance, E, VU,NR by HB at 7/15/2023 1204    Acceptance, E, VU,NR by  at 7/14/2023 1028                         Point: Home exercise program (Done)       Description:   Instruct learner(s) on appropriate technique for monitoring, assisting and/or progressing therapeutic exercises/activities.                  Learning Progress Summary             Patient Acceptance, E, VU,NR by HB at 7/15/2023 1204                         Point: Precautions (Done)       Description:   Instruct learner(s) on prescribed precautions during self-care and functional transfers.                  Learning Progress Summary             Patient Acceptance, E, VU,NR by  at  7/17/2023 1536    Acceptance, E, VU,NR by  at 7/15/2023 1204    Acceptance, E, VU,NR by  at 7/14/2023 1028                         Point: Body mechanics (Done)       Description:   Instruct learner(s) on proper positioning and spine alignment during self-care, functional mobility activities and/or exercises.                  Learning Progress Summary             Patient Acceptance, E, VU,NR by  at 7/15/2023 1204                                         User Key       Initials Effective Dates Name Provider Type Discipline     07/11/23 -  Araceli Lua OT Occupational Therapist OT     05/25/21 -  Vandana Rader OT Occupational Therapist OT                        OT Recommendation and Plan    Planned Therapy Interventions (OT): adaptive equipment training, activity tolerance training, BADL retraining, ROM/therapeutic exercise, strengthening exercise, transfer/mobility retraining                    Time Calculation:      Time Calculation- OT       Row Name 07/17/23 1541 07/17/23 1540          Time Calculation- OT    OT Start Time 1500  -BF 0820  -BF     OT Stop Time 1535  -BF 0915  -BF     OT Time Calculation (min) 35 min  -BF 55 min  -BF     Total Timed Code Minutes- OT 35 minute(s)  -BF 55 minute(s)  -BF     OT Non-Billable Time (min) -- 10 min  -BF               User Key  (r) = Recorded By, (t) = Taken By, (c) = Cosigned By      Initials Name Provider Type     Araceli Lua OT Occupational Therapist                  Therapy Charges for Today       Code Description Service Date Service Provider Modifiers Qty    16246217603 HC OT SELF CARE/MGMT/TRAIN EA 15 MIN 7/17/2023 Araceli Lua OT GO 2    23308797172 HC OT THERAPEUTIC ACT EA 15 MIN 7/17/2023 Araceli Lua OT GO 2    35456026315 HC OT THER PROC EA 15 MIN 7/17/2023 Araceli Lua OT GO 2                     Araceli Lua OT  7/17/2023
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
## 2023-07-17 NOTE — PROGRESS NOTES
Patient Assessment Instrument  Quality Indicators - Admission FY 2023    Section A. Ethnicity/ Race/Language  Ethnicity:  Race:  Preferred Language:    Section A. Transportation      Section B. Hearing and Vision        Section B. Health Literacy        Section C. Cognitive Patterns      Section C. Signs and Symptoms of Delirium (from CAM)      Section D. Mood      Section D. Social Isolation      Section [id number]. Prior Functioning    Self Care: Patient completed all the activities by themself, with or without an  assistive device, with no assistance from a helper.  Indoor Mobility: Patient completed the activities by themself, with or without  an assistive device, with no assistance from a helper.  Stairs: Patient completed the activities by themself, with or without an  assistive device, with no assistance from a helper.  Functional Cognition: Patient completed the activities by themself, with or  without an assistive device, with no assistance from a helper.    Section [id number]. Prior Device Use      Section OS0830. Self Care Performance      Section II9991. Self Care Discharge Goals      Section [id number]. Mobility Performance      Section [id number]. Mobility Discharge Goals      Section H. Bladder and Bowel  Bladder Continence: Incontinent daily.  Bowel Continence: Frequently incontinent (2 or more episodes of bowel  incontinence, but at least one continent bowel movement).    Section I. Active Diagnosis  Comorbidities and Co-existing Conditions:   Diabetes Mellitus (DM) - e.g.,  diabetic retinopathy, nephropathy, and neuropathy).    Section J. Health Conditions  Patient has had two or more falls, or a fall with injury, in the past year.  Patient has had major surgery during the 100 days prior to admission.    Section J. Health Conditions (Pain)      Section K. Swallowing/Nutritional Status  Regular food (solids and liquids swallowed safely without supervision or  modified food or liquid consistency).  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    She is currently hemodynamically stable with no signs of focal neurological deficits, no signs of raised ICP and no signs of acute decompensation.    She will continue chemotherapy today. Approaches on Admission:  Nutritional Approaches on Admission:  Therapeutic diet (e.g., low salt, diabetic, low cholesterol)    Section M. Skin Conditions      Section N. Medication    Potential Clinically Significant Medication Issues: No issues found during  review  Patient is taking medications in the following pharmacological classification:  I. Antiplatelet An indication is noted for all medications in the Antiplatelet  drug class. E. Anticoagulant An indication is noted for all medications in the  Anticoagulant drug class. J. Hypoglycemic (including insulin) An indication is  noted for all medications in the Hypoglycemic drug class. H. Opioid An  indication is noted for all medications in the Opiod drug class.  Potential Clinically Significant Medication Issues: No issues found during  review    Section O. Special Treatments, Procedures, and Programs    Signed by: Felicia Lundberg, Supervisor

## 2023-07-17 NOTE — PROGRESS NOTES
Inpatient Rehabilitation Functional Measures Assessment and Plan of Care    Plan of Care  Self Care    [OT] Dressing (Lower)(Active)  Current Status(07/17/2023): Max A  Weekly Goal(07/25/2023): Mod A  Discharge Goal: Min A    Functional Measures  KIRAN Eating:  KIRAN Grooming:  KIRAN Bathing:  KIRAN Upper Body Dressing:  KIRAN Lower Body Dressing:  KIRAN Toileting:    KIRAN Bladder Management  Level of Assistance:  Frequency/Number of Accidents this Shift:    KIRAN Bowel Management  Level of Assistance:  Frequency/Number of Accidents this Shift:    KIRAN Bed/Chair/Wheelchair Transfer:  KIRAN Toilet Transfer:  KIRAN Tub/Shower Transfer:    Previously Documented Mode of Locomotion at Discharge:  KIRAN Expected Mode of Locomotion at Discharge:  KIRAN Walk/Wheelchair:  KIRAN Stairs:    KIRAN Comprehension:  KIRAN Expression:  KIRAN Social Interaction:  KIRAN Problem Solving:  KIRAN Memory:    Therapy Mode Minutes  Occupational Therapy: Individual: 90 minutes.  Physical Therapy:  Speech Language Pathology:    Discharge Functional Goals:    Signed by: Araceli Lua OT

## 2023-07-17 NOTE — PROGRESS NOTES
PPS CMG Coordinator  Inpatient Rehabilitation Admission    Ethnic Group: White.  Marital Status:  Marital Status: .    IRF Admission Date:  07/13/2023  Admission Class: Initial Rehab.  Admit From:  [facility]    Pre-Hospital Living: Home. Pre-Hospital Living  With: (1) Alone.    Payment Sources: Primary: Medicare - Medicare Advantage  Secondary: Not Listed.  Impairment Group: 08.9 Other Orthopedic  Date of Onset of Impairment: 07/03/2023    Etiologic Diagnosis Code(s):  Rank Code      Description  1    S82.141A  Displaced bicondylar fracture of right tibia,                 initial encounter for closed fracture    Comorbidities:      Height on Admission: 67 inches.  Weight on Admission: 194 pounds.    Are there any arthritis conditions recorded for Impairment Group, Etiologic  Diagnosis, or Comorbid Conditions that meet all of the regulatory requirements  for IRF classification (in 42 .29(b)(2)(x), (xi), and xii))?    KIRAN Bladder Accidents:  0 - Accidents.  Bladder Score = 1.  Five (5) or more  bladder accidents.  KIRAN Bowel Accident: 0 -Accidents.  Bowel Score = 4.  Two (2) bowel accidents.    Signed by: Felicia Lundberg, Supervisor

## 2023-07-17 NOTE — THERAPY TREATMENT NOTE
Inpatient Rehabilitation - Physical Therapy Treatment Note       TONI Wild     Patient Name: Lisa Velazquez  : 1965  MRN: [id number]    Today's Date: 2023                    Admit Date: 2023      Visit Dx:   No diagnosis found.    Patient Active Problem List   Diagnosis    S/P right hip fracture    Acute respiratory failure with hypoxia    Right tibial fracture       Past Medical History:   Diagnosis Date    Coronary artery disease     Diabetes mellitus     Elevated cholesterol     GERD (gastroesophageal reflux disease)     History of transfusion     Hypertension        Past Surgical History:   Procedure Laterality Date    ABDOMINAL SURGERY      gallbladder removal    CARDIAC CATHETERIZATION      CARDIAC SURGERY      stent    COLONOSCOPY      FRACTURE SURGERY      TOE AMPUTATION Right     3rd toe right foot       PT ASSESSMENT (last 12 hours)       IRF PT Evaluation and Treatment       Row Name 23 1507          PT Time and Intention    Document Type daily treatment  -LB     Mode of Treatment individual therapy;physical therapy  -LB     Patient/Family/Caregiver Comments/Observations she missed some PT time today due to medical issues. in am she became hypotensive and had to return to bed, in pm she was gone for an echo.  -LB       Row Name 23 1507          General Information    Existing Precautions/Restrictions fall  -LB       Row Name 23 1507          Pain Scale: FACES Pre/Post-Treatment    Pain: FACES Scale, Pretreatment 6-->hurts even more  -LB     Posttreatment Pain Rating 6-->hurts even more  -LB     Pain Location - --  RLE  -LB     Pre/Posttreatment Pain Comment rest, repositioned, notified RN  -       Row Name 23 1507          Cognition/Psychosocial    Affect/Mental Status (Cognition) WFL  -LB     Follows Commands (Cognition) WFL  -LB     Personal Safety Interventions gait belt;nonskid shoes/slippers when out of bed;supervised activity  -LB       Row Name 23  1507          Mobility    Left Lower Extremity (Weight-bearing Status) weight-bearing as tolerated (WBAT)  -LB     Right Lower Extremity (Weight-bearing Status) weight-bearing as tolerated (WBAT)  -LB       Row Name 07/17/23 1507          Bed Mobility    Supine-Sit-Supine Patrick (Bed Mobility) minimum assist (75% patient effort);verbal cues;nonverbal cues (demo/gesture)  A for lifting/scooting RLE  -LB       Row Name 07/17/23 1507          Chair-Bed Transfer    Chair-Bed Patrick (Transfers) moderate assist (50% patient effort);maximum assist (25% patient effort);verbal cues;nonverbal cues (demo/gesture)  -LB     Assistive Device (Chair-Bed Transfers) wheelchair;walker, front-wheeled  -LB       Row Name 07/17/23 1507          Balance    Static Sitting Balance --  in pm she sat EOB 15 min and performed sitting ex  -LB       Row Name 07/17/23 1507          Motor Skills    Therapeutic Exercise --  attempted siting ex in am but had hypotension and had to lay down. in pm she tolerated sitting ex at EOB and supine ex/ROM for RLE.  -LB       Row Name 07/17/23 1507          Vital Signs    Intra Systolic BP Rehab 73  -LB     Intra Treatment Diastolic BP 52  -LB     Intra Patient Position Sitting  -LB     Post Patient Position Supine  she was returned to bed, MD and RN with patient  -LB       Row Name 07/17/23 1507          Therapy Assessment/Plan (PT)    Patient's Goals For Discharge return home  -LB       Row Name 07/17/23 1507          Therapy Assessment/Plan (PT)    Rehab Potential/Prognosis (PT) adequate, monitor progress closely  -LB     Frequency of Treatment (PT) 5 times per week  -LB     Estimated Duration of Therapy (PT) 2 weeks  -LB     Problem List (PT) balance;mobility;range of motion (ROM);strength;pain  -LB     Activity Limitations Related to Problem List (PT) unable to ambulate safely;unable to transfer safely  -LB       Row Name 07/17/23 1507          Daily Progress Summary (PT)    Recommendations  (PT) continue PT  -LB       Row Name 07/17/23 1507          Therapy Plan Review/Discharge Plan (PT)    Anticipated Equipment Needs at Discharge (PT Eval) --  tbd  -LB     Anticipated Discharge Disposition (PT) home with assist;home with home health  -LB       Row Name 07/17/23 1507          IRF PT Goals    Bed Mobility Goal Selection (PT-IRF) bed mobility, PT goal 1  -LB     Transfer Goal Selection (PT-IRF) transfers, PT goal 1  -LB     Gait (Walking Locomotion) Goal Selection (PT-IRF) gait, PT goal 1  -LB       Row Name 07/17/23 1507          Bed Mobility Goal 1 (PT-IRF)    Activity/Assistive Device (Bed Mobility Goal 1, PT-IRF) sit to supine/supine to sit  -LB     Amelia Level (Bed Mobility Goal 1, PT-IRF) modified independence  -LB     Time Frame (Bed Mobility Goal 1, PT-IRF) by discharge  -LB       Row Name 07/17/23 1507          Transfer Goal 1 (PT-IRF)    Activity/Assistive Device (Transfer Goal 1, PT-IRF) sit-to-stand/stand-to-sit;bed-to-chair/chair-to-bed  -LB     Amelia Level (Transfer Goal 1, PT-IRF) modified independence  -LB     Time Frame (Transfer Goal 1, PT-IRF) by discharge  -LB       Row Name 07/17/23 1507          Gait/Walking Locomotion Goal 1 (PT-IRF)    Activity/Assistive Device (Gait/Walking Locomotion Goal 1, PT-IRF) walker, rolling  -LB     Gait/Walking Locomotion Distance Goal 1 (PT-IRF) 300'  -LB     Amelia Level (Gait/Walking Locomotion Goal 1, PT-IRF) modified independence  -LB     Time Frame (Gait/Walking Locomotion Goal 1, PT-IRF) by discharge  -LB               User Key  (r) = Recorded By, (t) = Taken By, (c) = Cosigned By      Initials Name Provider Type    LB Wendi Brothers, PT Physical Therapist                  Wound 07/14/23 1930 Right lower leg (Active)   Dressing Appearance dry;intact 07/17/23 1047   Base dressing in place, unable to visualize 07/17/23 1047   Drainage Amount none 07/16/23 1930     Physical Therapy Education       Title: PT OT SLP  Therapies (Done)       Topic: Physical Therapy (Done)       Point: Mobility training (Done)       Learning Progress Summary             Patient Acceptance, E, VU,NR by LB at 7/17/2023 1513    Acceptance, E,TB, VU by RM at 7/15/2023 1243    Acceptance, E, VU,NR by LB at 7/14/2023 1550                         Point: Home exercise program (Done)       Learning Progress Summary             Patient Acceptance, E, VU,NR by LB at 7/17/2023 1513    Acceptance, E,TB, VU by RM at 7/15/2023 1243    Acceptance, E, VU,NR by LB at 7/14/2023 1550                         Point: Body mechanics (Done)       Learning Progress Summary             Patient Acceptance, E, VU,NR by LB at 7/17/2023 1513    Acceptance, E,TB, VU by RM at 7/15/2023 1243    Acceptance, E, VU,NR by LB at 7/14/2023 1550                         Point: Precautions (Done)       Learning Progress Summary             Patient Acceptance, E, VU,NR by LB at 7/17/2023 1513    Acceptance, E,TB, VU by RM at 7/15/2023 1243    Acceptance, E, VU,NR by LB at 7/14/2023 1550                                         User Key       Initials Effective Dates Name Provider Type Discipline     06/16/21 -  Wendi Brothers, PT Physical Therapist PT     02/17/23 -  Julianna Quach, JESSICA Physical Therapist Assistant PT                    PT Recommendation and Plan    Planned Therapy Interventions (PT): balance training, bed mobility training, gait training, home exercise program, patient/family education, ROM (range of motion), strengthening, transfer training  Frequency of Treatment (PT): 5 times per week  Anticipated Equipment Needs at Discharge (PT Eval):  (tbd)  Daily Progress Summary (PT)  Recommendations (PT): continue PT               Time Calculation:      PT Charges       Row Name 07/17/23 1514 07/17/23 1513          Time Calculation    Start Time 1430  -LB 0915  -LB     Stop Time 1500  -LB 0945  -LB     Time Calculation (min) 30 min  -LB 30 min  -LB     PT  Received On -- 07/17/23  -[facility]               User Key  (r) = Recorded By, (t) = Taken By, (c) = Cosigned By      Initials Name Provider Type    Wendi Henriquez, PT Physical Therapist                    Therapy Charges for Today       Code Description Service Date Service Provider Modifiers Qty    89760516157  PT THER PROC EA 15 MIN 7/17/2023 Wendi Brothers, PT GP 2    [id number]  PT THERAPEUTIC ACT EA 15 MIN 7/17/2023 Wendi Brothers, PT GP 2                     Wendi Brothers, PT  7/17/2023

## 2023-07-17 NOTE — PROGRESS NOTES
Rehabilitation Nursing  Inpatient Rehabilitation Plan of Care Note    Plan of Care  Copy from POCSafety    Potential for Injury (Active)  Current Status (6/28/2023 6:00:00 PM): risk for falls  Weekly Goal: no falls  Discharge Goal: no falls    Body Systems    Integumentary (Active)  Current Status (6/28/2023 6:00:00 PM): Skin Breakdown  Weekly Goal: no further breakdown  Discharge Goal: no further breakdown    Signed by: Kari Stevenson RN

## 2023-07-18 LAB
CORTIS SERPL-MCNC: 26.76 MCG/DL
CORTIS SERPL-MCNC: 29.86 MCG/DL
CORTIS SERPL-MCNC: 7.84 MCG/DL
GLUCOSE BLDC GLUCOMTR-MCNC: 155 MG/DL (ref 70–130)
GLUCOSE BLDC GLUCOMTR-MCNC: 157 MG/DL (ref 70–130)
GLUCOSE BLDC GLUCOMTR-MCNC: 190 MG/DL (ref 70–130)
GLUCOSE BLDC GLUCOMTR-MCNC: 212 MG/DL (ref 70–130)
GLUCOSE BLDC GLUCOMTR-MCNC: 217 MG/DL (ref 70–130)

## 2023-07-18 PROCEDURE — 63710000001 INSULIN GLARGINE PER 5 UNITS: Performed by: INTERNAL MEDICINE

## 2023-07-18 PROCEDURE — 97530 THERAPEUTIC ACTIVITIES: CPT

## 2023-07-18 PROCEDURE — 97110 THERAPEUTIC EXERCISES: CPT | Performed by: OCCUPATIONAL THERAPIST

## 2023-07-18 PROCEDURE — 0 LIDOCAINE 1 % SOLUTION

## 2023-07-18 PROCEDURE — 82533 TOTAL CORTISOL: CPT | Performed by: FAMILY MEDICINE

## 2023-07-18 PROCEDURE — 36410 VNPNXR 3YR/> PHY/QHP DX/THER: CPT

## 2023-07-18 PROCEDURE — 99232 SBSQ HOSP IP/OBS MODERATE 35: CPT | Performed by: INTERNAL MEDICINE

## 2023-07-18 PROCEDURE — 82948 REAGENT STRIP/BLOOD GLUCOSE: CPT

## 2023-07-18 PROCEDURE — 25010000002 COSYNTROPIN PER 0.25 MG: Performed by: FAMILY MEDICINE

## 2023-07-18 PROCEDURE — 25010000002 ENOXAPARIN PER 10 MG: Performed by: FAMILY MEDICINE

## 2023-07-18 PROCEDURE — 05HB33Z INSERTION OF INFUSION DEVICE INTO RIGHT BASILIC VEIN, PERCUTANEOUS APPROACH: ICD-10-PCS

## 2023-07-18 PROCEDURE — 97110 THERAPEUTIC EXERCISES: CPT

## 2023-07-18 PROCEDURE — 63710000001 INSULIN GLARGINE PER 5 UNITS: Performed by: FAMILY MEDICINE

## 2023-07-18 PROCEDURE — 97530 THERAPEUTIC ACTIVITIES: CPT | Performed by: OCCUPATIONAL THERAPIST

## 2023-07-18 PROCEDURE — 97535 SELF CARE MNGMENT TRAINING: CPT | Performed by: OCCUPATIONAL THERAPIST

## 2023-07-18 RX ORDER — TRAMADOL HYDROCHLORIDE 50 MG/1
50 TABLET ORAL EVERY 12 HOURS PRN
Status: DISCONTINUED | OUTPATIENT
Start: 2023-07-18 | End: 2023-07-22

## 2023-07-18 RX ORDER — COSYNTROPIN 0.25 MG/ML
0.25 INJECTION, POWDER, FOR SOLUTION INTRAMUSCULAR; INTRAVENOUS ONCE
Status: DISCONTINUED | OUTPATIENT
Start: 2023-07-18 | End: 2023-07-18

## 2023-07-18 RX ORDER — GABAPENTIN 100 MG/1
200 CAPSULE ORAL EVERY 8 HOURS SCHEDULED
Status: DISCONTINUED | OUTPATIENT
Start: 2023-07-18 | End: 2023-07-19

## 2023-07-18 RX ORDER — COSYNTROPIN 0.25 MG/ML
0.25 INJECTION, POWDER, FOR SOLUTION INTRAMUSCULAR; INTRAVENOUS ONCE
Status: COMPLETED | OUTPATIENT
Start: 2023-07-18 | End: 2023-07-18

## 2023-07-18 RX ORDER — SODIUM CHLORIDE 9 MG/ML
INJECTION, SOLUTION INTRAVENOUS
Status: COMPLETED
Start: 2023-07-18 | End: 2023-07-18

## 2023-07-18 RX ORDER — MIDODRINE HYDROCHLORIDE 2.5 MG/1
5 TABLET ORAL
Status: DISCONTINUED | OUTPATIENT
Start: 2023-07-18 | End: 2023-07-20

## 2023-07-18 RX ORDER — LIDOCAINE HYDROCHLORIDE 10 MG/ML
10 INJECTION, SOLUTION INFILTRATION; PERINEURAL ONCE
Status: COMPLETED | OUTPATIENT
Start: 2023-07-18 | End: 2023-07-18

## 2023-07-18 RX ORDER — SODIUM CHLORIDE 9 MG/ML
75 INJECTION, SOLUTION INTRAVENOUS CONTINUOUS
Status: DISCONTINUED | OUTPATIENT
Start: 2023-07-18 | End: 2023-07-19

## 2023-07-18 RX ADMIN — PANTOPRAZOLE SODIUM 40 MG: 40 TABLET, DELAYED RELEASE ORAL at 05:50

## 2023-07-18 RX ADMIN — LIDOCAINE HYDROCHLORIDE 10 ML: 10 INJECTION, SOLUTION INFILTRATION; PERINEURAL at 16:46

## 2023-07-18 RX ADMIN — Medication 5 MG: at 21:43

## 2023-07-18 RX ADMIN — ASPIRIN 81 MG: 81 TABLET, COATED ORAL at 08:38

## 2023-07-18 RX ADMIN — ROSUVASTATIN CALCIUM 40 MG: 20 TABLET, FILM COATED ORAL at 21:31

## 2023-07-18 RX ADMIN — SODIUM CHLORIDE 75 ML/HR: 9 INJECTION, SOLUTION INTRAVENOUS at 22:12

## 2023-07-18 RX ADMIN — ENOXAPARIN SODIUM 40 MG: 100 INJECTION SUBCUTANEOUS at 08:38

## 2023-07-18 RX ADMIN — INSULIN GLARGINE 10 UNITS: 100 INJECTION, SOLUTION SUBCUTANEOUS at 08:39

## 2023-07-18 RX ADMIN — ENOXAPARIN SODIUM 40 MG: 100 INJECTION SUBCUTANEOUS at 21:31

## 2023-07-18 RX ADMIN — GABAPENTIN 300 MG: 300 CAPSULE ORAL at 05:50

## 2023-07-18 RX ADMIN — COSYNTROPIN 0.25 MG: 0.25 INJECTION, POWDER, LYOPHILIZED, FOR SOLUTION INTRAMUSCULAR; INTRAVENOUS at 18:45

## 2023-07-18 RX ADMIN — CLOPIDOGREL BISULFATE 75 MG: 75 TABLET, FILM COATED ORAL at 08:38

## 2023-07-18 RX ADMIN — Medication 5000 UNITS: at 08:38

## 2023-07-18 RX ADMIN — CARBIDOPA AND LEVODOPA 5 MG: 50; 200 TABLET, EXTENDED RELEASE ORAL at 12:34

## 2023-07-18 RX ADMIN — CARBIDOPA AND LEVODOPA 2.5 MG: 50; 200 TABLET, EXTENDED RELEASE ORAL at 06:36

## 2023-07-18 RX ADMIN — OXYCODONE HYDROCHLORIDE AND ACETAMINOPHEN 500 MG: 500 TABLET ORAL at 08:38

## 2023-07-18 RX ADMIN — TRAMADOL HYDROCHLORIDE 50 MG: 50 TABLET, COATED ORAL at 21:43

## 2023-07-18 RX ADMIN — SODIUM CHLORIDE 75 ML/HR: 9 INJECTION, SOLUTION INTRAVENOUS at 18:47

## 2023-07-18 RX ADMIN — GABAPENTIN 200 MG: 100 CAPSULE ORAL at 21:31

## 2023-07-18 RX ADMIN — ACETAMINOPHEN 650 MG: 325 TABLET ORAL at 08:38

## 2023-07-18 RX ADMIN — INSULIN GLARGINE 13 UNITS: 100 INJECTION, SOLUTION SUBCUTANEOUS at 21:31

## 2023-07-18 RX ADMIN — Medication 1 TABLET: at 08:38

## 2023-07-18 NOTE — PROGRESS NOTES
Occupational Therapy:    Physical Therapy: Individual: 90 minutes.    Speech Language Pathology:    Signed by: Nadine Hale PTA

## 2023-07-18 NOTE — PROCEDURES
Mid line PROCEDURE NOTE    Patient Name:  Lisa Velazquez  YOB: 1965  [id number]    7/18/2023        PREOPERATIVE DIAGNOSIS: Poor venous access      POSTOPERATIVE DIAGNOSIS: Same       PROCEDURE PERFORMED: Mid line placement       PROVIDER: RAVIN Montes       ANESTHESIA: Local       INDICATIONS:    The patient is a 57 y.o. female with poor venous access.     DESCRIPTION OF PROCEDURE:      Veins of right upper extremity with oxygen oxygen.  Patient was prepped and draped sterile manner as well as ultrasound probe.  Lidocaine was used as local anesthesia.  18-gauge needle was used to cannulate patient's right basilic vein.  Blood return noted, guidewire passed with ease.  Needle was removed, 20-gauge powerglide catheter was started over the guidewire.  Guidewire was then removed and extension stent was attached.  Noted flushed with easy return blood.  Patient's arm circumference noted to be 26 cm.  Patient tolerated this procedure well.  Minimal blood loss noted.  No complications noted.      RAVIN Ratliff  7/18/2023  16:42 EDT

## 2023-07-18 NOTE — PLAN OF CARE
Problem: Rehabilitation (IRF) Plan of Care  Goal: Plan of Care Review  Outcome: Ongoing, Progressing  Flowsheets (Taken 7/18/2023 0446)  Progress: no change  Plan of Care Reviewed With: patient  Outcome Evaluation:   Patient calm and cooperative   resting well throughout the night   no acute distress, will continue to monitor   call light with in reach.  Goal: Patient-Specific Goal (Individualized)  Outcome: Ongoing, Progressing  Goal: Absence of New-Onset Illness or Injury  Outcome: Ongoing, Progressing  Intervention: Prevent Fall and Fall Injury  Recent Flowsheet Documentation  Taken 7/18/2023 0400 by Kelly Morfin, RN  Safety Promotion/Fall Prevention: safety round/check completed  Taken 7/18/2023 0200 by Kelly Morfin, RN  Safety Promotion/Fall Prevention: safety round/check completed  Taken 7/18/2023 0003 by Kelly Morfin, RN  Safety Promotion/Fall Prevention: safety round/check completed  Taken 7/17/2023 2200 by Kelly Morfin RN  Safety Promotion/Fall Prevention: safety round/check completed  Taken 7/17/2023 2000 by Kelly Morfin, RN  Safety Promotion/Fall Prevention:   nonskid shoes/slippers when out of bed   clutter free environment maintained   assistive device/personal items within reach   room organization consistent   fall prevention program maintained   safety round/check completed  Intervention: Prevent Infection  Recent Flowsheet Documentation  Taken 7/17/2023 2000 by Kelly Morfin, RN  Infection Prevention:   hand hygiene promoted   personal protective equipment utilized   rest/sleep promoted  Goal: Optimal Comfort and Wellbeing  Outcome: Ongoing, Progressing  Goal: Home and Community Transition Plan Established  Outcome: Ongoing, Progressing   Goal Outcome Evaluation:  Plan of Care Reviewed With: patient        Progress: no change  Outcome Evaluation: Patient calm and cooperative; resting well throughout the night; no acute  distress, will continue to monitor; call light with in reach.

## 2023-07-18 NOTE — PROGRESS NOTES
Case Management  Inpatient Rehabilitation Team Conference    Conference Date/Time: 7/18/2023 7:56:23 AM    Team Conference Attendees:  MD Carrie Parra SW Jessica Bill, RN,   SUBHA Patterson, PT  Araceli Lua OT    Demographics            Age: 57Y            Gender: Female    Admission Date: 7/13/2023 3:38:00 PM  Rehabilitation Diagnosis:  debility  Comorbidities:      Plan of Care  Anticipated Discharge Date/Estimated Length of Stay: 14 days  Anticipated Discharge Destination: Community discharge with assistance  Discharge Plan : Pt plans to return home at discharge.  Pt says her sister may  stay with her during the day.  Medical Necessity Expected Level Rationale: good  Intensity and Duration: an average of 3 hours/5 days per week  Medical Supervision and 24 Hour Rehab Nursing: x  Physical Therapy: x  PT Intensity/Duration: PT 1.5 hours per day/5 days per week  Occupational Therapy: x  OT Intensity/Duration: OT 1.5 hours per day/5 days per week  Social Work: x  Therapeutic Recreation: x  Updated (if changes indicated)    Anticipated Discharge Date/Estimated Length of Stay:   7-27-23      Discharge Plan of Care:    Based on the patient's medical and functional status, their prognosis and  expected level of functional improvement is: fair      Interdisciplinary Problem/Goals/Status  Body Function Structure    [RN] Skin Integrity(Active)  Current Status(07/13/2023): free from skin breakdown this shift  Weekly Goal(07/20/2023): free from skin breakdown this stay  Discharge Goal: home free of skin breakdown        Safety    [RN] Potential for Injury(Active)  Current Status(07/13/2023): free from injury this shift  Weekly Goal(07/20/2023): free from injury this stay  Discharge Goal: home free of injury        Mobility    [PT] Bed/Chair/Wheelchair(Active)  Current Status(07/14/2023): mod-max A  Weekly Goal(07/21/2023): MI  Discharge Goal: MI    [PT]  Walk(Active)  Current Status(07/14/2023): unable to amb  Weekly Goal(07/21/2023): amb 300' RW MI  Discharge Goal: amb 300' RW MI        Self Care    [OT] Toileting(Resolved)  Current Status(06/20/2022): set up  Weekly Goal(06/22/2022): set up  Discharge Goal: set up    [OT] Dressing (Lower)(Active)  Current Status(07/17/2023): Max A  Weekly Goal(07/25/2023): Mod A  Discharge Goal: Min A    Comments: Pt plans to return home with sister providing some assistance during  the day.    Signed by: RASTA Landry    Physician CoSigned By: Wilmer Gama 07/18/2023 14:26:26

## 2023-07-18 NOTE — THERAPY TREATMENT NOTE
Inpatient Rehabilitation - Occupational Therapy Treatment Note    TONI Wild     Patient Name: Lisa Velazquez  : 1965  MRN: [id number]    Today's Date: 2023                 Admit Date: 2023       No diagnosis found.    Patient Active Problem List   Diagnosis    S/P right hip fracture    Acute respiratory failure with hypoxia    Right tibial fracture       Past Medical History:   Diagnosis Date    Coronary artery disease     Diabetes mellitus     Elevated cholesterol     GERD (gastroesophageal reflux disease)     History of transfusion     Hypertension        Past Surgical History:   Procedure Laterality Date    ABDOMINAL SURGERY      gallbladder removal    CARDIAC CATHETERIZATION      CARDIAC SURGERY      stent    COLONOSCOPY      FRACTURE SURGERY      TOE AMPUTATION Right     3rd toe right foot             IRF OT ASSESSMENT FLOWSHEET (last 12 hours)       IRF OT Evaluation and Treatment       Row Name 23 1437          OT Time and Intention    Document Type daily treatment  -BF     Mode of Treatment occupational therapy  -BF     Patient Effort adequate  -BF     Symptoms Noted During/After Treatment fatigue  -BF       Row Name 23 1437          General Information    Existing Precautions/Restrictions fall  WBAT RLE  -BF       Row Name 23 1437          Cognition/Psychosocial    Orientation Status (Cognition) oriented x 3  -BF     Follows Commands (Cognition) WFL  -BF       Row Name 23 1437          Range of Motion Comprehensive    Comment, General Range of Motion BUE AROM WFL  -BF       Row Name 23 1437          Bed-Chair Transfer    Bed-Chair [place] (Transfers) moderate assist (50% patient effort);minimum assist (75% patient effort);verbal cues  -BF     Assistive Device (Bed-Chair Transfers) wheelchair  -BF       Row Name 23 1437          Chair-Bed Transfer    Chair-Bed [place] (Transfers) minimum assist (75% patient effort);verbal cues;nonverbal cues  (demo/gesture)  -BF     Assistive Device (Chair-Bed Transfers) wheelchair  -BF       Row Name 07/18/23 1437          Motor Skills    Motor Control/Coordination Interventions gross motor coordination activities;therapeutic exercise/ROM  BUE GMC therex, strengthening; 3 lb dowel therex, handgripper, wrist rolls, theraband therex  -BF       Row Name 07/18/23 1437          Positioning and Restraints    In Bed supine;call light within reach;encouraged to call for assist;RLE elevated  -BF               User Key  (r) = Recorded By, (t) = Taken By, (c) = Cosigned By      Initials Name Effective Dates    BF Radha Luaney Gloria, OT 07/11/23 -                      Occupational Therapy Education       Title: PT OT SLP Therapies (Done)       Topic: Occupational Therapy (Done)       Point: ADL training (Done)       Description:   Instruct learner(s) on proper safety adaptation and remediation techniques during self care or transfers.   Instruct in proper use of assistive devices.                  Learning Progress Summary             Patient Acceptance, E, VU,NR by BF at 7/18/2023 1436    Acceptance, E, VU,NR by BF at 7/17/2023 1536    Acceptance, E, VU,NR by HB at 7/15/2023 1204    Acceptance, E, VU,NR by BF at 7/14/2023 1028                         Point: Home exercise program (Done)       Description:   Instruct learner(s) on appropriate technique for monitoring, assisting and/or progressing therapeutic exercises/activities.                  Learning Progress Summary             Patient Acceptance, E, VU,NR by HB at 7/15/2023 1204                         Point: Precautions (Done)       Description:   Instruct learner(s) on prescribed precautions during self-care and functional transfers.                  Learning Progress Summary             Patient Acceptance, E, VU,NR by BF at 7/18/2023 1436    Acceptance, E, VU,NR by BF at 7/17/2023 1536    Acceptance, E, VU,NR by HB at 7/15/2023 1204    Acceptance, E, VU,NR by BF at  7/14/2023 1028                         Point: Body mechanics (Done)       Description:   Instruct learner(s) on proper positioning and spine alignment during self-care, functional mobility activities and/or exercises.                  Learning Progress Summary             Patient Acceptance, E, VU,NR by  at 7/15/2023 1204                                         User Key       Initials Effective Dates Name Provider Type Discipline     07/11/23 -  Araceli Lua, OT Occupational Therapist OT     05/25/21 -  Vandana Rader OT Occupational Therapist OT                        OT Recommendation and Plan    Planned Therapy Interventions (OT): adaptive equipment training, activity tolerance training, BADL retraining, ROM/therapeutic exercise, strengthening exercise, transfer/mobility retraining                    Time Calculation:      Time Calculation- OT       Row Name 07/18/23 1440 07/18/23 0935          Time Calculation- OT    OT Start Time 1335  -BF 0935  -BF     OT Stop Time 1420  -BF 1020  -BF     OT Time Calculation (min) 45 min  -BF 45 min  -BF     Total Timed Code Minutes- OT 45 minute(s)  -BF 45 minute(s)  -BF     OT Non-Billable Time (min) -- 10 min  -BF               User Key  (r) = Recorded By, (t) = Taken By, (c) = Cosigned By      Initials Name Provider Type     Stoney Araceli Castro, OT Occupational Therapist                  Therapy Charges for Today       Code Description Service Date Service Provider Modifiers Qty    00221849941 HC OT SELF CARE/MGMT/TRAIN EA 15 MIN 7/17/2023 LuaAraceli saeed, OT GO 2    17476608899 HC OT THERAPEUTIC ACT EA 15 MIN 7/17/2023 LuaAraceli saeed OT GO 2    65548012812 HC OT THER PROC EA 15 MIN 7/17/2023 LuaAraceli saeed, OT GO 2    68655868389 HC OT SELF CARE/MGMT/TRAIN EA 15 MIN 7/18/2023 LuaAraceli saeed OT GO 1    84175442502 HC OT THERAPEUTIC ACT EA 15 MIN 7/18/2023 LuaAraceli saeed OT GO 2    96033517388 HC OT THER PROC EA  15 MIN 7/18/2023 Araceli Lua, OT GO 3                     Araceli Lua, OT  7/18/2023

## 2023-07-18 NOTE — THERAPY TREATMENT NOTE
Inpatient Rehabilitation - Physical Therapy Treatment Note        [place]     Patient Name: Lisa Velazquez  : 1965  MRN: [id number]    Today's Date: 2023                    Admit Date: 2023      Visit Dx:   No diagnosis found.    Patient Active Problem List   Diagnosis    S/P right hip fracture    Acute respiratory failure with hypoxia    Right tibial fracture       Past Medical History:   Diagnosis Date    Coronary artery disease     Diabetes mellitus     Elevated cholesterol     GERD (gastroesophageal reflux disease)     History of transfusion     Hypertension        Past Surgical History:   Procedure Laterality Date    ABDOMINAL SURGERY      gallbladder removal    CARDIAC CATHETERIZATION      CARDIAC SURGERY      stent    COLONOSCOPY      FRACTURE SURGERY      TOE AMPUTATION Right     3rd toe right foot       PT ASSESSMENT (last 12 hours)       IRF PT Evaluation and Treatment       Row Name 23 1107          PT Time and Intention    Document Type daily treatment  -HC     Mode of Treatment individual therapy;physical therapy  -HC     Patient/Family/Caregiver Comments/Observations Pt and RN in agreement for PT. Pt O2 did drop when sitting up, RN reported Pt needed to return to supine. Pt complted supine exercises with rest breaks as needed and AAROM as needed.  -HC       Row Name 23 1107          General Information    Existing Precautions/Restrictions fall  -HC       Row Name 23 1107          Pain Scale: FACES Pre/Post-Treatment    Pain: FACES Scale, Pretreatment 6-->hurts even more  -HC     Posttreatment Pain Rating 6-->hurts even more  -HC       Row Name 23 1107          Cognition/Psychosocial    Affect/Mental Status (Cognition) WFL  -HC     Follows Commands (Cognition) WFL  -HC     Personal Safety Interventions fall prevention program maintained;gait belt;supervised activity;toileting scheduled  -HC       Row Name 23 1107          Mobility    Left Lower Extremity  (Weight-bearing Status) weight-bearing as tolerated (WBAT)  -     Right Lower Extremity (Weight-bearing Status) weight-bearing as tolerated (WBAT)  -       Row Name 23 110          Bed Mobility    Supine-Sit-Supine [place] (Bed Mobility) minimum assist (75% patient effort);verbal cues;nonverbal cues (demo/gesture)  A for lifting/scooting RLE  -       Row Name 23 110          Bed-Chair Transfer    Bed-Chair [place] (Transfers) minimum assist (75% patient effort);nonverbal cues (demo/gesture);verbal cues  -     Assistive Device (Bed-Chair Transfers) wheelchair  -       Row Name 23 110          Chair-Bed Transfer    Chair-Bed [place] (Transfers) verbal cues;nonverbal cues (demo/gesture);minimum assist (75% patient effort)  -     Assistive Device (Chair-Bed Transfers) wheelchair;walker, front-wheeled  -       Row Name 23 110          Sit-Stand Transfer    Sit-Stand [place] (Transfers) minimum assist (75% patient effort);moderate assist (50% patient effort)  -     Assistive Device (Sit-Stand Transfers) other (see comments)  HHA  -       Row Name 23 110          Stand-Sit Transfer    Stand-Sit [place] (Transfers) minimum assist (75% patient effort);moderate assist (50% patient effort)  -     Assistive Device (Stand-Sit Transfers) other (see comments)  A  -       Row Name 23 110          Gait/Stairs (Locomotion)    Comment, (Gait/Stairs) unable secondary to BP being low  -       Row Name 23 110          Balance    Comment, Balance Sittin# on L: AP, LAQ. Supine: AP, inversion/eversion, SLR, hip abd, Heel slide, Quad set, glute set, SAQ  -       Row Name 23 110          Hip (Therapeutic Exercise)    Hip Strengthening (Therapeutic Exercise) bilateral;flexion;extension;aBduction;aDduction;sitting;supine;2 lb free weight;left  -       Row Name 23 110          Knee (Therapeutic Exercise)    Knee Strengthening  (Therapeutic Exercise) bilateral;flexion;extension;heel slides;SLR (straight leg raise);SAQ (short arc quad);LAQ (long arc quad);hamstring curls;sitting;supine;2 lb free weight;left  -       Row Name 07/18/23 1107          Ankle (Therapeutic Exercise)    Ankle Strengthening (Therapeutic Exercise) bilateral;dorsiflexion;plantarflexion;inversion;eversion;sitting;supine  -[facility] Name 07/18/23 1107          Positioning and Restraints    Pre-Treatment Position in bed  -     Post Treatment Position bed  -     In Bed fowlers;encouraged to call for assist;call light within reach;side rails up x2  -       Row Name 07/18/23 1107          Therapy Assessment/Plan (PT)    Patient's Goals For Discharge return home  -[facility] Name 07/18/23 1107          Therapy Assessment/Plan (PT)    Rehab Potential/Prognosis (PT) adequate, monitor progress closely  -     Frequency of Treatment (PT) 5 times per week  -     Estimated Duration of Therapy (PT) 2 weeks  -     Problem List (PT) balance;mobility;range of motion (ROM);strength;pain  -     Activity Limitations Related to Problem List (PT) unable to ambulate safely;unable to transfer safely  -       Row Name 07/18/23 1107          Therapy Plan Review/Discharge Plan (PT)    Anticipated Equipment Needs at Discharge (PT Eval) --  tbd  -     Anticipated Discharge Disposition (PT) home with assist;home with home health  -       Row Name 07/18/23 1107          IRF PT Goals    Bed Mobility Goal Selection (PT-IRF) bed mobility, PT goal 1  -     Transfer Goal Selection (PT-IRF) transfers, PT goal 1  -     Gait (Walking Locomotion) Goal Selection (PT-IRF) gait, PT goal 1  -[facility] Name 07/18/23 1107          Bed Mobility Goal 1 (PT-IRF)    Activity/Assistive Device (Bed Mobility Goal 1, PT-IRF) sit to supine/supine to sit  -     [place] Level (Bed Mobility Goal 1, PT-IRF) modified independence  -     Time Frame (Bed Mobility Goal 1, PT-IRF) by  discharge  -       Row Name 07/18/23 1107          Transfer Goal 1 (PT-IRF)    Activity/Assistive Device (Transfer Goal 1, PT-IRF) sit-to-stand/stand-to-sit;bed-to-chair/chair-to-bed  -     [place] Level (Transfer Goal 1, PT-IRF) modified independence  -HC     Time Frame (Transfer Goal 1, PT-IRF) by discharge  -       Row Name 07/18/23 1107          Gait/Walking Locomotion Goal 1 (PT-IRF)    Activity/Assistive Device (Gait/Walking Locomotion Goal 1, PT-IRF) walker, rolling  -HC     Gait/Walking Locomotion Distance Goal 1 (PT-IRF) 300'  -HC     [place] Level (Gait/Walking Locomotion Goal 1, PT-IRF) modified independence  -HC     Time Frame (Gait/Walking Locomotion Goal 1, PT-IRF) by discharge  -               User Key  (r) = Recorded By, (t) = Taken By, (c) = Cosigned By      Initials Name Provider Type     Nadine Hale, JESSICA Physical Therapist Assistant                  Wound 07/14/23 1930 Right lower leg (Active)   Base dressing in place, unable to visualize 07/17/23 2024     Physical Therapy Education       Title: PT OT SLP Therapies (Done)       Topic: Physical Therapy (Done)       Point: Mobility training (Done)       Learning Progress Summary             Patient Acceptance, E,TB, VU,DU by  at 7/18/2023 1119    Acceptance, E, VU,NR by LB at 7/17/2023 1513    Acceptance, E,TB, VU by RM at 7/15/2023 1243    Acceptance, E, VU,NR by LB at 7/14/2023 1550                         Point: Home exercise program (Done)       Learning Progress Summary             Patient Acceptance, E,TB, VU,DU by  at 7/18/2023 1119    Acceptance, E, VU,NR by LB at 7/17/2023 1513    Acceptance, E,TB, VU by RM at 7/15/2023 1243    Acceptance, E, VU,NR by LB at 7/14/2023 1550                         Point: Body mechanics (Done)       Learning Progress Summary             Patient Acceptance, E,TB, VU,DU by  at 7/18/2023 1119    Acceptance, E, VU,NR by LB at 7/17/2023 1513    Acceptance, E,TB, VU by RM at  7/15/2023 1243    Acceptance, E, VU,NR by  at 7/14/2023 1550                         Point: Precautions (Done)       Learning Progress Summary             Patient Acceptance, E,TB, VU,DU by  at 7/18/2023 1119    Acceptance, E, VU,NR by  at 7/17/2023 1513    Acceptance, E,TB, VU by  at 7/15/2023 1243    Acceptance, E, VU,NR by  at 7/14/2023 1550                                         User Key       Initials Effective Dates Name Provider Type Discipline     06/16/21 -  Wendi Brothers, PT Physical Therapist PT     02/17/23 -  Julianna Quach PTA Physical Therapist Assistant PT     01/20/23 -  Nadine Hale PTA Physical Therapist Assistant PT                    PT Recommendation and Plan    Frequency of Treatment (PT): 5 times per week  Anticipated Equipment Needs at Discharge (PT Eval):  (tbd)                  Time Calculation:      PT Charges       Row Name 07/18/23 1119             Time Calculation    Start Time 0745  -      Stop Time 0915  -      Time Calculation (min) 90 min  -      PT Received On 07/18/23  -         Time Calculation- PT    Total Timed Code Minutes- PT 90 minute(s)  -                User Key  (r) = Recorded By, (t) = Taken By, (c) = Cosigned By      Initials Name Provider Type     Nadine Hale PTA Physical Therapist Assistant                    Therapy Charges for Today       Code Description Service Date Service Provider Modifiers Qty    88732323768  PT THER PROC EA 15 MIN 7/18/2023 Nadine Hale PTA GP, CQ 3    [id number]  PT THERAPEUTIC ACT EA 15 MIN 7/18/2023 Nadine Hale PTA GP, CQ 3                     Nadine Hale PTA  7/18/2023

## 2023-07-18 NOTE — PLAN OF CARE
Goal Outcome Evaluation:  Plan of Care Reviewed With: patient        Progress: no change  Outcome Evaluation: MONITOR

## 2023-07-18 NOTE — PROGRESS NOTES
CC: Follow-up on tibia fracture    Interview History/HPI: Patient got up today but again got significantly orthostatic, was symptomatic with this and had to go back to bed.  She had no chest pain or palpitations and once back in bed her MAP certainly returned above 65.  Patient states she has had problems with this before and does see a cardiologist in [place].  She had been on losartan in the past but had stopped this due to symptomatic hypotension.  She is status post intramedullary callie and screw fixation of comminuted fracture of the proximal tibia.  She is nonweightbearing on this leg.          Current Hospital Meds:  ascorbic acid, 500 mg, Oral, Daily  aspirin, 81 mg, Oral, Daily  calcium carbonate, 1 tablet, Oral, BID  clopidogrel, 75 mg, Oral, Daily  enoxaparin, 40 mg, Subcutaneous, Q12H  gabapentin, 200 mg, Oral, Q8H  insulin glargine, 10 Units, Subcutaneous, Q12H  lidocaine, 10 mL, Infiltration, Once  midodrine, 5 mg, Oral, TID AC  multivitamin, 1 tablet, Oral, Daily  pantoprazole, 40 mg, Oral, Q AM  rosuvastatin, 40 mg, Oral, Nightly  senna-docusate sodium, 2 tablet, Oral, BID  vitamin D3, 5,000 Units, Oral, Daily         Vitals:    07/18/23 1234   BP: 114/67   Pulse: 80   Resp:    Temp:    SpO2:          Intake/Output Summary (Last 24 hours) at 7/18/2023 1645  Last data filed at 7/18/2023 1300  Gross per 24 hour   Intake 480 ml   Output --   Net 480 ml       EXAM: All of her orthostatic readings from this morning reviewed with nursing.  Temperature is 98.7, her room air saturation has been good.  Pulse 60s to 80s.  She is in no distress, skin warm and dry, lungs have bilateral breath sounds that are clear, heart regular rate and rhythm without murmur.      Diet: Diabetic Diets; Consistent Carbohydrate; Texture: Regular Texture (IDDSI 7); Fluid Consistency: Thin (IDDSI 0)        LABS:     Lab Results (last 48 hours)       Procedure Component Value Units Date/Time    POC Glucose Once [[id number]]   (Abnormal) Collected: 07/18/23 1619    Specimen: Blood Updated: 07/18/23 1625     Glucose 212 mg/dL      Comment: Meter: [id number] : [id number] DIANA BOWLING       POC Glucose Once [[id number]]  (Abnormal) Collected: 07/18/23 1117    Specimen: Blood Updated: 07/18/23 1123     Glucose 190 mg/dL      Comment: Meter: [id number] : [id number] DIANA BOWLING       POC Glucose Once [[id number]]  (Abnormal) Collected: 07/18/23 0826    Specimen: Blood Updated: 07/18/23 0833     Glucose 217 mg/dL      Comment: Meter: [id number] : [id number] Narcisa Sandra       POC Glucose Once [[id number]]  (Abnormal) Collected: 07/18/23 0600    Specimen: Blood Updated: 07/18/23 0607     Glucose 155 mg/dL      Comment: Meter: [id number] : [id number] Shelbie STILL       POC Glucose Once [[id number]]  (Abnormal) Collected: 07/17/23 2002    Specimen: Blood Updated: 07/17/23 2009     Glucose 263 mg/dL      Comment: Meter: [id number] : [id number] BERNADETTE CHRIS       POC Glucose Once [[id number]]  (Abnormal) Collected: 07/17/23 1634    Specimen: Blood Updated: 07/17/23 1649     Glucose 165 mg/dL      Comment: Meter: [id number] : [id number] Hanh John       Basic Metabolic Panel [[id number]]  (Abnormal) Collected: 07/17/23 0956    Specimen: Blood Updated: 07/17/23 1036     Glucose 207 mg/dL      BUN 12 mg/dL      Creatinine 0.75 mg/dL      Sodium 140 mmol/L      Potassium 4.1 mmol/L      Chloride 106 mmol/L      CO2 24.4 mmol/L      Calcium 9.2 mg/dL      BUN/Creatinine Ratio 16.0     Anion Gap 9.6 mmol/L      eGFR 93.0 mL/min/1.73     Narrative:      GFR Normal >60  Chronic Kidney Disease <60  Kidney Failure <15      CBC & Differential [[id number]]  (Abnormal) Collected: 07/17/23 0956    Specimen: Blood Updated: 07/17/23 1006    Narrative:      The following orders were created for panel order CBC & Differential.  Procedure                               Abnormality         Status                     ---------                                -----------         ------                     CBC Auto Differential[[id number]]        Abnormal            Final result                 Please view results for these tests on the individual orders.    CBC Auto Differential [[id number]]  (Abnormal) Collected: 07/17/23 0956    Specimen: Blood Updated: 07/17/23 1006     WBC 6.01 10*3/mm3      RBC 3.24 10*6/mm3      Hemoglobin 9.3 g/dL      Hematocrit 30.9 %      MCV 95.4 fL      MCH 28.7 pg      MCHC 30.1 g/dL      RDW 16.2 %      RDW-SD 53.2 fl      MPV 9.0 fL      Platelets 239 10*3/mm3      Neutrophil % 67.8 %      Lymphocyte % 20.1 %      Monocyte % 7.7 %      Eosinophil % 3.8 %      Basophil % 0.3 %      Immature Grans % 0.3 %      Neutrophils, Absolute 4.07 10*3/mm3      Lymphocytes, Absolute 1.21 10*3/mm3      Monocytes, Absolute 0.46 10*3/mm3      Eosinophils, Absolute 0.23 10*3/mm3      Basophils, Absolute 0.02 10*3/mm3      Immature Grans, Absolute 0.02 10*3/mm3      nRBC 0.0 /100 WBC     POC Glucose Once [[id number]]  (Abnormal) Collected: 07/17/23 0940    Specimen: Blood Updated: 07/17/23 0947     Glucose 211 mg/dL      Comment: Meter: [id number] : [id number] Narcisa Sandra       POC Glucose Once [[id number]]  (Normal) Collected: 07/17/23 0612    Specimen: Blood Updated: 07/17/23 0618     Glucose 120 mg/dL      Comment: Meter: [id number] : [id number] BERNADETTE PEREZ       POC Glucose Once [[id number]]  (Abnormal) Collected: 07/16/23 1958    Specimen: Blood Updated: 07/16/23 2005     Glucose 233 mg/dL      Comment: Meter: [id number] : [id number] BERNADETTE PEREZ                    Radiology:    Imaging Results (Last 72 Hours)       Procedure Component Value Units Date/Time    XR Shoulder 2+ View Right [[id number]] Collected: 07/17/23 1605     Updated: 07/17/23 1608    Narrative:      EXAM:    XR Right Shoulder Complete, 2 or More Views     EXAM DATE:    7/17/2023 3:51 PM     CLINICAL HISTORY:    rt shouldr pain     TECHNIQUE:    Two  or more views of the right shoulder.     COMPARISON:    No relevant prior studies available.     FINDINGS:    Bones/joints:  Unremarkable.  No acute fracture.  No dislocation.    Soft tissues:  Unremarkable.       Impression:        No acute findings in the right shoulder.     This report was finalized on 7/17/2023 4:06 PM by Dr. Chepe Arellano MD.               Results for orders placed during the hospital encounter of 07/13/23    Adult Transthoracic Echo Complete W/ Cont if Necessary Per Protocol    Interpretation Summary    Normal left ventricular cavity size and wall thickness noted. All left ventricular wall segments contract normally.    Left ventricular ejection fraction appears to be 56 - 60%.    Left ventricular diastolic function is consistent with (grade I) impaired relaxation.    There is mild, bileaflet mitral valve thickening present.    Systolic anterior motion of the anterior mitral leaflet is present.    The aortic valve is structurally normal with no stenosis present. Trace aortic valve regurgitation is present.    There is mild, bileaflet mitral valve thickening present. Systolic anterior motion of the anterior mitral leaflet is present.    Mild mitral valve regurgitation is present. No significant mitral valve stenosis is present.    There is no evidence of pericardial effusion.      Assessment/Plan:   Status post right inner medullary callie placement for bicondylar tibial plateau fracture.  Continue occupational physical therapy although this has been somewhat limited in sessions have interrupted due to orthostasis which we are trying to work through.  With OT, patient was min to mod for bed to chair and min for chair to bed.  OT continue to work on motor skills gross motor coordination ADLs strengthening and range of motion.  With PT today patient completed supine exercises with rest breaks as needed and range of motion exercises.    Orthostatic hypotension, I have decreased her gabapentin as  this can cause this, IV access has been obtained, I am going to check a cosyntropin stimulation test looking for any evidence of adrenal insufficiency, I am going to give IV fluids overnight, her ejection fraction is normal by echocardiogram.  Her hemoglobin has been stable.  We will also check her thyroid status in the a.m.  Midodrine has been increased.  She is wearing an abdominal binder.    Diabetes, not well controlled, basal insulin adjusted.    DVT prophylaxis, subcu Lovenox    Coronary artery disease, continue DAPT and Crestor    Wilmer Gama MD

## 2023-07-19 LAB
ANION GAP SERPL CALCULATED.3IONS-SCNC: 9.3 MMOL/L (ref 5–15)
BASOPHILS # BLD AUTO: 0.03 10*3/MM3 (ref 0–0.2)
BASOPHILS NFR BLD AUTO: 0.4 % (ref 0–1.5)
BUN SERPL-MCNC: 12 MG/DL (ref 6–20)
BUN/CREAT SERPL: 17.4 (ref 7–25)
CALCIUM SPEC-SCNC: 9.4 MG/DL (ref 8.6–10.5)
CHLORIDE SERPL-SCNC: 106 MMOL/L (ref 98–107)
CO2 SERPL-SCNC: 24.7 MMOL/L (ref 22–29)
CREAT SERPL-MCNC: 0.69 MG/DL (ref 0.57–1)
DEPRECATED RDW RBC AUTO: 52.7 FL (ref 37–54)
EGFRCR SERPLBLD CKD-EPI 2021: 101.4 ML/MIN/1.73
EOSINOPHIL # BLD AUTO: 0.12 10*3/MM3 (ref 0–0.4)
EOSINOPHIL NFR BLD AUTO: 1.8 % (ref 0.3–6.2)
ERYTHROCYTE [DISTWIDTH] IN BLOOD BY AUTOMATED COUNT: 15.7 % (ref 12.3–15.4)
GLUCOSE BLDC GLUCOMTR-MCNC: 142 MG/DL (ref 70–130)
GLUCOSE BLDC GLUCOMTR-MCNC: 164 MG/DL (ref 70–130)
GLUCOSE BLDC GLUCOMTR-MCNC: 164 MG/DL (ref 70–130)
GLUCOSE BLDC GLUCOMTR-MCNC: 179 MG/DL (ref 70–130)
GLUCOSE SERPL-MCNC: 189 MG/DL (ref 65–99)
HCT VFR BLD AUTO: 31.4 % (ref 34–46.6)
HGB BLD-MCNC: 9.6 G/DL (ref 12–15.9)
IMM GRANULOCYTES # BLD AUTO: 0.03 10*3/MM3 (ref 0–0.05)
IMM GRANULOCYTES NFR BLD AUTO: 0.4 % (ref 0–0.5)
LYMPHOCYTES # BLD AUTO: 1 10*3/MM3 (ref 0.7–3.1)
LYMPHOCYTES NFR BLD AUTO: 14.7 % (ref 19.6–45.3)
MCH RBC QN AUTO: 28.9 PG (ref 26.6–33)
MCHC RBC AUTO-ENTMCNC: 30.6 G/DL (ref 31.5–35.7)
MCV RBC AUTO: 94.6 FL (ref 79–97)
MONOCYTES # BLD AUTO: 0.23 10*3/MM3 (ref 0.1–0.9)
MONOCYTES NFR BLD AUTO: 3.4 % (ref 5–12)
NEUTROPHILS NFR BLD AUTO: 5.37 10*3/MM3 (ref 1.7–7)
NEUTROPHILS NFR BLD AUTO: 79.3 % (ref 42.7–76)
NRBC BLD AUTO-RTO: 0 /100 WBC (ref 0–0.2)
PLATELET # BLD AUTO: 265 10*3/MM3 (ref 140–450)
PMV BLD AUTO: 9.1 FL (ref 6–12)
POTASSIUM SERPL-SCNC: 4.3 MMOL/L (ref 3.5–5.2)
RBC # BLD AUTO: 3.32 10*6/MM3 (ref 3.77–5.28)
SODIUM SERPL-SCNC: 140 MMOL/L (ref 136–145)
T4 FREE SERPL-MCNC: 1.1 NG/DL (ref 0.93–1.7)
TSH SERPL DL<=0.05 MIU/L-ACNC: 0.79 UIU/ML (ref 0.27–4.2)
WBC NRBC COR # BLD: 6.78 10*3/MM3 (ref 3.4–10.8)

## 2023-07-19 PROCEDURE — 80048 BASIC METABOLIC PNL TOTAL CA: CPT | Performed by: INTERNAL MEDICINE

## 2023-07-19 PROCEDURE — 97530 THERAPEUTIC ACTIVITIES: CPT

## 2023-07-19 PROCEDURE — 82948 REAGENT STRIP/BLOOD GLUCOSE: CPT

## 2023-07-19 PROCEDURE — 97530 THERAPEUTIC ACTIVITIES: CPT | Performed by: OCCUPATIONAL THERAPIST

## 2023-07-19 PROCEDURE — 84439 ASSAY OF FREE THYROXINE: CPT | Performed by: INTERNAL MEDICINE

## 2023-07-19 PROCEDURE — 84443 ASSAY THYROID STIM HORMONE: CPT | Performed by: INTERNAL MEDICINE

## 2023-07-19 PROCEDURE — 63710000001 INSULIN GLARGINE PER 5 UNITS: Performed by: INTERNAL MEDICINE

## 2023-07-19 PROCEDURE — 85025 COMPLETE CBC W/AUTO DIFF WBC: CPT | Performed by: INTERNAL MEDICINE

## 2023-07-19 PROCEDURE — 97110 THERAPEUTIC EXERCISES: CPT

## 2023-07-19 PROCEDURE — 97535 SELF CARE MNGMENT TRAINING: CPT | Performed by: OCCUPATIONAL THERAPIST

## 2023-07-19 PROCEDURE — 97110 THERAPEUTIC EXERCISES: CPT | Performed by: OCCUPATIONAL THERAPIST

## 2023-07-19 PROCEDURE — 25010000002 ENOXAPARIN PER 10 MG: Performed by: FAMILY MEDICINE

## 2023-07-19 RX ORDER — GABAPENTIN 100 MG/1
100 CAPSULE ORAL EVERY 8 HOURS SCHEDULED
Status: DISCONTINUED | OUTPATIENT
Start: 2023-07-19 | End: 2023-07-20

## 2023-07-19 RX ORDER — POLYETHYLENE GLYCOL 3350 17 G/17G
17 POWDER, FOR SOLUTION ORAL DAILY
Status: DISCONTINUED | OUTPATIENT
Start: 2023-07-19 | End: 2023-07-21

## 2023-07-19 RX ADMIN — ANTACID TABLETS 1 TABLET: 500 TABLET, CHEWABLE ORAL at 08:27

## 2023-07-19 RX ADMIN — PANTOPRAZOLE SODIUM 40 MG: 40 TABLET, DELAYED RELEASE ORAL at 05:22

## 2023-07-19 RX ADMIN — Medication 5000 UNITS: at 08:27

## 2023-07-19 RX ADMIN — IBUPROFEN 400 MG: 400 TABLET, FILM COATED ORAL at 23:00

## 2023-07-19 RX ADMIN — INSULIN GLARGINE 13 UNITS: 100 INJECTION, SOLUTION SUBCUTANEOUS at 08:27

## 2023-07-19 RX ADMIN — ANTACID TABLETS 1 TABLET: 500 TABLET, CHEWABLE ORAL at 21:00

## 2023-07-19 RX ADMIN — CARBIDOPA AND LEVODOPA 5 MG: 50; 200 TABLET, EXTENDED RELEASE ORAL at 08:27

## 2023-07-19 RX ADMIN — CARBIDOPA AND LEVODOPA 5 MG: 50; 200 TABLET, EXTENDED RELEASE ORAL at 17:02

## 2023-07-19 RX ADMIN — GABAPENTIN 100 MG: 100 CAPSULE ORAL at 21:20

## 2023-07-19 RX ADMIN — GABAPENTIN 200 MG: 100 CAPSULE ORAL at 05:22

## 2023-07-19 RX ADMIN — Medication 5 MG: at 21:19

## 2023-07-19 RX ADMIN — GABAPENTIN 200 MG: 100 CAPSULE ORAL at 15:02

## 2023-07-19 RX ADMIN — ENOXAPARIN SODIUM 40 MG: 100 INJECTION SUBCUTANEOUS at 21:08

## 2023-07-19 RX ADMIN — Medication 1 TABLET: at 08:27

## 2023-07-19 RX ADMIN — ROSUVASTATIN CALCIUM 40 MG: 20 TABLET, FILM COATED ORAL at 21:00

## 2023-07-19 RX ADMIN — ASPIRIN 81 MG: 81 TABLET, COATED ORAL at 08:27

## 2023-07-19 RX ADMIN — INSULIN GLARGINE 13 UNITS: 100 INJECTION, SOLUTION SUBCUTANEOUS at 21:07

## 2023-07-19 RX ADMIN — OXYCODONE HYDROCHLORIDE AND ACETAMINOPHEN 500 MG: 500 TABLET ORAL at 08:27

## 2023-07-19 RX ADMIN — TRAMADOL HYDROCHLORIDE 50 MG: 50 TABLET, COATED ORAL at 15:45

## 2023-07-19 RX ADMIN — DOCUSATE SODIUM 50 MG AND SENNOSIDES 8.6 MG 2 TABLET: 8.6; 5 TABLET, FILM COATED ORAL at 08:27

## 2023-07-19 RX ADMIN — ENOXAPARIN SODIUM 40 MG: 100 INJECTION SUBCUTANEOUS at 08:27

## 2023-07-19 RX ADMIN — CLOPIDOGREL BISULFATE 75 MG: 75 TABLET, FILM COATED ORAL at 08:27

## 2023-07-19 RX ADMIN — CARBIDOPA AND LEVODOPA 5 MG: 50; 200 TABLET, EXTENDED RELEASE ORAL at 11:41

## 2023-07-19 NOTE — PROGRESS NOTES
Patient's blood pressure is improving but still did get orthostatic during physical therapy today, continuing the current midodrine dosing, she was hydrated overnight.  She was not adrenally insufficient, thyroid status is euthyroid.  She most likely does have an autonomic neuropathy, continue midodrine, her EF was normal.  She is wearing an abdominal binder.  Hemoglobin is stable.  Glucoses show better control.  Asymptomatic from a coronary standpoint.  On Lovenox for DVT prophylaxis.  Patient has not had a BM in 3 to 4 days, MiraLAX has been scheduled.  To try to help with orthostasis, I am going to continue to decrease her gabapentin.

## 2023-07-19 NOTE — THERAPY TREATMENT NOTE
Inpatient Rehabilitation - Physical Therapy Treatment Note        [facility]     Patient Name: Lisa Velazquez  : 1965  MRN: [id number]    Today's Date: 2023                    Admit Date: 2023      Visit Dx:   No diagnosis found.    Patient Active Problem List   Diagnosis    S/P right hip fracture    Acute respiratory failure with hypoxia    Right tibial fracture       Past Medical History:   Diagnosis Date    Coronary artery disease     Diabetes mellitus     Elevated cholesterol     GERD (gastroesophageal reflux disease)     History of transfusion     Hypertension        Past Surgical History:   Procedure Laterality Date    ABDOMINAL SURGERY      gallbladder removal    CARDIAC CATHETERIZATION      CARDIAC SURGERY      stent    COLONOSCOPY      FRACTURE SURGERY      TOE AMPUTATION Right     3rd toe right foot       PT ASSESSMENT (last 12 hours)       IRF PT Evaluation and Treatment       Row Name 23 1519          PT Time and Intention    Document Type daily treatment  -RG     Mode of Treatment individual therapy;physical therapy  -RG     Patient/Family/Caregiver Comments/Observations Pt and nursing in agreement for skilled PT on this date.  Pt did not ambulate secondary to low BP.  BP in sitting was 112/69, 93/66, 93/57.  Pt given frequent rest breaks.  -RG       Row Name 23 1519          General Information    Existing Precautions/Restrictions fall  -RG       Row Name 23 1519          Pain Scale: FACES Pre/Post-Treatment    Pain: FACES Scale, Pretreatment 6-->hurts even more  -RG     Posttreatment Pain Rating 6-->hurts even more  -RG       Row Name 23 1519          Cognition/Psychosocial    Affect/Mental Status (Cognition) WFL  -RG     Follows Commands (Cognition) WFL  -RG     Personal Safety Interventions gait belt;fall prevention program maintained;nonskid shoes/slippers when out of bed  -RG       Row Name 23 1519          Mobility    Left Lower Extremity  (Weight-bearing Status) weight-bearing as tolerated (WBAT)  -RG     Right Lower Extremity (Weight-bearing Status) weight-bearing as tolerated (WBAT)  -       Row Name 07/19/23 1519          Bed Mobility    Supine-Sit-Supine [place] (Bed Mobility) minimum assist (75% patient effort);verbal cues;nonverbal cues (demo/gesture)  A for lifting/scooting RLE  -       Row Name 07/19/23 1519          Bed-Chair Transfer    Bed-Chair [place] (Transfers) minimum assist (75% patient effort);nonverbal cues (demo/gesture);verbal cues  -RG     Assistive Device (Bed-Chair Transfers) wheelchair  -       Row Name 07/19/23 1519          Chair-Bed Transfer    Chair-Bed [place] (Transfers) verbal cues;nonverbal cues (demo/gesture);minimum assist (75% patient effort)  -     Assistive Device (Chair-Bed Transfers) wheelchair;walker, front-wheeled  -       Row Name 07/19/23 1519          Sit-Stand Transfer    Sit-Stand [place] (Transfers) minimum assist (75% patient effort);moderate assist (50% patient effort)  -RG     Assistive Device (Sit-Stand Transfers) other (see comments)  HHA  -       Row Name 07/19/23 1519          Stand-Sit Transfer    Stand-Sit [place] (Transfers) minimum assist (75% patient effort);moderate assist (50% patient effort)  -RG     Assistive Device (Stand-Sit Transfers) other (see comments)  HHA  -       Row Name 07/19/23 1519          Gait/Stairs (Locomotion)    Comment, (Gait/Stairs) Low BP unable today  -       Row Name 07/19/23 1519          Hip (Therapeutic Exercise)    Hip Strengthening (Therapeutic Exercise) bilateral;flexion;aBduction;aDduction;marching while seated;heel slides;internal rotation;external rotation;sitting;supine;2 lb free weight;resistance band;green;10 repetitions;2 sets  -       Row Name 07/19/23 1519          Knee (Therapeutic Exercise)    Knee Strengthening (Therapeutic Exercise) bilateral;flexion;extension;heel slides;marching while seated;SAQ  (short arc quad);LAQ (long arc quad);sitting;supine;2 lb free weight;resistance band;green;10 repetitions;2 sets  -RG       Row Name 07/19/23 1519          Ankle (Therapeutic Exercise)    Ankle Strengthening (Therapeutic Exercise) bilateral;dorsiflexion;plantarflexion;sitting;2 lb free weight;resistance band;green;10 repetitions;2 sets  -RG       Row Name 07/19/23 1519          Positioning and Restraints    Pre-Treatment Position sitting in chair/recliner  -RG     Post Treatment Position bed  -RG     In Bed notified nsg;supine;call light within reach;encouraged to call for assist;legs elevated  -RG       Row Name 07/19/23 1519          Therapy Assessment/Plan (PT)    Patient's Goals For Discharge return home  -       Row Name 07/19/23 1519          Therapy Assessment/Plan (PT)    Rehab Potential/Prognosis (PT) adequate, monitor progress closely  -RG     Frequency of Treatment (PT) 5 times per week  -RG     Estimated Duration of Therapy (PT) 2 weeks  -RG     Problem List (PT) balance;mobility;range of motion (ROM);strength;pain  -RG     Activity Limitations Related to Problem List (PT) unable to ambulate safely;unable to transfer safely  -RG       Row Name 07/19/23 1519          Therapy Plan Review/Discharge Plan (PT)    Anticipated Equipment Needs at Discharge (PT Eval) --  tbd  -RG     Anticipated Discharge Disposition (PT) home with assist;home with home health  -       Row Name 07/19/23 1519          IRF PT Goals    Bed Mobility Goal Selection (PT-IRF) bed mobility, PT goal 1  -RG     Transfer Goal Selection (PT-IRF) transfers, PT goal 1  -RG     Gait (Walking Locomotion) Goal Selection (PT-IRF) gait, PT goal 1  -RG       Row Name 07/19/23 1519          Bed Mobility Goal 1 (PT-IRF)    Activity/Assistive Device (Bed Mobility Goal 1, PT-IRF) sit to supine/supine to sit  -RG     [place] (Bed Mobility Goal 1, PT-IRF) modified independence  -RG     Time Frame (Bed Mobility Goal 1, PT-IRF) by discharge   -RG       Row Name 07/19/23 1519          Transfer Goal 1 (PT-IRF)    Activity/Assistive Device (Transfer Goal 1, PT-IRF) sit-to-stand/stand-to-sit;bed-to-chair/chair-to-bed  -RG     [place] Level (Transfer Goal 1, PT-IRF) modified independence  -RG     Time Frame (Transfer Goal 1, PT-IRF) by discharge  -RG       Row Name 07/19/23 1519          Gait/Walking Locomotion Goal 1 (PT-IRF)    Activity/Assistive Device (Gait/Walking Locomotion Goal 1, PT-IRF) walker, rolling  -RG     Gait/Walking Locomotion Distance Goal 1 (PT-IRF) 300'  -RG     [place] Level (Gait/Walking Locomotion Goal 1, PT-IRF) modified independence  -RG     Time Frame (Gait/Walking Locomotion Goal 1, PT-IRF) by discharge  -RG               User Key  (r) = Recorded By, (t) = Taken By, (c) = Cosigned By      Initials Name Provider Type    RG Chidi Harris PTA Physical Therapist Assistant                  Wound 07/14/23 1930 Right lower leg (Active)   Dressing Appearance dry;intact 07/19/23 0755   Closure DANIELLA 07/18/23 1925   Base dressing in place, unable to visualize 07/18/23 1925     Physical Therapy Education       Title: PT OT SLP Therapies (Done)       Topic: Physical Therapy (Done)       Point: Mobility training (Done)       Learning Progress Summary             Patient Acceptance, E,D, VU,NR by RG at 7/19/2023 1530    Acceptance, E,TB, VU,DU by HC at 7/18/2023 1119    Acceptance, E, VU,NR by LB at 7/17/2023 1513    Acceptance, E,TB, VU by RM at 7/15/2023 1243    Acceptance, E, VU,NR by LB at 7/14/2023 1550                         Point: Home exercise program (Done)       Learning Progress Summary             Patient Acceptance, E,D, VU,NR by RG at 7/19/2023 1530    Acceptance, E,TB, VU,DU by HC at 7/18/2023 1119    Acceptance, E, VU,NR by LB at 7/17/2023 1513    Acceptance, E,TB, VU by RM at 7/15/2023 1243    Acceptance, E, VU,NR by LB at 7/14/2023 1550                         Point: Body mechanics (Done)       Learning Progress  Summary             Patient Acceptance, E,D, VU,NR by RG at 7/19/2023 1530    Acceptance, E,TB, VU,DU by HC at 7/18/2023 1119    Acceptance, E, VU,NR by LB at 7/17/2023 1513    Acceptance, E,TB, VU by RM at 7/15/2023 1243    Acceptance, E, VU,NR by LB at 7/14/2023 1550                         Point: Precautions (Done)       Learning Progress Summary             Patient Acceptance, E,D, VU,NR by RG at 7/19/2023 1530    Acceptance, E,TB, VU,DU by HC at 7/18/2023 1119    Acceptance, E, VU,NR by LB at 7/17/2023 1513    Acceptance, E,TB, VU by RM at 7/15/2023 1243    Acceptance, E, VU,NR by LB at 7/14/2023 1550                                         User Key       Initials Effective Dates Name Provider Type Discipline     06/16/21 -  Wendi Brothers, PT Physical Therapist PT     02/17/23 -  Julianna Quach, JESSICA Physical Therapist Assistant PT     06/16/21 -  Chidi Harris PTA Physical Therapist Assistant PT     01/20/23 -  Nadine Hale PTA Physical Therapist Assistant PT                    PT Recommendation and Plan    Frequency of Treatment (PT): 5 times per week  Anticipated Equipment Needs at Discharge (PT Eval):  (tbd)                  Time Calculation:      PT Charges       Row Name 07/19/23 1531 07/19/23 1530          Time Calculation    Start Time 1245  - 1045  -     Stop Time 1330  - 1130  -     Time Calculation (min) 45 min  -RG 45 min  -RG     PT Received On 07/19/23  -RG 07/19/23  -RG        Time Calculation- PT    Total Timed Code Minutes- PT 45 minute(s)  -RG 45 minute(s)  -RG               User Key  (r) = Recorded By, (t) = Taken By, (c) = Cosigned By      Initials Name Provider Type    RG Chidi Harris PTA Physical Therapist Assistant                    Therapy Charges for Today       Code Description Service Date Service Provider Modifiers Qty    76450097520  PT THERAPEUTIC ACT EA 15 MIN 7/19/2023 Chidi Harris PTA CQ 3    15646908275 HC PT THER PROC EA 15  MIN 7/19/2023 Chidi Harris, PTA GP, CQ 3                     Chidi Harris, PTA  7/19/2023

## 2023-07-19 NOTE — PROGRESS NOTES
Rehabilitation Nursing  Inpatient Rehabilitation Plan of Care Note    Plan of Care  Copy from POCBody Function Structure    Skin Integrity (Active)  Current Status (7/13/2023 5:38:00 PM): free from skin breakdown this shift  Weekly Goal: free from skin breakdown this stay  Discharge Goal: home free of skin breakdown    Safety    Potential for Injury (Active)  Current Status (7/13/2023 5:38:00 PM): free from injury this shift  Weekly Goal: free from injury this stay  Discharge Goal: home free of injury    Signed by: Ashley Mccord RN

## 2023-07-19 NOTE — PLAN OF CARE
Problem: Rehabilitation (IRF) Plan of Care  Goal: Optimal Comfort and Wellbeing  Outcome: Ongoing, Progressing   Goal Outcome Evaluation:

## 2023-07-19 NOTE — THERAPY TREATMENT NOTE
Inpatient Rehabilitation - Occupational Therapy Treatment Note     [facility]     Patient Name: Lisa Velazquez  : 1965  MRN: [id number]    Today's Date: 2023                 Admit Date: 2023       No diagnosis found.    Patient Active Problem List   Diagnosis    S/P right hip fracture    Acute respiratory failure with hypoxia    Right tibial fracture       Past Medical History:   Diagnosis Date    Coronary artery disease     Diabetes mellitus     Elevated cholesterol     GERD (gastroesophageal reflux disease)     History of transfusion     Hypertension        Past Surgical History:   Procedure Laterality Date    ABDOMINAL SURGERY      gallbladder removal    CARDIAC CATHETERIZATION      CARDIAC SURGERY      stent    COLONOSCOPY      FRACTURE SURGERY      TOE AMPUTATION Right     3rd toe right foot             IRF OT ASSESSMENT FLOWSHEET (last 12 hours)       IRF OT Evaluation and Treatment       Row Name 23 1500          OT Time and Intention    Document Type daily treatment  -     Mode of Treatment individual therapy;occupational therapy  -     Patient Effort good  -       Row Name 23 1500          General Information    Patient/Family/Caregiver Comments/Observations patient agreeable to therapy. patient felt some better in am. /66, 112/69 during seated activities. patient seen in bed in pm due to decreased BP.  -     Existing Precautions/Restrictions fall  -       Row Name 23 1500          Cognition/Psychosocial    Affect/Mental Status (Cognition) WFL  -       Row Name 23 1500          Lower Body Dressing    [place] Level (Lower Body Dressing) don;pants/bottoms;shoes/slippers;moderate assist (50% patient effort);minimum assist (75% patient effort)  -     Position (Lower Body Dressing) supine  -       Row Name 23 1500          Bed-Chair Transfer    Bed-Chair [place] (Transfers) minimum assist (75% patient effort);verbal cues;contact guard   -       Row Name 07/19/23 1500          Motor Skills    Motor Skills coordination;functional endurance  -     Therapeutic Exercise shoulder;elbow/forearm;wrist;hand  BUE ROM,gmc,fmc,  strengthening  -       Row Name 07/19/23 1500          Positioning and Restraints    Post Treatment Position bed  -     In Bed supine;call light within reach;encouraged to call for assist  -               User Key  (r) = Recorded By, (t) = Taken By, (c) = Cosigned By      Initials Name Effective Dates    HERNAN DixonRukhsana bakerney, OT 07/11/23 -                      Occupational Therapy Education       Title: PT OT SLP Therapies (Done)       Topic: Occupational Therapy (Done)       Point: ADL training (Done)       Description:   Instruct learner(s) on proper safety adaptation and remediation techniques during self care or transfers.   Instruct in proper use of assistive devices.                  Learning Progress Summary             Patient Acceptance, E, VU,NR by BF at 7/18/2023 1436    Acceptance, E, VU,NR by BF at 7/17/2023 1536    Acceptance, E, VU,NR by HB at 7/15/2023 1204    Acceptance, E, VU,NR by BF at 7/14/2023 1028                         Point: Home exercise program (Done)       Description:   Instruct learner(s) on appropriate technique for monitoring, assisting and/or progressing therapeutic exercises/activities.                  Learning Progress Summary             Patient Acceptance, E, VU,NR by HB at 7/15/2023 1204                         Point: Precautions (Done)       Description:   Instruct learner(s) on prescribed precautions during self-care and functional transfers.                  Learning Progress Summary             Patient Acceptance, E, VU,NR by BF at 7/18/2023 1436    Acceptance, E, VU,NR by BF at 7/17/2023 1536    Acceptance, E, VU,NR by HB at 7/15/2023 1204    Acceptance, E, VU,NR by BF at 7/14/2023 1028                         Point: Body mechanics (Done)       Description:   Instruct  learner(s) on proper positioning and spine alignment during self-care, functional mobility activities and/or exercises.                  Learning Progress Summary             Patient Acceptance, E, VU,NR by  at 7/15/2023 1204                                         User Key       Initials Effective Dates Name Provider Type Discipline     07/11/23 -  Araceli Lua OT Occupational Therapist OT     05/25/21 -  Vandana Rader OT Occupational Therapist OT                        OT Recommendation and Plan                         Time Calculation:      Time Calculation- OT       Row Name 07/19/23 1517 07/19/23 1516          Time Calculation- OT    OT Start Time 1330  -AH 1000  -AH     OT Stop Time 1425  -AH 1045  -AH     OT Time Calculation (min) 55 min  -AH 45 min  -AH               User Key  (r) = Recorded By, (t) = Taken By, (c) = Cosigned By      Initials Name Provider Type     Rukhsana Dixon OT Occupational Therapist                  Therapy Charges for Today       Code Description Service Date Service Provider Modifiers Qty    64654992370 HC OT SELF CARE/MGMT/TRAIN EA 15 MIN 7/19/2023 Rukhsana Dixon OT GO 2    28647968362 HC OT THER PROC EA 15 MIN 7/19/2023 Rukhsana Dixon OT GO 2    65090841717 HC OT THER PROC EA 15 MIN 7/19/2023 Rukhsana Dixon OT GO 3                     Rukhsana Dixon OT  7/19/2023

## 2023-07-19 NOTE — PROGRESS NOTES
Occupational Therapy: Individual: 100 minutes.    Physical Therapy:    Speech Language Pathology:    Signed by: Candie Dixon, Occupational Therapist

## 2023-07-19 NOTE — SIGNIFICANT NOTE
07/19/23 1425   Plan   Plan Spoke to pt about her follow-up Ortho appointment on 7-24-23 at 8:50 am in [place].  Explained MD wants to see if her B/P issues have resolved and if not she may need to have appointment rescheduled.  Pt says if she is able to go her son Luis Fernando can transport her.  Spoke to MD who says he can follow-up with Ortho tomorrow if B/P issues have not resolved to reschedule appointment.   Will follow.

## 2023-07-20 LAB
GLUCOSE BLDC GLUCOMTR-MCNC: 104 MG/DL (ref 70–130)
GLUCOSE BLDC GLUCOMTR-MCNC: 189 MG/DL (ref 70–130)
GLUCOSE BLDC GLUCOMTR-MCNC: 216 MG/DL (ref 70–130)
GLUCOSE BLDC GLUCOMTR-MCNC: 219 MG/DL (ref 70–130)

## 2023-07-20 PROCEDURE — 63710000001 INSULIN GLARGINE PER 5 UNITS: Performed by: INTERNAL MEDICINE

## 2023-07-20 PROCEDURE — 97530 THERAPEUTIC ACTIVITIES: CPT | Performed by: OCCUPATIONAL THERAPIST

## 2023-07-20 PROCEDURE — 25010000002 ENOXAPARIN PER 10 MG: Performed by: FAMILY MEDICINE

## 2023-07-20 PROCEDURE — 97535 SELF CARE MNGMENT TRAINING: CPT | Performed by: OCCUPATIONAL THERAPIST

## 2023-07-20 PROCEDURE — 97530 THERAPEUTIC ACTIVITIES: CPT

## 2023-07-20 PROCEDURE — 97110 THERAPEUTIC EXERCISES: CPT | Performed by: OCCUPATIONAL THERAPIST

## 2023-07-20 PROCEDURE — 97110 THERAPEUTIC EXERCISES: CPT

## 2023-07-20 PROCEDURE — 82948 REAGENT STRIP/BLOOD GLUCOSE: CPT

## 2023-07-20 PROCEDURE — 99231 SBSQ HOSP IP/OBS SF/LOW 25: CPT | Performed by: INTERNAL MEDICINE

## 2023-07-20 RX ORDER — GABAPENTIN 100 MG/1
200 CAPSULE ORAL EVERY 24 HOURS
Status: DISCONTINUED | OUTPATIENT
Start: 2023-07-20 | End: 2023-08-01 | Stop reason: HOSPADM

## 2023-07-20 RX ORDER — MIDODRINE HYDROCHLORIDE 2.5 MG/1
7.5 TABLET ORAL
Status: DISCONTINUED | OUTPATIENT
Start: 2023-07-20 | End: 2023-07-21

## 2023-07-20 RX ADMIN — PANTOPRAZOLE SODIUM 40 MG: 40 TABLET, DELAYED RELEASE ORAL at 06:09

## 2023-07-20 RX ADMIN — TRAMADOL HYDROCHLORIDE 50 MG: 50 TABLET, COATED ORAL at 20:36

## 2023-07-20 RX ADMIN — ASPIRIN 81 MG: 81 TABLET, COATED ORAL at 09:04

## 2023-07-20 RX ADMIN — ANTACID TABLETS 1 TABLET: 500 TABLET, CHEWABLE ORAL at 09:04

## 2023-07-20 RX ADMIN — CLOPIDOGREL BISULFATE 75 MG: 75 TABLET, FILM COATED ORAL at 09:04

## 2023-07-20 RX ADMIN — ENOXAPARIN SODIUM 40 MG: 100 INJECTION SUBCUTANEOUS at 09:03

## 2023-07-20 RX ADMIN — Medication 5 MG: at 20:36

## 2023-07-20 RX ADMIN — CARBIDOPA AND LEVODOPA 7.5 MG: 50; 200 TABLET, EXTENDED RELEASE ORAL at 11:44

## 2023-07-20 RX ADMIN — CARBIDOPA AND LEVODOPA 5 MG: 50; 200 TABLET, EXTENDED RELEASE ORAL at 06:45

## 2023-07-20 RX ADMIN — OXYCODONE HYDROCHLORIDE AND ACETAMINOPHEN 500 MG: 500 TABLET ORAL at 09:04

## 2023-07-20 RX ADMIN — ROSUVASTATIN CALCIUM 40 MG: 20 TABLET, FILM COATED ORAL at 20:37

## 2023-07-20 RX ADMIN — INSULIN GLARGINE 13 UNITS: 100 INJECTION, SOLUTION SUBCUTANEOUS at 09:04

## 2023-07-20 RX ADMIN — ENOXAPARIN SODIUM 40 MG: 100 INJECTION SUBCUTANEOUS at 20:37

## 2023-07-20 RX ADMIN — GABAPENTIN 200 MG: 100 CAPSULE ORAL at 17:25

## 2023-07-20 RX ADMIN — Medication 1 TABLET: at 09:04

## 2023-07-20 RX ADMIN — Medication 5000 UNITS: at 09:04

## 2023-07-20 RX ADMIN — INSULIN GLARGINE 13 UNITS: 100 INJECTION, SOLUTION SUBCUTANEOUS at 20:37

## 2023-07-20 RX ADMIN — CARBIDOPA AND LEVODOPA 7.5 MG: 50; 200 TABLET, EXTENDED RELEASE ORAL at 17:25

## 2023-07-20 NOTE — PLAN OF CARE
Problem: Rehabilitation (IRF) Plan of Care  Goal: Absence of New-Onset Illness or Injury  Outcome: Ongoing, Progressing  Intervention: Prevent Fall and Fall Injury  Recent Flowsheet Documentation  Taken 7/20/2023 1200 by Lolita Hernandez RN  Safety Promotion/Fall Prevention:   fall prevention program maintained   nonskid shoes/slippers when out of bed   safety round/check completed  Taken 7/20/2023 1000 by Lolita Hernandez RN  Safety Promotion/Fall Prevention:   fall prevention program maintained   nonskid shoes/slippers when out of bed   safety round/check completed  Taken 7/20/2023 0805 by Lolita Hernandez RN  Safety Promotion/Fall Prevention:   fall prevention program maintained   nonskid shoes/slippers when out of bed   safety round/check completed  Taken 7/20/2023 0800 by Lolita Hernandez RN  Safety Promotion/Fall Prevention:   fall prevention program maintained   nonskid shoes/slippers when out of bed   safety round/check completed   Goal Outcome Evaluation:

## 2023-07-20 NOTE — PROGRESS NOTES
Occupational Therapy:    Physical Therapy: Individual: 45 minutes.    Speech Language Pathology:    Signed by: Chidi Harris PTA

## 2023-07-20 NOTE — THERAPY TREATMENT NOTE
Inpatient Rehabilitation - Physical Therapy Treatment Note        [place]     Patient Name: Lisa Velazquez  : 1965  MRN: [id number]    Today's Date: 2023                    Admit Date: 2023      Visit Dx:   No diagnosis found.    Patient Active Problem List   Diagnosis    S/P right hip fracture    Acute respiratory failure with hypoxia    Right tibial fracture       Past Medical History:   Diagnosis Date    Coronary artery disease     Diabetes mellitus     Elevated cholesterol     GERD (gastroesophageal reflux disease)     History of transfusion     Hypertension        Past Surgical History:   Procedure Laterality Date    ABDOMINAL SURGERY      gallbladder removal    CARDIAC CATHETERIZATION      CARDIAC SURGERY      stent    COLONOSCOPY      FRACTURE SURGERY      TOE AMPUTATION Right     3rd toe right foot       PT ASSESSMENT (last 12 hours)       IRF PT Evaluation and Treatment       Row Name 23 1350          PT Time and Intention    Document Type daily treatment  -RG     Mode of Treatment individual therapy;physical therapy  -RG     Patient/Family/Caregiver Comments/Observations OT stated that pt had to go back to bed secondary to being hypotensive.  BP was 80/45.  Pt unable to sit in PT gym and was seen at bedside with her completing supine exercises with rest breaks as needed.  -RG       Row Name 23 1352          General Information    Existing Precautions/Restrictions fall  -RG       Row Name 23 1356          Pain Scale: FACES Pre/Post-Treatment    Pain: FACES Scale, Pretreatment 6-->hurts even more  -RG     Posttreatment Pain Rating 6-->hurts even more  -RG       Row Name 23 1352          Cognition/Psychosocial    Affect/Mental Status (Cognition) WFL  -RG     Follows Commands (Cognition) WFL  -RG     Personal Safety Interventions gait belt;nonskid shoes/slippers when out of bed;fall prevention program maintained  -RG       Row Name 23 1356          Mobility     Left Lower Extremity (Weight-bearing Status) weight-bearing as tolerated (WBAT)  -     Right Lower Extremity (Weight-bearing Status) weight-bearing as tolerated (WBAT)  -       Row Name 07/20/23 [facility]          Bed Mobility    Supine-Sit-Supine [place] (Bed Mobility) minimum assist (75% patient effort);verbal cues;nonverbal cues (demo/gesture)  A for lifting/scooting RLE  -       Row Name 07/20/23 [facility]          Transfer Assessment/Treatment    Comment, (Transfers) supine only today  -       Row Name 07/20/23 [facility]          Sit-Stand Transfer    Assistive Device (Sit-Stand Transfers) --  HHA  -       Row Name 07/20/23 [facility]          Stand-Sit Transfer    Assistive Device (Stand-Sit Transfers) --  A  -       Row Name 07/20/23 [facility]          Gait/Stairs (Locomotion)    Comment, (Gait/Stairs) unable secondary to being hypotensive  -       Row Name 07/20/23 [facility]          Hip (Therapeutic Exercise)    Hip Strengthening (Therapeutic Exercise) bilateral;flexion;aBduction;aDduction;heel slides;internal rotation;external rotation;supine;10 repetitions;2 sets  -[facility] Name 07/20/23 1353          Knee (Therapeutic Exercise)    Knee Strengthening (Therapeutic Exercise) bilateral;flexion;extension;SAQ (short arc quad);heel slides;supine;10 repetitions;2 sets  -[facility] Name 07/20/23 [facility]          Ankle (Therapeutic Exercise)    Ankle Strengthening (Therapeutic Exercise) bilateral;dorsiflexion;plantarflexion;supine;10 repetitions;2 sets  -[facility] Name 07/20/23 [facility]          Positioning and Restraints    Pre-Treatment Position in bed  -RG     Post Treatment Position bed  -RG     In Bed notified nsg;supine;call light within reach;encouraged to call for assist;legs elevated  -       Row Name 07/20/23 1353          Therapy Assessment/Plan (PT)    Patient's Goals For Discharge return home  -       Row Name 07/20/23 [facility]          Therapy Assessment/Plan (PT)    Rehab Potential/Prognosis (PT) adequate,  monitor progress closely  -RG     Frequency of Treatment (PT) 5 times per week  -RG     Estimated Duration of Therapy (PT) 2 weeks  -RG     Problem List (PT) balance;mobility;range of motion (ROM);strength;pain  -RG     Activity Limitations Related to Problem List (PT) unable to ambulate safely;unable to transfer safely  -RG       Row Name 07/20/23 9210          Therapy Plan Review/Discharge Plan (PT)    Anticipated Equipment Needs at Discharge (PT Eval) --  tbd  -RG     Anticipated Discharge Disposition (PT) home with assist;home with home health  -RG       Row Name 07/20/23 [facility]          IRF PT Goals    Bed Mobility Goal Selection (PT-IRF) bed mobility, PT goal 1  -RG     Transfer Goal Selection (PT-IRF) transfers, PT goal 1  -RG     Gait (Walking Locomotion) Goal Selection (PT-IRF) gait, PT goal 1  -RG       Row Name 07/20/23 4313          Bed Mobility Goal 1 (PT-IRF)    Activity/Assistive Device (Bed Mobility Goal 1, PT-IRF) sit to supine/supine to sit  -RG     [place] Level (Bed Mobility Goal 1, PT-IRF) modified independence  -RG     Time Frame (Bed Mobility Goal 1, PT-IRF) by discharge  -RG       Row Name 07/20/23 8493          Transfer Goal 1 (PT-IRF)    Activity/Assistive Device (Transfer Goal 1, PT-IRF) sit-to-stand/stand-to-sit;bed-to-chair/chair-to-bed  -RG     [place] Level (Transfer Goal 1, PT-IRF) modified independence  -RG     Time Frame (Transfer Goal 1, PT-IRF) by discharge  -RG       Row Name 07/20/23 [facility]          Gait/Walking Locomotion Goal 1 (PT-IRF)    Activity/Assistive Device (Gait/Walking Locomotion Goal 1, PT-IRF) walker, rolling  -RG     Gait/Walking Locomotion Distance Goal 1 (PT-IRF) 300'  -RG     [place] Level (Gait/Walking Locomotion Goal 1, PT-IRF) modified independence  -RG     Time Frame (Gait/Walking Locomotion Goal 1, PT-IRF) by discharge  -RG               User Key  (r) = Recorded By, (t) = Taken By, (c) = Cosigned By      Initials Name Provider Type    RG  Chidi Harris PTA Physical Therapist Assistant                  Wound 07/14/23 1930 Right lower leg (Active)   Dressing Appearance dry;intact 07/20/23 0805   Closure DANIELLA 07/19/23 2100   Base dressing in place, unable to visualize 07/19/23 2100   Drainage Amount none 07/19/23 2100     Physical Therapy Education       Title: PT OT SLP Therapies (In Progress)       Topic: Physical Therapy (Done)       Point: Mobility training (Done)       Learning Progress Summary             Patient Acceptance, E,D, VU,NR by RG at 7/20/2023 1401    Acceptance, TB, NR by DL at 7/19/2023 2100    Acceptance, E,D, VU,NR by RG at 7/19/2023 1530    Acceptance, E,TB, VU,DU by HC at 7/18/2023 1119    Acceptance, E, VU,NR by LB at 7/17/2023 1513    Acceptance, E,TB, VU by RM at 7/15/2023 1243    Acceptance, E, VU,NR by LB at 7/14/2023 1550                         Point: Home exercise program (Done)       Learning Progress Summary             Patient Acceptance, E,D, VU,NR by RG at 7/20/2023 1401    Acceptance, TB, NR by DL at 7/19/2023 2100    Acceptance, E,D, VU,NR by RG at 7/19/2023 1530    Acceptance, E,TB, VU,DU by HC at 7/18/2023 1119    Acceptance, E, VU,NR by LB at 7/17/2023 1513    Acceptance, E,TB, VU by RM at 7/15/2023 1243    Acceptance, E, VU,NR by LB at 7/14/2023 1550                         Point: Body mechanics (Done)       Learning Progress Summary             Patient Acceptance, E,D, VU,NR by RG at 7/20/2023 1401    Acceptance, TB, NR by DL at 7/19/2023 2100    Acceptance, E,D, VU,NR by RG at 7/19/2023 1530    Acceptance, E,TB, VU,DU by HC at 7/18/2023 1119    Acceptance, E, VU,NR by LB at 7/17/2023 1513    Acceptance, E,TB, VU by RM at 7/15/2023 1243    Acceptance, E, VU,NR by LB at 7/14/2023 1550                         Point: Precautions (Done)       Learning Progress Summary             Patient Acceptance, E,D, VU,NR by RG at 7/20/2023 1401    Acceptance, TB, NR by DL at 7/19/2023 2100    Acceptance, E,D, VU,NR by RG at  7/19/2023 1530    Acceptance, E,TB, VU,DU by  at 7/18/2023 1119    Acceptance, E, VU,NR by LB at 7/17/2023 1513    Acceptance, E,TB, VU by RM at 7/15/2023 1243    Acceptance, E, VU,NR by LB at 7/14/2023 1550                                         User Key       Initials Effective Dates Name Provider Type Discipline    LB 06/16/21 -  Wendi Brothers, PT Physical Therapist PT    RM 02/17/23 -  Julianna Quach, PTA Physical Therapist Assistant PT    RG 06/16/21 -  Chidi Harris PTA Physical Therapist Assistant PT    HC 01/20/23 -  Nadine Hale, JESSICA Physical Therapist Assistant PT    DL 03/16/22 -  Ashley Mccord, RN Registered Nurse Nurse                    PT Recommendation and Plan    Frequency of Treatment (PT): 5 times per week  Anticipated Equipment Needs at Discharge (PT Eval):  (tbd)                  Time Calculation:      PT Charges       Row Name 07/20/23 1401             Time Calculation    Start Time 1000  -RG      Stop Time 1045  -RG      Time Calculation (min) 45 min  -RG      PT Received On 07/20/23  -RG         Time Calculation- PT    Total Timed Code Minutes- PT 45 minute(s)  -RG                User Key  (r) = Recorded By, (t) = Taken By, (c) = Cosigned By      Initials Name Provider Type    RG Chidi Harris PTA Physical Therapist Assistant                    Therapy Charges for Today       Code Description Service Date Service Provider Modifiers Qty    15124069948 HC PT THERAPEUTIC ACT EA 15 MIN 7/19/2023 Chidi Harris PTA GP, CQ 3    28141455196 HC PT THER PROC EA 15 MIN 7/19/2023 Chidi Harris PTA GP, CQ 3    08504295432 HC PT THERAPEUTIC ACT EA 15 MIN 7/20/2023 Chidi Harris PTA GP, CQ 1    11530453851 HC PT THER PROC EA 15 MIN 7/20/2023 Chidi Harris PTA GP, CQ 2                     Chidi Harris PTA  7/20/2023

## 2023-07-20 NOTE — PLAN OF CARE
Goal Outcome Evaluation:  Plan of Care Reviewed With: patient resting in bed no changes reported, continues to participate in therapy, will continue to monitor.

## 2023-07-20 NOTE — PROGRESS NOTES
[facility] CM[facility] Coordinator  Inpatient Rehabilitation Admission    Ethnic Group:  Marital Status:    IRF Admission Date:  07/13/2023  Admission Class:  Admit From:    Pre-Hospital Living:    Payment Sources:  Impairment Group:  Date of Onset of Impairment:    Etiologic Diagnosis Code(s):  Rank Code      Description  1    S82.141A  Displaced bicondylar fracture of right tibia,                 initial encounter for closed fracture    Comorbidities:  Rank Code      Description    1    E11.51    Type 2 diabetes mellitus with diabetic                 peripheral angiopathy without gangrene  2    I10       Essential (primary) hypertension  3    E78.00    Pure hypercholesterolemia, unspecified  4    I25.10    Atherosclerotic heart disease of native                 coronary artery without angina pectoris  5    Z79.4     Long term (current) use of insulin  6    Z79.82    Long term (current) use of aspirin  7    Z95.1     Presence of aortocoronary bypass graft  8    K21.9     Gastro-esophageal reflux disease without                 esophagitis  9    K58.9     Irritable bowel syndrome without diarrhea  10   R53.81    Other malaise    Height on Admission:  Weight on Admission:    Are there any arthritis conditions recorded for Impairment Group, Etiologic  Diagnosis, or Comorbid Conditions that meet all of the regulatory requirements  for IRF classification (in 42 .29(b)(2)(x), (xi), and xii))?  No    KIRAN Bladder Accidents:   - Accidents.    KIRAN Bowel Accident:  -Accidents.    Signed by: Nurse Giovanni

## 2023-07-20 NOTE — THERAPY TREATMENT NOTE
Inpatient Rehabilitation - Occupational Therapy Treatment Note    TONI Wild     Patient Name: Lisa Velazquez  : 1965  MRN: [id number]    Today's Date: 2023                 Admit Date: 2023       No diagnosis found.    Patient Active Problem List   Diagnosis    S/P right hip fracture    Acute respiratory failure with hypoxia    Right tibial fracture       Past Medical History:   Diagnosis Date    Coronary artery disease     Diabetes mellitus     Elevated cholesterol     GERD (gastroesophageal reflux disease)     History of transfusion     Hypertension        Past Surgical History:   Procedure Laterality Date    ABDOMINAL SURGERY      gallbladder removal    CARDIAC CATHETERIZATION      CARDIAC SURGERY      stent    COLONOSCOPY      FRACTURE SURGERY      TOE AMPUTATION Right     3rd toe right foot             IRF OT ASSESSMENT FLOWSHEET (last 12 hours)       IRF OT Evaluation and Treatment       Row Name 23 1250          OT Time and Intention    Document Type daily treatment  -BF     Mode of Treatment occupational therapy  -BF     Patient Effort good  -BF     Symptoms Noted During/After Treatment other (see comments)  Decrease in BP  -BF       Row Name 23 1250          General Information    Patient/Family/Caregiver Comments/Observations Pt agreeable to OT. Pt's BP checked several times throughout session. Pt's BP dropped to 80/45 near end of session and returned to supine position w/ RN notified. Pt's systolic pressure [place] to 99 once supine.  -BF     Existing Precautions/Restrictions fall  RLE WBAT  -BF       Row Name 23 1250          Cognition/Psychosocial    Orientation Status (Cognition) oriented x 3  -BF     Follows Commands (Cognition) WFL  -BF       Row Name 23 1250          Grooming    [place] Level (Grooming) set up  -BF     Position (Grooming) supported sitting  -BF       Row Name 23 1250          Bed-Chair Transfer    Bed-Chair [place] (Transfers)  minimum assist (75% patient effort);verbal cues;nonverbal cues (demo/gesture)  -BF     Assistive Device (Bed-Chair Transfers) wheelchair  -BF       Row Name 07/20/23 1250          Chair-Bed Transfer    Chair-Bed [place] (Transfers) minimum assist (75% patient effort);verbal cues;nonverbal cues (demo/gesture)  -BF     Assistive Device (Chair-Bed Transfers) wheelchair  -BF       Row Name 07/20/23 1250          Motor Skills    Motor Control/Coordination Interventions gross motor coordination activities;fine motor manipulation/dexterity activities;therapeutic exercise/ROM  BUE GMC/FMC theract, strengthening; BUE rickshaw 20 lbs X20X5, BUE PRE 4 mins X2, wrist rolls  -BF       Row Name 07/20/23 1250          Positioning and Restraints    In Bed supine;notified nsg;call light within reach;encouraged to call for assist  -BF               User Key  (r) = Recorded By, (t) = Taken By, (c) = Cosigned By      Initials Name Effective Dates    Araceli Hammonds OT 07/11/23 -                      Occupational Therapy Education       Title: PT OT SLP Therapies (In Progress)       Topic: Occupational Therapy (In Progress)       Point: ADL training (Done)       Description:   Instruct learner(s) on proper safety adaptation and remediation techniques during self care or transfers.   Instruct in proper use of assistive devices.                  Learning Progress Summary             Patient Acceptance, E, VU,NR by BF at 7/20/2023 1250    Acceptance, TB, NR by DL at 7/19/2023 2100    Acceptance, E, VU,NR by BF at 7/18/2023 1436    Acceptance, E, VU,NR by BF at 7/17/2023 1536    Acceptance, E, VU,NR by HB at 7/15/2023 1204    Acceptance, E, VU,NR by BF at 7/14/2023 1028                         Point: Home exercise program (In Progress)       Description:   Instruct learner(s) on appropriate technique for monitoring, assisting and/or progressing therapeutic exercises/activities.                  Learning Progress Summary              Patient Acceptance, TB, NR by DL at 7/19/2023 2100    Acceptance, E, VU,NR by HB at 7/15/2023 1204                         Point: Precautions (Done)       Description:   Instruct learner(s) on prescribed precautions during self-care and functional transfers.                  Learning Progress Summary             Patient Acceptance, E, VU,NR by BF at 7/20/2023 1250    Acceptance, TB, NR by DL at 7/19/2023 2100    Acceptance, E, VU,NR by BF at 7/18/2023 1436    Acceptance, E, VU,NR by BF at 7/17/2023 1536    Acceptance, E, VU,NR by HB at 7/15/2023 1204    Acceptance, E, VU,NR by BF at 7/14/2023 1028                         Point: Body mechanics (In Progress)       Description:   Instruct learner(s) on proper positioning and spine alignment during self-care, functional mobility activities and/or exercises.                  Learning Progress Summary             Patient Acceptance, TB, NR by DL at 7/19/2023 2100    Acceptance, E, VU,NR by HB at 7/15/2023 1204                                         User Key       Initials Effective Dates Name Provider Type Discipline    BF 07/11/23 -  Araceli Lua OT Occupational Therapist OT    HB 05/25/21 -  Vandana Rader OT Occupational Therapist OT    DL 03/16/22 -  Ashley Mccord, RN Registered Nurse Nurse                        OT Recommendation and Plan    Planned Therapy Interventions (OT): adaptive equipment training, activity tolerance training, BADL retraining, ROM/therapeutic exercise, strengthening exercise, transfer/mobility retraining                    Time Calculation:      Time Calculation- OT       Row Name 07/20/23 1254             Time Calculation- OT    OT Start Time 0825  -BF      OT Stop Time 0955  -BF      OT Time Calculation (min) 90 min  -BF      Total Timed Code Minutes- OT 90 minute(s)  -BF      OT Non-Billable Time (min) 10 min  -BF                User Key  (r) = Recorded By, (t) = Taken By, (c) = Cosigned By      Initials Name  Provider Type     Araceli Lua OT Occupational Therapist                  Therapy Charges for Today       Code Description Service Date Service Provider Modifiers Qty    80946935428 HC OT SELF CARE/MGMT/TRAIN EA 15 MIN 7/20/2023 Araceli Lua OT GO 1    68569158923 HC OT THER PROC EA 15 MIN 7/20/2023 Araceli Lua OT GO 3    51017530815 HC OT THERAPEUTIC ACT EA 15 MIN 7/20/2023 Araceli Lua OT GO 2                     Araceli Lua OT  7/20/2023

## 2023-07-20 NOTE — PROGRESS NOTES
"Assisted By: ABELARDO Bustamante    CC: Follow-up on right tibia fracture    Interview History/HPI: Patient was not complaining of any symptoms of dizziness after sitting at the OT table for 30 minutes but while was talking to her she began to feel some symptoms and blood pressure had dropped to a MAP of 66 with systolic blood pressure in the low 90s.  It progressed on into the 80s precipitated the patient returning to bed.  Blood pressure came up after this when she was supine.  She denies any chest pain or shortness of breath.  Patient did not have her gabapentin this morning as she thought this may be contributing to the orthostasis but she does not want to stop this altogether.          Current Hospital Meds:  ascorbic acid, 500 mg, Oral, Daily  aspirin, 81 mg, Oral, Daily  calcium carbonate, 1 tablet, Oral, BID  clopidogrel, 75 mg, Oral, Daily  enoxaparin, 40 mg, Subcutaneous, Q12H  gabapentin, 200 mg, Oral, Q24H  insulin glargine, 13 Units, Subcutaneous, Q12H  midodrine, 7.5 mg, Oral, TID AC  multivitamin, 1 tablet, Oral, Daily  pantoprazole, 40 mg, Oral, Q AM  polyethylene glycol, 17 g, Oral, Daily  rosuvastatin, 40 mg, Oral, Nightly  senna-docusate sodium, 2 tablet, Oral, BID  vitamin D3, 5,000 Units, Oral, Daily         Vitals:    07/20/23 0805   BP: 138/55   Pulse: 69   Resp: 20   Temp: 98.7 °F (37.1 °C)   SpO2:          Intake/Output Summary (Last 24 hours) at 7/20/2023 1245  Last data filed at 7/20/2023 0900  Gross per 24 hour   Intake 360 ml   Output --   Net 360 ml       EXAM: Patient's skin color is good although while I was talking to her she stated she was \"getting sweaty\" probably consistent with her orthostasis.  Her skin was mildly moist, lungs were clear heart was regular rate and rhythm, extremities are without edema      Diet: Diabetic Diets; Consistent Carbohydrate; Texture: Regular Texture (IDDSI 7); Fluid Consistency: Thin (IDDSI 0)        LABS:     Lab Results (last 48 hours)       Procedure " Component Value Units Date/Time    POC Glucose Once [[id number]]  (Abnormal) Collected: 07/20/23 1039    Specimen: Blood Updated: 07/20/23 1045     Glucose 219 mg/dL      Comment: Meter: [id number] : [id number] DIANA BOWLING       POC Glucose Once [[id number]]  (Normal) Collected: 07/20/23 0645    Specimen: Blood Updated: 07/20/23 0652     Glucose 104 mg/dL      Comment: Meter: [id number] : [id number] NATI LACY       POC Glucose Once [[id number]]  (Abnormal) Collected: 07/19/23 1912    Specimen: Blood Updated: 07/19/23 1918     Glucose 179 mg/dL      Comment: Meter: [id number] : [id number] NATI LACY       POC Glucose Once [[id number]]  (Abnormal) Collected: 07/19/23 1621    Specimen: Blood Updated: 07/19/23 1627     Glucose 164 mg/dL      Comment: Meter: [id number] : [id number] DIANA BOWLING       POC Glucose Once [[id number]]  (Abnormal) Collected: 07/19/23 1047    Specimen: Blood Updated: 07/19/23 1054     Glucose 164 mg/dL      Comment: Meter: [id number] : [id number] DIANA BOWLING       POC Glucose Once [[id number]]  (Abnormal) Collected: 07/19/23 0601    Specimen: Blood Updated: 07/19/23 0607     Glucose 142 mg/dL      Comment: Meter: [id number] : [id number] BERNADETTE CHRIS       Basic Metabolic Panel [[id number]]  (Abnormal) Collected: 07/19/23 0032    Specimen: Blood Updated: 07/19/23 0215     Glucose 189 mg/dL      BUN 12 mg/dL      Creatinine 0.69 mg/dL      Sodium 140 mmol/L      Potassium 4.3 mmol/L      Comment: Slight hemolysis detected by analyzer. Results may be affected.        Chloride 106 mmol/L      CO2 24.7 mmol/L      Calcium 9.4 mg/dL      BUN/Creatinine Ratio 17.4     Anion Gap 9.3 mmol/L      eGFR 101.4 mL/min/1.73     Narrative:      GFR Normal >60  Chronic Kidney Disease <60  Kidney Failure <15      TSH [[id number]]  (Normal) Collected: 07/19/23 0032    Specimen: Blood Updated: 07/19/23 0212     TSH 0.790 uIU/mL     T4, Free [[id number]]  (Normal) Collected: 07/19/23 0032     Specimen: Blood Updated: 07/19/23 0212     Free T4 1.10 ng/dL     Narrative:      Results may be falsely increased if patient taking Biotin.      CBC & Differential [[id number]]  (Abnormal) Collected: 07/19/23 0032    Specimen: Blood Updated: 07/19/23 0139    Narrative:      The following orders were created for panel order CBC & Differential.  Procedure                               Abnormality         Status                     ---------                               -----------         ------                     CBC Auto Differential[[id number]]        Abnormal            Final result                 Please view results for these tests on the individual orders.    CBC Auto Differential [[id number]]  (Abnormal) Collected: 07/19/23 0032    Specimen: Blood Updated: 07/19/23 0139     WBC 6.78 10*3/mm3      RBC 3.32 10*6/mm3      Hemoglobin 9.6 g/dL      Hematocrit 31.4 %      MCV 94.6 fL      MCH 28.9 pg      MCHC 30.6 g/dL      RDW 15.7 %      RDW-SD 52.7 fl      MPV 9.1 fL      Platelets 265 10*3/mm3      Neutrophil % 79.3 %      Lymphocyte % 14.7 %      Monocyte % 3.4 %      Eosinophil % 1.8 %      Basophil % 0.4 %      Immature Grans % 0.4 %      Neutrophils, Absolute 5.37 10*3/mm3      Lymphocytes, Absolute 1.00 10*3/mm3      Monocytes, Absolute 0.23 10*3/mm3      Eosinophils, Absolute 0.12 10*3/mm3      Basophils, Absolute 0.03 10*3/mm3      Immature Grans, Absolute 0.03 10*3/mm3      nRBC 0.0 /100 WBC     Cortisol [[id number]] Collected: 07/18/23 2007    Specimen: Blood Updated: 07/18/23 2048     Cortisol 29.86 mcg/dL     Narrative:      Cortisol Reference Ranges:    Cortisol 6AM - 10AM Range: 6.02-18.40 mcg/dl  Cortisol 4PM - 8PM Range: 2.68-10.50 mcg/dl      Results may be falsely increased if patient taking Biotin.      POC Glucose Once [[id number]]  (Abnormal) Collected: 07/18/23 2017    Specimen: Blood Updated: 07/18/23 2023     Glucose 157 mg/dL      Comment: Meter: [id number] : [id number] BERNADETTE  CHRIS       Cortisol [[id number]] Collected: 07/18/23 1933    Specimen: Blood Updated: 07/18/23 2009     Cortisol 26.76 mcg/dL     Narrative:      Cortisol Reference Ranges:    Cortisol 6AM - 10AM Range: 6.02-18.40 mcg/dl  Cortisol 4PM - 8PM Range: 2.68-10.50 mcg/dl      Results may be falsely increased if patient taking Biotin.      Cortisol [[id number]] Collected: 07/18/23 1830    Specimen: Blood Updated: 07/18/23 1905     Cortisol 7.84 mcg/dL     Narrative:      Cortisol Reference Ranges:    Cortisol 6AM - 10AM Range: 6.02-18.40 mcg/dl  Cortisol 4PM - 8PM Range: 2.68-10.50 mcg/dl      Results may be falsely increased if patient taking Biotin.      POC Glucose Once [[id number]]  (Abnormal) Collected: 07/18/23 1619    Specimen: Blood Updated: 07/18/23 1625     Glucose 212 mg/dL      Comment: Meter: [id number] : [id number] DIANA VASQUEZ                    Radiology:    Imaging Results (Last 72 Hours)       Procedure Component Value Units Date/Time    XR Shoulder 2+ View Right [[id number]] Collected: 07/17/23 1605     Updated: 07/17/23 1608    Narrative:      EXAM:    XR Right Shoulder Complete, 2 or More Views     EXAM DATE:    7/17/2023 3:51 PM     CLINICAL HISTORY:    rt shouldr pain     TECHNIQUE:    Two or more views of the right shoulder.     COMPARISON:    No relevant prior studies available.     FINDINGS:    Bones/joints:  Unremarkable.  No acute fracture.  No dislocation.    Soft tissues:  Unremarkable.       Impression:        No acute findings in the right shoulder.     This report was finalized on 7/17/2023 4:06 PM by Dr. Chepe Arellano MD.               Results for orders placed during the hospital encounter of 07/13/23    Adult Transthoracic Echo Complete W/ Cont if Necessary Per Protocol    Interpretation Summary    Normal left ventricular cavity size and wall thickness noted. All left ventricular wall segments contract normally.    Left ventricular ejection fraction appears to be 56 - 60%.    Left  ventricular diastolic function is consistent with (grade I) impaired relaxation.    There is mild, bileaflet mitral valve thickening present.    Systolic anterior motion of the anterior mitral leaflet is present.    The aortic valve is structurally normal with no stenosis present. Trace aortic valve regurgitation is present.    There is mild, bileaflet mitral valve thickening present. Systolic anterior motion of the anterior mitral leaflet is present.    Mild mitral valve regurgitation is present. No significant mitral valve stenosis is present.    There is no evidence of pericardial effusion.      Assessment/Plan:   Fractured tibia, patient is undergoing intensive occupational physical therapy but therapy sessions have been somewhat limited by her intermittently having to be in bed due to the orthostasis which we are working on.  With PT yesterday, min assist with bed mobility, min assist bed to chair and chair to bed with a front wheeled walker, min assist to mod assist stand to sit, she was unable to ambulate yesterday.  Yesterday with OT, she was min to mod lower body dressing, she was min assist with them bed to chair.  OT continue to work on ADLs, motor skills, strengthening and range of motion.    Orthostasis, per my previous note, we are working through this, I have increased midodrine, I have changed the gabapentin dosing to once a day at 1600 after her therapy sessions.  Patient has not been significantly anemic to cause this, she is not adrenally insufficient, thyroid status is normal, EF is normal.  This is most likely accommodation of medication mixed with autonomic neuropathy.  He is wearing an abdominal binder.    Diabetes, with the exception of one reading at 219 over the last 24 hours otherwise glucoses have been good, will continue to monitor on current insulin of Lantus 13 units every 12 hours.    DVT prophylaxis, subcu Lovenox    Coronary disease, she is on DAPT and Crestor and remains  asymptomatic at this time.    Constipation, patient apparently per nursing patient is not wanting bowel meds because it is not abnormal for her to go several days without a bowel movement.  Only narcotic tramadol she is not taking this very often.    Abs unremarkable yesterday.    By echo she did have grade 1 HFpEF but appears compensated.    Wilmer Gama MD

## 2023-07-20 NOTE — PROGRESS NOTES
Rehabilitation Nursing  Inpatient Rehabilitation Plan of Care Note    Plan of Care  Body Function Structure    Skin Integrity (Active)  Current Status (7/13/2023 5:38:00 PM): free from skin breakdown this shift  Weekly Goal: free from skin breakdown this stay  Discharge Goal: home free of skin breakdown    Safety    Potential for Injury (Active)  Current Status (7/13/2023 5:38:00 PM): free from injury this shift  Weekly Goal: free from injury this stay  Discharge Goal: home free of injuryC    Signed by: Lolita Hernandez Nurse

## 2023-07-21 LAB
GLUCOSE BLDC GLUCOMTR-MCNC: 116 MG/DL (ref 70–130)
GLUCOSE BLDC GLUCOMTR-MCNC: 172 MG/DL (ref 70–130)
GLUCOSE BLDC GLUCOMTR-MCNC: 176 MG/DL (ref 70–130)
GLUCOSE BLDC GLUCOMTR-MCNC: 190 MG/DL (ref 70–130)

## 2023-07-21 PROCEDURE — 97535 SELF CARE MNGMENT TRAINING: CPT | Performed by: OCCUPATIONAL THERAPIST

## 2023-07-21 PROCEDURE — 97530 THERAPEUTIC ACTIVITIES: CPT | Performed by: OCCUPATIONAL THERAPIST

## 2023-07-21 PROCEDURE — 82948 REAGENT STRIP/BLOOD GLUCOSE: CPT

## 2023-07-21 PROCEDURE — 97110 THERAPEUTIC EXERCISES: CPT

## 2023-07-21 PROCEDURE — 63710000001 INSULIN GLARGINE PER 5 UNITS: Performed by: INTERNAL MEDICINE

## 2023-07-21 PROCEDURE — 99254 IP/OBS CNSLTJ NEW/EST MOD 60: CPT | Performed by: INTERNAL MEDICINE

## 2023-07-21 PROCEDURE — 25010000002 ENOXAPARIN PER 10 MG: Performed by: FAMILY MEDICINE

## 2023-07-21 PROCEDURE — 97530 THERAPEUTIC ACTIVITIES: CPT

## 2023-07-21 PROCEDURE — 97110 THERAPEUTIC EXERCISES: CPT | Performed by: OCCUPATIONAL THERAPIST

## 2023-07-21 PROCEDURE — 99232 SBSQ HOSP IP/OBS MODERATE 35: CPT | Performed by: INTERNAL MEDICINE

## 2023-07-21 RX ORDER — FLUDROCORTISONE ACETATE 0.1 MG/1
50 TABLET ORAL DAILY
Status: DISCONTINUED | OUTPATIENT
Start: 2023-07-21 | End: 2023-08-01

## 2023-07-21 RX ORDER — MIDODRINE HYDROCHLORIDE 2.5 MG/1
10 TABLET ORAL
Status: DISCONTINUED | OUTPATIENT
Start: 2023-07-21 | End: 2023-07-24

## 2023-07-21 RX ADMIN — ANTACID TABLETS 1 TABLET: 500 TABLET, CHEWABLE ORAL at 09:06

## 2023-07-21 RX ADMIN — TRAMADOL HYDROCHLORIDE 50 MG: 50 TABLET, COATED ORAL at 09:06

## 2023-07-21 RX ADMIN — SODIUM CHLORIDE 250 ML: 9 INJECTION, SOLUTION INTRAVENOUS at 07:57

## 2023-07-21 RX ADMIN — FLUDROCORTISONE ACETATE 50 MCG: 0.1 TABLET ORAL at 09:06

## 2023-07-21 RX ADMIN — INSULIN GLARGINE 13 UNITS: 100 INJECTION, SOLUTION SUBCUTANEOUS at 20:36

## 2023-07-21 RX ADMIN — PANTOPRAZOLE SODIUM 40 MG: 40 TABLET, DELAYED RELEASE ORAL at 05:36

## 2023-07-21 RX ADMIN — TRAMADOL HYDROCHLORIDE 50 MG: 50 TABLET, COATED ORAL at 20:35

## 2023-07-21 RX ADMIN — CLOPIDOGREL BISULFATE 75 MG: 75 TABLET, FILM COATED ORAL at 09:07

## 2023-07-21 RX ADMIN — ENOXAPARIN SODIUM 40 MG: 100 INJECTION SUBCUTANEOUS at 09:07

## 2023-07-21 RX ADMIN — CARBIDOPA AND LEVODOPA 7.5 MG: 50; 200 TABLET, EXTENDED RELEASE ORAL at 06:31

## 2023-07-21 RX ADMIN — GABAPENTIN 200 MG: 100 CAPSULE ORAL at 16:03

## 2023-07-21 RX ADMIN — ENOXAPARIN SODIUM 40 MG: 100 INJECTION SUBCUTANEOUS at 20:35

## 2023-07-21 RX ADMIN — ASPIRIN 81 MG: 81 TABLET, COATED ORAL at 09:07

## 2023-07-21 RX ADMIN — Medication 5 MG: at 20:35

## 2023-07-21 RX ADMIN — Medication 1 TABLET: at 09:07

## 2023-07-21 RX ADMIN — OXYCODONE HYDROCHLORIDE AND ACETAMINOPHEN 500 MG: 500 TABLET ORAL at 09:07

## 2023-07-21 RX ADMIN — Medication 5000 UNITS: at 09:07

## 2023-07-21 RX ADMIN — CARBIDOPA AND LEVODOPA 10 MG: 50; 200 TABLET, EXTENDED RELEASE ORAL at 17:02

## 2023-07-21 RX ADMIN — CARBIDOPA AND LEVODOPA 10 MG: 50; 200 TABLET, EXTENDED RELEASE ORAL at 11:44

## 2023-07-21 RX ADMIN — ROSUVASTATIN CALCIUM 40 MG: 20 TABLET, FILM COATED ORAL at 20:35

## 2023-07-21 RX ADMIN — INSULIN GLARGINE 13 UNITS: 100 INJECTION, SOLUTION SUBCUTANEOUS at 09:07

## 2023-07-21 NOTE — THERAPY PROGRESS REPORT/RE-CERT
Inpatient Rehabilitation - Physical Therapy Progress Note        [facility]     Patient Name: Lisa Velazquez  : 1965  MRN: [id number]    Today's Date: 2023                    Admit Date: 2023      Visit Dx:   No diagnosis found.    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  BP was monitored throughout PT session in supine position. 119/72, 99/65, 117/73.   Progress toward pts goal decreased secondary to her BP issues.  -RG       Row Name 23 1448          General Information    Existing Precautions/Restrictions fall  -RG       Row Name 23 1448          Pain Scale: FACES Pre/Post-Treatment    Pain: FACES Scale, Pretreatment 6-->hurts even more  -RG     Posttreatment Pain Rating 6-->hurts even more  -RG       Row Name 23 1448          Cognition/Psychosocial    Affect/Mental Status (Cognition) WFL  -RG     Follows Commands (Cognition) WFL  -RG       Row Name 23 1448          Mobility    Left Lower Extremity (Weight-bearing Status) weight-bearing as tolerated (WBAT)  -RG     Right Lower Extremity  (Weight-bearing Status) weight-bearing as tolerated (WBAT)  -       Row Name 07/21/23 1448          Bed Mobility    Supine-Sit-Supine [place] (Bed Mobility) minimum assist (75% patient effort);verbal cues;nonverbal cues (demo/gesture)  A for lifting/scooting RLE  -       Row Name 07/21/23 1448          Sit-Stand Transfer    Assistive Device (Sit-Stand Transfers) --  HHA  -       Row Name 07/21/23 1448          Stand-Sit Transfer    Assistive Device (Stand-Sit Transfers) --  HHA  -       Row Name 07/21/23 1448          Hip (Therapeutic Exercise)    Hip Strengthening (Therapeutic Exercise) bilateral;flexion;aBduction;aDduction;external rotation;internal rotation;heel slides;marching while standing;supine;10 repetitions;2 sets  -[facility] Name 07/21/23 1448          Knee (Therapeutic Exercise)    Knee Strengthening (Therapeutic Exercise) flexion;bilateral;extension;heel slides;SLR (straight leg raise);SAQ (short arc quad);supine;10 repetitions;2 sets  -[facility] Name 07/21/23 1448          Ankle (Therapeutic Exercise)    Ankle Strengthening (Therapeutic Exercise) bilateral;dorsiflexion;plantarflexion;supine;10 repetitions;2 sets  -[facility] Name 07/21/23 1448          Positioning and Restraints    Pre-Treatment Position in bed  -RG     Post Treatment Position bed  -RG     In Bed notified nsg;supine;call light within reach;encouraged to call for assist;legs elevated  -[facility] Name 07/21/23 1448          Therapy Assessment/Plan (PT)    Patient's Goals For Discharge return home  -[facility] Name 07/21/23 1448          Therapy Assessment/Plan (PT)    Rehab Potential/Prognosis (PT) adequate, monitor progress closely  -RG     Frequency of Treatment (PT) 5 times per week  -RG     Estimated Duration of Therapy (PT) 2 weeks  -RG     Problem List (PT) balance;mobility;range of motion (ROM);strength;pain  -RG     Activity Limitations Related to Problem List (PT) unable to ambulate safely;unable to  transfer safely  -RG       Row Name 07/21/23 1448          Therapy Plan Review/Discharge Plan (PT)    Anticipated Equipment Needs at Discharge (PT Eval) --  tbd  -RG     Anticipated Discharge Disposition (PT) home with assist;home with home health  -RG       Row Name 07/21/23 1448          IRF PT Goals    Bed Mobility Goal Selection (PT-IRF) bed mobility, PT goal 1  -RG     Transfer Goal Selection (PT-IRF) transfers, PT goal 1  -RG     Gait (Walking Locomotion) Goal Selection (PT-IRF) gait, PT goal 1  -RG       Row Name 07/21/23 1448          Bed Mobility Goal 1 (PT-IRF)    Activity/Assistive Device (Bed Mobility Goal 1, PT-IRF) sit to supine/supine to sit  -RG     Rea Level (Bed Mobility Goal 1, PT-IRF) modified independence  -RG     Time Frame (Bed Mobility Goal 1, PT-IRF) by discharge  -RG     Progress/Outcomes (Bed Mobility Goal 1, PT-IRF) goal ongoing  -RG       Row Name 07/21/23 1448          Transfer Goal 1 (PT-IRF)    Activity/Assistive Device (Transfer Goal 1, PT-IRF) sit-to-stand/stand-to-sit;bed-to-chair/chair-to-bed  -RG     Rea Level (Transfer Goal 1, PT-IRF) modified independence  -RG     Time Frame (Transfer Goal 1, PT-IRF) by discharge  -RG     Progress/Outcomes (Transfer Goal 1, PT-IRF) goal ongoing  -RG       Row Name 07/21/23 1448          Gait/Walking Locomotion Goal 1 (PT-IRF)    Activity/Assistive Device (Gait/Walking Locomotion Goal 1, PT-IRF) walker, rolling  -RG     Gait/Walking Locomotion Distance Goal 1 (PT-IRF) 300'  -RG     Rea Level (Gait/Walking Locomotion Goal 1, PT-IRF) modified independence  -RG     Time Frame (Gait/Walking Locomotion Goal 1, PT-IRF) by discharge  -RG     Progress/Outcomes (Gait/Walking Locomotion Goal 1, PT-IRF) goal ongoing  -RG               User Key  (r) = Recorded By, (t) = Taken By, (c) = Cosigned By      Initials Name Provider Type    Chidi Lerner PTA Physical Therapist Assistant                  Wound 07/14/23 1930 Right  lower leg (Active)   Dressing Appearance dry;intact 07/21/23 0800   Closure Sutures;Adhesive bandage 07/21/23 0800   Base dressing in place, unable to visualize 07/21/23 0800   Drainage Amount none 07/21/23 0530   Care, Wound cleansed with 07/21/23 0530   Dressing Care dressing changed 07/21/23 0530     Physical Therapy Education       Title: PT OT SLP Therapies (In Progress)       Topic: Physical Therapy (Done)       Point: Mobility training (Done)       Learning Progress Summary             Patient Acceptance, E,D, VU,NR by RG at 7/21/2023 1453    Acceptance, E,D, VU,NR by RG at 7/20/2023 1401    Acceptance, TB, NR by DL at 7/19/2023 2100    Acceptance, E,D, VU,NR by RG at 7/19/2023 1530    Acceptance, E,TB, VU,DU by HC at 7/18/2023 1119    Acceptance, E, VU,NR by LB at 7/17/2023 1513    Acceptance, E,TB, VU by RM at 7/15/2023 1243    Acceptance, E, VU,NR by LB at 7/14/2023 1550                         Point: Home exercise program (Done)       Learning Progress Summary             Patient Acceptance, E,D, VU,NR by RG at 7/21/2023 1453    Acceptance, E,D, VU,NR by RG at 7/20/2023 1401    Acceptance, TB, NR by DL at 7/19/2023 2100    Acceptance, E,D, VU,NR by RG at 7/19/2023 1530    Acceptance, E,TB, VU,DU by HC at 7/18/2023 1119    Acceptance, E, VU,NR by LB at 7/17/2023 1513    Acceptance, E,TB, VU by RM at 7/15/2023 1243    Acceptance, E, VU,NR by LB at 7/14/2023 1550                         Point: Body mechanics (Done)       Learning Progress Summary             Patient Acceptance, E,D, VU,NR by RG at 7/21/2023 1453    Acceptance, E,D, VU,NR by RG at 7/20/2023 1401    Acceptance, TB, NR by DL at 7/19/2023 2100    Acceptance, E,D, VU,NR by RG at 7/19/2023 1530    Acceptance, E,TB, VU,DU by HC at 7/18/2023 1119    Acceptance, E, VU,NR by LB at 7/17/2023 1513    Acceptance, E,TB, VU by RM at 7/15/2023 1243    Acceptance, E, VU,NR by LB at 7/14/2023 1550                         Point: Precautions (Done)        Learning Progress Summary             Patient Acceptance, E,D, VU,NR by RG at 7/21/2023 1453    Acceptance, E,D, VU,NR by RG at 7/20/2023 1401    Acceptance, TB, NR by DL at 7/19/2023 2100    Acceptance, E,D, VU,NR by RG at 7/19/2023 1530    Acceptance, E,TB, VU,DU by HC at 7/18/2023 1119    Acceptance, E, VU,NR by LB at 7/17/2023 1513    Acceptance, E,TB, VU by RM at 7/15/2023 1243    Acceptance, E, VU,NR by LB at 7/14/2023 1550                                         User Key       Initials Effective Dates Name Provider Type Discipline    LB 06/16/21 -  Wendi Brothers, PT Physical Therapist PT    RM 02/17/23 -  Julianna Quach, PTA Physical Therapist Assistant PT    RG 06/16/21 -  Chidi Harris PTA Physical Therapist Assistant PT    HC 01/20/23 -  Nadine Hale PTA Physical Therapist Assistant PT    DL 03/16/22 -  Ashley Mccord, RN Registered Nurse Nurse                    PT Recommendation and Plan    Frequency of Treatment (PT): 5 times per week  Anticipated Equipment Needs at Discharge (PT Eval):  (tbd)                  Time Calculation:      PT Charges       Row Name 07/21/23 1454             Time Calculation    Start Time 0745  -RG      Stop Time 0845  -RG      Time Calculation (min) 60 min  -RG      PT Received On 07/21/23  -RG         Time Calculation- PT    Total Timed Code Minutes- PT 60 minute(s)  -RG                User Key  (r) = Recorded By, (t) = Taken By, (c) = Cosigned By      Initials Name Provider Type    RG Chidi Harris PTA Physical Therapist Assistant                    Therapy Charges for Today       Code Description Service Date Service Provider Modifiers Qty    98597655955 HC PT THERAPEUTIC ACT EA 15 MIN 7/20/2023 Chidi Harris PTA GP, CQ 1    71044642909 HC PT THER PROC EA 15 MIN 7/20/2023 Chidi Harris PTA GP, CQ 2    95964813936 HC PT THERAPEUTIC ACT EA 15 MIN 7/21/2023 Chidi Harris PTA GP, CQ 2    55135515783 HC PT THER PROC EA 15 MIN 7/21/2023  Chidi Harris, PTA GP, CQ 2                     Chidi Harris, PTA  7/21/2023

## 2023-07-21 NOTE — PROGRESS NOTES
Rehabilitation Nursing  Inpatient Rehabilitation Plan of Care Note    Plan of Care  Copy from POCBody Function Structure    Skin Integrity (Active)  Current Status (7/13/2023 5:38:00 PM): free from skin breakdown this shift  Weekly Goal: free from skin breakdown this stay  Discharge Goal: home free of skin breakdown    Safety    Potential for Injury (Active)  Current Status (7/13/2023 5:38:00 PM): free from injury this shift  Weekly Goal: free from injury this stay  Discharge Goal: home free of injury    Signed by: Jane Dixon, Nurse

## 2023-07-21 NOTE — THERAPY TREATMENT NOTE
Inpatient Rehabilitation - Occupational Therapy Progress Note     [place]     Patient Name: Lisa Velazquez  : 1965  MRN: [id number]    Today's Date: 2023                 Admit Date: 2023       No diagnosis found.    Patient Active Problem List   Diagnosis    S/P right hip fracture    Acute respiratory failure with hypoxia    Right tibial fracture       Past Medical History:   Diagnosis Date    Coronary artery disease     Diabetes mellitus     Elevated cholesterol     GERD (gastroesophageal reflux disease)     History of transfusion     Hypertension        Past Surgical History:   Procedure Laterality Date    ABDOMINAL SURGERY      gallbladder removal    CARDIAC CATHETERIZATION      CARDIAC SURGERY      stent    COLONOSCOPY      FRACTURE SURGERY      TOE AMPUTATION Right     3rd toe right foot             IRF OT ASSESSMENT FLOWSHEET (last 12 hours)       IRF OT Evaluation and Treatment       Row Name 23 1400          OT Time and Intention    Document Type daily treatment;progress note  -     Mode of Treatment individual therapy;occupational therapy  -     Patient Effort good  -       Row Name 23 1400          General Information    Patient/Family/Caregiver Comments/Observations patient agreeable to therapy. patient continues with BP issues while up. patient seen supine in bed in a.m., /83, 145/81 during session. patient seen seated edge of bed in pm. /79. patient tolerated well with rest breaks.  -     Existing Precautions/Restrictions fall  -       Row Name 23 1400          Cognition/Psychosocial    Affect/Mental Status (Cognition) WFL  -       Row Name 23 1400          Bathing    Surveyor Level (Bathing) minimum assist (75% patient effort)  -       Row Name 23 1400          Upper Body Dressing    Surveyor Level (Upper Body Dressing) set up assistance  -       Row Name 23 1400          Lower Body Dressing    Surveyor Level  (Lower Body Dressing) minimum assist (75% patient effort)  -       Row Name 07/21/23 1400          Grooming    Beadle Level (Grooming) set up  -[facility] Name 07/21/23 1400          Toileting    Beadle Level (Toileting) maximum assist (25% patient effort)  -[facility] Name 07/21/23 1400          Self-Feeding    Beadle Level (Self-Feeding) set up  -     Position (Self-Feeding) edge of bed sitting  -[facility] Name 07/21/23 1400          Motor Skills    Motor Skills coordination;functional endurance  -     Therapeutic Exercise shoulder;elbow/forearm;wrist;hand  BUE ROM, gmc,fmc, strengthening, flex bar, dowel ex  -       Row Name 07/21/23 1400          Positioning and Restraints    Post Treatment Position bed  -     In Bed supine;call light within reach;encouraged to call for assist  -AH       Row Name 07/21/23 1400          LB Dressing Goal 1 (OT-IRF)    Progress/Outcomes (LB Dressing Goal 1, OT-IRF) goal met  -AH       Row Name 07/21/23 1400          LB Dressing Goal 2 (OT-IRF)    Progress/Outcomes (LB Dressing Goal 2, OT-IRF) goal partially met  -AH       Row Name 07/21/23 1400          Toileting Goal 1 (OT-IRF)    Progress/Outcomes (Toileting Goal 1, OT-IRF) goal partially met  -AH       Row Name 07/21/23 1400          Toileting Goal 2 (OT-IRF)    Progress/Outcomes (Toileting Goal 2, OT-IRF) goal ongoing  -               User Key  (r) = Recorded By, (t) = Taken By, (c) = Cosigned By      Initials Name Effective Dates    Rukhsana Olivo, OT 07/11/23 -                      Occupational Therapy Education       Title: PT OT SLP Therapies (In Progress)       Topic: Occupational Therapy (In Progress)       Point: ADL training (Done)       Description:   Instruct learner(s) on proper safety adaptation and remediation techniques during self care or transfers.   Instruct in proper use of assistive devices.                  Learning Progress Summary             Patient  Acceptance, E, VU,NR by BF at 7/20/2023 1250    Acceptance, TB, NR by DL at 7/19/2023 2100    Acceptance, E, VU,NR by BF at 7/18/2023 1436    Acceptance, E, VU,NR by BF at 7/17/2023 1536    Acceptance, E, VU,NR by HB at 7/15/2023 1204    Acceptance, E, VU,NR by BF at 7/14/2023 1028                         Point: Home exercise program (In Progress)       Description:   Instruct learner(s) on appropriate technique for monitoring, assisting and/or progressing therapeutic exercises/activities.                  Learning Progress Summary             Patient Acceptance, TB, NR by DL at 7/19/2023 2100    Acceptance, E, VU,NR by HB at 7/15/2023 1204                         Point: Precautions (Done)       Description:   Instruct learner(s) on prescribed precautions during self-care and functional transfers.                  Learning Progress Summary             Patient Acceptance, E, VU,NR by BF at 7/20/2023 1250    Acceptance, TB, NR by DL at 7/19/2023 2100    Acceptance, E, VU,NR by BF at 7/18/2023 1436    Acceptance, E, VU,NR by BF at 7/17/2023 1536    Acceptance, E, VU,NR by HB at 7/15/2023 1204    Acceptance, E, VU,NR by BF at 7/14/2023 1028                         Point: Body mechanics (In Progress)       Description:   Instruct learner(s) on proper positioning and spine alignment during self-care, functional mobility activities and/or exercises.                  Learning Progress Summary             Patient Acceptance, TB, NR by DL at 7/19/2023 2100    Acceptance, E, VU,NR by HB at 7/15/2023 1204                                         User Key       Initials Effective Dates Name Provider Type Discipline    BF 07/11/23 -  Araceli Lua OT Occupational Therapist OT    HB 05/25/21 -  Vandana Rader OT Occupational Therapist OT    DL 03/16/22 -  Ashley Mccord, RN Registered Nurse Nurse                        OT Recommendation and Plan                         Time Calculation:      Time Calculation- OT        Row Name 07/21/23 1427 07/21/23 1426          Time Calculation- OT    OT Start Time 1250  - 1045  -     OT Stop Time 1345  - 1130  -     OT Time Calculation (min) 55 min  - 45 min  -               User Key  (r) = Recorded By, (t) = Taken By, (c) = Cosigned By      Initials Name Provider Type     Rukhsana Dixon, ABELARDO Occupational Therapist                  Therapy Charges for Today       Code Description Service Date Service Provider Modifiers Qty    19606281509 HC OT THERAPEUTIC ACT EA 15 MIN 7/21/2023 Rukhsana Dixon, OT GO 2    08817201815 HC OT THER PROC EA 15 MIN 7/21/2023 Rukhsana Dixon, OT GO 3    40833036301 HC OT SELF CARE/MGMT/TRAIN EA 15 MIN 7/21/2023 Rukhsana Dixon, OT GO 2                     Rukhsana Dixon OT  7/21/2023

## 2023-07-21 NOTE — CONSULTS
[facility]  CONSULT  NOTE      Patient information:  Date of Admit: 7/13/2023  Date of Consult: 07/21/23  Hospitalist/Referring MD:Rob Camarillo MD  PCP: Patricia Skelton APRN  MRN:  [id number]  Visit Number:  [id number]    LOS: 8  CODE STATUS:  Code Status and Medical Interventions:   Ordered at: 07/13/23 1542     Level Of Support Discussed With:    Patient     Code Status (Patient has no pulse and is not breathing):    CPR (Attempt to Resuscitate)     Medical Interventions (Patient has pulse or is breathing):    Full Support       PROBLEM LIST: Principal Problem:    Right tibial fracture      Inpatient Cardiology Consult  Consult performed by: Priscilla Lackey APRN  Consult ordered by: Wilmer Gama MD      09:54 EDT  7/21/2023    General Cardiology Consulting Physician: Dr. Rob Correa MD, FACC    Assessment      Orthostatic hypotension associated with dizziness.  ASCVD, status post CABG  Peripheral arterial disease, status post intervention involving the right and left femoral arteries.  Type 2 diabetes mellitus  Systemic hypertension.    Recommendations     Agree with midodrine for now.  I have asked her to drink more water and keep herself well-hydrated.      Reason for Cardiology consultation: Persistent orthostasis    Subjective Data   ADMISSION INFORMATION:  No chief complaint on file.    History of Present Illness    Lisa Velazquez is a 58 y.o. female with a past medical history significant for history of CAD s/p CABG, PAD s/p right and left femoral artery stenting, diabetes mellitus, GERD, hypertension, right toe amputation, and IBS.  Patient was admitted to [facility] physical rehab on 07/13/2023 status post fracture of proximal tibia which she underwent closed reduction percutaneous screw insertion for bicondylar tibial plateau fracture on July 4, 2023.  Today on 07/21/2023, General Cardiology has been consulted for further evaluation and  "management persistent orthostasis.     No primary Cardiologist is noted in patient's chart.    Patient is in room 108 A and was examined by Dr. Correa.  Patient is lying in bed resting quietly.  No acute distress noted at this time.  Head of bed is elevated approximately 45 degrees.  Patient is working with occupational therapy at this time.  Patient denies any chest pain, shortness of breath, palpitations, dizziness, or lightheadedness.  Patient reports \" I do not feel anything.  It scares them more than it does me.\" Blood pressure was 153/77 and sitting blood pressure was 114/66 at this time.  Patient admits not drinking much water in a day.     Cardiac risk factors:arteriosclerotic heart disease, diabetes mellitus, hypercholesterolemia, hypertension, and peripheral vascular disease      Last Echo: Results for orders placed during the hospital encounter of 07/13/23    Adult Transthoracic Echo Complete W/ Cont if Necessary Per Protocol    Interpretation Summary    Normal left ventricular cavity size and wall thickness noted. All left ventricular wall segments contract normally.    Left ventricular ejection fraction appears to be 56 - 60%.    Left ventricular diastolic function is consistent with (grade I) impaired relaxation.    There is mild, bileaflet mitral valve thickening present.    Systolic anterior motion of the anterior mitral leaflet is present.    The aortic valve is structurally normal with no stenosis present. Trace aortic valve regurgitation is present.    There is mild, bileaflet mitral valve thickening present. Systolic anterior motion of the anterior mitral leaflet is present.    Mild mitral valve regurgitation is present. No significant mitral valve stenosis is present.    There is no evidence of pericardial effusion.         Last Stress: Stress test results were  mentioned and heart cath report attached below.    Last Cath:        05/25/022                 Imaging:  Cardiac catheterization    [facility]: " 70% stenosis in LAD, 70% stenosis in LCx and total occlusion of RCA withleft to right collaterals.    Echocardiogram: EF 55-60%, diastolic dysfunction, mild-moderate MR and mild TR.    Stress test: anterior wall with reversible defect.    Impression:  Lisa Velazquez is a 56 y.o. female with significant peripheral vascular disease who presents to clinic for evaluation of multivessel coronary artery disease. She reports chest pain with radiation to left arm, symptoms occurring mostly at night. She also endorses dyspnea on exertion. However her lower extremity claudication is her most debilitating and limiting factor, and her anginal symptoms are likely not worse because she can't exert herself due to her claudication. Her LHC revealed 70% stenosis in LAD, 70% stenosis in LCx and total occlusion of RCA withleft to right collaterals. She also had echocardiogram performed which revealed EF 55-60%, diastolic dysfunction, mild-moderate MR and mild TR. Stress test showed anterior wall with reversible defect.    We recommend patient undergo coronary artery bypass grafting. The risks, benefits and alternatives were discussed with the patient and all questions were answered. She agreed, signed consent, and wished to proceed. Her radial arteries are insufficient conduits due to her peripheral vascular disease and failed Allens test with pulse oximetry. We will plan to use her left internal mammary artery and vein from her thighs as conduits. His of right internal mammary artery/segment there of depending upon this was a shin then harvesting of the left internal mammary artery. She is currently scheduled for surgery on 5/30/22.  -----------------------------------------------------------------------------------------  Patient seen and examined with resident physician. Severe peripheral vascular disease which is symptomatic and is being dealt with with catheter based intervention thus far. She has significant ischemic changes her  heart during stress test. Coronary anatomy shows a diabetic pattern with near normal hemodynamics and left ventricular function.  Her Dangelo's test suggest that radial artery is not available for harvest bilaterally. We will use mammary artery saphenous vein graft to the right circulation, and I am debating with the use portion of the right internal mammary artery for circumflex system. Will make that decision on a real-time basis quality of the left.    Discussed all the above with Ms. Velazquez she understands and desires to proceed.      Electronically signed by Ralph Dumont MD at 2022 9:32 AM EDT         Past Medical History:   Diagnosis Date    Coronary artery disease     Diabetes mellitus     Elevated cholesterol     GERD (gastroesophageal reflux disease)     History of transfusion     Hypertension      Past Surgical History:   Procedure Laterality Date    ABDOMINAL SURGERY      gallbladder removal    CARDIAC CATHETERIZATION      CARDIAC SURGERY      stent    COLONOSCOPY      FRACTURE SURGERY      TOE AMPUTATION Right     3rd toe right foot     Family History   Problem Relation Age of Onset    Cancer Mother     Diabetes Mother     Heart disease Mother     Heart disease Father     Diabetes Brother     Heart disease Brother     Heart disease Daughter      Social History     Tobacco Use    Smoking status: Former     Packs/day: 2.00     Years: 15.00     Pack years: 30.00     Types: Cigarettes     Quit date: 2020     Years since quittin.5    Smokeless tobacco: Never   Vaping Use    Vaping Use: Never used   Substance Use Topics    Alcohol use: Never    Drug use: Never       Medications listed below are reported home medications pulling from within the system:  Medications Prior to Admission   Medication Sig Dispense Refill Last Dose    aspirin 81 MG EC tablet Take 1 tablet by mouth Daily.   Past Month    clopidogrel (PLAVIX) 75 MG tablet Take 1 tablet by mouth Daily.   Past Month    Dulaglutide 1.5  MG/0.5ML solution pen-injector Inject 1.5 mg under the skin into the appropriate area as directed 1 (One) Time Per Week.   Past Month    gabapentin (NEURONTIN) 300 MG capsule Take 1 capsule by mouth 3 (Three) Times a Day As Needed (nerve pain).   Past Month    Insulin Glargine (LANTUS SOLOSTAR) 100 UNIT/ML injection pen Inject 10 Units under the skin into the appropriate area as directed 2 (Two) Times a Day.   Past Month    losartan (COZAAR) 25 MG tablet Take 12.5 mg by mouth Daily.   Past Month    pantoprazole (PROTONIX) 40 MG EC tablet Take 1 tablet by mouth Daily.   Past Month    rosuvastatin (CRESTOR) 40 MG tablet Take 1 tablet by mouth Every Night.   Past Month    traMADol (ULTRAM) 50 MG tablet Take 1 tablet by mouth 2 (Two) Times a Day As Needed for Moderate Pain.   Past Month    vitamin D (ERGOCALCIFEROL) 1.25 MG (55489 UT) capsule capsule Take 1 capsule by mouth 1 (One) Time Per Week.   Past Month    acetaminophen (TYLENOL) 500 MG tablet Take 2 tablets (1,000 mg) by mouth every 6 (six) hours if needed for pain. 120 tablet 1 Unknown     Allergies:  Patient has no known allergies.    Review of Systems   Constitutional:  Negative for activity change, appetite change and diaphoresis.   HENT:  Negative for facial swelling and trouble swallowing.    Eyes:  Negative for visual disturbance.   Respiratory:  Negative for shortness of breath.    Cardiovascular:  Negative for chest pain, palpitations and leg swelling.   Gastrointestinal:  Negative for blood in stool, nausea and vomiting.   Endocrine: Negative.    Genitourinary:  Negative for hematuria.   Musculoskeletal:  Negative for gait problem.   Skin:  Negative for color change.   Neurological:  Negative for dizziness, syncope, speech difficulty, weakness, light-headedness and headaches.   Psychiatric/Behavioral:  Negative for agitation and behavioral problems.      Objective Data      Vital Signs  Temp:  [97.3 °F (36.3 °C)-98.6 °F (37 °C)] 97.3 °F (36.3 °C)  Heart  Rate:  [66-81] 81  Resp:  [18] 18  BP: ()/(65-84) 99/65  Vital Signs (last 72 hrs)         07/18 0700  07/19 0659 07/19 0700  07/20 0659 07/20 0700  07/21 0659 07/21 0700  07/21 0954   Most Recent      Temp (°F)   98.7    98 -  98.7    98.6 -  98.7      97.3     97.3 (36.3) 07/21 0700    Heart Rate 72 -  80    68 -  81    66 -  74      68     68 07/21 0700    Resp   18    18 -  20    18 -  20      18     18 07/21 0700    /67 -  168/65    120/60 -  148/85    106/66 -  188/84      94/66     94/66 07/21 0700    SpO2 (%)   94      94      96      97     97 07/21 0700          Body mass index is 30.38 kg/m².    Intake/Output Summary (Last 24 hours) at 7/21/2023 1316  Last data filed at 7/21/2023 0900  Gross per 24 hour   Intake 1200 ml   Output --   Net 1200 ml     I have seen and examined Ms. Velazquez today.    Physical Exam  Constitutional:       Appearance: Normal appearance.   HENT:      Head: Normocephalic and atraumatic.      Nose: Nose normal.      Mouth/Throat:      Mouth: Mucous membranes are moist.      Pharynx: Oropharynx is clear.   Eyes:      Conjunctiva/sclera: Conjunctivae normal.   Cardiovascular:      Rate and Rhythm: Normal rate and regular rhythm.      Pulses: Normal pulses.   Pulmonary:      Effort: Pulmonary effort is normal.      Breath sounds: Normal breath sounds.   Abdominal:      General: Bowel sounds are normal.      Palpations: Abdomen is soft.   Musculoskeletal:         General: Normal range of motion.      Cervical back: Normal range of motion.   Skin:     General: Skin is warm and dry.   Neurological:      General: No focal deficit present.      Mental Status: She is alert and oriented to person, place, and time.   Psychiatric:         Mood and Affect: Mood normal.         Behavior: Behavior normal.       Results review   Results Review:    I have reviewed the patient's new clinical results.      Results from last 7 days   Lab Units 07/19/23  0032 07/17/23  0956 07/16/23  0053    WBC 10*3/mm3 6.78 6.01 5.85   HEMOGLOBIN g/dL 9.6* 9.3* 8.7*   PLATELETS 10*3/mm3 265 239 251     Results from last 7 days   Lab Units 07/19/23  0032 07/17/23  0956 07/16/23  0053   SODIUM mmol/L 140 140 144   POTASSIUM mmol/L 4.3 4.1 4.1   CHLORIDE mmol/L 106 106 109*   CO2 mmol/L 24.7 24.4 24.2   BUN mg/dL 12 12 9   CREATININE mg/dL 0.69 0.75 0.70   CALCIUM mg/dL 9.4 9.2 9.3   GLUCOSE mg/dL 189* 207* 153*     Lab Results   Component Value Date    INR 1.0 07/04/2023    INR 1.2 (H) 05/31/2022         Lab Results   Component Value Date    MG 1.9 07/14/2023    MG 1.9 06/09/2022    MG 2.0 06/08/2022     Lab Results   Component Value Date    TSH 0.790 07/19/2023      No results found for: CHOL, TRIG, HDL, LDL  No results found for: PROBNP  No results found.  No results found for: URICACID  Pain Management Panel          Latest Ref Rng & Units 7/4/2023   Pain Management Panel   Barbiturates Screen, Urine Cutoff: 200 ng/mL Negative       Benzodiazepine Screen, Urine Cutoff: 200 ng/mL Negative       Cocaine Screen, Urine Cutoff: 300 ng/mL Negative       Fentanyl, Urine Cutoff: 1 ng/mL Negative       Hydromorphone <50 ng/mL <50       Methadone Screen , Urine Cutoff: 300 ng/mL Negative          Details          This result is from an external source.             Microbiology Results (last 10 days)       Procedure Component Value - Date/Time    COVID-19, DEONDRE IN-HOUSE CEPHEID/FRANCOISE NP SWAB IN TRANSPORT MEDIA 8-12 HR TAT - , [[id number]]  (Normal) Collected: 07/13/23 1006    Lab Status: Final result Specimen: Swab from Nasopharynx Updated: 07/13/23 1538     SARS-CoV-2, MARIANNA Not Detected    Narrative:      This assay is for in vitro diagnostic use under FDA emergency use authorization only. Negative results do not preclude infection with the SARS CoV-2 virus and should not be the sole basis of a patient treatment/management or public health decision. Follow up testing should be performed according to the current CDC  recommendations.  This test was performed on the Xpert Xpress SARS CoV-2 Plus assay test, a PCR-based method.  Negative results should be considered presumptive and do not preclude current or future infection obtained through community transmission or other exposures. Negative results must be considered in the context of an individual's recent exposures, history, presence of clinical signs and symptoms consistent with COVID-19.            EC2022      ECG/EMG Results (last 24 hours)       ** No results found for the last 24 hours. **            TELEMETRY:     Patient is not on continuous cardiac monitor at this time.    RADIOLOGY STUDIES:  Imaging Results (Last 72 Hours)       ** No results found for the last 72 hours. **            CURRENT MEDICATIONS:  Current list of medications may not reflect those currently placed in orders that are not signed or are being held.     ascorbic acid, 500 mg, Oral, Daily  aspirin, 81 mg, Oral, Daily  calcium carbonate, 1 tablet, Oral, BID  clopidogrel, 75 mg, Oral, Daily  enoxaparin, 40 mg, Subcutaneous, Q12H  fludrocortisone, 50 mcg, Oral, Daily  gabapentin, 200 mg, Oral, Q24H  insulin glargine, 13 Units, Subcutaneous, Q12H  midodrine, 10 mg, Oral, TID AC  multivitamin, 1 tablet, Oral, Daily  pantoprazole, 40 mg, Oral, Q AM  rosuvastatin, 40 mg, Oral, Nightly  vitamin D3, 5,000 Units, Oral, Daily           acetaminophen    bismuth subsalicylate    diphenhydrAMINE    ibuprofen    melatonin    ondansetron    traMADol    Thank you very much for asking us to be involved in this patient's care.  We will follow along with you.    I have discussed the patients findings and my recommendations with patient.    Electronically signed by RAVIN Dennison, 23, 1:21 PM EDT.     Electronically signed by Rob Correa MD, 23, 3:10 PM EDT.          Please note that portions of this note were copied and has been reviewed and is accurate as of 2023 .      Please  note that portions of this note were completed with a voice recognition program.

## 2023-07-21 NOTE — PROGRESS NOTES
Assisted By: Jeromy MASCORRO    CC: Follow-up on right tibial fracture as well as severe orthostasis.    Interview History/HPI: Patient states she is feeling okay, she had been sitting up eating, her blood pressure was 94/66.  In preparation for physical therapy, I did give her 250 cc bolus of normal saline however even with this she became orthostatic with physical therapy.  I have increased her midodrine to 10 mg.  Added Florinef.          Current Hospital Meds:  ascorbic acid, 500 mg, Oral, Daily  aspirin, 81 mg, Oral, Daily  calcium carbonate, 1 tablet, Oral, BID  clopidogrel, 75 mg, Oral, Daily  enoxaparin, 40 mg, Subcutaneous, Q12H  fludrocortisone, 50 mcg, Oral, Daily  gabapentin, 200 mg, Oral, Q24H  insulin glargine, 13 Units, Subcutaneous, Q12H  midodrine, 10 mg, Oral, TID AC  multivitamin, 1 tablet, Oral, Daily  pantoprazole, 40 mg, Oral, Q AM  rosuvastatin, 40 mg, Oral, Nightly  vitamin D3, 5,000 Units, Oral, Daily         Vitals:    07/21/23 1144   BP: 99/65   Pulse: 81   Resp:    Temp:    SpO2:          Intake/Output Summary (Last 24 hours) at 7/21/2023 1337  Last data filed at 7/21/2023 0900  Gross per 24 hour   Intake 1200 ml   Output --   Net 1200 ml       EXAM: Temperature is 97, room air saturation 97%.  Pulse 68.  Skin warm and dry lungs are clear heart regular rate and rhythm no edema      Diet: Diabetic Diets; Consistent Carbohydrate; Texture: Regular Texture (IDDSI 7); Fluid Consistency: Thin (IDDSI 0)        LABS:     Lab Results (last 48 hours)       Procedure Component Value Units Date/Time    POC Glucose Once [[id number]]  (Abnormal) Collected: 07/21/23 1111    Specimen: Blood Updated: 07/21/23 1117     Glucose 190 mg/dL      Comment: Meter: [id number] : [id number] BRAIN MARTINEZ       POC Glucose Once [[id number]]  (Normal) Collected: 07/21/23 0615    Specimen: Blood Updated: 07/21/23 0630     Glucose 116 mg/dL      Comment: Meter: [id number] : [id number] Jeannine Crystal       POC Glucose  Once [[id number]]  (Abnormal) Collected: 07/20/23 2026    Specimen: Blood Updated: 07/20/23 2046     Glucose 216 mg/dL      Comment: Meter: [id number] : [id number] [place]       POC Glucose Once [[id number]]  (Abnormal) Collected: 07/20/23 1611    Specimen: Blood Updated: 07/20/23 1617     Glucose 189 mg/dL      Comment: Meter: [id number] : [id number] DIANA BOWLING       POC Glucose Once [[id number]]  (Abnormal) Collected: 07/20/23 1039    Specimen: Blood Updated: 07/20/23 1045     Glucose 219 mg/dL      Comment: Meter: [id number] : [id number] DIANA BOWLING       POC Glucose Once [[id number]]  (Normal) Collected: 07/20/23 0645    Specimen: Blood Updated: 07/20/23 0652     Glucose 104 mg/dL      Comment: Meter: [id number] : [id number] NATI LACY       POC Glucose Once [[id number]]  (Abnormal) Collected: 07/19/23 1912    Specimen: Blood Updated: 07/19/23 1918     Glucose 179 mg/dL      Comment: Meter: [id number] : [id number] NATI LACY       POC Glucose Once [[id number]]  (Abnormal) Collected: 07/19/23 1621    Specimen: Blood Updated: 07/19/23 1627     Glucose 164 mg/dL      Comment: Meter: [id number] : [id number] DIANA BOWLING                    Radiology:    Imaging Results (Last 72 Hours)       ** No results found for the last 72 hours. **            Results for orders placed during the hospital encounter of 07/13/23    Adult Transthoracic Echo Complete W/ Cont if Necessary Per Protocol    Interpretation Summary    Normal left ventricular cavity size and wall thickness noted. All left ventricular wall segments contract normally.    Left ventricular ejection fraction appears to be 56 - 60%.    Left ventricular diastolic function is consistent with (grade I) impaired relaxation.    There is mild, bileaflet mitral valve thickening present.    Systolic anterior motion of the anterior mitral leaflet is present.    The aortic valve is structurally normal with no stenosis present. Trace aortic  "valve regurgitation is present.    There is mild, bileaflet mitral valve thickening present. Systolic anterior motion of the anterior mitral leaflet is present.    Mild mitral valve regurgitation is present. No significant mitral valve stenosis is present.    There is no evidence of pericardial effusion.      Assessment/Plan:   Right tibial fracture continue occupational and physical therapy, orthostasis continues to hinder her occupational and physical therapy.  She is overall min assist for bed mobility.  She has been having to get most of her physical therapy sessions in the bed, with physical therapy she is min assist with chair to bed and bed to chair.    Orthostasis, work-up includes giving her fluids to rule out hypovolemia, she is not been anemic enough to cause this, hemoglobin has been stable, she is not adrenally insufficient, thyroid status is euthyroid, EF is normal.  She is not on medication that should cause this is set the gabapentin and we have adjusted the dosing so she only gets 1 dose at 1600.  This would be after therapy sessions.  He is wearing an abdominal binder.  Have increased her midodrine to 10 mg 3 times a day and added Florinef.  Because of the persistent nature of this and her history of coronary disease I have asked cardiology to evaluate the patient as well.  Patient is status post coronary artery bypass grafting.    Diabetes, glucoses reviewed, I think overall under acceptable control.    DVT prophylaxis, subcu Lovenox    Constipation, I did discuss this with her, she states that her bowels only move about every 10 days at home.  She states that she takes stool softeners or cathartics she will \"end up in the hospital\" and has significant diarrhea, she is adamant about not taking these therefore these have been discontinued.    Grade 1 HFpEF by echo, appears to be euvolemic    Wilmer Gama MD     "

## 2023-07-21 NOTE — PLAN OF CARE
Goal Outcome Evaluation:     Problem: Rehabilitation (IRF) Plan of Care  Goal: Plan of Care Review  Outcome: Ongoing, Progressing  Goal: Patient-Specific Goal (Individualized)  Outcome: Ongoing, Progressing  Goal: Absence of New-Onset Illness or Injury  Outcome: Ongoing, Progressing  Intervention: Prevent Fall and Fall Injury  Recent Flowsheet Documentation  Taken 7/21/2023 0800 by Jeromy Slater RN  Safety Promotion/Fall Prevention: safety round/check completed  Intervention: Prevent Infection  Recent Flowsheet Documentation  Taken 7/21/2023 0800 by Jeromy Slater RN  Infection Prevention:   hand hygiene promoted   rest/sleep promoted  Intervention: Prevent VTE (Venous Thromboembolism)  Recent Flowsheet Documentation  Taken 7/21/2023 0800 by Jeromy Slater RN  VTE Prevention/Management: (lovenox) other (see comments)  Goal: Optimal Comfort and Wellbeing  Outcome: Ongoing, Progressing  Goal: Home and Community Transition Plan Established  Outcome: Ongoing, Progressing     Problem: Fall Injury Risk  Goal: Absence of Fall and Fall-Related Injury  Outcome: Ongoing, Progressing  Intervention: Identify and Manage Contributors  Recent Flowsheet Documentation  Taken 7/21/2023 0800 by Jeromy Slater RN  Medication Review/Management: medications reviewed  Intervention: Promote Injury-Free Environment  Recent Flowsheet Documentation  Taken 7/21/2023 0800 by Jeromy Slater RN  Safety Promotion/Fall Prevention: safety round/check completed     Problem: Skin Injury Risk Increased  Goal: Skin Health and Integrity  Outcome: Ongoing, Progressing  Intervention: Promote and Optimize Oral Intake  Recent Flowsheet Documentation  Taken 7/21/2023 0800 by Jeromy Slater RN  Oral Nutrition Promotion: physical activity promoted  Intervention: Optimize Skin Protection  Recent Flowsheet Documentation  Taken 7/21/2023 0800 by Jeromy Slater RN  Pressure Reduction Techniques:   frequent weight shift encouraged   heels elevated off bed   weight  shift assistance provided  Pressure Reduction Devices:   pressure-redistributing mattress utilized   heel offloading device utilized   positioning supports utilized  Skin Protection:   adhesive use limited   incontinence pads utilized

## 2023-07-22 LAB
ANION GAP SERPL CALCULATED.3IONS-SCNC: 8.6 MMOL/L (ref 5–15)
BASOPHILS # BLD AUTO: 0.04 10*3/MM3 (ref 0–0.2)
BASOPHILS NFR BLD AUTO: 0.6 % (ref 0–1.5)
BUN SERPL-MCNC: 11 MG/DL (ref 6–20)
BUN/CREAT SERPL: 17.2 (ref 7–25)
CALCIUM SPEC-SCNC: 9.5 MG/DL (ref 8.6–10.5)
CHLORIDE SERPL-SCNC: 108 MMOL/L (ref 98–107)
CO2 SERPL-SCNC: 26.4 MMOL/L (ref 22–29)
CREAT SERPL-MCNC: 0.64 MG/DL (ref 0.57–1)
DEPRECATED RDW RBC AUTO: 52.1 FL (ref 37–54)
EGFRCR SERPLBLD CKD-EPI 2021: 102.6 ML/MIN/1.73
EOSINOPHIL # BLD AUTO: 0.3 10*3/MM3 (ref 0–0.4)
EOSINOPHIL NFR BLD AUTO: 4.8 % (ref 0.3–6.2)
ERYTHROCYTE [DISTWIDTH] IN BLOOD BY AUTOMATED COUNT: 15.7 % (ref 12.3–15.4)
GLUCOSE BLDC GLUCOMTR-MCNC: 111 MG/DL (ref 70–130)
GLUCOSE BLDC GLUCOMTR-MCNC: 112 MG/DL (ref 70–130)
GLUCOSE BLDC GLUCOMTR-MCNC: 127 MG/DL (ref 70–130)
GLUCOSE BLDC GLUCOMTR-MCNC: 234 MG/DL (ref 70–130)
GLUCOSE SERPL-MCNC: 125 MG/DL (ref 65–99)
HCT VFR BLD AUTO: 33 % (ref 34–46.6)
HGB BLD-MCNC: 10.3 G/DL (ref 12–15.9)
IMM GRANULOCYTES # BLD AUTO: 0.02 10*3/MM3 (ref 0–0.05)
IMM GRANULOCYTES NFR BLD AUTO: 0.3 % (ref 0–0.5)
LYMPHOCYTES # BLD AUTO: 2.16 10*3/MM3 (ref 0.7–3.1)
LYMPHOCYTES NFR BLD AUTO: 34.4 % (ref 19.6–45.3)
MCH RBC QN AUTO: 28.9 PG (ref 26.6–33)
MCHC RBC AUTO-ENTMCNC: 31.2 G/DL (ref 31.5–35.7)
MCV RBC AUTO: 92.7 FL (ref 79–97)
MONOCYTES # BLD AUTO: 0.43 10*3/MM3 (ref 0.1–0.9)
MONOCYTES NFR BLD AUTO: 6.8 % (ref 5–12)
NEUTROPHILS NFR BLD AUTO: 3.33 10*3/MM3 (ref 1.7–7)
NEUTROPHILS NFR BLD AUTO: 53.1 % (ref 42.7–76)
NRBC BLD AUTO-RTO: 0 /100 WBC (ref 0–0.2)
PLATELET # BLD AUTO: 249 10*3/MM3 (ref 140–450)
PMV BLD AUTO: 9.1 FL (ref 6–12)
POTASSIUM SERPL-SCNC: 3.9 MMOL/L (ref 3.5–5.2)
RBC # BLD AUTO: 3.56 10*6/MM3 (ref 3.77–5.28)
SODIUM SERPL-SCNC: 143 MMOL/L (ref 136–145)
WBC NRBC COR # BLD: 6.28 10*3/MM3 (ref 3.4–10.8)

## 2023-07-22 PROCEDURE — 99231 SBSQ HOSP IP/OBS SF/LOW 25: CPT | Performed by: INTERNAL MEDICINE

## 2023-07-22 PROCEDURE — 25010000002 ENOXAPARIN PER 10 MG: Performed by: FAMILY MEDICINE

## 2023-07-22 PROCEDURE — 82948 REAGENT STRIP/BLOOD GLUCOSE: CPT

## 2023-07-22 PROCEDURE — 63710000001 INSULIN GLARGINE PER 5 UNITS: Performed by: INTERNAL MEDICINE

## 2023-07-22 PROCEDURE — 80048 BASIC METABOLIC PNL TOTAL CA: CPT | Performed by: INTERNAL MEDICINE

## 2023-07-22 PROCEDURE — 85025 COMPLETE CBC W/AUTO DIFF WBC: CPT | Performed by: INTERNAL MEDICINE

## 2023-07-22 RX ORDER — TRAMADOL HYDROCHLORIDE 50 MG/1
50 TABLET ORAL EVERY 12 HOURS PRN
Status: DISPENSED | OUTPATIENT
Start: 2023-07-22 | End: 2023-07-30

## 2023-07-22 RX ADMIN — GABAPENTIN 200 MG: 100 CAPSULE ORAL at 16:20

## 2023-07-22 RX ADMIN — CARBIDOPA AND LEVODOPA 10 MG: 50; 200 TABLET, EXTENDED RELEASE ORAL at 16:30

## 2023-07-22 RX ADMIN — ENOXAPARIN SODIUM 40 MG: 100 INJECTION SUBCUTANEOUS at 08:47

## 2023-07-22 RX ADMIN — FLUDROCORTISONE ACETATE 50 MCG: 0.1 TABLET ORAL at 08:46

## 2023-07-22 RX ADMIN — ANTACID TABLETS 1 TABLET: 500 TABLET, CHEWABLE ORAL at 20:19

## 2023-07-22 RX ADMIN — CARBIDOPA AND LEVODOPA 10 MG: 50; 200 TABLET, EXTENDED RELEASE ORAL at 06:31

## 2023-07-22 RX ADMIN — ASPIRIN 81 MG: 81 TABLET, COATED ORAL at 08:47

## 2023-07-22 RX ADMIN — CLOPIDOGREL BISULFATE 75 MG: 75 TABLET, FILM COATED ORAL at 08:47

## 2023-07-22 RX ADMIN — INSULIN GLARGINE 13 UNITS: 100 INJECTION, SOLUTION SUBCUTANEOUS at 08:46

## 2023-07-22 RX ADMIN — ROSUVASTATIN CALCIUM 40 MG: 20 TABLET, FILM COATED ORAL at 20:19

## 2023-07-22 RX ADMIN — OXYCODONE HYDROCHLORIDE AND ACETAMINOPHEN 500 MG: 500 TABLET ORAL at 08:47

## 2023-07-22 RX ADMIN — CARBIDOPA AND LEVODOPA 10 MG: 50; 200 TABLET, EXTENDED RELEASE ORAL at 10:39

## 2023-07-22 RX ADMIN — Medication 5000 UNITS: at 08:47

## 2023-07-22 RX ADMIN — Medication 5 MG: at 20:27

## 2023-07-22 RX ADMIN — TRAMADOL HYDROCHLORIDE 50 MG: 50 TABLET, COATED ORAL at 20:25

## 2023-07-22 RX ADMIN — TRAMADOL HYDROCHLORIDE 50 MG: 50 TABLET, COATED ORAL at 08:46

## 2023-07-22 RX ADMIN — INSULIN GLARGINE 13 UNITS: 100 INJECTION, SOLUTION SUBCUTANEOUS at 20:19

## 2023-07-22 RX ADMIN — Medication 1 TABLET: at 08:47

## 2023-07-22 RX ADMIN — PANTOPRAZOLE SODIUM 40 MG: 40 TABLET, DELAYED RELEASE ORAL at 05:38

## 2023-07-22 RX ADMIN — ENOXAPARIN SODIUM 40 MG: 100 INJECTION SUBCUTANEOUS at 20:19

## 2023-07-22 NOTE — PLAN OF CARE
Goal Outcome Evaluation:     Problem: Rehabilitation (IRF) Plan of Care  Goal: Plan of Care Review  Outcome: Ongoing, Progressing  Goal: Patient-Specific Goal (Individualized)  Outcome: Ongoing, Progressing  Goal: Absence of New-Onset Illness or Injury  Outcome: Ongoing, Progressing  Intervention: Prevent Fall and Fall Injury  Recent Flowsheet Documentation  Taken 7/22/2023 0800 by Jeromy Slater RN  Safety Promotion/Fall Prevention: safety round/check completed  Intervention: Prevent Infection  Recent Flowsheet Documentation  Taken 7/22/2023 0800 by Jeromy Slater RN  Infection Prevention:   hand hygiene promoted   rest/sleep promoted  Intervention: Prevent VTE (Venous Thromboembolism)  Recent Flowsheet Documentation  Taken 7/22/2023 0800 by Jeromy Slater RN  VTE Prevention/Management: (lovenox) other (see comments)  Goal: Optimal Comfort and Wellbeing  Outcome: Ongoing, Progressing  Goal: Home and Community Transition Plan Established  Outcome: Ongoing, Progressing     Problem: Fall Injury Risk  Goal: Absence of Fall and Fall-Related Injury  Outcome: Ongoing, Progressing  Intervention: Identify and Manage Contributors  Recent Flowsheet Documentation  Taken 7/22/2023 0800 by Jeromy Slater RN  Medication Review/Management: medications reviewed  Intervention: Promote Injury-Free Environment  Recent Flowsheet Documentation  Taken 7/22/2023 0800 by Jeromy Slater RN  Safety Promotion/Fall Prevention: safety round/check completed     Problem: Skin Injury Risk Increased  Goal: Skin Health and Integrity  Outcome: Ongoing, Progressing  Intervention: Promote and Optimize Oral Intake  Recent Flowsheet Documentation  Taken 7/22/2023 0800 by Jeromy Slater RN  Oral Nutrition Promotion: physical activity promoted  Intervention: Optimize Skin Protection  Recent Flowsheet Documentation  Taken 7/22/2023 0800 by Jeromy Slater RN  Pressure Reduction Techniques:   frequent weight shift encouraged   heels elevated off bed   weight  shift assistance provided  Pressure Reduction Devices:   heel offloading device utilized   positioning supports utilized   pressure-redistributing mattress utilized  Skin Protection:   adhesive use limited   incontinence pads utilized

## 2023-07-22 NOTE — PROGRESS NOTES
"Assisted By: Jeromy MASCORRO    CC: Follow-up on right tibial fracture as well as orthostasis    Interview History/HPI: Patient this morning states she \"feels fine\" but she has not been up out of bed.  Cardiology consult noted and appreciated.  Patient is trying to drink extra water.  Still no bowel movement.          Current Hospital Meds:  ascorbic acid, 500 mg, Oral, Daily  aspirin, 81 mg, Oral, Daily  calcium carbonate, 1 tablet, Oral, BID  clopidogrel, 75 mg, Oral, Daily  enoxaparin, 40 mg, Subcutaneous, Q12H  fludrocortisone, 50 mcg, Oral, Daily  gabapentin, 200 mg, Oral, Q24H  insulin glargine, 13 Units, Subcutaneous, Q12H  midodrine, 10 mg, Oral, TID AC  multivitamin, 1 tablet, Oral, Daily  pantoprazole, 40 mg, Oral, Q AM  rosuvastatin, 40 mg, Oral, Nightly  vitamin D3, 5,000 Units, Oral, Daily         Vitals:    07/22/23 0700   BP: 109/64   Pulse: 64   Resp: 18   Temp: 98.2 °F (36.8 °C)   SpO2: 96%         Intake/Output Summary (Last 24 hours) at 7/22/2023 0933  Last data filed at 7/22/2023 0700  Gross per 24 hour   Intake 1920 ml   Output --   Net 1920 ml       EXAM: He is pleasant and in no distress, skin warm and dry, no no edema, her bandages have been changed on her leg and ankle and nursing reports these are healing well, bandages are dry no surrounding erythema of the skin.      Diet: Diabetic Diets; Consistent Carbohydrate; Texture: Regular Texture (IDDSI 7); Fluid Consistency: Thin (IDDSI 0)        LABS:     Lab Results (last 48 hours)       Procedure Component Value Units Date/Time    POC Glucose Once [[id number]]  (Normal) Collected: 07/22/23 0630    Specimen: Blood Updated: 07/22/23 0641     Glucose 112 mg/dL      Comment: Meter: [id number] : [id number] [place]       Basic Metabolic Panel [[id number]]  (Abnormal) Collected: 07/22/23 0122    Specimen: Blood Updated: 07/22/23 0156     Glucose 125 mg/dL      BUN 11 mg/dL      Creatinine 0.64 mg/dL      Sodium 143 mmol/L      Potassium 3.9 mmol/L  "     Chloride 108 mmol/L      CO2 26.4 mmol/L      Calcium 9.5 mg/dL      BUN/Creatinine Ratio 17.2     Anion Gap 8.6 mmol/L      eGFR 102.6 mL/min/1.73     Narrative:      GFR Normal >60  Chronic Kidney Disease <60  Kidney Failure <15      CBC & Differential [[id number]]  (Abnormal) Collected: 07/22/23 0122    Specimen: Blood Updated: 07/22/23 0132    Narrative:      The following orders were created for panel order CBC & Differential.  Procedure                               Abnormality         Status                     ---------                               -----------         ------                     CBC Auto Differential[[id number]]        Abnormal            Final result                 Please view results for these tests on the individual orders.    CBC Auto Differential [[id number]]  (Abnormal) Collected: 07/22/23 0122    Specimen: Blood Updated: 07/22/23 0132     WBC 6.28 10*3/mm3      RBC 3.56 10*6/mm3      Hemoglobin 10.3 g/dL      Hematocrit 33.0 %      MCV 92.7 fL      MCH 28.9 pg      MCHC 31.2 g/dL      RDW 15.7 %      RDW-SD 52.1 fl      MPV 9.1 fL      Platelets 249 10*3/mm3      Neutrophil % 53.1 %      Lymphocyte % 34.4 %      Monocyte % 6.8 %      Eosinophil % 4.8 %      Basophil % 0.6 %      Immature Grans % 0.3 %      Neutrophils, Absolute 3.33 10*3/mm3      Lymphocytes, Absolute 2.16 10*3/mm3      Monocytes, Absolute 0.43 10*3/mm3      Eosinophils, Absolute 0.30 10*3/mm3      Basophils, Absolute 0.04 10*3/mm3      Immature Grans, Absolute 0.02 10*3/mm3      nRBC 0.0 /100 WBC     POC Glucose Once [[id number]]  (Abnormal) Collected: 07/21/23 1949    Specimen: Blood Updated: 07/21/23 2000     Glucose 172 mg/dL      Comment: Meter: [id number] : [id number] [place] Crystal       POC Glucose Once [[id number]]  (Abnormal) Collected: 07/21/23 1613    Specimen: Blood Updated: 07/21/23 1626     Glucose 176 mg/dL      Comment: Meter: [id number] : [id number] david mejias       POC Glucose Once  [[id number]]  (Abnormal) Collected: 07/21/23 1111    Specimen: Blood Updated: 07/21/23 1117     Glucose 190 mg/dL      Comment: Meter: [id number] : [id number] BRAIN HOBSONERD       POC Glucose Once [[id number]]  (Normal) Collected: 07/21/23 0615    Specimen: Blood Updated: 07/21/23 0630     Glucose 116 mg/dL      Comment: Meter: [id number] : [id number] [place]       POC Glucose Once [[id number]]  (Abnormal) Collected: 07/20/23 2026    Specimen: Blood Updated: 07/20/23 2046     Glucose 216 mg/dL      Comment: Meter: [id number] : [id number] [place]       POC Glucose Once [[id number]]  (Abnormal) Collected: 07/20/23 1611    Specimen: Blood Updated: 07/20/23 1617     Glucose 189 mg/dL      Comment: Meter: [id number] : [id number] DIANA BOWLING       POC Glucose Once [[id number]]  (Abnormal) Collected: 07/20/23 1039    Specimen: Blood Updated: 07/20/23 1045     Glucose 219 mg/dL      Comment: Meter: [id number] : [id number] DIANA BOWLING                    Radiology:    Imaging Results (Last 72 Hours)       ** No results found for the last 72 hours. **            Results for orders placed during the hospital encounter of 07/13/23    Adult Transthoracic Echo Complete W/ Cont if Necessary Per Protocol    Interpretation Summary    Normal left ventricular cavity size and wall thickness noted. All left ventricular wall segments contract normally.    Left ventricular ejection fraction appears to be 56 - 60%.    Left ventricular diastolic function is consistent with (grade I) impaired relaxation.    There is mild, bileaflet mitral valve thickening present.    Systolic anterior motion of the anterior mitral leaflet is present.    The aortic valve is structurally normal with no stenosis present. Trace aortic valve regurgitation is present.    There is mild, bileaflet mitral valve thickening present. Systolic anterior motion of the anterior mitral leaflet is present.    Mild mitral valve regurgitation is  present. No significant mitral valve stenosis is present.    There is no evidence of pericardial effusion.      Assessment/Plan:   Right tibial fracture, patient is working with occupational and physical therapy.  With PT yesterday, blood pressure was monitored throughout the session, supine 119/72, then 99/65 and 117/73.  Progress towards goal has been hindered secondary to orthostasis.  With OT, patient is min assist bathing, set up upper body dressing, min assist lower body dressing, set up for grooming, max assist toileting, set up for self-feeding.    Orthostatic hypotension, has been persistent, we have been working through this, there is no evidence of secondary cause, cardiology input appreciated, continuing on midodrine as well as Florinef, follow.  Electrolytes unremarkable and hemoglobin stable to improved    History of HFpEF, monitoring closely as she did receive some fluids and is on Florinef but appears euvolemic at this time.    DVT prophylaxis, subcu Lovenox    No BM, patient does not want cathartics at this time.    Diabetes controlled on basal insulin alone.    Wilmer Gama MD

## 2023-07-22 NOTE — PROGRESS NOTES
Rehabilitation Nursing  Inpatient Rehabilitation Plan of Care Note    Plan of Care  Body Function Structure    Skin Integrity (Active)  Current Status (7/13/2023 5:38:00 PM): free from skin breakdown this shift  Weekly Goal: free from skin breakdown this stay  Discharge Goal: home free of skin breakdown    Safety    Potential for Injury (Active)  Current Status (7/13/2023 5:38:00 PM): free from injury this shift  Weekly Goal: free from injury this stay  Discharge Goal: home free of injury    Signed by: Jeromy Slater RN

## 2023-07-23 LAB
GLUCOSE BLDC GLUCOMTR-MCNC: 121 MG/DL (ref 70–130)
GLUCOSE BLDC GLUCOMTR-MCNC: 132 MG/DL (ref 70–130)
GLUCOSE BLDC GLUCOMTR-MCNC: 181 MG/DL (ref 70–130)
GLUCOSE BLDC GLUCOMTR-MCNC: 258 MG/DL (ref 70–130)

## 2023-07-23 PROCEDURE — 25010000002 ENOXAPARIN PER 10 MG: Performed by: FAMILY MEDICINE

## 2023-07-23 PROCEDURE — 63710000001 INSULIN GLARGINE PER 5 UNITS: Performed by: INTERNAL MEDICINE

## 2023-07-23 PROCEDURE — 82948 REAGENT STRIP/BLOOD GLUCOSE: CPT

## 2023-07-23 RX ADMIN — CARBIDOPA AND LEVODOPA 10 MG: 50; 200 TABLET, EXTENDED RELEASE ORAL at 12:24

## 2023-07-23 RX ADMIN — GABAPENTIN 200 MG: 100 CAPSULE ORAL at 17:10

## 2023-07-23 RX ADMIN — ASPIRIN 81 MG: 81 TABLET, COATED ORAL at 09:31

## 2023-07-23 RX ADMIN — OXYCODONE HYDROCHLORIDE AND ACETAMINOPHEN 500 MG: 500 TABLET ORAL at 09:31

## 2023-07-23 RX ADMIN — ENOXAPARIN SODIUM 40 MG: 100 INJECTION SUBCUTANEOUS at 21:40

## 2023-07-23 RX ADMIN — Medication 5000 UNITS: at 09:32

## 2023-07-23 RX ADMIN — Medication 5 MG: at 21:40

## 2023-07-23 RX ADMIN — Medication 1 TABLET: at 09:32

## 2023-07-23 RX ADMIN — INSULIN GLARGINE 13 UNITS: 100 INJECTION, SOLUTION SUBCUTANEOUS at 21:39

## 2023-07-23 RX ADMIN — TRAMADOL HYDROCHLORIDE 50 MG: 50 TABLET, COATED ORAL at 21:39

## 2023-07-23 RX ADMIN — CARBIDOPA AND LEVODOPA 10 MG: 50; 200 TABLET, EXTENDED RELEASE ORAL at 17:10

## 2023-07-23 RX ADMIN — FLUDROCORTISONE ACETATE 50 MCG: 0.1 TABLET ORAL at 09:32

## 2023-07-23 RX ADMIN — CARBIDOPA AND LEVODOPA 10 MG: 50; 200 TABLET, EXTENDED RELEASE ORAL at 06:42

## 2023-07-23 RX ADMIN — PANTOPRAZOLE SODIUM 40 MG: 40 TABLET, DELAYED RELEASE ORAL at 06:42

## 2023-07-23 RX ADMIN — CLOPIDOGREL BISULFATE 75 MG: 75 TABLET, FILM COATED ORAL at 09:31

## 2023-07-23 RX ADMIN — ENOXAPARIN SODIUM 40 MG: 100 INJECTION SUBCUTANEOUS at 09:31

## 2023-07-23 RX ADMIN — INSULIN GLARGINE 13 UNITS: 100 INJECTION, SOLUTION SUBCUTANEOUS at 09:32

## 2023-07-23 RX ADMIN — ROSUVASTATIN CALCIUM 40 MG: 20 TABLET, FILM COATED ORAL at 21:40

## 2023-07-23 NOTE — PLAN OF CARE
Goal Outcome Evaluation:              Outcome Evaluation: Rested well so far . Continue POC

## 2023-07-23 NOTE — PLAN OF CARE
Problem: Rehabilitation (IRF) Plan of Care  Goal: Plan of Care Review  Outcome: Ongoing, Progressing  Flowsheets (Taken 7/23/2023 1516)  Progress: improving  Plan of Care Reviewed With: patient  Outcome Evaluation:   Patient resting comfortably   no acute distress  noted. Call light and items within reach.Continue current plan of care   monitor.  Goal: Patient-Specific Goal (Individualized)  Outcome: Ongoing, Progressing  Goal: Absence of New-Onset Illness or Injury  Outcome: Ongoing, Progressing  Intervention: Prevent Fall and Fall Injury  Recent Flowsheet Documentation  Taken 7/23/2023 1400 by Geraldine Velazquez RN  Safety Promotion/Fall Prevention:   safety round/check completed   nonskid shoes/slippers when out of bed   assistive device/personal items within reach  Taken 7/23/2023 1200 by Geraldine Velazquez RN  Safety Promotion/Fall Prevention:   safety round/check completed   nonskid shoes/slippers when out of bed   assistive device/personal items within reach  Taken 7/23/2023 1000 by Geraldine Velazquez RN  Safety Promotion/Fall Prevention: safety round/check completed  Taken 7/23/2023 0910 by Geraldine Velazquez RN  Safety Promotion/Fall Prevention:   safety round/check completed   assistive device/personal items within reach   nonskid shoes/slippers when out of bed  Taken 7/23/2023 0800 by Geraldine Velazquez RN  Safety Promotion/Fall Prevention:   safety round/check completed   nonskid shoes/slippers when out of bed   assistive device/personal items within reach  Intervention: Prevent Infection  Recent Flowsheet Documentation  Taken 7/23/2023 1400 by Geraldine Velazquez RN  Infection Prevention:   hand hygiene promoted   rest/sleep promoted  Taken 7/23/2023 1200 by Geraldine Velazquez RN  Infection Prevention:   hand hygiene promoted   rest/sleep promoted  Taken 7/23/2023 1000 by Geraldine Velazquez RN  Infection Prevention: hand hygiene promoted  Taken 7/23/2023 0910 by Geraldine Velazquez RN  Infection Prevention:    hand hygiene promoted   rest/sleep promoted  Taken 7/23/2023 0800 by Geraldine Velazquez, RN  Infection Prevention:   hand hygiene promoted   rest/sleep promoted  Intervention: Prevent VTE (Venous Thromboembolism)  Recent Flowsheet Documentation  Taken 7/23/2023 0910 by Geraldine Velazquez, RN  VTE Prevention/Management: (lovenox) other (see comments)  Goal: Optimal Comfort and Wellbeing  Outcome: Ongoing, Progressing  Goal: Home and Community Transition Plan Established  Outcome: Ongoing, Progressing   Goal Outcome Evaluation:  Plan of Care Reviewed With: patient        Progress: improving  Outcome Evaluation: Patient resting comfortably; no acute distress  noted. Call light and items within reach.Continue current plan of care;monitor.

## 2023-07-23 NOTE — PROGRESS NOTES
Rehabilitation Nursing  Inpatient Rehabilitation Plan of Care Note    Plan of Care  Copy from POCBody Function Structure    Skin Integrity (Active)  Current Status (7/13/2023 5:38:00 PM): free from skin breakdown this shift  Weekly Goal: free from skin breakdown this stay  Discharge Goal: home free of skin breakdown    Safety    Potential for Injury (Active)  Current Status (7/13/2023 5:38:00 PM): free from injury this shift  Weekly Goal: free from injury this stay  Discharge Goal: home free of injury    Signed by: Kari Stevenson RN

## 2023-07-24 LAB
GLUCOSE BLDC GLUCOMTR-MCNC: 154 MG/DL (ref 70–130)
GLUCOSE BLDC GLUCOMTR-MCNC: 209 MG/DL (ref 70–130)
GLUCOSE BLDC GLUCOMTR-MCNC: 226 MG/DL (ref 70–130)
GLUCOSE BLDC GLUCOMTR-MCNC: 255 MG/DL (ref 70–130)

## 2023-07-24 PROCEDURE — 99232 SBSQ HOSP IP/OBS MODERATE 35: CPT | Performed by: INTERNAL MEDICINE

## 2023-07-24 PROCEDURE — 97535 SELF CARE MNGMENT TRAINING: CPT | Performed by: OCCUPATIONAL THERAPIST

## 2023-07-24 PROCEDURE — 97110 THERAPEUTIC EXERCISES: CPT

## 2023-07-24 PROCEDURE — 25010000002 ENOXAPARIN PER 10 MG: Performed by: FAMILY MEDICINE

## 2023-07-24 PROCEDURE — 97110 THERAPEUTIC EXERCISES: CPT | Performed by: OCCUPATIONAL THERAPIST

## 2023-07-24 PROCEDURE — 97530 THERAPEUTIC ACTIVITIES: CPT

## 2023-07-24 PROCEDURE — 63710000001 INSULIN GLARGINE PER 5 UNITS: Performed by: INTERNAL MEDICINE

## 2023-07-24 PROCEDURE — 99231 SBSQ HOSP IP/OBS SF/LOW 25: CPT | Performed by: FAMILY MEDICINE

## 2023-07-24 PROCEDURE — 82948 REAGENT STRIP/BLOOD GLUCOSE: CPT

## 2023-07-24 PROCEDURE — 97112 NEUROMUSCULAR REEDUCATION: CPT

## 2023-07-24 PROCEDURE — 97530 THERAPEUTIC ACTIVITIES: CPT | Performed by: OCCUPATIONAL THERAPIST

## 2023-07-24 RX ORDER — PYRIDOSTIGMINE BROMIDE 60 MG/1
60 TABLET ORAL EVERY 8 HOURS SCHEDULED
Status: DISCONTINUED | OUTPATIENT
Start: 2023-07-24 | End: 2023-08-01 | Stop reason: HOSPADM

## 2023-07-24 RX ORDER — MIDODRINE HYDROCHLORIDE 2.5 MG/1
5 TABLET ORAL
Status: DISCONTINUED | OUTPATIENT
Start: 2023-07-25 | End: 2023-07-27

## 2023-07-24 RX ADMIN — ENOXAPARIN SODIUM 40 MG: 100 INJECTION SUBCUTANEOUS at 08:49

## 2023-07-24 RX ADMIN — Medication 1 TABLET: at 08:49

## 2023-07-24 RX ADMIN — CARBIDOPA AND LEVODOPA 10 MG: 50; 200 TABLET, EXTENDED RELEASE ORAL at 08:48

## 2023-07-24 RX ADMIN — FLUDROCORTISONE ACETATE 50 MCG: 0.1 TABLET ORAL at 08:49

## 2023-07-24 RX ADMIN — PYRIDOSTIGMINE BROMIDE 60 MG: 60 TABLET ORAL at 22:41

## 2023-07-24 RX ADMIN — ASPIRIN 81 MG: 81 TABLET, COATED ORAL at 08:49

## 2023-07-24 RX ADMIN — SODIUM CHLORIDE 1000 ML: 9 INJECTION, SOLUTION INTRAVENOUS at 08:57

## 2023-07-24 RX ADMIN — CLOPIDOGREL BISULFATE 75 MG: 75 TABLET, FILM COATED ORAL at 08:49

## 2023-07-24 RX ADMIN — CARBIDOPA AND LEVODOPA 10 MG: 50; 200 TABLET, EXTENDED RELEASE ORAL at 16:40

## 2023-07-24 RX ADMIN — CARBIDOPA AND LEVODOPA 10 MG: 50; 200 TABLET, EXTENDED RELEASE ORAL at 12:20

## 2023-07-24 RX ADMIN — ROSUVASTATIN CALCIUM 40 MG: 20 TABLET, FILM COATED ORAL at 21:46

## 2023-07-24 RX ADMIN — Medication 5 MG: at 21:47

## 2023-07-24 RX ADMIN — INSULIN GLARGINE 13 UNITS: 100 INJECTION, SOLUTION SUBCUTANEOUS at 09:01

## 2023-07-24 RX ADMIN — PANTOPRAZOLE SODIUM 40 MG: 40 TABLET, DELAYED RELEASE ORAL at 05:55

## 2023-07-24 RX ADMIN — GABAPENTIN 200 MG: 100 CAPSULE ORAL at 15:22

## 2023-07-24 RX ADMIN — Medication 5000 UNITS: at 08:48

## 2023-07-24 RX ADMIN — OXYCODONE HYDROCHLORIDE AND ACETAMINOPHEN 500 MG: 500 TABLET ORAL at 08:49

## 2023-07-24 RX ADMIN — INSULIN GLARGINE 13 UNITS: 100 INJECTION, SOLUTION SUBCUTANEOUS at 21:46

## 2023-07-24 RX ADMIN — TRAMADOL HYDROCHLORIDE 50 MG: 50 TABLET, COATED ORAL at 21:46

## 2023-07-24 RX ADMIN — ENOXAPARIN SODIUM 40 MG: 100 INJECTION SUBCUTANEOUS at 21:46

## 2023-07-24 NOTE — PLAN OF CARE
Goal Outcome Evaluation:  Plan of Care Reviewed With: patient        Progress: improving  Outcome Evaluation: patient worked with therapies this shift. 1 liter bolus given. continue plan of care.

## 2023-07-24 NOTE — THERAPY TREATMENT NOTE
"Inpatient Rehabilitation - Physical Therapy Treatment Note       TONI Wild     Patient Name: Lisa Velazquez  : 1965  MRN: [id number]    Today's Date: 2023                    Admit Date: 2023      Visit Dx:   No diagnosis found.    Patient Active Problem List   Diagnosis    S/P right hip fracture    Acute respiratory failure with hypoxia    Right tibial fracture       Past Medical History:   Diagnosis Date    Coronary artery disease     Diabetes mellitus     Elevated cholesterol     GERD (gastroesophageal reflux disease)     History of transfusion     Hypertension        Past Surgical History:   Procedure Laterality Date    ABDOMINAL SURGERY      gallbladder removal    CARDIAC CATHETERIZATION      CARDIAC SURGERY      stent    COLONOSCOPY      FRACTURE SURGERY      TOE AMPUTATION Right     3rd toe right foot       PT ASSESSMENT (last 12 hours)       IRF PT Evaluation and Treatment       Row Name 23 1528          PT Time and Intention    Document Type daily treatment  -RG     Mode of Treatment individual therapy;physical therapy  -RG     Patient/Family/Caregiver Comments/Observations Pt continues to have hypotensive episodes in supine and sitting EOB.  Pts BP was monitored before and throughout PT session with nursing and MD aware.  Pts BP's were as follows:  supine 85/53, sitting 98/55, sitting while \"dizzy\" 83/47, supine 112/57, sit 80/54 and c/o \"dizziness\".  She was only able to tolerate sitting EOB for a short period of time before requiring to return to supine.  -RG       Row Name 23 1528          General Information    Existing Precautions/Restrictions fall  -RG       Row Name 23 1528          Pain Scale: FACES Pre/Post-Treatment    Pain: FACES Scale, Pretreatment 6-->hurts even more  -RG     Posttreatment Pain Rating 6-->hurts even more  -RG       Row Name 23 1528          Cognition/Psychosocial    Affect/Mental Status (Cognition) WFL  -RG     Follows Commands " (Cognition) WFL  -RG     Personal Safety Interventions gait belt;fall prevention program maintained;nonskid shoes/slippers when out of bed  -       Row Name 07/24/23 1528          Mobility    Left Lower Extremity (Weight-bearing Status) weight-bearing as tolerated (WBAT)  -RG     Right Lower Extremity (Weight-bearing Status) weight-bearing as tolerated (WBAT)  -       Row Name 07/24/23 1528          Bed Mobility    Supine-Sit Bartholomew (Bed Mobility) verbal cues;nonverbal cues (demo/gesture);contact guard  -RG     Supine-Sit-Supine Bartholomew (Bed Mobility) verbal cues;nonverbal cues (demo/gesture);contact guard  A for lifting/scooting RLE  -       Row Name 07/24/23 1528          Transfer Assessment/Treatment    Comment, (Transfers) unable secondary to low BP  -[facility] Name 07/24/23 1528          Sit-Stand Transfer    Assistive Device (Sit-Stand Transfers) --  HHA  -RG       Row Name 07/24/23 1528          Stand-Sit Transfer    Assistive Device (Stand-Sit Transfers) --  HHA  -[facility] Name 07/24/23 1528          Gait/Stairs (Locomotion)    Comment, (Gait/Stairs) unable secondary to low BP  -[facility] Name 07/24/23 1528          Balance    Dynamic Sitting Balance verbal cues;non-verbal cues (demo/gesture);contact guard  -     Comment, Balance sitting unsupported EOB with LE strengthening exercises  -[facility] Name 07/24/23 1528          Hip (Therapeutic Exercise)    Hip Strengthening (Therapeutic Exercise) bilateral;flexion;aDduction;marching while seated;heel slides;internal rotation;external rotation;sitting;supine;10 repetitions;2 sets  -       Row Name 07/24/23 1528          Knee (Therapeutic Exercise)    Knee Strengthening (Therapeutic Exercise) bilateral;flexion;extension;heel slides;marching while seated;SAQ (short arc quad);LAQ (long arc quad);sitting;supine;10 repetitions;2 sets  -       Row Name 07/24/23 1528          Ankle (Therapeutic Exercise)    Ankle Strengthening  (Therapeutic Exercise) bilateral;dorsiflexion;plantarflexion;sitting;supine;10 repetitions;2 sets  -RG       Row Name 07/24/23 1528          Positioning and Restraints    Pre-Treatment Position in bed  -RG     Post Treatment Position bed  -RG     In Bed notified nsg;supine;call light within reach;encouraged to call for assist;legs elevated  -RG       Row Name 07/24/23 1528          Therapy Assessment/Plan (PT)    Patient's Goals For Discharge return home  -RG       Row Name 07/24/23 1528          Therapy Assessment/Plan (PT)    Rehab Potential/Prognosis (PT) adequate, monitor progress closely  -RG     Frequency of Treatment (PT) 5 times per week  -RG     Estimated Duration of Therapy (PT) 2 weeks  -RG     Problem List (PT) balance;mobility;range of motion (ROM);strength;pain  -RG     Activity Limitations Related to Problem List (PT) unable to ambulate safely;unable to transfer safely  -RG       Row Name 07/24/23 1528          Therapy Plan Review/Discharge Plan (PT)    Anticipated Equipment Needs at Discharge (PT Eval) --  tbd  -RG     Anticipated Discharge Disposition (PT) home with assist;home with home health  -RG       Row Name 07/24/23 1528          IRF PT Goals    Bed Mobility Goal Selection (PT-IRF) bed mobility, PT goal 1  -RG     Transfer Goal Selection (PT-IRF) transfers, PT goal 1  -RG     Gait (Walking Locomotion) Goal Selection (PT-IRF) gait, PT goal 1  -RG       Row Name 07/24/23 1528          Bed Mobility Goal 1 (PT-IRF)    Activity/Assistive Device (Bed Mobility Goal 1, PT-IRF) sit to supine/supine to sit  -RG     [place] Level (Bed Mobility Goal 1, PT-IRF) modified independence  -RG     Time Frame (Bed Mobility Goal 1, PT-IRF) by discharge  -RG     Progress/Outcomes (Bed Mobility Goal 1, PT-IRF) goal ongoing  -RG       Row Name 07/24/23 1528          Transfer Goal 1 (PT-IRF)    Activity/Assistive Device (Transfer Goal 1, PT-IRF) sit-to-stand/stand-to-sit;bed-to-chair/chair-to-bed  -RG      [place] Level (Transfer Goal 1, PT-IRF) modified independence  -RG     Time Frame (Transfer Goal 1, PT-IRF) by discharge  -RG     Progress/Outcomes (Transfer Goal 1, PT-IRF) goal ongoing  -RG       Row Name 07/24/23 1528          Gait/Walking Locomotion Goal 1 (PT-IRF)    Activity/Assistive Device (Gait/Walking Locomotion Goal 1, PT-IRF) walker, rolling  -RG     Gait/Walking Locomotion Distance Goal 1 (PT-IRF) 300'  -RG     [place] Level (Gait/Walking Locomotion Goal 1, PT-IRF) modified independence  -RG     Time Frame (Gait/Walking Locomotion Goal 1, PT-IRF) by discharge  -RG     Progress/Outcomes (Gait/Walking Locomotion Goal 1, PT-IRF) goal ongoing  -RG               User Key  (r) = Recorded By, (t) = Taken By, (c) = Cosigned By      Initials Name Provider Type    Chidi Lerner PTA Physical Therapist Assistant                  Wound 07/14/23 1930 Right lower leg (Active)   Dressing Appearance dry;intact 07/23/23 1915   Closure DANIELLA 07/23/23 1915   Base dressing in place, unable to visualize 07/23/23 1915     Physical Therapy Education       Title: PT OT SLP Therapies (In Progress)       Topic: Physical Therapy (Done)       Point: Mobility training (Done)       Learning Progress Summary             Patient Acceptance, E,D, VU,NR by RG at 7/24/2023 1535    Acceptance, E,D, VU,NR by RG at 7/21/2023 1453    Acceptance, E,D, VU,NR by RG at 7/20/2023 1401    Acceptance, TB, NR by DL at 7/19/2023 2100    Acceptance, E,D, VU,NR by RG at 7/19/2023 1530    Acceptance, E,TB, VU,DU by HC at 7/18/2023 1119    Acceptance, E, VU,NR by LB at 7/17/2023 1513    Acceptance, E,TB, VU by RM at 7/15/2023 1243    Acceptance, E, VU,NR by LB at 7/14/2023 1550                         Point: Home exercise program (Done)       Learning Progress Summary             Patient Acceptance, E,D, VU,NR by RG at 7/24/2023 1535    Acceptance, E,D, VU,NR by RG at 7/21/2023 1453    Acceptance, E,D, VU,NR by RG at 7/20/2023 4314     Acceptance, TB, NR by DL at 7/19/2023 2100    Acceptance, E,D, VU,NR by RG at 7/19/2023 1530    Acceptance, E,TB, VU,DU by HC at 7/18/2023 1119    Acceptance, E, VU,NR by LB at 7/17/2023 1513    Acceptance, E,TB, VU by RM at 7/15/2023 1243    Acceptance, E, VU,NR by LB at 7/14/2023 1550                         Point: Body mechanics (Done)       Learning Progress Summary             Patient Acceptance, E,D, VU,NR by RG at 7/24/2023 1535    Acceptance, E,D, VU,NR by RG at 7/21/2023 1453    Acceptance, E,D, VU,NR by RG at 7/20/2023 1401    Acceptance, TB, NR by DL at 7/19/2023 2100    Acceptance, E,D, VU,NR by RG at 7/19/2023 1530    Acceptance, E,TB, VU,DU by HC at 7/18/2023 1119    Acceptance, E, VU,NR by LB at 7/17/2023 1513    Acceptance, E,TB, VU by RM at 7/15/2023 1243    Acceptance, E, VU,NR by LB at 7/14/2023 1550                         Point: Precautions (Done)       Learning Progress Summary             Patient Acceptance, E,D, VU,NR by RG at 7/24/2023 1535    Acceptance, E,D, VU,NR by RG at 7/21/2023 1453    Acceptance, E,D, VU,NR by RG at 7/20/2023 1401    Acceptance, TB, NR by DL at 7/19/2023 2100    Acceptance, E,D, VU,NR by RG at 7/19/2023 1530    Acceptance, E,TB, VU,DU by HC at 7/18/2023 1119    Acceptance, E, VU,NR by LB at 7/17/2023 1513    Acceptance, E,TB, VU by RM at 7/15/2023 1243    Acceptance, E, VU,NR by LB at 7/14/2023 1550                                         User Key       Initials Effective Dates Name Provider Type Discipline    LB 06/16/21 -  Wendi Brothers, PT Physical Therapist PT    RM 02/17/23 -  Julianna Quach, PTA Physical Therapist Assistant PT    RG 06/16/21 -  Chidi Harris, JESSICA Physical Therapist Assistant PT    HC 01/20/23 -  Nadine Hale, JESSICA Physical Therapist Assistant PT    DL 03/16/22 -  Ashley Mccord, RN Registered Nurse Nurse                    PT Recommendation and Plan    Frequency of Treatment (PT): 5 times per week  Anticipated  Equipment Needs at Discharge (PT Eval):  (tbd)                  Time Calculation:      PT Charges       Row Name 07/24/23 1535             Time Calculation    Start Time 0745  -RG      Stop Time 0915  -RG      Time Calculation (min) 90 min  -RG      PT Received On 07/24/23  -RG         Time Calculation- PT    Total Timed Code Minutes- PT 90 minute(s)  -RG                User Key  (r) = Recorded By, (t) = Taken By, (c) = Cosigned By      Initials Name Provider Type    Chidi Lerner PTA Physical Therapist Assistant                    Therapy Charges for Today       Code Description Service Date Service Provider Modifiers Qty    47102633336 HC PT THERAPEUTIC ACT EA 15 MIN 7/24/2023 Chidi Harris PTA GP, CQ 2    06715619254 HC PT THER PROC EA 15 MIN 7/24/2023 Chidi Harris PTA GP, CQ 3    39180946285 HC PT NEUROMUSC RE EDUCATION EA 15 MIN 7/24/2023 Chidi Harris PTA GP, CQ 1                     Chidi Harris PTA  7/24/2023

## 2023-07-24 NOTE — PROGRESS NOTES
Rehabilitation Nursing  Inpatient Rehabilitation Plan of Care Note    Plan of Care  Copy from POC    Body Function Structure    Skin Integrity (Active)  Current Status (7/13/2023 5:38:00 PM): free from skin breakdown this shift  Weekly Goal: free from skin breakdown this stay  Discharge Goal: home free of skin breakdown    Safety    Potential for Injury (Active)  Current Status (7/13/2023 5:38:00 PM): free from injury this shift  Weekly Goal: free from injury this stay  Discharge Goal: home free of injury    RN Interventions    Body Function Structure -  RN: monitor skin with each shift change, barrier skin, turn q 2, pressure relief  [RN]    Safety -  RN: items within reach, nonskid socks, gait belt, answer call light  quickly  [RN]    Signed by: Geraldine Gonzalez Nurse

## 2023-07-24 NOTE — PROGRESS NOTES
[facility]  PROGRESS NOTE     Patient Identification:  Name:  Lisa Velazquez  Age:  58 y.o.  Sex:  female  :  1965  MRN:  [id number]  Visit Number:  [id number]  ROOM: 108/     Primary Care Provider:  Patricia Skelton APRN    Length of stay in inpatient status:  11    Subjective     Chief Compliant:  No chief complaint on file.      History of Presenting Illness: 58-year-old female who is status post right tibial fracture.  Patient has a history of CHF preserved EF and has had issues over the last several days with orthostatic hypotension.  This morning still was somewhat hypotensive when she is placed in the seated position.  Mild lightheadedness with the orthostasis at this time.  Currently on midodrine and Florinef.    Objective     Current Hospital Meds:ascorbic acid, 500 mg, Oral, Daily  aspirin, 81 mg, Oral, Daily  calcium carbonate, 1 tablet, Oral, BID  clopidogrel, 75 mg, Oral, Daily  enoxaparin, 40 mg, Subcutaneous, Q12H  fludrocortisone, 50 mcg, Oral, Daily  gabapentin, 200 mg, Oral, Q24H  insulin glargine, 13 Units, Subcutaneous, Q12H  midodrine, 10 mg, Oral, TID AC  multivitamin, 1 tablet, Oral, Daily  pantoprazole, 40 mg, Oral, Q AM  rosuvastatin, 40 mg, Oral, Nightly  sodium chloride, 1,000 mL, Intravenous, Once  vitamin D3, 5,000 Units, Oral, Daily       ----------------------------------------------------------------------------------------------------------------------  Vital Signs:  Temp:  [97.6 °F (36.4 °C)-98.4 °F (36.9 °C)] 97.6 °F (36.4 °C)  Heart Rate:  [62-77] 73  Resp:  [16-20] 16  BP: ()/(44-70) 84/62  SpO2:  [96 %-98 %] 98 %  on   ;   Device (Oxygen Therapy): room air  Body mass index is 30.38 kg/m².    Wt Readings from Last 3 Encounters:   23 88 kg (194 lb 0.1 oz)   22 73 kg (160 lb 15 oz)   22 73 kg (161 lb)     Intake & Output (last 3 days)           0700    P.O. 2160 1800 1200 360    Total Intake(mL/kg) 2160 (24.5) 1800 (20.5) 1200 (13.6) 360 (4.1)    Net +2160 +1800 +1200 +360            Urine Unmeasured Occurrence 4 x 5 x 3 x     Stool Unmeasured Occurrence  2 x 1 x           Diet: Diabetic Diets; Consistent Carbohydrate; Texture: Regular Texture (IDDSI 7); Fluid Consistency: Thin (IDDSI 0)  ----------------------------------------------------------------------------------------------------------------------  Physical exam:  Constitutional: No acute distress  HEENT: Normocephalic atraumatic  Neck: Supple  Cardiovascular: Regular rate and rhythm  Pulmonary/Chest: Clear to auscultation  Abdominal:  .  Positive bowel sounds soft  Musculoskeletal: Status post right tibial fracture  Neurological: No focal deficits  Skin: No rash  Peripheral vascular: No edema  Genitourinary:  ----------------------------------------------------------------------------------------------------------------------    Last echocardiogram:  Results for orders placed during the hospital encounter of 07/13/23    Adult Transthoracic Echo Complete W/ Cont if Necessary Per Protocol    Interpretation Summary    Normal left ventricular cavity size and wall thickness noted. All left ventricular wall segments contract normally.    Left ventricular ejection fraction appears to be 56 - 60%.    Left ventricular diastolic function is consistent with (grade I) impaired relaxation.    There is mild, bileaflet mitral valve thickening present.    Systolic anterior motion of the anterior mitral leaflet is present.    The aortic valve is structurally normal with no stenosis present. Trace aortic valve regurgitation is present.    There is mild, bileaflet mitral valve thickening present. Systolic anterior motion of the anterior mitral leaflet is present.    Mild mitral valve regurgitation is present. No significant mitral valve stenosis is present.    There is no evidence of pericardial  effusion.    ----------------------------------------------------------------------------------------------------------------------  Results from last 7 days   Lab Units 07/22/23  0122 07/19/23  0032   WBC 10*3/mm3 6.28 6.78   HEMOGLOBIN g/dL 10.3* 9.6*   HEMATOCRIT % 33.0* 31.4*   MCV fL 92.7 94.6   MCHC g/dL 31.2* 30.6*   PLATELETS 10*3/mm3 249 265         Results from last 7 days   Lab Units 07/22/23  0122 07/19/23  0032   SODIUM mmol/L 143 140   POTASSIUM mmol/L 3.9 4.3   CHLORIDE mmol/L 108* 106   CO2 mmol/L 26.4 24.7   BUN mg/dL 11 12   CREATININE mg/dL 0.64 0.69   CALCIUM mg/dL 9.5 9.4   GLUCOSE mg/dL 125* 189*   Estimated Creatinine Clearance: 109.2 mL/min (by C-G formula based on SCr of 0.64 mg/dL).  No results found for: AMMONIA              Glucose   Date/Time Value Ref Range Status   07/24/2023 0633 154 (H) 70 - 130 mg/dL Final     Comment:     Meter: [id number] : [id number] NATI LESLIE   07/23/2023 2013 258 (H) 70 - 130 mg/dL Final     Comment:     Meter: [id number] : [id number] NATI LESLIE   07/23/2023 1630 132 (H) 70 - 130 mg/dL Final     Comment:     Meter: [id number] : [id number] LYRIC ALVARADO   07/23/2023 1120 181 (H) 70 - 130 mg/dL Final     Comment:     Meter: [id number] : [id number] BRAIN MARTINEZ   07/23/2023 0632 121 70 - 130 mg/dL Final     Comment:     Meter: [id number] : [id number] Ashippun Crystal   07/22/2023 1951 234 (H) 70 - 130 mg/dL Final     Comment:     Meter: [id number] : [id number] Jeannine Crystal   07/22/2023 1617 127 70 - 130 mg/dL Final     Comment:     Meter: [id number] : [id number] david magalie   07/22/2023 1107 111 70 - 130 mg/dL Final     Comment:     Meter: [id number] : [id number] BRAIN MARTINEZ     Lab Results   Component Value Date    TSH 0.790 07/19/2023    FREET4 1.10 07/19/2023     Lab Results   Component Value Date    PREGTESTUR Negative 07/04/2023     Pain Management Panel          Latest Ref Rng & Units 7/4/2023   Pain Management  Panel   Barbiturates Screen, Urine Cutoff: 200 ng/mL Negative       Benzodiazepine Screen, Urine Cutoff: 200 ng/mL Negative       Cocaine Screen, Urine Cutoff: 300 ng/mL Negative       Fentanyl, Urine Cutoff: 1 ng/mL Negative       Hydromorphone <50 ng/mL <50       Methadone Screen , Urine Cutoff: 300 ng/mL Negative          Details          This result is from an external source.             Brief Urine Lab Results  (Last result in the past 365 days)        Color   Clarity   Blood   Leuk Est   Nitrite   Protein   CREAT   Urine HCG        07/04/23 0037               Negative             No results found for: BLOODCX      No results found for: URINECX  No results found for: WOUNDCX  No results found for: STOOLCX        I have personally looked at the labs and they are summarized above.  ----------------------------------------------------------------------------------------------------------------------  Detailed radiology reports for the last 24 hours:    Imaging Results (Last 24 Hours)       ** No results found for the last 24 hours. **          Final impressions for the last 30 days of radiology reports:    XR Shoulder 2+ View Right    Result Date: 7/17/2023    No acute findings in the right shoulder.  This report was finalized on 7/17/2023 4:06 PM by Dr. Chepe Arellano MD.      XR Femur 2 View Right    Result Date: 7/4/2023  Redemonstrated comminuted proximal tibia and fibular fractures, with improved alignment. CRITICAL RESULT:   No. COMMUNICATION: Per this written report. Preliminary report signed by Kade Clifton DO on 7/4/2023 4:28 AM By electronically signing this report, I, the attending physician, attest that I have personally reviewed the images/data for the above examination(s) and agree with the final edited report. Drafted by Kade Clifton DO on 7/4/2023 4:22 AM Final report signed by Rex Mcgowan MD on 7/4/2023 4:33 AM    XR Knee 3 View Right    Result Date: 7/6/2023  1.Internal fixation  of proximal tibial fracture with unchanged fracture fragment alignment. 2.Unchanged alignment of proximal fibular neck fracture. CRITICAL RESULT:   No. COMMUNICATION: Per this written report. Drafted by Norm Nair MD on 7/6/2023 11:28 AM Final report signed by Norm Nair MD on 7/6/2023 11:30 AM    XR Knee 3 View Right    Result Date: 7/4/2023  No knee joint effusion. Fracture of the proximal tibial metaphysis with apex at anterior angulation as well as fibular neck fracture. Remainder of foreleg is intact CRITICAL RESULT:   No. COMMUNICATION: Per this written report. Drafted by Rex Mcgowan MD on 7/4/2023 12:30 AM Final report signed by Rex Mcgowan MD on 7/4/2023 12:31 AM    XR Tibia Fibula 2 View Right    Result Date: 7/4/2023  Postoperative appearance of the tibia. CRITICAL RESULT:   No. COMMUNICATION: Per this written report. Drafted by Jose A Hughes MD on 7/4/2023 5:44 PM Final report signed by Jose A Hughes MD on 7/4/2023 5:45 PM    XR Tibia Fibula 2 View Right    Result Date: 7/4/2023  Redemonstrated comminuted proximal tibia and fibular fractures, with improved alignment. CRITICAL RESULT:   No. COMMUNICATION: Per this written report. Preliminary report signed by Kade Clifton DO on 7/4/2023 4:28 AM By electronically signing this report, I, the attending physician, attest that I have personally reviewed the images/data for the above examination(s) and agree with the final edited report. Drafted by Kade Clifton DO on 7/4/2023 4:22 AM Final report signed by Rex Mcgowan MD on 7/4/2023 4:33 AM    XR Tibia Fibula 2 View Right    Result Date: 7/4/2023  No knee joint effusion. Fracture of the proximal tibial metaphysis with apex at anterior angulation as well as fibular neck fracture. Remainder of foreleg is intact CRITICAL RESULT:   No. COMMUNICATION: Per this written report. Drafted by Rex Mcgowan MD on 7/4/2023 12:30 AM Final report signed by Rex Mcgowan MD on  7/4/2023 12:31 AM    XR Ankle 3+ View Left    Result Date: 7/4/2023  Redemonstrated comminuted proximal tibia and fibular fractures, with improved alignment. CRITICAL RESULT:   No. COMMUNICATION: Per this written report. Preliminary report signed by Kade Clifton DO on 7/4/2023 4:28 AM By electronically signing this report, I, the attending physician, attest that I have personally reviewed the images/data for the above examination(s) and agree with the final edited report. Drafted by Kade Clifton DO on 7/4/2023 4:22 AM Final report signed by Rex Mcgowan MD on 7/4/2023 4:33 AM    XR Chest 1 View    Result Date: 7/4/2023  Redemonstrated comminuted proximal tibia and fibular fractures, with improved alignment. CRITICAL RESULT:   No. COMMUNICATION: Per this written report. Preliminary report signed by Kade Clifton DO on 7/4/2023 4:28 AM By electronically signing this report, I, the attending physician, attest that I have personally reviewed the images/data for the above examination(s) and agree with the final edited report. Drafted by Kade Clifton DO on 7/4/2023 4:22 AM Final report signed by Rex Mcgowan MD on 7/4/2023 4:33 AM    CT Knee Right wo IV Contrast    Result Date: 7/4/2023  Questionable avulsion fracture along the medial margin of the medial femoral condyle, may represent a PCL avulsion injury. Comminuted fracture of the proximal fibular metaphysis with extension into the epiphysis. Oblique fracture of the tibial metadiaphysis which extends cranially along the posterior cortex into the epiphysis with extension into the tibiotalar joint and tibial eminence. Internal derangement of the knee is not excluded. Lipohemarthrosis. CRITICAL RESULT:   No. COMMUNICATION: Per this written report. Drafted by Jose A Hughes MD on 7/4/2023 9:09 AM Final report signed by Jose A Hughes MD on 7/4/2023 9:14 AM    Doppler Ankle Brachial Index Single Level CAR    Result Date: 6/26/2023  Right:  Abnormal study consistent with hemodynamically significant infrainguinal arterial disease resulting in a moderate  insufficiency at rest. Left:  Abnormal study consistent with hemodynamically significant infrainguinal arterial disease resulting in a moderate  insufficiency at rest. No significant change from previous exam. COMMUNICATION: Per this written report. Preliminary report signed by MICHEAL Bills on 6/26/2023 2:59 PM By electronically signing this report, I, the attending physician, attest that I have personally reviewed the images/data for the above examination(s) and I agree with the final edited report. Drafted by MICHEAL Bills on 6/26/2023 2:56 PM Final report signed by Cherry Alcazar MD on 6/26/2023 6:21 PM   I have personally looked at the radiology images and read the final radiology report.    Assessment & Plan    Status post right tibial fracture--to resume PT and OT today.  Her therapy goals have been somewhat hindered by the fact that she has been limited in her therapy secondary to orthostasis.    Orthostatic hypotension--currently on midodrine and Florinef.  We will give 1 L of normal saline bolus today and reevaluate.    CHF preserved EF patient compensated at this time diabetes mellitus continue basal insulin at this time.        VTE Prophylaxis:   Mechanical Order History:       None          Pharmalogical Order History:        Ordered     Dose Route Frequency Stop    07/13/23 1541  Enoxaparin Sodium (LOVENOX) syringe 40 mg         40 mg SC Every 12 Hours 07/31/23 2059                        Rob Camarillo MD  [facility]  07/24/23  10:33 EDT

## 2023-07-24 NOTE — THERAPY TREATMENT NOTE
Inpatient Rehabilitation - Occupational Therapy Treatment Note     [facility]     Patient Name: Lisa Velazquez  : 1965  MRN: [id number]    Today's Date: 2023                 Admit Date: 2023       No diagnosis found.    Patient Active Problem List   Diagnosis    S/P right hip fracture    Acute respiratory failure with hypoxia    Right tibial fracture       Past Medical History:   Diagnosis Date    Coronary artery disease     Diabetes mellitus     Elevated cholesterol     GERD (gastroesophageal reflux disease)     History of transfusion     Hypertension        Past Surgical History:   Procedure Laterality Date    ABDOMINAL SURGERY      gallbladder removal    CARDIAC CATHETERIZATION      CARDIAC SURGERY      stent    COLONOSCOPY      FRACTURE SURGERY      TOE AMPUTATION Right     3rd toe right foot             IRF OT ASSESSMENT FLOWSHEET (last 12 hours)       IRF OT Evaluation and Treatment       Row Name 23 1400          OT Time and Intention    Document Type daily treatment  -     Mode of Treatment individual therapy;occupational therapy  -     Patient Effort good  -       Row Name 23 1400          General Information    Patient/Family/Caregiver Comments/Observations patient agreeable to therapy. patient seen in room in bed due to BP  and medical issues. patient tolerated session with no complaints.  -     Existing Precautions/Restrictions fall  -       Row Name 23 1400          Cognition/Psychosocial    Affect/Mental Status (Cognition) WFL  -       Row Name 23 1400          Motor Skills    Motor Skills coordination;functional endurance  -     Therapeutic Exercise shoulder;elbow/forearm;wrist;hand  BUE ROM, gmc,fmc, strengthening, functional endurance  -               User Key  (r) = Recorded By, (t) = Taken By, (c) = Cosigned By      Initials Name Effective Dates     Rukhsana Dixon, OT 23 -                      Occupational Therapy Education        Title: PT OT SLP Therapies (In Progress)       Topic: Occupational Therapy (In Progress)       Point: ADL training (Done)       Description:   Instruct learner(s) on proper safety adaptation and remediation techniques during self care or transfers.   Instruct in proper use of assistive devices.                  Learning Progress Summary             Patient Acceptance, E, VU,NR by BF at 7/20/2023 1250    Acceptance, TB, NR by DL at 7/19/2023 2100    Acceptance, E, VU,NR by BF at 7/18/2023 1436    Acceptance, E, VU,NR by BF at 7/17/2023 1536    Acceptance, E, VU,NR by HB at 7/15/2023 1204    Acceptance, E, VU,NR by BF at 7/14/2023 1028                         Point: Home exercise program (In Progress)       Description:   Instruct learner(s) on appropriate technique for monitoring, assisting and/or progressing therapeutic exercises/activities.                  Learning Progress Summary             Patient Acceptance, TB, NR by DL at 7/19/2023 2100    Acceptance, E, VU,NR by HB at 7/15/2023 1204                         Point: Precautions (Done)       Description:   Instruct learner(s) on prescribed precautions during self-care and functional transfers.                  Learning Progress Summary             Patient Acceptance, E, VU,NR by BF at 7/20/2023 1250    Acceptance, TB, NR by DL at 7/19/2023 2100    Acceptance, E, VU,NR by BF at 7/18/2023 1436    Acceptance, E, VU,NR by BF at 7/17/2023 1536    Acceptance, E, VU,NR by HB at 7/15/2023 1204    Acceptance, E, VU,NR by BF at 7/14/2023 1028                         Point: Body mechanics (In Progress)       Description:   Instruct learner(s) on proper positioning and spine alignment during self-care, functional mobility activities and/or exercises.                  Learning Progress Summary             Patient Acceptance, TB, NR by DL at 7/19/2023 2100    Acceptance, E, VU,NR by HB at 7/15/2023 1204                                         User Key       Initials  Effective Dates Name Provider Type Discipline    BF 07/11/23 -  Araceli Lua OT Occupational Therapist OT    HB 05/25/21 -  Vandana Rader OT Occupational Therapist OT    DL 03/16/22 -  Ashley Mccord, RN Registered Nurse Nurse                        OT Recommendation and Plan                         Time Calculation:      Time Calculation- OT       Row Name 07/24/23 1412             Time Calculation- OT    OT Start Time 1045  -      OT Stop Time 1130  -      OT Time Calculation (min) 45 min  -                User Key  (r) = Recorded By, (t) = Taken By, (c) = Cosigned By      Initials Name Provider Type     Rukhsana Dixon, OT Occupational Therapist                  Therapy Charges for Today       Code Description Service Date Service Provider Modifiers Qty    34200213113 HC OT SELF CARE/MGMT/TRAIN EA 15 MIN 7/24/2023 Rukhsana Dixon, OT GO 1    70558337656 HC OT THERAPEUTIC ACT EA 15 MIN 7/24/2023 Rukhsana Dixon OT GO 2    90109645326 HC OT THER PROC EA 15 MIN 7/24/2023 Rukhsana Dixon OT GO 3                     Rukhsana Dixon OT  7/24/2023

## 2023-07-24 NOTE — PROGRESS NOTES
[facility]  PROGRESS NOTE    Patient information:  Name: Lisa Velazquez  Age/Sex: 58 y.o. female  :  1965        PCP: Patricia Skelton APRN  Attending: Rob Camarillo MD  MRN:  [id number]  Visit Number:  [id number]    LOS:  LOS: 11 days     CODE STATUS:    Code Status and Medical Interventions:   Ordered at: 23 1542     Level Of Support Discussed With:    Patient     Code Status (Patient has no pulse and is not breathing):    CPR (Attempt to Resuscitate)     Medical Interventions (Patient has pulse or is breathing):    Full Support       PROBLEM LIST:Principal Problem:    Right tibial fracture      Reason for Cardiology follow-up: Persistent orthostasis     Subjective   ADMISSION INFORMATION:  No chief complaint on file.      HPI:  Lisa Velazquez is a 58 y.o. female with a past medical history significant for history of CAD s/p CABG, PAD s/p right and left femoral artery stenting, diabetes mellitus, GERD, hypertension, right toe amputation, and IBS.  Patient was admitted to [facility] physical rehab on 2023 status post fracture of proximal tibia which she underwent closed reduction percutaneous screw insertion for bicondylar tibial plateau fracture on 2023.  Today on 2023, General Cardiology has been consulted for further evaluation and management persistent orthostasis.      No local primary Cardiologist is noted in patient's chart.     Interval History:   Patient is in room 108 A  and was examined by Dr. Correa.  Patient is lying in bed resting quietly.  No acute distress noted at this time.  Patient's last recorded blood pressure was 112/69.  Patient denies any chest pain, shortness of breath, or palpitations.Patient reports she is still having dizziness with standing.     ORDERS: Consult for Pharmacy to do a price check on Northera.     Vital Signs  Temp:  [97.6 °F (36.4 °C)-98.4 °F (36.9 °C)] 97.6 °F (36.4 °C)  Heart Rate:   [62-77] 75  Resp:  [16-20] 16  BP: ()/(44-70) 112/69  Vital Signs (last 72 hrs)         07/21 0700  07/22 0659 07/22 0700 07/23 0659 07/23 0700  07/24 0659 07/24 0700 07/24 0717   Most Recent      Temp (°F) 97.3 -  98.4    97.7 -  98.2    98.2 -  98.4       98.4 (36.9) 07/23 1900    Heart Rate 65 -  81    63 -  69    62 -  71       62 07/23 1900    Resp   18      18    18 -  20       20 07/23 1900    BP 94/66 -  175/84    109/64 -  165/83    126/66 -  169/61       169/61 07/23 1900    SpO2 (%) 97 -  99    96 -  97    95 -  96       96 07/23 1900          Body mass index is 30.38 kg/m².    Intake/Output Summary (Last 24 hours) at 7/24/2023 1507  Last data filed at 7/24/2023 1300  Gross per 24 hour   Intake 960 ml   Output --   Net 960 ml       Objective     Physical Exam:      General Appearance:    Alert, cooperative, in no acute distress.   Head:    Normocephalic, without obvious abnormality, atraumatic.   Eyes:                          Conjunctivae and sclerae normal, no icterus, no pallor, corneas clear.   Neck:   No adenopathy, supple, trachea midline, no thyromegaly, no carotid bruit, no JVD.   Lungs:     Clear to auscultation, respirations regular, even and             unlabored.    Heart:    Regular rhythm and normal rate, normal S1 and S2, no          murmur, no gallop, no rub, no click   Chest Wall:    No abnormalities observed.   Abdomen:     Normal bowel sounds, no masses, no organomegaly, soft nontender, nondistended, no guarding, no rebound tenderness.   Extremities:   Moves all extremities well, no edema, no cyanosis, no           redness.   Pulses:   Pulses palpable and equal bilaterally.   Skin:   No bleeding, bruising or rash.   Neurologic:   Alert and Oriented x 3, Speech Clear & comprehensive.       Results review   Results Review:   Results from last 7 days   Lab Units 07/22/23  0122 07/19/23  0032   WBC 10*3/mm3 6.28 6.78   HEMOGLOBIN g/dL 10.3* 9.6*   PLATELETS 10*3/mm3 164 061      Results from last 7 days   Lab Units 07/22/23  0122 07/19/23  0032   SODIUM mmol/L 143 140   POTASSIUM mmol/L 3.9 4.3   CHLORIDE mmol/L 108* 106   CO2 mmol/L 26.4 24.7   BUN mg/dL 11 12   CREATININE mg/dL 0.64 0.69   CALCIUM mg/dL 9.5 9.4   GLUCOSE mg/dL 125* 189*         No results found for: PROBNP  No results found.     Lab Results   Component Value Date    INR 1.0 07/04/2023    INR 1.2 (H) 05/31/2022     Lab Results   Component Value Date    MG 1.9 07/14/2023    MG 1.9 06/09/2022    MG 2.0 06/08/2022     Lab Results   Component Value Date    TSH 0.790 07/19/2023      No results found for: CHOL, TRIG, HDL, LDL  Pain Management Panel          Latest Ref Rng & Units 7/4/2023   Pain Management Panel   Barbiturates Screen, Urine Cutoff: 200 ng/mL Negative       Benzodiazepine Screen, Urine Cutoff: 200 ng/mL Negative       Cocaine Screen, Urine Cutoff: 300 ng/mL Negative       Fentanyl, Urine Cutoff: 1 ng/mL Negative       Hydromorphone <50 ng/mL <50       Methadone Screen , Urine Cutoff: 300 ng/mL Negative          Details          This result is from an external source.             Microbiology Results (last 10 days)       ** No results found for the last 240 hours. **           Imaging Results (Last 48 Hours)       ** No results found for the last 48 hours. **            ECHO:  Results for orders placed during the hospital encounter of 07/13/23    Adult Transthoracic Echo Complete W/ Cont if Necessary Per Protocol    Interpretation Summary    Normal left ventricular cavity size and wall thickness noted. All left ventricular wall segments contract normally.    Left ventricular ejection fraction appears to be 56 - 60%.    Left ventricular diastolic function is consistent with (grade I) impaired relaxation.    There is mild, bileaflet mitral valve thickening present.    Systolic anterior motion of the anterior mitral leaflet is present.    The aortic valve is structurally normal with no stenosis present. Trace  aortic valve regurgitation is present.    There is mild, bileaflet mitral valve thickening present. Systolic anterior motion of the anterior mitral leaflet is present.    Mild mitral valve regurgitation is present. No significant mitral valve stenosis is present.    There is no evidence of pericardial effusion.      HEART CATH:   Per Dr. Phan's note on 05/25/2022                 Imaging:  Cardiac catheterization    LHC: 70% stenosis in LAD, 70% stenosis in LCx and total occlusion of RCA withleft to right collaterals.    Echocardiogram: EF 55-60%, diastolic dysfunction, mild-moderate MR and mild TR.    Stress test: anterior wall with reversible defect.    TELEMETRY:  Patient is not on continuous cardiac monitor              I reviewed the patient's new clinical results.    Medication Review:   Current list of medications may not reflect those currently placed in orders that are not signed or are being held.     ascorbic acid, 500 mg, Oral, Daily  aspirin, 81 mg, Oral, Daily  calcium carbonate, 1 tablet, Oral, BID  clopidogrel, 75 mg, Oral, Daily  enoxaparin, 40 mg, Subcutaneous, Q12H  fludrocortisone, 50 mcg, Oral, Daily  gabapentin, 200 mg, Oral, Q24H  insulin glargine, 13 Units, Subcutaneous, Q12H  midodrine, 10 mg, Oral, TID AC  multivitamin, 1 tablet, Oral, Daily  pantoprazole, 40 mg, Oral, Q AM  rosuvastatin, 40 mg, Oral, Nightly  vitamin D3, 5,000 Units, Oral, Daily      Pharmacy Consult,         acetaminophen    bismuth subsalicylate    diphenhydrAMINE    ibuprofen    melatonin    ondansetron    Pharmacy Consult    traMADol    Assessment      Orthostatic hypotension associated with dizziness.  ASCVD, status post CABG  Peripheral arterial disease, status post intervention involving the right and left femoral arteries.  Type 2 diabetes mellitus  Systemic hypertension.    Plan     Since patient continues to have orthostatic dizziness, will find the cost of Northera and see if this is affordable and switch  midodrine to Northera if affordable.  Continue to encourage adequate hydration.  Continue with midodrine but decrease the dose to 5 mg and Florinef for now.  Add pyridostigmine to help with the orthostatic hypotension    I have discussed the patients findings and my recommendations with patient.    Electronically signed by RAVIN Dennison, 07/24/23, 3:08 PM EDT.     Electronically signed by Rob Correa MD, 07/24/23, 7:46 PM EDT.            Please note that portions of this note were completed with a voice recognition program.    Please note that portions of this note were copied and has been reviewed and is accurate as of 7/24/2023 .

## 2023-07-24 NOTE — PHARMACY PATIENT ASSISTANCE
"Pharmacy consulted for a price check on Northera for hypotension.  Insurance will cover the first fill under \"transition\" fill but the patient will need a PA for further fills.   First fill will have a $0 co-pay.     Pharmacy initiated prior authorization on CoverMyMeds, awaiting determination at this time.  "

## 2023-07-24 NOTE — PAYOR COMM NOTE
"Felicia Lundberg, RN  Utilization Review Nurse for Inpatient Rehab  Phone: [phone]  Fax: [phone]  Email: [email or web address]  [facility]  Facility NPI: [id number]    CLINICAL REVIEW FOR CONTINUED REHAB STAY APPROVAL  Member: Lisa Velazquez  : 1965  INTAKE ID# [id number]  AUTH# [id number]  ID# [id number]  Admission Date: 23  Planning for Discharge home on 23, but she has had medical issues that may impact her discharge date.    *Requesting additional 7 days    Lisa Velazquez (58 y.o. Female)       Date of Birth   1965    Social Security Number       Address   [address] [place] [address]    Home Phone   [phone]    MRN   [id number]       [facility]    Marital Status                               Admission Date   23    Admission Type   Elective    Admitting Provider   Rob Camarillo MD    Attending Provider   Rob Camarillo MD    Department, Room/Bed   [facility] REHABILITATION, 108/1S       Discharge Date       Discharge Disposition       Discharge Destination                                 Attending Provider: Rob Camarillo MD    Allergies: No Known Allergies    Isolation: None   Infection: None   Code Status: CPR    Ht: 170.2 cm (67.01\")   Wt: 88 kg (194 lb 0.1 oz)    Admission Cmt: None   Principal Problem: Right tibial fracture [S82.201A]                 Rob Correa MD   Physician  Cardiology     Consults      Signed     Date of Service: 23  Creation Time: 23  Consult Orders   Inpatient Cardiology Consult [[id number]] ordered by Wilmer Gama MD          Signed       Expand All Collapse All         [facility]  CONSULT  NOTE        Patient information:  Date of Admit: 2023  Date of Consult: 23  Hospitalist/Referring MD:Rob Camarillo MD  PCP: Patricia Skelton APRN  MRN:  [id number]  Visit Number:  [id number]     LOS: 8  CODE STATUS:      Code Status and " Medical Interventions:   Ordered at: 07/13/23 1542     Level Of Support Discussed With:     Patient     Code Status (Patient has no pulse and is not breathing):     CPR (Attempt to Resuscitate)     Medical Interventions (Patient has pulse or is breathing):     Full Support         PROBLEM LIST: Principal Problem:    Right tibial fracture        Inpatient Cardiology Consult  Consult performed by: Priscilla Lackey APRN  Consult ordered by: Wilmer Gama MD       09:54 EDT  7/21/2023     General Cardiology Consulting Physician: Dr. Rob Correa MD, FACC     Assessment       Orthostatic hypotension associated with dizziness.  ASCVD, status post CABG  Peripheral arterial disease, status post intervention involving the right and left femoral arteries.  Type 2 diabetes mellitus  Systemic hypertension.     Recommendations      Agree with midodrine for now.  I have asked her to drink more water and keep herself well-hydrated.        Reason for Cardiology consultation: Persistent orthostasis     Subjective Data   ADMISSION INFORMATION:  No chief complaint on file.     History of Present Illness     Lisa Velazquez is a 58 y.o. female with a past medical history significant for history of CAD s/p CABG, PAD s/p right and left femoral artery stenting, diabetes mellitus, GERD, hypertension, right toe amputation, and IBS.  Patient was admitted to [facility] physical rehab on 07/13/2023 status post fracture of proximal tibia which she underwent closed reduction percutaneous screw insertion for bicondylar tibial plateau fracture on July 4, 2023.  Today on 07/21/2023, General Cardiology has been consulted for further evaluation and management persistent orthostasis.      No primary Cardiologist is noted in patient's chart.     Patient is in room 108 A and was examined by Dr. Correa.  Patient is lying in bed resting quietly.  No acute distress noted at this time.  Head of bed is elevated approximately 45 degrees.  Patient  "is working with occupational therapy at this time.  Patient denies any chest pain, shortness of breath, palpitations, dizziness, or lightheadedness.  Patient reports \" I do not feel anything.  It scares them more than it does me.\" Blood pressure was 153/77 and sitting blood pressure was 114/66 at this time.  Patient admits not drinking much water in a day.      Cardiac risk factors:arteriosclerotic heart disease, diabetes mellitus, hypercholesterolemia, hypertension, and peripheral vascular disease        Last Echo: Results for orders placed during the hospital encounter of 07/13/23     Adult Transthoracic Echo Complete W/ Cont if Necessary Per Protocol     Interpretation Summary  •  Normal left ventricular cavity size and wall thickness noted. All left ventricular wall segments contract normally.  •  Left ventricular ejection fraction appears to be 56 - 60%.  •  Left ventricular diastolic function is consistent with (grade I) impaired relaxation.  •  There is mild, bileaflet mitral valve thickening present.  •  Systolic anterior motion of the anterior mitral leaflet is present.  •  The aortic valve is structurally normal with no stenosis present. Trace aortic valve regurgitation is present.  •  There is mild, bileaflet mitral valve thickening present. Systolic anterior motion of the anterior mitral leaflet is present.  •  Mild mitral valve regurgitation is present. No significant mitral valve stenosis is present.  •  There is no evidence of pericardial effusion.           Last Stress: Stress test results were  mentioned and heart cath report attached below.     Last Cath:        05/25/022                 Imaging:  Cardiac catheterization    LHC: 70% stenosis in LAD, 70% stenosis in LCx and total occlusion of RCA withleft to right collaterals.    Echocardiogram: EF 55-60%, diastolic dysfunction, mild-moderate MR and mild TR.    Stress test: anterior wall with reversible defect.    Impression:  Lisa Velazquez is a 56 " y.o. female with significant peripheral vascular disease who presents to clinic for evaluation of multivessel coronary artery disease. She reports chest pain with radiation to left arm, symptoms occurring mostly at night. She also endorses dyspnea on exertion. However her lower extremity claudication is her most debilitating and limiting factor, and her anginal symptoms are likely not worse because she can't exert herself due to her claudication. Her LHC revealed 70% stenosis in LAD, 70% stenosis in LCx and total occlusion of RCA withleft to right collaterals. She also had echocardiogram performed which revealed EF 55-60%, diastolic dysfunction, mild-moderate MR and mild TR. Stress test showed anterior wall with reversible defect.    We recommend patient undergo coronary artery bypass grafting. The risks, benefits and alternatives were discussed with the patient and all questions were answered. She agreed, signed consent, and wished to proceed. Her radial arteries are insufficient conduits due to her peripheral vascular disease and failed Allens test with pulse oximetry. We will plan to use her left internal mammary artery and vein from her thighs as conduits. His of right internal mammary artery/segment there of depending upon this was a shin then harvesting of the left internal mammary artery. She is currently scheduled for surgery on 5/30/22.  -----------------------------------------------------------------------------------------  Patient seen and examined with resident physician. Severe peripheral vascular disease which is symptomatic and is being dealt with with catheter based intervention thus far. She has significant ischemic changes her heart during stress test. Coronary anatomy shows a diabetic pattern with near normal hemodynamics and left ventricular function.  Her Dangelo's test suggest that radial artery is not available for harvest bilaterally. We will use mammary artery saphenous vein graft to the  right circulation, and I am debating with the use portion of the right internal mammary artery for circumflex system. Will make that decision on a real-time basis quality of the left.    Discussed all the above with Ms. Velazquez she understands and desires to proceed.      Electronically signed by Ralph Dumont MD at 2022 9:32 AM EDT            Medical History        Past Medical History:   Diagnosis Date   • Coronary artery disease     • Diabetes mellitus     • Elevated cholesterol     • GERD (gastroesophageal reflux disease)     • History of transfusion     • Hypertension           Surgical History         Past Surgical History:   Procedure Laterality Date   • ABDOMINAL SURGERY         gallbladder removal   • CARDIAC CATHETERIZATION       • CARDIAC SURGERY         stent   • COLONOSCOPY       • FRACTURE SURGERY       • TOE AMPUTATION Right       3rd toe right foot               Family History   Problem Relation Age of Onset   • Cancer Mother     • Diabetes Mother     • Heart disease Mother     • Heart disease Father     • Diabetes Brother     • Heart disease Brother     • Heart disease Daughter        Social History            Tobacco Use   • Smoking status: Former       Packs/day: 2.00       Years: 15.00       Pack years: 30.00       Types: Cigarettes       Quit date: 2020       Years since quittin.5   • Smokeless tobacco: Never   Vaping Use   • Vaping Use: Never used   Substance Use Topics   • Alcohol use: Never   • Drug use: Never         Medications listed below are reported home medications pulling from within the system:  Prescriptions Prior to Admission           Medications Prior to Admission   Medication Sig Dispense Refill Last Dose   • aspirin 81 MG EC tablet Take 1 tablet by mouth Daily.     Past Month   • clopidogrel (PLAVIX) 75 MG tablet Take 1 tablet by mouth Daily.     Past Month   • Dulaglutide 1.5 MG/0.5ML solution pen-injector Inject 1.5 mg under the skin into the appropriate  area as directed 1 (One) Time Per Week.     Past Month   • gabapentin (NEURONTIN) 300 MG capsule Take 1 capsule by mouth 3 (Three) Times a Day As Needed (nerve pain).     Past Month   • Insulin Glargine (LANTUS SOLOSTAR) 100 UNIT/ML injection pen Inject 10 Units under the skin into the appropriate area as directed 2 (Two) Times a Day.     Past Month   • losartan (COZAAR) 25 MG tablet Take 12.5 mg by mouth Daily.     Past Month   • pantoprazole (PROTONIX) 40 MG EC tablet Take 1 tablet by mouth Daily.     Past Month   • rosuvastatin (CRESTOR) 40 MG tablet Take 1 tablet by mouth Every Night.     Past Month   • traMADol (ULTRAM) 50 MG tablet Take 1 tablet by mouth 2 (Two) Times a Day As Needed for Moderate Pain.     Past Month   • vitamin D (ERGOCALCIFEROL) 1.25 MG (14444 UT) capsule capsule Take 1 capsule by mouth 1 (One) Time Per Week.     Past Month   • acetaminophen (TYLENOL) 500 MG tablet Take 2 tablets (1,000 mg) by mouth every 6 (six) hours if needed for pain. 120 tablet 1 Unknown         Allergies:  Patient has no known allergies.     Review of Systems   Constitutional:  Negative for activity change, appetite change and diaphoresis.   HENT:  Negative for facial swelling and trouble swallowing.    Eyes:  Negative for visual disturbance.   Respiratory:  Negative for shortness of breath.    Cardiovascular:  Negative for chest pain, palpitations and leg swelling.   Gastrointestinal:  Negative for blood in stool, nausea and vomiting.   Endocrine: Negative.    Genitourinary:  Negative for hematuria.   Musculoskeletal:  Negative for gait problem.   Skin:  Negative for color change.   Neurological:  Negative for dizziness, syncope, speech difficulty, weakness, light-headedness and headaches.   Psychiatric/Behavioral:  Negative for agitation and behavioral problems.       Objective Data       Vital Signs  Temp:  [97.3 °F (36.3 °C)-98.6 °F (37 °C)] 97.3 °F (36.3 °C)  Heart Rate:  [66-81] 81  Resp:  [18] 18  BP:  ()/(65-84) 99/65  Vital Signs (last 72 hrs)           07/18 0700  07/19 0659 07/19 0700  07/20 0659 07/20 0700  07/21 0659 07/21 0700 07/21 0954   Most Recent       Temp (°F)     98.7    98 -  98.7    98.6 -  98.7        97.3      97.3 (36.3) 07/21 0700     Heart Rate 72 -  80    68 -  81    66 -  74        68      68 07/21 0700     Resp     18    18 -  20    18 -  20        18      18 07/21 0700     /67 -  168/65    120/60 -  148/85    106/66 -  188/84        94/66      94/66 07/21 0700     SpO2 (%)     94        94        96        97      97 07/21 0700             Body mass index is 30.38 kg/m².     Intake/Output Summary (Last 24 hours) at 7/21/2023 1316  Last data filed at 7/21/2023 0900      Gross per 24 hour   Intake 1200 ml   Output --   Net 1200 ml      I have seen and examined Ms. Velazquez today.     Physical Exam  Constitutional:       Appearance: Normal appearance.   HENT:      Head: Normocephalic and atraumatic.      Nose: Nose normal.      Mouth/Throat:      Mouth: Mucous membranes are moist.      Pharynx: Oropharynx is clear.   Eyes:      Conjunctiva/sclera: Conjunctivae normal.   Cardiovascular:      Rate and Rhythm: Normal rate and regular rhythm.      Pulses: Normal pulses.   Pulmonary:      Effort: Pulmonary effort is normal.      Breath sounds: Normal breath sounds.   Abdominal:      General: Bowel sounds are normal.      Palpations: Abdomen is soft.   Musculoskeletal:         General: Normal range of motion.      Cervical back: Normal range of motion.   Skin:     General: Skin is warm and dry.   Neurological:      General: No focal deficit present.      Mental Status: She is alert and oriented to person, place, and time.   Psychiatric:         Mood and Affect: Mood normal.         Behavior: Behavior normal.         Results review   Results Review:               I have reviewed the patient's new clinical results.             Results from last 7 days   Lab Units 07/19/23  0032  07/17/23  0956 07/16/23  0053   WBC 10*3/mm3 6.78 6.01 5.85   HEMOGLOBIN g/dL 9.6* 9.3* 8.7*   PLATELETS 10*3/mm3 265 239 251             Results from last 7 days   Lab Units 07/19/23  0032 07/17/23  0956 07/16/23  0053   SODIUM mmol/L 140 140 144   POTASSIUM mmol/L 4.3 4.1 4.1   CHLORIDE mmol/L 106 106 109*   CO2 mmol/L 24.7 24.4 24.2   BUN mg/dL 12 12 9   CREATININE mg/dL 0.69 0.75 0.70   CALCIUM mg/dL 9.4 9.2 9.3   GLUCOSE mg/dL 189* 207* 153*            Lab Results   Component Value Date     INR 1.0 07/04/2023     INR 1.2 (H) 05/31/2022                Lab Results   Component Value Date     MG 1.9 07/14/2023     MG 1.9 06/09/2022     MG 2.0 06/08/2022            Lab Results   Component Value Date     TSH 0.790 07/19/2023      No results found for: CHOL, TRIG, HDL, LDL  No results found for: PROBNP  No results found.  No results found for: URICACID  Pain Management Panel               Latest Ref Rng & Units 7/4/2023   Pain Management Panel   Barbiturates Screen, Urine Cutoff: 200 ng/mL Negative       Benzodiazepine Screen, Urine Cutoff: 200 ng/mL Negative       Cocaine Screen, Urine Cutoff: 300 ng/mL Negative       Fentanyl, Urine Cutoff: 1 ng/mL Negative       Hydromorphone <50 ng/mL <50       Methadone Screen , Urine Cutoff: 300 ng/mL Negative            Details           This result is from an external source.                   Microbiology Results (last 10 days)         Procedure Component Value - Date/Time     COVID-19, DEONDRE IN-HOUSE CEPHEID/FRANCOISE NP SWAB IN TRANSPORT MEDIA 8-12 HR TAT - , [[id number]]  (Normal) Collected: 07/13/23 1006     Lab Status: Final result Specimen: Swab from Nasopharynx Updated: 07/13/23 1538       SARS-CoV-2, MARIANNA Not Detected     Narrative:       This assay is for in vitro diagnostic use under FDA emergency use authorization only. Negative results do not preclude infection with the SARS CoV-2 virus and should not be the sole basis of a patient treatment/management or public health  decision. Follow up testing should be performed according to the current CDC recommendations.  This test was performed on the Xpert Xpress SARS CoV-2 Plus assay test, a PCR-based method.  Negative results should be considered presumptive and do not preclude current or future infection obtained through community transmission or other exposures. Negative results must be considered in the context of an individual's recent exposures, history, presence of clinical signs and symptoms consistent with COVID-19.                EC2022       ECG/EMG Results (last 24 hours)         ** No results found for the last 24 hours. **                TELEMETRY:      Patient is not on continuous cardiac monitor at this time.     RADIOLOGY STUDIES:  Imaging Results (Last 72 Hours)         ** No results found for the last 72 hours. **                CURRENT MEDICATIONS:  Current list of medications may not reflect those currently placed in orders that are not signed or are being held.      ascorbic acid, 500 mg, Oral, Daily  aspirin, 81 mg, Oral, Daily  calcium carbonate, 1 tablet, Oral, BID  clopidogrel, 75 mg, Oral, Daily  enoxaparin, 40 mg, Subcutaneous, Q12H  fludrocortisone, 50 mcg, Oral, Daily  gabapentin, 200 mg, Oral, Q24H  insulin glargine, 13 Units, Subcutaneous, Q12H  midodrine, 10 mg, Oral, TID AC  multivitamin, 1 tablet, Oral, Daily  pantoprazole, 40 mg, Oral, Q AM  rosuvastatin, 40 mg, Oral, Nightly  vitamin D3, 5,000 Units, Oral, Daily        •  acetaminophen  •  bismuth subsalicylate  •  diphenhydrAMINE  •  ibuprofen  •  melatonin  •  ondansetron  •  traMADol     Thank you very much for asking us to be involved in this patient's care.  We will follow along with you.     I have discussed the patients findings and my recommendations with patient.     Electronically signed by RAVIN Dennison, 23, 1:21 PM EDT.      Electronically signed by Rob Correa MD, 23, 3:10 PM EDT.                      Wilmer Gama MD   Physician  Medicine     Progress Notes      Signed     Date of Service: 07/21/23 1336  Creation Time: 07/21/23 1336     Signed         Assisted By: Jeromy MASCORRO     CC: Follow-up on right tibial fracture as well as severe orthostasis.     Interview History/HPI: Patient states she is feeling okay, she had been sitting up eating, her blood pressure was 94/66.  In preparation for physical therapy, I did give her 250 cc bolus of normal saline however even with this she became orthostatic with physical therapy.  I have increased her midodrine to 10 mg.  Added Florinef.              Current Hospital Meds:  ascorbic acid, 500 mg, Oral, Daily  aspirin, 81 mg, Oral, Daily  calcium carbonate, 1 tablet, Oral, BID  clopidogrel, 75 mg, Oral, Daily  enoxaparin, 40 mg, Subcutaneous, Q12H  fludrocortisone, 50 mcg, Oral, Daily  gabapentin, 200 mg, Oral, Q24H  insulin glargine, 13 Units, Subcutaneous, Q12H  midodrine, 10 mg, Oral, TID AC  multivitamin, 1 tablet, Oral, Daily  pantoprazole, 40 mg, Oral, Q AM  rosuvastatin, 40 mg, Oral, Nightly  vitamin D3, 5,000 Units, Oral, Daily            Vitals:     07/21/23 1144   BP: 99/65   Pulse: 81   Resp:     Temp:     SpO2:              Intake/Output Summary (Last 24 hours) at 7/21/2023 1337  Last data filed at 7/21/2023 0900      Gross per 24 hour   Intake 1200 ml   Output --   Net 1200 ml         EXAM: Temperature is 97, room air saturation 97%.  Pulse 68.  Skin warm and dry lungs are clear heart regular rate and rhythm no edema        Diet: Diabetic Diets; Consistent Carbohydrate; Texture: Regular Texture (IDDSI 7); Fluid Consistency: Thin (IDDSI 0)           LABS:      Lab Results (last 48 hours)         Procedure Component Value Units Date/Time     POC Glucose Once [[id number]]  (Abnormal) Collected: 07/21/23 1111     Specimen: Blood Updated: 07/21/23 1117       Glucose 190 mg/dL         Comment: Meter: [id number] : [id number] BRAIN MARTINEZ        POC  Glucose Once [[id number]]  (Normal) Collected: 07/21/23 0615     Specimen: Blood Updated: 07/21/23 0630       Glucose 116 mg/dL         Comment: Meter: [id number] : [id number] Jeannine Crystal        POC Glucose Once [[id number]]  (Abnormal) Collected: 07/20/23 2026     Specimen: Blood Updated: 07/20/23 2046       Glucose 216 mg/dL         Comment: Meter: [id number] : [id number] Jeannine Crystal        POC Glucose Once [[id number]]  (Abnormal) Collected: 07/20/23 1611     Specimen: Blood Updated: 07/20/23 1617       Glucose 189 mg/dL         Comment: Meter: [id number] : [id number] DIANA BOWLING        POC Glucose Once [[id number]]  (Abnormal) Collected: 07/20/23 1039     Specimen: Blood Updated: 07/20/23 1045       Glucose 219 mg/dL         Comment: Meter: [id number] : [id number] DIANA BOWLING        POC Glucose Once [[id number]]  (Normal) Collected: 07/20/23 0645     Specimen: Blood Updated: 07/20/23 0652       Glucose 104 mg/dL         Comment: Meter: [id number] : [id number] NATI LACY        POC Glucose Once [[id number]]  (Abnormal) Collected: 07/19/23 1912     Specimen: Blood Updated: 07/19/23 1918       Glucose 179 mg/dL         Comment: Meter: [id number] : [id number] NATI LACY        POC Glucose Once [[id number]]  (Abnormal) Collected: 07/19/23 1621     Specimen: Blood Updated: 07/19/23 1627       Glucose 164 mg/dL         Comment: Meter: [id number] : [id number] DIANA BOWLING                         Radiology:     Imaging Results (Last 72 Hours)         ** No results found for the last 72 hours. **                Results for orders placed during the hospital encounter of 07/13/23     Adult Transthoracic Echo Complete W/ Cont if Necessary Per Protocol     Interpretation Summary  •  Normal left ventricular cavity size and wall thickness noted. All left ventricular wall segments contract normally.  •  Left ventricular ejection fraction appears to be 56 - 60%.  •  Left ventricular diastolic  "function is consistent with (grade I) impaired relaxation.  •  There is mild, bileaflet mitral valve thickening present.  •  Systolic anterior motion of the anterior mitral leaflet is present.  •  The aortic valve is structurally normal with no stenosis present. Trace aortic valve regurgitation is present.  •  There is mild, bileaflet mitral valve thickening present. Systolic anterior motion of the anterior mitral leaflet is present.  •  Mild mitral valve regurgitation is present. No significant mitral valve stenosis is present.  •  There is no evidence of pericardial effusion.        Assessment/Plan:   Right tibial fracture continue occupational and physical therapy, orthostasis continues to hinder her occupational and physical therapy.  She is overall min assist for bed mobility.  She has been having to get most of her physical therapy sessions in the bed, with physical therapy she is min assist with chair to bed and bed to chair.     Orthostasis, work-up includes giving her fluids to rule out hypovolemia, she is not been anemic enough to cause this, hemoglobin has been stable, she is not adrenally insufficient, thyroid status is euthyroid, EF is normal.  She is not on medication that should cause this is set the gabapentin and we have adjusted the dosing so she only gets 1 dose at 1600.  This would be after therapy sessions.  He is wearing an abdominal binder.  Have increased her midodrine to 10 mg 3 times a day and added Florinef.  Because of the persistent nature of this and her history of coronary disease I have asked cardiology to evaluate the patient as well.  Patient is status post coronary artery bypass grafting.     Diabetes, glucoses reviewed, I think overall under acceptable control.     DVT prophylaxis, subcu Lovenox     Constipation, I did discuss this with her, she states that her bowels only move about every 10 days at home.  She states that she takes stool softeners or cathartics she will \"end up " "in the hospital\" and has significant diarrhea, she is adamant about not taking these therefore these have been discontinued.     Grade 1 HFpEF by echo, appears to be euvolemic     MD Natan Vazquez Karl F, MD   Physician  Medicine     Progress Notes      Signed     Date of Service: 07/22/23 0933  Creation Time: 07/22/23 0933     Signed         Assisted By: Jeromy MASCORRO     CC: Follow-up on right tibial fracture as well as orthostasis     Interview History/HPI: Patient this morning states she \"feels fine\" but she has not been up out of bed.  Cardiology consult noted and appreciated.  Patient is trying to drink extra water.  Still no bowel movement.              Current Hospital Meds:  ascorbic acid, 500 mg, Oral, Daily  aspirin, 81 mg, Oral, Daily  calcium carbonate, 1 tablet, Oral, BID  clopidogrel, 75 mg, Oral, Daily  enoxaparin, 40 mg, Subcutaneous, Q12H  fludrocortisone, 50 mcg, Oral, Daily  gabapentin, 200 mg, Oral, Q24H  insulin glargine, 13 Units, Subcutaneous, Q12H  midodrine, 10 mg, Oral, TID AC  multivitamin, 1 tablet, Oral, Daily  pantoprazole, 40 mg, Oral, Q AM  rosuvastatin, 40 mg, Oral, Nightly  vitamin D3, 5,000 Units, Oral, Daily            Vitals:     07/22/23 0700   BP: 109/64   Pulse: 64   Resp: 18   Temp: 98.2 °F (36.8 °C)   SpO2: 96%            Intake/Output Summary (Last 24 hours) at 7/22/2023 0933  Last data filed at 7/22/2023 0700      Gross per 24 hour   Intake 1920 ml   Output --   Net 1920 ml         EXAM: He is pleasant and in no distress, skin warm and dry, no no edema, her bandages have been changed on her leg and ankle and nursing reports these are healing well, bandages are dry no surrounding erythema of the skin.        Diet: Diabetic Diets; Consistent Carbohydrate; Texture: Regular Texture (IDDSI 7); Fluid Consistency: Thin (IDDSI 0)           LABS:      Lab Results (last 48 hours)         Procedure Component Value Units Date/Time     POC Glucose Once [[id number]] "  (Normal) Collected: 07/22/23 0630     Specimen: Blood Updated: 07/22/23 0641       Glucose 112 mg/dL         Comment: Meter: [id number] : [id number] Jeannine Crystal        Basic Metabolic Panel [[id number]]  (Abnormal) Collected: 07/22/23 0122     Specimen: Blood Updated: 07/22/23 0156       Glucose 125 mg/dL         BUN 11 mg/dL         Creatinine 0.64 mg/dL         Sodium 143 mmol/L         Potassium 3.9 mmol/L         Chloride 108 mmol/L         CO2 26.4 mmol/L         Calcium 9.5 mg/dL         BUN/Creatinine Ratio 17.2       Anion Gap 8.6 mmol/L         eGFR 102.6 mL/min/1.73       Narrative:       GFR Normal >60  Chronic Kidney Disease <60  Kidney Failure <15        CBC & Differential [[id number]]  (Abnormal) Collected: 07/22/23 0122     Specimen: Blood Updated: 07/22/23 0132     Narrative:       The following orders were created for panel order CBC & Differential.  Procedure                               Abnormality         Status                     ---------                               -----------         ------                     CBC Auto Differential[[id number]]        Abnormal            Final result                  Please view results for these tests on the individual orders.     CBC Auto Differential [[id number]]  (Abnormal) Collected: 07/22/23 0122     Specimen: Blood Updated: 07/22/23 0132       WBC 6.28 10*3/mm3         RBC 3.56 10*6/mm3         Hemoglobin 10.3 g/dL         Hematocrit 33.0 %         MCV 92.7 fL         MCH 28.9 pg         MCHC 31.2 g/dL         RDW 15.7 %         RDW-SD 52.1 fl         MPV 9.1 fL         Platelets 249 10*3/mm3         Neutrophil % 53.1 %         Lymphocyte % 34.4 %         Monocyte % 6.8 %         Eosinophil % 4.8 %         Basophil % 0.6 %         Immature Grans % 0.3 %         Neutrophils, Absolute 3.33 10*3/mm3         Lymphocytes, Absolute 2.16 10*3/mm3         Monocytes, Absolute 0.43 10*3/mm3         Eosinophils, Absolute 0.30 10*3/mm3         Basophils,  Absolute 0.04 10*3/mm3         Immature Grans, Absolute 0.02 10*3/mm3         nRBC 0.0 /100 WBC       POC Glucose Once [[id number]]  (Abnormal) Collected: 07/21/23 1949     Specimen: Blood Updated: 07/21/23 2000       Glucose 172 mg/dL         Comment: Meter: [id number] : [id number] Bonfield Crystal        POC Glucose Once [[id number]]  (Abnormal) Collected: 07/21/23 1613     Specimen: Blood Updated: 07/21/23 1626       Glucose 176 mg/dL         Comment: Meter: [id number] : [id number] david magalie        POC Glucose Once [[id number]]  (Abnormal) Collected: 07/21/23 1111     Specimen: Blood Updated: 07/21/23 1117       Glucose 190 mg/dL         Comment: Meter: [id number] : [id number] BRAIN MUHAMMADPHERD        POC Glucose Once [[id number]]  (Normal) Collected: 07/21/23 0615     Specimen: Blood Updated: 07/21/23 0630       Glucose 116 mg/dL         Comment: Meter: [id number] : [id number] Jeannine Crystal        POC Glucose Once [[id number]]  (Abnormal) Collected: 07/20/23 2026     Specimen: Blood Updated: 07/20/23 2046       Glucose 216 mg/dL         Comment: Meter: [id number] : [id number] Bonfield Crystal        POC Glucose Once [[id number]]  (Abnormal) Collected: 07/20/23 1611     Specimen: Blood Updated: 07/20/23 1617       Glucose 189 mg/dL         Comment: Meter: [id number] : [id number] DIANA BOWLING        POC Glucose Once [[id number]]  (Abnormal) Collected: 07/20/23 1039     Specimen: Blood Updated: 07/20/23 1045       Glucose 219 mg/dL         Comment: Meter: [id number] : [id number] DIANA BOWLING                         Radiology:     Imaging Results (Last 72 Hours)         ** No results found for the last 72 hours. **                Results for orders placed during the hospital encounter of 07/13/23     Adult Transthoracic Echo Complete W/ Cont if Necessary Per Protocol     Interpretation Summary  •  Normal left ventricular cavity size and wall thickness noted. All left ventricular wall segments  contract normally.  •  Left ventricular ejection fraction appears to be 56 - 60%.  •  Left ventricular diastolic function is consistent with (grade I) impaired relaxation.  •  There is mild, bileaflet mitral valve thickening present.  •  Systolic anterior motion of the anterior mitral leaflet is present.  •  The aortic valve is structurally normal with no stenosis present. Trace aortic valve regurgitation is present.  •  There is mild, bileaflet mitral valve thickening present. Systolic anterior motion of the anterior mitral leaflet is present.  •  Mild mitral valve regurgitation is present. No significant mitral valve stenosis is present.  •  There is no evidence of pericardial effusion.        Assessment/Plan:   Right tibial fracture, patient is working with occupational and physical therapy.  With PT yesterday, blood pressure was monitored throughout the session, supine 119/72, then 99/65 and 117/73.  Progress towards goal has been hindered secondary to orthostasis.  With OT, patient is min assist bathing, set up upper body dressing, min assist lower body dressing, set up for grooming, max assist toileting, set up for self-feeding.     Orthostatic hypotension, has been persistent, we have been working through this, there is no evidence of secondary cause, cardiology input appreciated, continuing on midodrine as well as Florinef, follow.  Electrolytes unremarkable and hemoglobin stable to improved     History of HFpEF, monitoring closely as she did receive some fluids and is on Florinef but appears euvolemic at this time.     DVT prophylaxis, subcu Lovenox     No BM, patient does not want cathartics at this time.     Diabetes controlled on basal insulin alone.     MD Steven Vazquez Randy, PTA   Physical Therapist Assistant  Physical Therapy     Therapy Progress Report/Re-Cert      Signed     Date of Service: 07/21/23 1458  Creation Time: 07/21/23 1458     Signed       Expand All  Collapse All    Inpatient Rehabilitation - Physical Therapy Progress Note                                                               [place]     Patient Name: Lisa Velazquez                       : 1965                      MRN: [id number]     Today's Date: 2023                                                                                            Admit Date: 2023                          Visit Dx:   Visit Diagnosis   No diagnosis found.        Problem List       Patient Active Problem List   Diagnosis   • S/P right hip fracture   • Acute respiratory failure with hypoxia   • Right tibial fracture            Medical History        Past Medical History:   Diagnosis Date   • Coronary artery disease     • Diabetes mellitus     • Elevated cholesterol     • GERD (gastroesophageal reflux disease)     • History of transfusion     • Hypertension              Surgical History         Past Surgical History:   Procedure Laterality Date   • ABDOMINAL SURGERY         gallbladder removal   • CARDIAC CATHETERIZATION       • CARDIAC SURGERY         stent   • COLONOSCOPY       • FRACTURE SURGERY       • TOE AMPUTATION Right       3rd toe right foot            PT ASSESSMENT (last 12 hours)            IRF PT Evaluation and Treatment         Row Name 23 1448                 PT Time and Intention     Document Type daily treatment;progress note  -RG       Mode of Treatment individual therapy;physical therapy  -RG       Patient/Family/Caregiver Comments/Observations Pt was seen at bedside in AM secndary to low BP.  BP was monitored throughout PT session in supine position. 119/72, 99/65, 117/73.   Progress toward pts goal decreased secondary to her BP issues.  -RG          Row Name 23 1448                 General Information     Existing Precautions/Restrictions fall  -RG          Row Name 23 1448                 Pain Scale: FACES Pre/Post-Treatment     Pain: FACES Scale, Pretreatment 6-->hurts  even more  -RG       Posttreatment Pain Rating 6-->hurts even more  -RG          Row Name 07/21/23 1448                 Cognition/Psychosocial     Affect/Mental Status (Cognition) WFL  -RG       Follows Commands (Cognition) WFL  -RG          Row Name 07/21/23 1448                 Mobility     Left Lower Extremity (Weight-bearing Status) weight-bearing as tolerated (WBAT)  -RG       Right Lower Extremity (Weight-bearing Status) weight-bearing as tolerated (WBAT)  -          Row Name 07/21/23 1448                 Bed Mobility     Supine-Sit-Supine [place] (Bed Mobility) minimum assist (75% patient effort);verbal cues;nonverbal cues (demo/gesture)  A for lifting/scooting RLE  -RG          Row Name 07/21/23 1448                 Sit-Stand Transfer     Assistive Device (Sit-Stand Transfers) --  HHA  -          Row Name 07/21/23 1448                 Stand-Sit Transfer     Assistive Device (Stand-Sit Transfers) --  A  -          Row Name 07/21/23 1448                 Hip (Therapeutic Exercise)     Hip Strengthening (Therapeutic Exercise) bilateral;flexion;aBduction;aDduction;external rotation;internal rotation;heel slides;marching while standing;supine;10 repetitions;2 sets  -          Row Name 07/21/23 1448                 Knee (Therapeutic Exercise)     Knee Strengthening (Therapeutic Exercise) flexion;bilateral;extension;heel slides;SLR (straight leg raise);SAQ (short arc quad);supine;10 repetitions;2 sets  -          Row Name 07/21/23 1448                 Ankle (Therapeutic Exercise)     Ankle Strengthening (Therapeutic Exercise) bilateral;dorsiflexion;plantarflexion;supine;10 repetitions;2 sets  -          Row Name 07/21/23 1448                 Positioning and Restraints     Pre-Treatment Position in bed  -RG       Post Treatment Position bed  -RG       In Bed notified nsg;supine;call light within reach;encouraged to call for assist;legs elevated  -          Row Name 07/21/23 1448                  Therapy Assessment/Plan (PT)     Patient's Goals For Discharge return home  -RG          Row Name 07/21/23 1448                 Therapy Assessment/Plan (PT)     Rehab Potential/Prognosis (PT) adequate, monitor progress closely  -RG       Frequency of Treatment (PT) 5 times per week  -RG       Estimated Duration of Therapy (PT) 2 weeks  -RG       Problem List (PT) balance;mobility;range of motion (ROM);strength;pain  -RG       Activity Limitations Related to Problem List (PT) unable to ambulate safely;unable to transfer safely  -RG          Row Name 07/21/23 1448                 Therapy Plan Review/Discharge Plan (PT)     Anticipated Equipment Needs at Discharge (PT Eval) --  tbd  -RG       Anticipated Discharge Disposition (PT) home with assist;home with home health  -RG          Row Name 07/21/23 1448                 IRF PT Goals     Bed Mobility Goal Selection (PT-IRF) bed mobility, PT goal 1  -RG       Transfer Goal Selection (PT-IRF) transfers, PT goal 1  -RG       Gait (Walking Locomotion) Goal Selection (PT-IRF) gait, PT goal 1  -RG          Row Name 07/21/23 1448                 Bed Mobility Goal 1 (PT-IRF)     Activity/Assistive Device (Bed Mobility Goal 1, PT-IRF) sit to supine/supine to sit  -RG       [place] Level (Bed Mobility Goal 1, PT-IRF) modified independence  -RG       Time Frame (Bed Mobility Goal 1, PT-IRF) by discharge  -RG       Progress/Outcomes (Bed Mobility Goal 1, PT-IRF) goal ongoing  -RG          Row Name 07/21/23 1448                 Transfer Goal 1 (PT-IRF)     Activity/Assistive Device (Transfer Goal 1, PT-IRF) sit-to-stand/stand-to-sit;bed-to-chair/chair-to-bed  -RG       [place] Level (Transfer Goal 1, PT-IRF) modified independence  -RG       Time Frame (Transfer Goal 1, PT-IRF) by discharge  -RG       Progress/Outcomes (Transfer Goal 1, PT-IRF) goal ongoing  -RG          Row Name 07/21/23 1448                 Gait/Walking Locomotion Goal 1 (PT-IRF)     Activity/Assistive  Device (Gait/Walking Locomotion Goal 1, PT-IRF) walker, rolling  -RG       Gait/Walking Locomotion Distance Goal 1 (PT-IRF) 300'  -RG       [place] Level (Gait/Walking Locomotion Goal 1, PT-IRF) modified independence  -RG       Time Frame (Gait/Walking Locomotion Goal 1, PT-IRF) by discharge  -RG       Progress/Outcomes (Gait/Walking Locomotion Goal 1, PT-IRF) goal ongoing  -RG                    User Key  (r) = Recorded By, (t) = Taken By, (c) = Cosigned By        Initials Name Provider Type     RG Chidi Harris, PTA Physical Therapist Assistant                               Wound 23 193 Right lower leg (Active)   Dressing Appearance dry;intact 23 0800   Closure Sutures;Adhesive bandage 23 08   Base dressing in place, unable to visualize 23 0800   Drainage Amount none 23 0530   Care, Wound cleansed with 23 0530   Dressing Care dressing changed 23 0530                  PT Recommendation and Plan     Frequency of Treatment (PT): 5 times per week  Anticipated Equipment Needs at Discharge (PT Eval):  (tbd)              Time Calculation:        PT Charges         Row Name 23 1454                       Time Calculation     Start Time 0745  -RG         Stop Time 0845  -RG         Time Calculation (min) 60 min  -RG         PT Received On 23  -RG                 Time Calculation- PT     Total Timed Code Minutes- PT 60 minute(s)  -Rukhsana Medina, OT   Occupational Therapist  Occupational Therapy     Therapy Treatment Note      Signed     Date of Service: 23  Creation Time: 23     Signed       Expand All Collapse All    Inpatient Rehabilitation - Occupational Therapy Progress Note     TONI Wild     Patient Name: Lisa Velazquez                       : 1965                      MRN: [id number]     Today's Date: 2023                                                                              Admit Date: 7/13/2023        Visit Diagnosis   No diagnosis found.        Problem List       Patient Active Problem List   Diagnosis   • S/P right hip fracture   • Acute respiratory failure with hypoxia   • Right tibial fracture            Medical History        Past Medical History:   Diagnosis Date   • Coronary artery disease     • Diabetes mellitus     • Elevated cholesterol     • GERD (gastroesophageal reflux disease)     • History of transfusion     • Hypertension              Surgical History         Past Surgical History:   Procedure Laterality Date   • ABDOMINAL SURGERY         gallbladder removal   • CARDIAC CATHETERIZATION       • CARDIAC SURGERY         stent   • COLONOSCOPY       • FRACTURE SURGERY       • TOE AMPUTATION Right       3rd toe right foot                           IRF OT ASSESSMENT FLOWSHEET (last 12 hours)            IRF OT Evaluation and Treatment         Row Name 07/21/23 1400                 OT Time and Intention     Document Type daily treatment;progress note  -       Mode of Treatment individual therapy;occupational therapy  -       Patient Effort good  -          Row Name 07/21/23 1400                 General Information     Patient/Family/Caregiver Comments/Observations patient agreeable to therapy. patient continues with BP issues while up. patient seen supine in bed in a.m., /83, 145/81 during session. patient seen seated edge of bed in pm. /79. patient tolerated well with rest breaks.  -       Existing Precautions/Restrictions fall  -          Row Name 07/21/23 1400                 Cognition/Psychosocial     Affect/Mental Status (Cognition) WFL  -          Row Name 07/21/23 1400                 Bathing     [place] Level (Bathing) minimum assist (75% patient effort)  -          Row Name 07/21/23 1400                 Upper Body Dressing     [place] Level (Upper Body Dressing) set up assistance  -          Row Name 07/21/23 1400                  Lower Body Dressing     [place] Level (Lower Body Dressing) minimum assist (75% patient effort)  -[facility] Name 07/21/23 1400                 Grooming     [place] Level (Grooming) set up  -[facility] Name 07/21/23 1400                 Toileting     [place] Level (Toileting) maximum assist (25% patient effort)  -[facility] Name 07/21/23 1400                 Self-Feeding     [place] Level (Self-Feeding) set up  -       Position (Self-Feeding) edge of bed sitting  -[facility] Name 07/21/23 1400                 Motor Skills     Motor Skills coordination;functional endurance  -       Therapeutic Exercise shoulder;elbow/forearm;wrist;hand  BUE ROM, gmc,fmc, strengthening, flex bar, dowel ex  -[facility] Name 07/21/23 1400                 Positioning and Restraints     Post Treatment Position bed  -       In Bed supine;call light within reach;encouraged to call for assist  -AH          Row Name 07/21/23 1400                 LB Dressing Goal 1 (OT-IRF)     Progress/Outcomes (LB Dressing Goal 1, OT-IRF) goal met  -AH          Row Name 07/21/23 1400                 LB Dressing Goal 2 (OT-IRF)     Progress/Outcomes (LB Dressing Goal 2, OT-IRF) goal partially met  -AH          Row Name 07/21/23 1400                 Toileting Goal 1 (OT-IRF)     Progress/Outcomes (Toileting Goal 1, OT-IRF) goal partially met  -AH          Row Name 07/21/23 1400                 Toileting Goal 2 (OT-IRF)     Progress/Outcomes (Toileting Goal 2, OT-IRF) goal ongoing  -                    User Key  (r) = Recorded By, (t) = Taken By, (c) = Cosigned By        Initials Name Effective Dates      Rukhsana Dixon, OT 07/11/23 -                                             OT Recommendation and Plan              Time Calculation:        Time Calculation- [facility] Name 07/21/23 1427 07/21/23 1426                  Time Calculation-      OT Start Time 1250  - 1045  -       OT Stop  Time 1345  -AH 1130  -AH       OT Time Calculation (min) 55 min  -AH 45 min  -AH

## 2023-07-25 LAB
ANION GAP SERPL CALCULATED.3IONS-SCNC: 10.2 MMOL/L (ref 5–15)
BASOPHILS # BLD AUTO: 0.04 10*3/MM3 (ref 0–0.2)
BASOPHILS NFR BLD AUTO: 0.6 % (ref 0–1.5)
BUN SERPL-MCNC: 10 MG/DL (ref 6–20)
BUN/CREAT SERPL: 15.4 (ref 7–25)
CALCIUM SPEC-SCNC: 9.6 MG/DL (ref 8.6–10.5)
CHLORIDE SERPL-SCNC: 107 MMOL/L (ref 98–107)
CO2 SERPL-SCNC: 24.8 MMOL/L (ref 22–29)
CREAT SERPL-MCNC: 0.65 MG/DL (ref 0.57–1)
DEPRECATED RDW RBC AUTO: 51.2 FL (ref 37–54)
EGFRCR SERPLBLD CKD-EPI 2021: 102.2 ML/MIN/1.73
EOSINOPHIL # BLD AUTO: 0.22 10*3/MM3 (ref 0–0.4)
EOSINOPHIL NFR BLD AUTO: 3.4 % (ref 0.3–6.2)
ERYTHROCYTE [DISTWIDTH] IN BLOOD BY AUTOMATED COUNT: 15.4 % (ref 12.3–15.4)
GLUCOSE BLDC GLUCOMTR-MCNC: 137 MG/DL (ref 70–130)
GLUCOSE BLDC GLUCOMTR-MCNC: 141 MG/DL (ref 70–130)
GLUCOSE BLDC GLUCOMTR-MCNC: 200 MG/DL (ref 70–130)
GLUCOSE BLDC GLUCOMTR-MCNC: 224 MG/DL (ref 70–130)
GLUCOSE SERPL-MCNC: 136 MG/DL (ref 65–99)
HCT VFR BLD AUTO: 31.9 % (ref 34–46.6)
HGB BLD-MCNC: 10.1 G/DL (ref 12–15.9)
IMM GRANULOCYTES # BLD AUTO: 0.01 10*3/MM3 (ref 0–0.05)
IMM GRANULOCYTES NFR BLD AUTO: 0.2 % (ref 0–0.5)
LYMPHOCYTES # BLD AUTO: 2.03 10*3/MM3 (ref 0.7–3.1)
LYMPHOCYTES NFR BLD AUTO: 31.2 % (ref 19.6–45.3)
MCH RBC QN AUTO: 29 PG (ref 26.6–33)
MCHC RBC AUTO-ENTMCNC: 31.7 G/DL (ref 31.5–35.7)
MCV RBC AUTO: 91.7 FL (ref 79–97)
MONOCYTES # BLD AUTO: 0.47 10*3/MM3 (ref 0.1–0.9)
MONOCYTES NFR BLD AUTO: 7.2 % (ref 5–12)
NEUTROPHILS NFR BLD AUTO: 3.73 10*3/MM3 (ref 1.7–7)
NEUTROPHILS NFR BLD AUTO: 57.4 % (ref 42.7–76)
NRBC BLD AUTO-RTO: 0 /100 WBC (ref 0–0.2)
PLATELET # BLD AUTO: 235 10*3/MM3 (ref 140–450)
PMV BLD AUTO: 9.7 FL (ref 6–12)
POTASSIUM SERPL-SCNC: 3.7 MMOL/L (ref 3.5–5.2)
RBC # BLD AUTO: 3.48 10*6/MM3 (ref 3.77–5.28)
SODIUM SERPL-SCNC: 142 MMOL/L (ref 136–145)
WBC NRBC COR # BLD: 6.5 10*3/MM3 (ref 3.4–10.8)

## 2023-07-25 PROCEDURE — 63710000001 DIPHENHYDRAMINE PER 50 MG: Performed by: FAMILY MEDICINE

## 2023-07-25 PROCEDURE — 97110 THERAPEUTIC EXERCISES: CPT | Performed by: OCCUPATIONAL THERAPIST

## 2023-07-25 PROCEDURE — 97535 SELF CARE MNGMENT TRAINING: CPT | Performed by: OCCUPATIONAL THERAPIST

## 2023-07-25 PROCEDURE — 25010000002 ENOXAPARIN PER 10 MG: Performed by: FAMILY MEDICINE

## 2023-07-25 PROCEDURE — 82948 REAGENT STRIP/BLOOD GLUCOSE: CPT

## 2023-07-25 PROCEDURE — 97530 THERAPEUTIC ACTIVITIES: CPT | Performed by: OCCUPATIONAL THERAPIST

## 2023-07-25 PROCEDURE — 97530 THERAPEUTIC ACTIVITIES: CPT

## 2023-07-25 PROCEDURE — 85025 COMPLETE CBC W/AUTO DIFF WBC: CPT | Performed by: FAMILY MEDICINE

## 2023-07-25 PROCEDURE — 80048 BASIC METABOLIC PNL TOTAL CA: CPT | Performed by: FAMILY MEDICINE

## 2023-07-25 PROCEDURE — 63710000001 INSULIN GLARGINE PER 5 UNITS: Performed by: INTERNAL MEDICINE

## 2023-07-25 PROCEDURE — 97110 THERAPEUTIC EXERCISES: CPT

## 2023-07-25 RX ADMIN — GABAPENTIN 200 MG: 100 CAPSULE ORAL at 17:22

## 2023-07-25 RX ADMIN — Medication 5000 UNITS: at 10:08

## 2023-07-25 RX ADMIN — INSULIN GLARGINE 13 UNITS: 100 INJECTION, SOLUTION SUBCUTANEOUS at 21:47

## 2023-07-25 RX ADMIN — OXYCODONE HYDROCHLORIDE AND ACETAMINOPHEN 500 MG: 500 TABLET ORAL at 10:09

## 2023-07-25 RX ADMIN — CARBIDOPA AND LEVODOPA 5 MG: 50; 200 TABLET, EXTENDED RELEASE ORAL at 17:23

## 2023-07-25 RX ADMIN — PYRIDOSTIGMINE BROMIDE 60 MG: 60 TABLET ORAL at 21:47

## 2023-07-25 RX ADMIN — FLUDROCORTISONE ACETATE 50 MCG: 0.1 TABLET ORAL at 10:08

## 2023-07-25 RX ADMIN — TRAMADOL HYDROCHLORIDE 50 MG: 50 TABLET, COATED ORAL at 10:07

## 2023-07-25 RX ADMIN — DIPHENHYDRAMINE HYDROCHLORIDE 25 MG: 25 CAPSULE ORAL at 21:47

## 2023-07-25 RX ADMIN — PYRIDOSTIGMINE BROMIDE 60 MG: 60 TABLET ORAL at 06:11

## 2023-07-25 RX ADMIN — CLOPIDOGREL BISULFATE 75 MG: 75 TABLET, FILM COATED ORAL at 10:09

## 2023-07-25 RX ADMIN — CARBIDOPA AND LEVODOPA 5 MG: 50; 200 TABLET, EXTENDED RELEASE ORAL at 10:12

## 2023-07-25 RX ADMIN — ROSUVASTATIN CALCIUM 40 MG: 20 TABLET, FILM COATED ORAL at 21:47

## 2023-07-25 RX ADMIN — TRAMADOL HYDROCHLORIDE 50 MG: 50 TABLET, COATED ORAL at 21:47

## 2023-07-25 RX ADMIN — ENOXAPARIN SODIUM 40 MG: 100 INJECTION SUBCUTANEOUS at 21:48

## 2023-07-25 RX ADMIN — PANTOPRAZOLE SODIUM 40 MG: 40 TABLET, DELAYED RELEASE ORAL at 06:11

## 2023-07-25 RX ADMIN — ASPIRIN 81 MG: 81 TABLET, COATED ORAL at 10:09

## 2023-07-25 RX ADMIN — INSULIN GLARGINE 13 UNITS: 100 INJECTION, SOLUTION SUBCUTANEOUS at 10:05

## 2023-07-25 RX ADMIN — Medication 1 TABLET: at 10:08

## 2023-07-25 RX ADMIN — ENOXAPARIN SODIUM 40 MG: 100 INJECTION SUBCUTANEOUS at 10:09

## 2023-07-25 NOTE — SIGNIFICANT NOTE
07/18/23 1605   Plan   Plan Team conference was held this morning and discharge date is planned for 07/27/23.  She plans to return home at discharge with sister Shayy helping at times.  Spoke to pt about discharge date and she is agreeable.  SS will continue to follow for discharge planning needs.        Unna Boot Text: An Unna boot was placed to help immobilize the limb and facilitate more rapid healing.

## 2023-07-26 LAB
GLUCOSE BLDC GLUCOMTR-MCNC: 122 MG/DL (ref 70–130)
GLUCOSE BLDC GLUCOMTR-MCNC: 187 MG/DL (ref 70–130)
GLUCOSE BLDC GLUCOMTR-MCNC: 227 MG/DL (ref 70–130)
GLUCOSE BLDC GLUCOMTR-MCNC: 304 MG/DL (ref 70–130)

## 2023-07-26 PROCEDURE — 99231 SBSQ HOSP IP/OBS SF/LOW 25: CPT | Performed by: FAMILY MEDICINE

## 2023-07-26 PROCEDURE — 97530 THERAPEUTIC ACTIVITIES: CPT

## 2023-07-26 PROCEDURE — 25010000002 ENOXAPARIN PER 10 MG: Performed by: FAMILY MEDICINE

## 2023-07-26 PROCEDURE — 97110 THERAPEUTIC EXERCISES: CPT

## 2023-07-26 PROCEDURE — 63710000001 DIPHENHYDRAMINE PER 50 MG: Performed by: FAMILY MEDICINE

## 2023-07-26 PROCEDURE — 63710000001 INSULIN GLARGINE PER 5 UNITS: Performed by: INTERNAL MEDICINE

## 2023-07-26 PROCEDURE — 82948 REAGENT STRIP/BLOOD GLUCOSE: CPT

## 2023-07-26 PROCEDURE — 97112 NEUROMUSCULAR REEDUCATION: CPT

## 2023-07-26 PROCEDURE — 97535 SELF CARE MNGMENT TRAINING: CPT

## 2023-07-26 RX ADMIN — FLUDROCORTISONE ACETATE 50 MCG: 0.1 TABLET ORAL at 08:59

## 2023-07-26 RX ADMIN — PANTOPRAZOLE SODIUM 40 MG: 40 TABLET, DELAYED RELEASE ORAL at 05:20

## 2023-07-26 RX ADMIN — ROSUVASTATIN CALCIUM 40 MG: 20 TABLET, FILM COATED ORAL at 20:25

## 2023-07-26 RX ADMIN — PYRIDOSTIGMINE BROMIDE 60 MG: 60 TABLET ORAL at 14:36

## 2023-07-26 RX ADMIN — CARBIDOPA AND LEVODOPA 5 MG: 50; 200 TABLET, EXTENDED RELEASE ORAL at 12:11

## 2023-07-26 RX ADMIN — CARBIDOPA AND LEVODOPA 5 MG: 50; 200 TABLET, EXTENDED RELEASE ORAL at 16:56

## 2023-07-26 RX ADMIN — DIPHENHYDRAMINE HYDROCHLORIDE 25 MG: 25 CAPSULE ORAL at 20:24

## 2023-07-26 RX ADMIN — INSULIN GLARGINE 13 UNITS: 100 INJECTION, SOLUTION SUBCUTANEOUS at 20:24

## 2023-07-26 RX ADMIN — ENOXAPARIN SODIUM 40 MG: 100 INJECTION SUBCUTANEOUS at 20:25

## 2023-07-26 RX ADMIN — ENOXAPARIN SODIUM 40 MG: 100 INJECTION SUBCUTANEOUS at 08:59

## 2023-07-26 RX ADMIN — CLOPIDOGREL BISULFATE 75 MG: 75 TABLET, FILM COATED ORAL at 08:59

## 2023-07-26 RX ADMIN — GABAPENTIN 200 MG: 100 CAPSULE ORAL at 15:55

## 2023-07-26 RX ADMIN — PYRIDOSTIGMINE BROMIDE 60 MG: 60 TABLET ORAL at 21:08

## 2023-07-26 RX ADMIN — ASPIRIN 81 MG: 81 TABLET, COATED ORAL at 08:59

## 2023-07-26 RX ADMIN — Medication 1 TABLET: at 08:59

## 2023-07-26 RX ADMIN — Medication 5000 UNITS: at 08:59

## 2023-07-26 RX ADMIN — TRAMADOL HYDROCHLORIDE 50 MG: 50 TABLET, COATED ORAL at 15:55

## 2023-07-26 RX ADMIN — PYRIDOSTIGMINE BROMIDE 60 MG: 60 TABLET ORAL at 05:20

## 2023-07-26 RX ADMIN — CARBIDOPA AND LEVODOPA 5 MG: 50; 200 TABLET, EXTENDED RELEASE ORAL at 09:00

## 2023-07-26 RX ADMIN — OXYCODONE HYDROCHLORIDE AND ACETAMINOPHEN 500 MG: 500 TABLET ORAL at 08:59

## 2023-07-26 RX ADMIN — INSULIN GLARGINE 13 UNITS: 100 INJECTION, SOLUTION SUBCUTANEOUS at 08:58

## 2023-07-27 LAB
GLUCOSE BLDC GLUCOMTR-MCNC: 130 MG/DL (ref 70–130)
GLUCOSE BLDC GLUCOMTR-MCNC: 164 MG/DL (ref 70–130)
GLUCOSE BLDC GLUCOMTR-MCNC: 192 MG/DL (ref 70–130)
GLUCOSE BLDC GLUCOMTR-MCNC: 289 MG/DL (ref 70–130)

## 2023-07-27 PROCEDURE — 63710000001 DIPHENHYDRAMINE PER 50 MG: Performed by: FAMILY MEDICINE

## 2023-07-27 PROCEDURE — 97530 THERAPEUTIC ACTIVITIES: CPT | Performed by: OCCUPATIONAL THERAPIST

## 2023-07-27 PROCEDURE — 97110 THERAPEUTIC EXERCISES: CPT | Performed by: OCCUPATIONAL THERAPIST

## 2023-07-27 PROCEDURE — 97530 THERAPEUTIC ACTIVITIES: CPT

## 2023-07-27 PROCEDURE — 97116 GAIT TRAINING THERAPY: CPT

## 2023-07-27 PROCEDURE — 25010000002 ENOXAPARIN PER 10 MG: Performed by: FAMILY MEDICINE

## 2023-07-27 PROCEDURE — 63710000001 INSULIN GLARGINE PER 5 UNITS: Performed by: INTERNAL MEDICINE

## 2023-07-27 PROCEDURE — 82948 REAGENT STRIP/BLOOD GLUCOSE: CPT

## 2023-07-27 PROCEDURE — 97110 THERAPEUTIC EXERCISES: CPT

## 2023-07-27 PROCEDURE — 97535 SELF CARE MNGMENT TRAINING: CPT | Performed by: OCCUPATIONAL THERAPIST

## 2023-07-27 RX ORDER — MIDODRINE HYDROCHLORIDE 2.5 MG/1
5 TABLET ORAL
Status: DISCONTINUED | OUTPATIENT
Start: 2023-07-27 | End: 2023-08-01 | Stop reason: HOSPADM

## 2023-07-27 RX ADMIN — GABAPENTIN 200 MG: 100 CAPSULE ORAL at 15:49

## 2023-07-27 RX ADMIN — OXYCODONE HYDROCHLORIDE AND ACETAMINOPHEN 500 MG: 500 TABLET ORAL at 08:29

## 2023-07-27 RX ADMIN — PYRIDOSTIGMINE BROMIDE 60 MG: 60 TABLET ORAL at 13:04

## 2023-07-27 RX ADMIN — Medication 5000 UNITS: at 08:29

## 2023-07-27 RX ADMIN — ROSUVASTATIN CALCIUM 40 MG: 20 TABLET, FILM COATED ORAL at 20:40

## 2023-07-27 RX ADMIN — Medication 1 TABLET: at 08:29

## 2023-07-27 RX ADMIN — TRAMADOL HYDROCHLORIDE 50 MG: 50 TABLET, COATED ORAL at 15:51

## 2023-07-27 RX ADMIN — PYRIDOSTIGMINE BROMIDE 60 MG: 60 TABLET ORAL at 05:33

## 2023-07-27 RX ADMIN — DIPHENHYDRAMINE HYDROCHLORIDE 25 MG: 25 CAPSULE ORAL at 20:39

## 2023-07-27 RX ADMIN — CARBIDOPA AND LEVODOPA 5 MG: 50; 200 TABLET, EXTENDED RELEASE ORAL at 06:30

## 2023-07-27 RX ADMIN — PANTOPRAZOLE SODIUM 40 MG: 40 TABLET, DELAYED RELEASE ORAL at 05:33

## 2023-07-27 RX ADMIN — INSULIN GLARGINE 13 UNITS: 100 INJECTION, SOLUTION SUBCUTANEOUS at 08:29

## 2023-07-27 RX ADMIN — ENOXAPARIN SODIUM 40 MG: 100 INJECTION SUBCUTANEOUS at 08:29

## 2023-07-27 RX ADMIN — INSULIN GLARGINE 13 UNITS: 100 INJECTION, SOLUTION SUBCUTANEOUS at 20:41

## 2023-07-27 RX ADMIN — FLUDROCORTISONE ACETATE 50 MCG: 0.1 TABLET ORAL at 08:29

## 2023-07-27 RX ADMIN — ENOXAPARIN SODIUM 40 MG: 100 INJECTION SUBCUTANEOUS at 20:41

## 2023-07-27 RX ADMIN — ASPIRIN 81 MG: 81 TABLET, COATED ORAL at 08:29

## 2023-07-27 RX ADMIN — CLOPIDOGREL BISULFATE 75 MG: 75 TABLET, FILM COATED ORAL at 08:29

## 2023-07-27 RX ADMIN — PYRIDOSTIGMINE BROMIDE 60 MG: 60 TABLET ORAL at 21:03

## 2023-07-27 RX ADMIN — CARBIDOPA AND LEVODOPA 5 MG: 50; 200 TABLET, EXTENDED RELEASE ORAL at 11:46

## 2023-07-28 LAB
GLUCOSE BLDC GLUCOMTR-MCNC: 107 MG/DL (ref 70–130)
GLUCOSE BLDC GLUCOMTR-MCNC: 233 MG/DL (ref 70–130)
GLUCOSE BLDC GLUCOMTR-MCNC: 233 MG/DL (ref 70–130)
GLUCOSE BLDC GLUCOMTR-MCNC: 288 MG/DL (ref 70–130)

## 2023-07-28 PROCEDURE — 97535 SELF CARE MNGMENT TRAINING: CPT | Performed by: OCCUPATIONAL THERAPIST

## 2023-07-28 PROCEDURE — 99231 SBSQ HOSP IP/OBS SF/LOW 25: CPT | Performed by: NURSE PRACTITIONER

## 2023-07-28 PROCEDURE — 82948 REAGENT STRIP/BLOOD GLUCOSE: CPT

## 2023-07-28 PROCEDURE — 25010000002 ENOXAPARIN PER 10 MG: Performed by: FAMILY MEDICINE

## 2023-07-28 PROCEDURE — 97110 THERAPEUTIC EXERCISES: CPT

## 2023-07-28 PROCEDURE — 97530 THERAPEUTIC ACTIVITIES: CPT | Performed by: OCCUPATIONAL THERAPIST

## 2023-07-28 PROCEDURE — 63710000001 DIPHENHYDRAMINE PER 50 MG: Performed by: FAMILY MEDICINE

## 2023-07-28 PROCEDURE — 97530 THERAPEUTIC ACTIVITIES: CPT

## 2023-07-28 PROCEDURE — 97110 THERAPEUTIC EXERCISES: CPT | Performed by: OCCUPATIONAL THERAPIST

## 2023-07-28 PROCEDURE — 97116 GAIT TRAINING THERAPY: CPT

## 2023-07-28 PROCEDURE — 63710000001 INSULIN GLARGINE PER 5 UNITS: Performed by: INTERNAL MEDICINE

## 2023-07-28 RX ADMIN — Medication 1 TABLET: at 08:22

## 2023-07-28 RX ADMIN — ENOXAPARIN SODIUM 40 MG: 100 INJECTION SUBCUTANEOUS at 08:21

## 2023-07-28 RX ADMIN — TRAMADOL HYDROCHLORIDE 50 MG: 50 TABLET, COATED ORAL at 14:07

## 2023-07-28 RX ADMIN — FLUDROCORTISONE ACETATE 50 MCG: 0.1 TABLET ORAL at 08:22

## 2023-07-28 RX ADMIN — PYRIDOSTIGMINE BROMIDE 60 MG: 60 TABLET ORAL at 14:06

## 2023-07-28 RX ADMIN — ROSUVASTATIN CALCIUM 40 MG: 20 TABLET, FILM COATED ORAL at 21:51

## 2023-07-28 RX ADMIN — GABAPENTIN 200 MG: 100 CAPSULE ORAL at 16:26

## 2023-07-28 RX ADMIN — INSULIN GLARGINE 13 UNITS: 100 INJECTION, SOLUTION SUBCUTANEOUS at 21:51

## 2023-07-28 RX ADMIN — ENOXAPARIN SODIUM 40 MG: 100 INJECTION SUBCUTANEOUS at 21:51

## 2023-07-28 RX ADMIN — ASPIRIN 81 MG: 81 TABLET, COATED ORAL at 08:22

## 2023-07-28 RX ADMIN — PYRIDOSTIGMINE BROMIDE 60 MG: 60 TABLET ORAL at 21:51

## 2023-07-28 RX ADMIN — OXYCODONE HYDROCHLORIDE AND ACETAMINOPHEN 500 MG: 500 TABLET ORAL at 08:22

## 2023-07-28 RX ADMIN — DIPHENHYDRAMINE HYDROCHLORIDE 25 MG: 25 CAPSULE ORAL at 21:53

## 2023-07-28 RX ADMIN — Medication 5000 UNITS: at 08:22

## 2023-07-28 RX ADMIN — CLOPIDOGREL BISULFATE 75 MG: 75 TABLET, FILM COATED ORAL at 08:23

## 2023-07-28 RX ADMIN — INSULIN GLARGINE 13 UNITS: 100 INJECTION, SOLUTION SUBCUTANEOUS at 08:21

## 2023-07-28 RX ADMIN — CARBIDOPA AND LEVODOPA 5 MG: 50; 200 TABLET, EXTENDED RELEASE ORAL at 06:30

## 2023-07-28 RX ADMIN — CARBIDOPA AND LEVODOPA 5 MG: 50; 200 TABLET, EXTENDED RELEASE ORAL at 11:38

## 2023-07-28 RX ADMIN — PYRIDOSTIGMINE BROMIDE 60 MG: 60 TABLET ORAL at 05:37

## 2023-07-28 RX ADMIN — PANTOPRAZOLE SODIUM 40 MG: 40 TABLET, DELAYED RELEASE ORAL at 05:38

## 2023-07-29 LAB
GLUCOSE BLDC GLUCOMTR-MCNC: 131 MG/DL (ref 70–130)
GLUCOSE BLDC GLUCOMTR-MCNC: 136 MG/DL (ref 70–130)
GLUCOSE BLDC GLUCOMTR-MCNC: 160 MG/DL (ref 70–130)
GLUCOSE BLDC GLUCOMTR-MCNC: 294 MG/DL (ref 70–130)

## 2023-07-29 PROCEDURE — 25010000002 ENOXAPARIN PER 10 MG: Performed by: FAMILY MEDICINE

## 2023-07-29 PROCEDURE — 63710000001 DIPHENHYDRAMINE PER 50 MG: Performed by: FAMILY MEDICINE

## 2023-07-29 PROCEDURE — 63710000001 INSULIN GLARGINE PER 5 UNITS: Performed by: INTERNAL MEDICINE

## 2023-07-29 PROCEDURE — 82948 REAGENT STRIP/BLOOD GLUCOSE: CPT

## 2023-07-29 RX ADMIN — ANTACID TABLETS 1 TABLET: 500 TABLET, CHEWABLE ORAL at 08:45

## 2023-07-29 RX ADMIN — ENOXAPARIN SODIUM 40 MG: 100 INJECTION SUBCUTANEOUS at 20:27

## 2023-07-29 RX ADMIN — TRAMADOL HYDROCHLORIDE 50 MG: 50 TABLET, COATED ORAL at 20:46

## 2023-07-29 RX ADMIN — CLOPIDOGREL BISULFATE 75 MG: 75 TABLET, FILM COATED ORAL at 08:45

## 2023-07-29 RX ADMIN — DIPHENHYDRAMINE HYDROCHLORIDE 25 MG: 25 CAPSULE ORAL at 20:27

## 2023-07-29 RX ADMIN — Medication 1 TABLET: at 08:45

## 2023-07-29 RX ADMIN — CARBIDOPA AND LEVODOPA 5 MG: 50; 200 TABLET, EXTENDED RELEASE ORAL at 06:31

## 2023-07-29 RX ADMIN — INSULIN GLARGINE 13 UNITS: 100 INJECTION, SOLUTION SUBCUTANEOUS at 20:27

## 2023-07-29 RX ADMIN — GABAPENTIN 200 MG: 100 CAPSULE ORAL at 17:11

## 2023-07-29 RX ADMIN — FLUDROCORTISONE ACETATE 50 MCG: 0.1 TABLET ORAL at 08:45

## 2023-07-29 RX ADMIN — PYRIDOSTIGMINE BROMIDE 60 MG: 60 TABLET ORAL at 05:01

## 2023-07-29 RX ADMIN — ROSUVASTATIN CALCIUM 40 MG: 20 TABLET, FILM COATED ORAL at 20:27

## 2023-07-29 RX ADMIN — CARBIDOPA AND LEVODOPA 5 MG: 50; 200 TABLET, EXTENDED RELEASE ORAL at 10:37

## 2023-07-29 RX ADMIN — INSULIN GLARGINE 13 UNITS: 100 INJECTION, SOLUTION SUBCUTANEOUS at 08:45

## 2023-07-29 RX ADMIN — ASPIRIN 81 MG: 81 TABLET, COATED ORAL at 08:45

## 2023-07-29 RX ADMIN — Medication 5000 UNITS: at 09:00

## 2023-07-29 RX ADMIN — PYRIDOSTIGMINE BROMIDE 60 MG: 60 TABLET ORAL at 15:07

## 2023-07-29 RX ADMIN — PANTOPRAZOLE SODIUM 40 MG: 40 TABLET, DELAYED RELEASE ORAL at 05:01

## 2023-07-29 RX ADMIN — PYRIDOSTIGMINE BROMIDE 60 MG: 60 TABLET ORAL at 21:04

## 2023-07-29 RX ADMIN — OXYCODONE HYDROCHLORIDE AND ACETAMINOPHEN 500 MG: 500 TABLET ORAL at 08:45

## 2023-07-29 RX ADMIN — ENOXAPARIN SODIUM 40 MG: 100 INJECTION SUBCUTANEOUS at 08:44

## 2023-07-30 LAB
GLUCOSE BLDC GLUCOMTR-MCNC: 115 MG/DL (ref 70–130)
GLUCOSE BLDC GLUCOMTR-MCNC: 150 MG/DL (ref 70–130)
GLUCOSE BLDC GLUCOMTR-MCNC: 172 MG/DL (ref 70–130)
GLUCOSE BLDC GLUCOMTR-MCNC: 229 MG/DL (ref 70–130)

## 2023-07-30 PROCEDURE — 63710000001 DIPHENHYDRAMINE PER 50 MG: Performed by: FAMILY MEDICINE

## 2023-07-30 PROCEDURE — 82948 REAGENT STRIP/BLOOD GLUCOSE: CPT

## 2023-07-30 PROCEDURE — 25010000002 ENOXAPARIN PER 10 MG: Performed by: FAMILY MEDICINE

## 2023-07-30 PROCEDURE — 63710000001 INSULIN GLARGINE PER 5 UNITS: Performed by: INTERNAL MEDICINE

## 2023-07-30 RX ADMIN — GABAPENTIN 200 MG: 100 CAPSULE ORAL at 15:17

## 2023-07-30 RX ADMIN — ENOXAPARIN SODIUM 40 MG: 100 INJECTION SUBCUTANEOUS at 08:40

## 2023-07-30 RX ADMIN — Medication 1 TABLET: at 08:40

## 2023-07-30 RX ADMIN — PYRIDOSTIGMINE BROMIDE 60 MG: 60 TABLET ORAL at 05:08

## 2023-07-30 RX ADMIN — FLUDROCORTISONE ACETATE 50 MCG: 0.1 TABLET ORAL at 08:40

## 2023-07-30 RX ADMIN — IBUPROFEN 400 MG: 400 TABLET, FILM COATED ORAL at 20:32

## 2023-07-30 RX ADMIN — Medication 5000 UNITS: at 08:39

## 2023-07-30 RX ADMIN — INSULIN GLARGINE 13 UNITS: 100 INJECTION, SOLUTION SUBCUTANEOUS at 08:39

## 2023-07-30 RX ADMIN — ASPIRIN 81 MG: 81 TABLET, COATED ORAL at 08:39

## 2023-07-30 RX ADMIN — ROSUVASTATIN CALCIUM 40 MG: 20 TABLET, FILM COATED ORAL at 20:31

## 2023-07-30 RX ADMIN — PYRIDOSTIGMINE BROMIDE 60 MG: 60 TABLET ORAL at 15:16

## 2023-07-30 RX ADMIN — CARBIDOPA AND LEVODOPA 5 MG: 50; 200 TABLET, EXTENDED RELEASE ORAL at 11:00

## 2023-07-30 RX ADMIN — ENOXAPARIN SODIUM 40 MG: 100 INJECTION SUBCUTANEOUS at 20:32

## 2023-07-30 RX ADMIN — OXYCODONE HYDROCHLORIDE AND ACETAMINOPHEN 500 MG: 500 TABLET ORAL at 08:39

## 2023-07-30 RX ADMIN — PYRIDOSTIGMINE BROMIDE 60 MG: 60 TABLET ORAL at 21:05

## 2023-07-30 RX ADMIN — INSULIN GLARGINE 13 UNITS: 100 INJECTION, SOLUTION SUBCUTANEOUS at 20:31

## 2023-07-30 RX ADMIN — CLOPIDOGREL BISULFATE 75 MG: 75 TABLET, FILM COATED ORAL at 08:40

## 2023-07-30 RX ADMIN — PANTOPRAZOLE SODIUM 40 MG: 40 TABLET, DELAYED RELEASE ORAL at 05:08

## 2023-07-30 RX ADMIN — CARBIDOPA AND LEVODOPA 5 MG: 50; 200 TABLET, EXTENDED RELEASE ORAL at 06:39

## 2023-07-30 RX ADMIN — DIPHENHYDRAMINE HYDROCHLORIDE 25 MG: 25 CAPSULE ORAL at 20:31

## 2023-07-31 LAB
GLUCOSE BLDC GLUCOMTR-MCNC: 117 MG/DL (ref 70–130)
GLUCOSE BLDC GLUCOMTR-MCNC: 171 MG/DL (ref 70–130)
GLUCOSE BLDC GLUCOMTR-MCNC: 180 MG/DL (ref 70–130)
GLUCOSE BLDC GLUCOMTR-MCNC: 207 MG/DL (ref 70–130)

## 2023-07-31 PROCEDURE — 97116 GAIT TRAINING THERAPY: CPT

## 2023-07-31 PROCEDURE — 82948 REAGENT STRIP/BLOOD GLUCOSE: CPT

## 2023-07-31 PROCEDURE — 25010000002 ENOXAPARIN PER 10 MG: Performed by: FAMILY MEDICINE

## 2023-07-31 PROCEDURE — 97535 SELF CARE MNGMENT TRAINING: CPT | Performed by: OCCUPATIONAL THERAPIST

## 2023-07-31 PROCEDURE — 63710000001 DIPHENHYDRAMINE PER 50 MG: Performed by: FAMILY MEDICINE

## 2023-07-31 PROCEDURE — 97110 THERAPEUTIC EXERCISES: CPT

## 2023-07-31 PROCEDURE — 97530 THERAPEUTIC ACTIVITIES: CPT | Performed by: OCCUPATIONAL THERAPIST

## 2023-07-31 PROCEDURE — 97530 THERAPEUTIC ACTIVITIES: CPT

## 2023-07-31 PROCEDURE — 63710000001 INSULIN GLARGINE PER 5 UNITS: Performed by: INTERNAL MEDICINE

## 2023-07-31 PROCEDURE — 97110 THERAPEUTIC EXERCISES: CPT | Performed by: OCCUPATIONAL THERAPIST

## 2023-07-31 RX ORDER — TRAMADOL HYDROCHLORIDE 50 MG/1
50 TABLET ORAL EVERY 12 HOURS PRN
Status: DISCONTINUED | OUTPATIENT
Start: 2023-07-31 | End: 2023-08-01 | Stop reason: HOSPADM

## 2023-07-31 RX ORDER — ENOXAPARIN SODIUM 100 MG/ML
40 INJECTION SUBCUTANEOUS EVERY 24 HOURS
Status: DISCONTINUED | OUTPATIENT
Start: 2023-08-01 | End: 2023-08-01 | Stop reason: HOSPADM

## 2023-07-31 RX ORDER — FLUDROCORTISONE ACETATE 0.1 MG/1
0.05 TABLET ORAL DAILY
Qty: 8 TABLET | Refills: 0 | Status: CANCELLED | OUTPATIENT
Start: 2023-08-01

## 2023-07-31 RX ADMIN — PYRIDOSTIGMINE BROMIDE 60 MG: 60 TABLET ORAL at 05:28

## 2023-07-31 RX ADMIN — DIPHENHYDRAMINE HYDROCHLORIDE 25 MG: 25 CAPSULE ORAL at 02:46

## 2023-07-31 RX ADMIN — ENOXAPARIN SODIUM 40 MG: 100 INJECTION SUBCUTANEOUS at 10:08

## 2023-07-31 RX ADMIN — PYRIDOSTIGMINE BROMIDE 60 MG: 60 TABLET ORAL at 21:14

## 2023-07-31 RX ADMIN — OXYCODONE HYDROCHLORIDE AND ACETAMINOPHEN 500 MG: 500 TABLET ORAL at 10:07

## 2023-07-31 RX ADMIN — ASPIRIN 81 MG: 81 TABLET, COATED ORAL at 10:07

## 2023-07-31 RX ADMIN — Medication 5 MG: at 02:45

## 2023-07-31 RX ADMIN — CARBIDOPA AND LEVODOPA 5 MG: 50; 200 TABLET, EXTENDED RELEASE ORAL at 06:30

## 2023-07-31 RX ADMIN — TRAMADOL HYDROCHLORIDE 50 MG: 50 TABLET ORAL at 20:26

## 2023-07-31 RX ADMIN — FLUDROCORTISONE ACETATE 50 MCG: 0.1 TABLET ORAL at 10:10

## 2023-07-31 RX ADMIN — Medication 1 TABLET: at 10:08

## 2023-07-31 RX ADMIN — PANTOPRAZOLE SODIUM 40 MG: 40 TABLET, DELAYED RELEASE ORAL at 05:29

## 2023-07-31 RX ADMIN — INSULIN GLARGINE 13 UNITS: 100 INJECTION, SOLUTION SUBCUTANEOUS at 20:27

## 2023-07-31 RX ADMIN — PYRIDOSTIGMINE BROMIDE 60 MG: 60 TABLET ORAL at 13:04

## 2023-07-31 RX ADMIN — CLOPIDOGREL BISULFATE 75 MG: 75 TABLET, FILM COATED ORAL at 10:08

## 2023-07-31 RX ADMIN — Medication 5000 UNITS: at 10:09

## 2023-07-31 RX ADMIN — CARBIDOPA AND LEVODOPA 5 MG: 50; 200 TABLET, EXTENDED RELEASE ORAL at 12:26

## 2023-07-31 RX ADMIN — ROSUVASTATIN CALCIUM 40 MG: 20 TABLET, FILM COATED ORAL at 20:27

## 2023-07-31 RX ADMIN — GABAPENTIN 200 MG: 100 CAPSULE ORAL at 17:42

## 2023-07-31 RX ADMIN — INSULIN GLARGINE 13 UNITS: 100 INJECTION, SOLUTION SUBCUTANEOUS at 10:10

## 2023-07-31 RX ADMIN — DIPHENHYDRAMINE HYDROCHLORIDE 25 MG: 25 CAPSULE ORAL at 20:26

## 2023-08-01 LAB
ANION GAP SERPL CALCULATED.3IONS-SCNC: 10.5 MMOL/L (ref 5–15)
BASOPHILS # BLD AUTO: 0.04 10*3/MM3 (ref 0–0.2)
BASOPHILS NFR BLD AUTO: 0.7 % (ref 0–1.5)
BUN SERPL-MCNC: 8 MG/DL (ref 6–20)
BUN/CREAT SERPL: 11 (ref 7–25)
CALCIUM SPEC-SCNC: 9.6 MG/DL (ref 8.6–10.5)
CHLORIDE SERPL-SCNC: 111 MMOL/L (ref 98–107)
CO2 SERPL-SCNC: 26.5 MMOL/L (ref 22–29)
CREAT SERPL-MCNC: 0.73 MG/DL (ref 0.57–1)
DEPRECATED RDW RBC AUTO: 49.7 FL (ref 37–54)
EGFRCR SERPLBLD CKD-EPI 2021: 95.5 ML/MIN/1.73
EOSINOPHIL # BLD AUTO: 0.27 10*3/MM3 (ref 0–0.4)
EOSINOPHIL NFR BLD AUTO: 5 % (ref 0.3–6.2)
ERYTHROCYTE [DISTWIDTH] IN BLOOD BY AUTOMATED COUNT: 15.1 % (ref 12.3–15.4)
GLUCOSE BLDC GLUCOMTR-MCNC: 101 MG/DL (ref 70–130)
GLUCOSE BLDC GLUCOMTR-MCNC: 238 MG/DL (ref 70–130)
GLUCOSE SERPL-MCNC: 93 MG/DL (ref 65–99)
HCT VFR BLD AUTO: 32.9 % (ref 34–46.6)
HGB BLD-MCNC: 10.6 G/DL (ref 12–15.9)
IMM GRANULOCYTES # BLD AUTO: 0.02 10*3/MM3 (ref 0–0.05)
IMM GRANULOCYTES NFR BLD AUTO: 0.4 % (ref 0–0.5)
LYMPHOCYTES # BLD AUTO: 1.89 10*3/MM3 (ref 0.7–3.1)
LYMPHOCYTES NFR BLD AUTO: 34.8 % (ref 19.6–45.3)
MCH RBC QN AUTO: 29.6 PG (ref 26.6–33)
MCHC RBC AUTO-ENTMCNC: 32.2 G/DL (ref 31.5–35.7)
MCV RBC AUTO: 91.9 FL (ref 79–97)
MONOCYTES # BLD AUTO: 0.38 10*3/MM3 (ref 0.1–0.9)
MONOCYTES NFR BLD AUTO: 7 % (ref 5–12)
NEUTROPHILS NFR BLD AUTO: 2.83 10*3/MM3 (ref 1.7–7)
NEUTROPHILS NFR BLD AUTO: 52.1 % (ref 42.7–76)
NRBC BLD AUTO-RTO: 0 /100 WBC (ref 0–0.2)
PLATELET # BLD AUTO: 199 10*3/MM3 (ref 140–450)
PMV BLD AUTO: 9.8 FL (ref 6–12)
POTASSIUM SERPL-SCNC: 3.6 MMOL/L (ref 3.5–5.2)
RBC # BLD AUTO: 3.58 10*6/MM3 (ref 3.77–5.28)
SODIUM SERPL-SCNC: 148 MMOL/L (ref 136–145)
WBC NRBC COR # BLD: 5.43 10*3/MM3 (ref 3.4–10.8)

## 2023-08-01 PROCEDURE — 80048 BASIC METABOLIC PNL TOTAL CA: CPT | Performed by: INTERNAL MEDICINE

## 2023-08-01 PROCEDURE — 63710000001 INSULIN GLARGINE PER 5 UNITS: Performed by: INTERNAL MEDICINE

## 2023-08-01 PROCEDURE — 25010000002 ENOXAPARIN PER 10 MG: Performed by: INTERNAL MEDICINE

## 2023-08-01 PROCEDURE — 97530 THERAPEUTIC ACTIVITIES: CPT

## 2023-08-01 PROCEDURE — 97110 THERAPEUTIC EXERCISES: CPT

## 2023-08-01 PROCEDURE — 97116 GAIT TRAINING THERAPY: CPT

## 2023-08-01 PROCEDURE — 85025 COMPLETE CBC W/AUTO DIFF WBC: CPT | Performed by: INTERNAL MEDICINE

## 2023-08-01 PROCEDURE — 82948 REAGENT STRIP/BLOOD GLUCOSE: CPT

## 2023-08-01 RX ORDER — GABAPENTIN 100 MG/1
CAPSULE ORAL
Qty: 15 CAPSULE | Refills: 0 | Status: SHIPPED | OUTPATIENT
Start: 2023-08-01

## 2023-08-01 RX ORDER — MIDODRINE HYDROCHLORIDE 5 MG/1
5 TABLET ORAL
Qty: 30 TABLET | Refills: 0 | Status: SHIPPED | OUTPATIENT
Start: 2023-08-01

## 2023-08-01 RX ORDER — PYRIDOSTIGMINE BROMIDE 60 MG/1
60 TABLET ORAL EVERY 8 HOURS SCHEDULED
Qty: 45 TABLET | Refills: 0 | Status: SHIPPED | OUTPATIENT
Start: 2023-08-01

## 2023-08-01 RX ADMIN — CLOPIDOGREL BISULFATE 75 MG: 75 TABLET, FILM COATED ORAL at 08:56

## 2023-08-01 RX ADMIN — Medication 1 TABLET: at 08:56

## 2023-08-01 RX ADMIN — OXYCODONE HYDROCHLORIDE AND ACETAMINOPHEN 500 MG: 500 TABLET ORAL at 08:55

## 2023-08-01 RX ADMIN — PANTOPRAZOLE SODIUM 40 MG: 40 TABLET, DELAYED RELEASE ORAL at 05:36

## 2023-08-01 RX ADMIN — ASPIRIN 81 MG: 81 TABLET, COATED ORAL at 08:55

## 2023-08-01 RX ADMIN — PYRIDOSTIGMINE BROMIDE 60 MG: 60 TABLET ORAL at 14:12

## 2023-08-01 RX ADMIN — CARBIDOPA AND LEVODOPA 5 MG: 50; 200 TABLET, EXTENDED RELEASE ORAL at 12:34

## 2023-08-01 RX ADMIN — INSULIN GLARGINE 13 UNITS: 100 INJECTION, SOLUTION SUBCUTANEOUS at 10:14

## 2023-08-01 RX ADMIN — Medication 5000 UNITS: at 08:56

## 2023-08-01 RX ADMIN — CARBIDOPA AND LEVODOPA 5 MG: 50; 200 TABLET, EXTENDED RELEASE ORAL at 06:32

## 2023-08-01 RX ADMIN — ENOXAPARIN SODIUM 40 MG: 40 INJECTION SUBCUTANEOUS at 08:57

## 2023-08-01 RX ADMIN — PYRIDOSTIGMINE BROMIDE 60 MG: 60 TABLET ORAL at 05:36

## 2023-08-02 VITALS
TEMPERATURE: 97.5 F | BODY MASS INDEX: 30.45 KG/M2 | OXYGEN SATURATION: 97 % | RESPIRATION RATE: 16 BRPM | DIASTOLIC BLOOD PRESSURE: 78 MMHG | HEART RATE: 84 BPM | SYSTOLIC BLOOD PRESSURE: 136 MMHG | HEIGHT: 67 IN | WEIGHT: 194 LBS

## 2023-08-02 PROCEDURE — 94799 UNLISTED PULMONARY SVC/PX: CPT
